# Patient Record
Sex: FEMALE | Race: WHITE
[De-identification: names, ages, dates, MRNs, and addresses within clinical notes are randomized per-mention and may not be internally consistent; named-entity substitution may affect disease eponyms.]

---

## 2022-12-10 ENCOUNTER — HOSPITAL ENCOUNTER (EMERGENCY)
Dept: HOSPITAL 97 - ER | Age: 62
Discharge: HOME | End: 2022-12-10
Payer: COMMERCIAL
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
## 2022-12-10 DIAGNOSIS — M25.561: ICD-10-CM

## 2022-12-10 DIAGNOSIS — M79.671: Primary | ICD-10-CM

## 2022-12-10 NOTE — ER
Nurse's Notes                                                                                     

 [facility]                                                                 

Name: Gita Haile                                                                                  

Age: 62 yrs                                                                                       

Sex: Female                                                                                       

: 1960                                                                                   

MRN: [id number]                                                                                   

Arrival Date: 12/10/2022                                                                          

Time: 13:22                                                                                       

Account#: [id number]                                                                            

Bed IW4                                                                                           

Private MD:                                                                                       

Diagnosis: Pain in right foot;Pain in right knee;Fall on same level, unspecified                  

                                                                                                  

ED Course:                                                                                        

12/10                                                                                             

13:22 Patient arrived in ED.                                                                  as  

13:55 Kishore Larkin PA is PHCP.                                                                cp  

13:55 Eloy Acuna MD is Attending Physician.                                            cp  

15:37 Dior Fishman RN is Primary Nurse.                                                   iw  

                                                                                                  

Administered Medications:                                                                         

No medications were administered                                                                  

                                                                                                  

                                                                                                  

Outcome:                                                                                          

15:34 Discharge ordered by MD.                                                                cp  

15:37 Patient left the ED.                                                                    iw  

                                                                                                  

Signatures:                                                                                       

Selina Hunter                             as                                                   

Dior Fishman, RN                     RN   iw                                                   

Kishore Larkin PA                         PA   cp                                                   

                                                                                                  

**************************************************************************************************

## 2022-12-10 NOTE — EDPHYS
Physician Documentation                                                                           

 [facility]                                                                 

Name: Gita Haile                                                                                  

Age: 62 yrs                                                                                       

Sex: Female                                                                                       

: 1960                                                                                   

MRN: [id number]                                                                                   

Arrival Date: 12/10/2022                                                                          

Time: 13:22                                                                                       

Account#: [id number]                                                                            

Bed IW4                                                                                           

Private MD:                                                                                       

ED Physician Eloy Acuna                                                                     

HPI:                                                                                              

12/10                                                                                             

14:50 This 62 yrs old Female presents to ER via Un[facility] with complaints of Toe Injury,     cp  

      Knee Injury.                                                                                

14:50 The patient presents with an injury, pain, that is acute. The complaints affect the     cp  

      right knee and right foot. Context: The problem was sustained at home, resulted from        

      the patient tripping, over a pet, the patient can fully bear weight, the patient is         

      able to ambulate, with mild difficulty. Onset: The symptoms/episode began/occurred          

      yesterday.                                                                                  

14:50 Associated signs and symptoms: The patient has no apparent associated signs or symptoms.cp  

                                                                                                  

ROS:                                                                                              

14:55 MS/extremity: Positive for pain, tenderness, of the right knee and right foot.          cp  

14:55 Eyes: Negative for injury, pain, redness, and discharge.                                cp  

14:55 Constitutional: Negative for body aches, chills, poor PO intake.                            

14:55 Neck: Negative for pain with movement, pain at rest, stiffness.                             

14:55 Cardiovascular: Negative for chest pain, palpitations.                                      

14:55 Respiratory: Negative for cough, shortness of breath, wheezing.                             

14:55 Abdomen/GI: Negative for abdominal pain, nausea, vomiting, and diarrhea.                    

14:55 Back: Negative for pain at rest, pain with movement.                                        

14:55 Skin: Negative for cellulitis, rash.                                                        

                                                                                                  

Exam:                                                                                             

15:00 Constitutional: The patient appears in no acute distress, alert, awake, non-toxic, well cp  

      developed, well nourished.                                                                  

15:00 Head/Face:  Normocephalic, atraumatic.                                                  cp  

15:00 Neck: ROM/movement: is normal, is supple, without pain, no range of motions limitations.    

15:00 Chest/axilla: Inspection: normal.                                                           

15:00 Cardiovascular: Rate: normal.                                                               

15:00 Respiratory: the patient does not display signs of respiratory distress,  Respirations:     

      normal, no use of accessory muscles, no retractions, labored breathing, is not present.     

15:00 Musculoskeletal/extremity: Extremities: grossly normal except: noted in the right knee:     

      pain, tenderness, There is no evidence of  decreased ROM, deformity, noted in the right     

      foot: pain, tenderness to great toe and dorsum mid foot, no evidence of  deformity,         

      Pulses: noted to be 2+ in the right dorsalis pedis artery.                                  

15:00 Neuro: Orientation: to person, place \T\ time. Mentation: is normal.                        

                                                                                                  

MDM:                                                                                              

14:44 Patient medically screened.                                                             cp  

15:00 Differential diagnosis: dislocation, closed fracture, contusion.                        cp  

15:33 Data reviewed: vital signs, nurses notes.                                               cp  

15:33 Counseling: I had a detailed discussion with the patient and/or guardian regarding: the cp  

      historical points, exam findings, and any diagnostic results supporting the                 

      discharge/admit diagnosis. ED course: Patient declined to wait for xrays of right knee      

      and right foot to be done. Patient may return at any time for reevaluation.                 

                                                                                                  

Administered Medications:                                                                         

No medications were administered                                                                  

                                                                                                  

                                                                                                  

Disposition:                                                                                      

17:30 Co-signature as Attending Physician, Eloy Acuna MD I agree with the assessment and rt  

      plan of care.                                                                               

                                                                                                  

Disposition Summary:                                                                              

12/10/22 15:34                                                                                    

Discharge Ordered                                                                                 

      Location: Home                                                                          cp  

      Problem: new                                                                            cp  

      Symptoms: are unchanged                                                                 cp  

      Condition: Stable                                                                       cp  

      Diagnosis                                                                                   

        - Pain in right foot                                                                  cp  

        - Pain in right knee                                                                  cp  

        - Fall on same level, unspecified                                                     cp  

      Followup:                                                                               cp  

        - With: Private Physician                                                                  

        - When: 2 - 3 days                                                                         

        - Reason: Recheck today's complaints                                                       

      Discharge Instructions:                                                                     

        - Discharge Summary Sheet                                                             cp  

        - Elastic Bandage and RICE Therapy                                                    cp  

        - Acute Knee Pain, Adult                                                              cp  

        - Foot Pain                                                                           cp  

      Forms:                                                                                      

        - Medication Reconciliation Form                                                      cp  

        - Thank You Letter                                                                    cp  

        - Antibiotic Education                                                                cp  

        - Prescription Opioid Use                                                             cp  

Signatures:                                                                                       

Dispatcher [employer]                           EDMS                                                 

Kishore Larkin PA PA   cp                                                   

Eloy Acuna MD MD   rt                                                   

                                                                                                  

Corrections: (The following items were deleted from the chart)                                    

                                                                                             

04:01 12/10 14:00 MS/extremity: Positive for pain, tenderness, of the right knee and right    cp  

      foot, cp                                                                                    

                                                                                                  

**************************************************************************************************

## 2022-12-10 NOTE — XMS REPORT
Continuity of Care Document

                          Created on:December 10, 2022



Patient:GILDARDO COON

Sex:Female

:1960

External Reference #:[id number]





Demographics







                          Address                   [address]



                                                    [place] [address]

 

                          Home Phone                [phone]

 

                          Work Phone                [phone]

 

                          Mobile Phone              [phone]

 

                          Email Address             [email or web address]

 

                          Preferred Language        English

 

                          Marital Status            Unknown

 

                          Jew Affiliation     Unknown

 

                          Race                      Unknown

 

                          Additional Race(s)        Unavailable



                                                    White

 

                          Ethnic Group              Unknown









Author







                          Organization              [facility]

t

 

                          Address                   [address]



                                                    [place] [address]

 

                          Phone                     [phone]









Support







                Name            Relationship    Address         Phone

 

                Brett Shepherd    Spouse          Unavailable     [phone]

 

                RONY MEYER   O               Unavailable     [phone]

 

                CHITO SANCHEZ     Friend          Unavailable     [phone]









Care Team Providers







                    Name                Role                Phone

 

                    Arnulfo London MD     Primary Care Physician [phone]

 

                    SYSTEM, PROVIDER NOT IN Attending Clinician Unavailable

 

                    Daron Coley MD  Attending Clinician [phone]

 

                    DARON COLEY     Attending Clinician Unavailable

 

                    Sangita Ortega Attending Clinician [phone]

 

                    SANGITA PAIGE Attending Clinician Unavailable

 

                    Kiya Chavez  Attending Clinician [phone]

 

                    JOAN MILLER        Attending Clinician Unavailable

 

                    Maty Whitt RN   Attending Clinician Unavailable

 

                    Monique Barrera NP Attending Clinician [phone]

 

                    MONIQUE BARRERA  Attending Clinician Unavailable

 

                    Dima Trotter  Attending Clinician [phone]

 

                    Rancho Mcfarlane, Sadie FALCON Attending Clinician [phone]

[phone]

 

                    DIMA BEAR      Attending Clinician Unavailable

 

                    Kvng Gallegos MD     Attending Clinician [phone]

 

                    KVNG GALLEGOS        Attending Clinician Unavailable

 

                    Akosua Medeiros RN   Attending Clinician Unavailable

 

                    JOSE ALFREDO ESPOSITO   Attending Clinician Unavailable

 

                    Jose Alfredo Corrigan Attending Clinician [phone]

 

                    Karli Garcia  Attending Clinician [phone]

 

                    Bebetobis [facility], Adelaida BURROUGHS. Attending Clinician [phone]

 

                    Adonay SOLANO, Milly   Attending Clinician [phone]

 

                    Kristina DAVID, Daryl  Attending Clinician [phone]

 

                    Kymberly Ramos RN    Attending Clinician [phone]

 

                    Migel SOLANO, Pamela Ashley Attending Clinician [phone]

[phone]

 

                    VENANCIO TAN   Attending Clinician Unavailable

 

                    Justen DAVID, Sameer  Attending Clinician [phone]

 

                    Vignesh NP, Kinza Attending Clinician [phone]

 

                    Drake DAVID, Jing      Attending Clinician [phone]

 

                    Rach SOLANO, Lilliana Attending Clinician [phone]

 

                    Roberto RT, Keara C  Attending Clinician Unavailable

 

                    Vitor DONALD, Alejandra     Attending Clinician [phone]

 

                    Sparkle SOLANO, Sandro  Attending Clinician [phone]

 

                    Luis DAVID, Santos Attending Clinician [phone]

 

                    Shady Painter MD        Attending Clinician [phone]

 

                    Collin Adler MD     Attending Clinician [phone]

 

                    Akosua Bauer  Attending Clinician [phone]

 

                    AKOSUA ROYAL      Attending Clinician Unavailable

 

                    Venancio Dominguez LVN Attending Clinician Unavailable

 

                    Douglas Shankar RN Attending Clinician Unavailable

 

                    Vel Abdul RN Attending Clinician Unavailable

 

                    Smiley Lopez Attending Clinician [phone]

 

                    Severino Hahn RN    Attending Clinician Unavailable

 

                    Avinash Heredia RN Attending Clinician Unavailable

 

                    Kymberly Choi RN Attending Clinician Unavailable

 

                    Dereje DONALD, Abiola CRISOSTOMO Attending Clinician [phone]

 

                    Haley Rogel RN Attending Clinician Unavailable

 

                    Shady Perez MD  Attending Clinician [phone]

 

                    Carmen Brush MA   Attending Clinician Unavailable

 

                    Reyes RN, Marialuisa L Attending Clinician Unavailable

 

                    Marium STEPHEN, Marlene   Attending Clinician Unavailable

 

                    Rubi RN, Lisbeth ORTEZ Attending Clinician Unavailable

 

                    Cricket RN, Tania LOPEZ Attending Clinician [phone]

 

                    Ramirez RN, Silvio CHEEMA  Attending Clinician [phone]

 

                    Tremayne RN, Santos MOSS  Attending Clinician [phone]

 

                    Max STEPHEN, Shree SHABAZZ Attending Clinician Unavailable

 

                    DIMITRIOS BRAUN  Attending Clinician Unavailable

 

                    Karen DAVID, Dimitrios JASON Attending Clinician [phone]

 

                    Tracy CONKLIN, Aubrey   Attending Clinician [phone]

 

                    Kevin HARPER MD, Serenity Attending Clinician [phone]

 

                    Doctor Unassigned, No Name Attending Clinician Unavailable

 

                    Only, Adc Test      Attending Clinician Unavailable

 

                    Pob, Adc Lab Main   Attending Clinician Unavailable

 

                    Stefano STEPHEN, Lianne AREVALO Attending Clinician Unavailable

 

                    Jaki DAVID, Enrike  Attending Clinician [phone]

 

                    Raul STEPHEN, Kiya SHEIKH  Attending Clinician [phone]

 

                    Irineo PA, Moses     Attending Clinician Unavailable

 

                    Giuliano STEPHEN, Cruz DOSS Attending Clinician [phone]

 

                    Elias STEPHEN, Ananya BURROUGHS Attending Clinician [phone]

 

                    Victor M STEPHEN, Nisha FALCON Attending Clinician Unavailable

 

                    Jordon DAVID, Kvng   Attending Clinician [phone]

 

                    Mena Felix Attending Clinician [phone]

 

                    Danya Madison Attending Clinician [phone]

 

                    Henri NP, Jose Alfredo JASON Attending Clinician [phone]

 

                    Merlin MD, Benjamin  Attending Clinician [phone]

 

                    Ruel DAVID, Jenaro Attending Clinician Unavailable

 

                    Cynthia DONALD, Carmen Attending Clinician [phone]

 

                    Sara DAVID, Melissa    Attending Clinician [phone]

 

                    Devin DAVID, Mai      Attending Clinician [phone]

 

                    Elie Perez CRNA Attending Clinician [phone]

 

                    CarrascoShayy Andrade Attending Clinician [phone]

 

                    SERENITY BAKER Attending Clinician Unavailable

 

                    JING ALTMAN         Attending Clinician Unavailable

 

                    PAMELA LAINEZ Attending Clinician Unavailable

 

                    SANDRO ORDAZ    Attending Clinician Unavailable

 

                    SHALONDA MORENO M.D. Attending Clinician Unavailable

 

                    DARON COLEY     Admitting Clinician Unavailable

 

                    DIMITRIOS BRAUN  Admitting Clinician Unavailable

 

                    Dimitrios Braun MD Admitting Clinician [phone]









Payers







           Payer Name Policy Type Policy Number Effective Date Expiration Date S

oursaurabh

 

           [employer] CHOICE POS II            [id number] 2001            



                                            00:00:00              

 

           [employer] HMO POS OPEN            [id number]            



           ACCESS                           00:00:00              

 

           [employer] PPO             [id number] 2015            



                                            00:00:00              

 

           [employer] COMMERCIAL            [id number] 2021            



           OUT OF NETWORK                       00:00:00              

 

           [employer] LIFESOURCE            [id number] 2019            



           SCT                              00:00:00              







Problems







       Condition Condition Condition Status Onset  Resolution Last   Treating Co

mments 

Source



       Name   Details Category        Date   Date   Treatment Clinician        



                                                 Date                 

 

       Class 2 Class 2 Disease Active                              [facility]



       severe severe                                              Health



       obesity obesity               00:00:                             



       due to due to               00                                 



       excess excess                                                  



       calories calories                                                  



       with   with                                                    



       serious serious                                                  



       comorbidit comorbidit                                                  



       y and body y and body                                                  



       mass index mass index                                                  



       (BMI) of (BMI) of                                                  



       37.0 to 37.0 to                                                  



       37.9 in 37.9 in                                                  



       adult  adult                                                   

 

       Right hip Right hip Disease Active                              [facility]



       pain   pain                                                Health



                                   00:00:                             



                                   00                                 

 

       Pain due Pain due Disease Active                              [facility]



       to right to right                                              Health



       hip joint hip joint               00:00:                             



       prosthesis prosthesis               00                                 

 

       Idiopathic Idiopathic Disease Active                              U

T



       aseptic aseptic                                              Health



       necrosis necrosis               00:00:                             



       of right of right               00                                 



       femur  femur                                                   

 

       Limp   Limp   Disease Active                              [facility]



                                                                  Health



                                   00:00:                             



                                   00                                 

 

       Chronic Chronic Disease Active                       Last   [facility]



       obstructiv obstructiv                                       Assessmen

 itdick of



       e      e                    00:00:                      t & Plan: [place]



       pulmonary pulmonary               00                          Formattin M

[place]



       disease disease                                           g of this [place]

so



                                                               note   n



                                                               might be Cancer



                                                               different Center



                                                               from the 



                                                               original. 



                                                               Patient 



                                                               reports 



                                                               unchanged 



                                                               dyspnea 



                                                               reports 



                                                               she is 



                                                               quite  



                                                               sedentary 



                                                               at home. 



                                                               Recommend 



                                                               that she 



                                                               initiate 



                                                               a home 



                                                               exercise 



                                                               regimen 



                                                               as she is 



                                                               unable to 



                                                               participa 



                                                               te in  



                                                               pulmonary 



                                                               rehab. 



                                                               Recommend 



                                                               she    



                                                               continue 



                                                               to use 



                                                               Advair 



                                                               and    



                                                               Spiriva 



                                                               inhaled 



                                                               therapies 



                                                               . Her  



                                                               FEV1 and 



                                                               total  



                                                               lung   



                                                               capacity 



                                                               are    



                                                               improved 



                                                               on PFTs. 



                                                               Recommend 



                                                               follow-up 



                                                               in 6   



                                                               months 



                                                               with   



                                                               PFTs.  

 

       Hoarseness Hoarseness Disease Active                              U

nivers



                                   9-07                               ity of



                                   00:00:                             [place]



                                   00                                 MD Tian lopez



                                                                      Cancer



                                                                      Center

 

       Gram-negat Gram-negat Disease Active                              U

nivers



       sena sepsis sena sepsis               4-30                               it

y of



                                   00::                             [place]



                                   00                                 MD Tian lopez



                                                                      Cancer



                                                                      Center

 

       Central Central Disease Active                              [facility]



       line   line                 4-30                               ity of



       associated associated               00:00:                             [place]

[place]



       bloodstrea bloodstrea               00                                 MD luis felipe [place]



       infection infection                                                  [facility]

 

       Supraventr Supraventr Disease Active 2021                      Last   U

nivers



       icular icular               0-26                        Assessmen ity of



       tachycardi tachycardi               00:00:                      t & Plan:

 [place]



       a      a                    00                          Formattin MD



                                                               g of this [place]



                                                               note   n



                                                               might be Cancer



                                                               different Center



                                                               from the 



                                                               original. 



                                                               Patient 



                                                               has a  



                                                               history 



                                                               of     



                                                               supravent 



                                                               ricular 



                                                               tachycard 



                                                               ia for 



                                                               which she 



                                                               wore a 



                                                               30-day 



                                                               event  



                                                               monitor 



                                                               in     



                                                               September 



                                                               that Dr. Daryl Acevedo 



                                                               reviewed 



                                                               showing 



                                                               no     



                                                               significa 



                                                               nt     



                                                               arrhythmi 



                                                               as during 



                                                               that time 



                                                               she had 



                                                               it on. 



                                                               Continue 



                                                               metoprolo 



                                                               l  



                                                               mg daily 

 

       Nausea Nausea Disease Active                       Overview: Univer

s



                                                           Formattin ity of



                                   00:00:                      g of this [place]



                                   00                          note   MD



                                                               might be [facility]



                                                               different n



                                                               from the Cancer



                                                               original. Center



                                                               Added  



                                                               automatic 



                                                               ally from 



                                                               request 



                                                               for    



                                                               surgery 



                                                               1872 

 

       Loose  Loose  Disease Active                       Overview: Univer

s



       stool  stool                                        Formattin ity of



                                   00:00:                      g of this [place]



                                   00                          note   MD



                                                               might be [place]



                                                               different n



                                                               from the Cancer



                                                               original. Center



                                                               Added  



                                                               automatic 



                                                               ally from 



                                                               request 



                                                               for    



                                                               surgery 



                                                               1872 

 

       Graft-vers Graft-vers Disease Active                       Overview

: Univers



       us-host us-host                                       Formattin ity o

f



       disease disease               00:00:                      g of this [place]



                                   00                          note   MD



                                                               might be [place]



                                                               different n



                                                               from the Cancer



                                                               original. Center



                                                               Added  



                                                               automatic 



                                                               ally from 



                                                               request 



                                                               for    



                                                               surgery 



                                                               [id number] 

 

       Hypokalemi Hypokalemi Disease Active 2020-0                             U

nivers



       a      a                    1-23                               ity of



                                   00:00:                             [place]



                                   00                                 MD Tian lopez



                                                                      [facility]



                                                                      Center

 

       Hypomagnes Hypomagnes Disease Active 2020-0                             U

pierce



       emia   emia                 -23                               ity of



                                   00:00:                             [place]



                                   00                                 MD Tian lopez



                                                                      [facility]

 

       Abdominal Abdominal Disease Active 2019                      Overview: 

Univers



       pain   pain                 2-24                        Formattin ity of



                                   00:00:                      g of this [place]



                                   00                          note   MD



                                                               might be [place]



                                                               from the Cancer



                                                               original. Center



                                                               Added  



                                                               automatic 



                                                               ally from 



                                                               request 



                                                               for    



                                                               surgery 



                                                               [id number] 

 

       Physical Physical Disease Active 2019                             Unive

rs



       deconditio deconditio               2-20                               it

y of



       fany   fany                 00:00:                             [place]



                                   00                                 MD Tian lopez



                                                                      [facility]

 

       Difficulty Difficulty Disease Active 2019                             U

nivers



       in walking in walking               2-20                               it

y of



                                   00:00:                             [place]



                                   00                                 MD Tian lopez



                                                                      [facility]

 

       Impairment Impairment Disease Active 2019                             U

nivpeter



       of balance of balance               2-20                               it

y of



                                   00:00:                             [place]



                                   00                                 MD Tian lopez



                                                                      [facility]

 

       Generalize Generalize Disease Active 2019                             U

pierce



       d muscle d muscle               1-27                               ity of



       weakness weakness               00:00:                             [place]



                                   00                                 MD Tian lopez



                                                                      [facility]

 

       Patient Patient Disease Active 2019                             Univers



       immunocomp immunocomp               1-13                               it

y of



       romised romised               00:00:                             [place]



                                   00                                 MD Tian lopez



                                                                      [facility]

 

       Cytomegalo Cytomegalo Disease Active 2019                             U

pierce



       virus  virus                1-05                               ity of



       serostatus serostatus               00:00:                             [place]

[place]



       positive positive               00                                 MD Tian lopez



                                                                      [facility]

 

       Pain due Pain due Disease Active 2019                             Unive

rs



       to     to                   0-28                               ity of



       neoplastic neoplastic               00:00:                             [place]

[place]



       disease disease               00                                 MD Tian lopez



                                                                      [facility]

 

       Status Status Disease Active 2019                             Univers



       post stem post stem               0-15                               ity 

of



       cell   cell                 00:00:                             [place]



       transplant transplant               00                                 MD Tian lopez



                                                                      [facility]

 

       Myelodyspl Myelodyspl Disease Active 2019                             U

pierce



       astic  astic                9-25                               ity of



       syndrome syndrome               00:00:                             [place]



                                   00                                 MD Tian lopez



                                                                      [facility]

 

       Encounter Encounter Disease Active                              Uni

vers



       for    for                  9-25                               ity of



       antineopla antineopla               00:00:                             [place]

[place]



       stic   stic                 00                                 MD



       chemothera chemothera                                                  An

derso



       py     py                                                      n



                                                                      Cancer



                                                                      Center

 

       Rash   Rash   Disease Active 2019                             Univers



                                   5-01                               ity of



                                   00:00:                             [place]



                                   00                                 MD Tian lopez



                                                                      [facility]

 

       Febrile Febrile Disease Active                              Univers



       neutrophil neutrophil               4-                               it

y of



       ic     ic                   00:00:                             [place]



       dermatosis dermatosis               00                                 MD



       (Evan) (Evan)                                                  Tian lopez



                                                                      [facility]

 

       Pain in Pain in Disease Active                              Univers



       right knee right knee                                              it

y of



                                   00:00:                             [place]



                                   00                                 MD Tian lopez



                                                                      [facility]

 

       Cellulitis Cellulitis Disease Active                              U

nivers



       of right of right                                              ity of



       lower limb lower limb               00:00:                             [place]

[place]



                                   00                                 MD Tian lopez



                                                                      [facility]

 

       Weakness Weakness Disease Active                              Unive

rs



                                                                  ity of



                                   00:00:                             [place]



                                   00                                 MD Tian lopez



                                                                      [facility]

 

       Other  Other  Disease Active                              Univers



       disorders disorders               4-                               ity 

of



       of     of                   00:00:                             [place]



       electrolyt electrolyt               00                                 



       fluid  fluid                                                   n



       balance, balance,                                                  Cancer



       not    not                                                     Center



       elsewhere elsewhere                                                  



       classified classified                                                  

 

       High grade High grade Disease Recurre                              

Univers



       myelodyspl myelodyspl        nce                                   it

y of



       astic  astic                00:00:                             [place]



       syndrome syndrome               00                                 MD



       lesions lesions                                                  [facility]

 

       Tumor  Tumor  Disease Active                              Univers



       lysis  lysis                3-                               ity of



       syndrome syndrome               00:00:                             [place]



                                   00                                 MD Tian lopez



                                                                      [facility]

 

       Anemia in Anemia in Disease Active                              Uni

vers



       neoplastic neoplastic               3-29                               it

y of



       disease disease               00:00:                             [place]



                                   00                                 MD Tian lopez



                                                                      [facility]

 

       Other  Other  Disease Active                              Univers



       secondary secondary               3-                               ity 

of



       thrombocyt thrombocyt               00:00:                             [place]

[place]



       openia openia               00                                 MD [place]



                                                                      [facility]

 

       Bipolar Bipolar Disease Active                              Univers



       disorder disorder               3-29                               ity of



                                   00:00:                             [place]



                                   00                                 MD Tian lopez



                                                                      [facility]

 

       Acute  Acute  Disease Active                              Univers



       myeloblast myeloblast               3-28                               it

y of



       ic     ic                   00:00:                             [place]



       leukemia leukemia               00                                 MD



       not having not having                                                  An

derso



       achieved achieved                                                  n



       remission remission                                                  [facility]



                                                                      Center

 

       Acute  Acute  Disease Active                              Methodi



       post-hemor post-hemor               7-24                               st



       rhagic rhagic               00:00:                             [facility]



       anemia anemia               00                                 l

 

       S/P lumbar S/P lumbar Disease Active                              M

ethodi



       spinal spinal               7-24                               st



       fusion fusion               00:00:                             [facility]



                                   00                                 l

 

       Electrolyt Electrolyt Disease Active                              M

ethodi



       e and  e and                7-24                               st



       fluid  fluid                00:00:                             [facility]



       disorder disorder               00                                 l

 

       Acute  Acute  Disease Active                              Methodi



       respirator respirator               7-16                               st



       y distress y distress               00:00:                             Ho

spita



                                   00                                 l

 

       Post-opera Post-opera Disease Active                              M

ethodi



       tive pain tive pain               7-14                               st



                                   00:00:                             [facility]



                                   00                                 l

 

       Post-opera Post-opera Disease Active                              M

ethodi



       tive   tive                 7-14                               st



       nausea and nausea and               00:00:                             Ho

spita



       vomiting vomiting               00                                 l

 

       Chronic Chronic Disease Active                              Methodi



       pain   pain                 7-14                               st



                                   00:00:                             [facility]



                                   00                                 l

 

       Obesity, Obesity, Disease Active                              Metho

di



       Class III, Class III,               7-14                               st



       BMI    BMI                  00:00:                             [facility]



       40-49.9 40-49.9               00                                 l



       (morbid (morbid                                                  



       obesity) obesity)                                                  

 

       Acute  Acute  Disease Active                              Methodi



       blood loss blood loss               7-14                               st



       anemia anemia               00:00:                             [facility]



                                   00                                 l

 

       BACK     BACK Diagnosis Active 2018-0        2018-10-19               Mem

oria



              Active                         14:44:00               l



              2018               08:00:                             [place]



              [facility]               00                                 



              [facility]                                                    

 

       Other  Other  Disease Active                              Methodi



       secondary secondary               7-10                               st



       scoliosis scoliosis               00:00:                             Hosp

abby



                                   00                                 l

 

       Scoliosis Scoliosis Disease Active                              Met

hodi



       due to due to               23                               st



       degenerati degenerati               00:00:                             Ho

spita



       ve disease ve disease               00                                 l



       of spine of spine                                                  



       in adult in adult                                                  



       patient patient                                                  

 

       BACK /  BACK / Diagnosis Active 2018               Me

moria



       AQUA   AQUA                           13:05:00               l



              Active               08:00:                             [place]



              2018               00                                 



               [facility]                                                    

 

       ARTHROPATH        Diagnosis Active 2017              

 [facility]



       Y OF   ARTHROPATH                         16:08:00               l



       LUMBAR Y OF                 00:00:                             Dami



       FACET  LUMBAR               00                                 



       JOINTS FACET                                                   



              JOINTS                                                  



              Active                                                  



              2017                                                  



               [facility]                                                  

 

       ARTHROPATH  ARTHROPAT Diagnosis Active 2017-0        2017-09-15          

     [facility]



       Y OF   HY OF                          09:23:00               l



       LUMBAR LUMBAR               00:00:                             Dami



       FACET  FACET                00                                 



       JOINT  JOINT                                                   



              Active                                                  



              2017                                                  



              [facility]                                                  



              [facility]                                                  

 

       S/P total S/P total Disease Active                              Uni

vers



       hip    hip                                                 ity of



       arthroplas arthroplas               00:00:                             [facility]

[place]



       ty     ty                   00                                 Medical



                                                                      Branch

 

       Low back  Low back Problem                      2019               

[facility]



       pain   pain                               11:36:29               l



              2019                                                  [place]



               [facility]                                                    

 

       Other    Other Problem                      2019               [facility]

max



       abnormalit abnormalit                             11:36:29               

l



       ies of ies of                                                  Dami



       gait and gait and                                                  



       mobility mobility                                                  



              2019                                                  



               [facility]                                                    

 

       Bipolar I  Bipolar I Problem Resolve               2019            

   [facility]



       disorder disorder        d                    11:36:29               l



       (disorder) (disorder)                                                  He

rmann



               Resolved                                                  



              Problem                                                  



              2019                                                  



               Ortho                                                  



              and                                                     



              Spine,[facility]                                                    

 

       Arthritis  Arthritis Problem Resolve               2019            

   [facility]



       (disorder) (disorder)        d                    11:36:29               

l



              Resolved                                                  Dami



              Problem                                                  



              2019                                                  



               Ortho                                                  



              and                                                     



              Spine,[facility]                                                    

 

       Depressive  Depressiv Problem Resolve               2019           

    [facility]



       disorder e disorder        d                    11:36:29               l



       (disorder) (disorder)                                                  He

rmann



              Resolved                                                  



              Problem                                                  



              2019                                                  



               Ortho                                                  



              and                                                     



              Spine,[facility]                                                    

 

       Sleep   Sleep Problem Resolve               2019               [facility]

max



       apnea  apnea         d                    11:36:29               l



       (finding) (finding)                                                  Herm

calin



              Resolved                                                  



              Problem                                                  



              2019                                                  



               Ortho                                                  



              and                                                     



              Spine,[facility]                                                    

 

       Diabetes  Diabetes Problem Active               2019               

[facility]



       mellitus mellitus                             11:36:29               l



       (disorder) (disorder)                                                  He

rmann



              Active                                                  



              Problem                                                  



              2019                                                  



               Ortho                                                  



              and                                                     



              Spine,[facility]                                                    

 

       Gastroesop  Gastroeso Problem Active               2019            

   [facility]



       hageal phageal                             11:36:29               l



       reflux reflux                                                  Dami



       disease disease                                                  



       (disorder) (disorder)                                                  



              Active                                                  



              Problem                                                  



              2019                                                  



               [facility]th[facility]                                                  



              and                                                     



              [facility]                                                    

 

       Hyperchole  Hyperchol Problem Active               2019            

   [facility]



       sterolemia esterolemi                             11:36:29               

l



       (disorder) ezekiel lopez



              (disorder)                                                  



              Active                                                  



              Problem                                                  



              2019                                                  



               [facility]th[facility]                                                  



              and                                                     



              Spine,[facility]                                                    

 

       Asthma  Asthma Problem Active               2019               Raphael

max



       (disorder) (disorder)                             11:36:29               

l



              Active                                                  [place]



              Problem                                                  



              2019                                                  



                Ortho                                                  



              and                                                     



              Spine,[facility]                                                    

 

       Hypertensi  Hypertens Problem Active               2019            

   [facility]



       ve     sena                                11:36:29               l



       disorder, disorder,                                                  Herm

calin



       systemic systemic                                                  



       arterial arterial                                                  



       (disorder) (disorder)                                                  



              Active                                                  



              Problem                                                  



              2019                                                  



               [facility]th[facility]                                                  



              and                                                     



              Sp[facility]                                                    

 

       Migraine  Migraine Problem Active               2019               

[facility]



       (disorder) (disorder)                             11:36:29               

l



              Active                                                  [place]



              Problem                                                  



              2019                                                  



                Ortho                                                  



              and                                                     



              Spine,[facility]                                                    

 

       Urinary   Urinary Problem Active               2019               M

[facility]



       incontinen incontinen                             11:36:29               

l



       ce     ce                                                      [place]



       (finding) (finding)                                                  



              Active                                                  



              Problem                                                  



              2019                                                  



                Ortho                                                  



              and                                                     



              Spine,[facility]                                                    

 

       Z96.641 -   Z96.641 Diagnosis Active               2022            

   [facility]



       PRESENCE - PRESENCE                             15:39:00               l



       OF RIGHT OF RIGHT                                                  [place]

n



       ARTIFICIAL ARTIFICIAL                                                  



       H      H Active                                                  



              [facility]                                                    

 

       Painful Painful Disease Active                                    [facility]



       [facility]                                                   [facility] of



                                                                      [place]



                                                                      [facility]



                                                                      [facility]



                                                                      Center

 

       Pain in Pain in Disease Active                                    [facility]



       right arm right arm                                                  ity 

of



                                                                      [place]



                                                                      [facility]



                                                                      [facility]



                                                                      Center

 

       Inguinal Inguinal Disease Active                                    [facility]

rs



       pain   pain                                                    ity of



                                                                      [place]



                                                                      [facility]



                                                                      [facility]



                                                                      Center

 

       Headache Headache Disease Active                                    [facility]

rs



                                                                      ity of



                                                                      [place]



                                                                      [facility]



                                                                      [facility]



                                                                      Center

 

       Nausea and Nausea and Disease Active                                    U

nivers



       vomiting vomiting                                                  ity of



                                                                      [place]



                                                                      MD Tina lopez



                                                                      Cancer



                                                                      Center







History of Past Illness







       Condition Condition Condition Status Onset  Resolution Last   Treating Co

mments 

Source



       Name   Details Category        Date   Date   Treatment Clinician        



                                                 Date                 

 

       Scoliosis,        Problem        -2019            

   [facility]



       unspecifie Scoliosis,                  11:36:29 11:36:29             

  l



       d      unspecifie               06:20:                             [place]

n



              d                    00                                 



              2018                                                  



               [facility]                                                    







Allergies, Adverse Reactions, Alerts







       Allergy Allergy Status Severity Reaction(s) Onset  Inactive Treating Comm

ents 

Source



       Name   Type                        Date   Date   Clinician        

 

       Penicill Propensi Active        Rash                         [facility]

s



       ins    ty to                                               ity of



              adverse                      00:00:                      [place]



              reaction                      00                          Medical



              s                                                       Branch

 

       Penicill Propensi Active        Rash   2022-0                      Univer

s



       ins    ty to                       1-26                        ity of



              adverse                      00:00:                      [place]



              reaction                      00                          Medical



              s                                                       Branch

 

       [facility]   Active        Rash   2022-0                      [facility]



       INS    Class                       1-26                        ity of



                                          00:00:                      [place]



                                          00                          Medical



                                                                      Branch

 

       Penicill Propensi Active        Rash   2019-0                      Univer

s



       ins    ty to                       3-28                        ity of



              adverse                      00:00:                      [place]



              reaction                      00                          MD gracy Overton



                                                                      n



                                                                      Cancer



                                                                      [place]

 

       PENICILL Drug   Active Low    Rash   2019-0                      MD



       INS    Class                       3-28                        [facility]



                                          00:00:                      n



                                          00                          

 

       PENICILL Drug   Active Low    Rash   2019-0                      MD



       INS    Class                       3-28                        [facility]



                                          00:00:                      n



                                          00                          

 

       PENICILL Drug   Active Low    Rash   2019-0                      MD



       INS    Class                       3-28                        [facility]



                                          00:00:                      n



                                          00                          

 

       PENICILL Drug   Active Low    Rash   2019-0                      MD



       INS    Class                       3-28                        [facility]



                                          00:00:                      n



                                          00                          

 

       PENICILL Drug   Active Low    Rash   2019-0                      MD



       INS    Class                       3-28                        [place]



                                          00:00:                      n



                                          00                          

 

       PENICILL Drug   Active Low    Rash   2019-0                      MD



       INS    Class                       3-28                        [facility]



                                          00:00:                      n



                                          00                          

 

       PENICILL Drug   Active Low    Rash   2019-0                      MD



       INS    Class                       3-28                        [place]



                                          00:00:                      n



                                          00                          

 

       PENICILL Drug   Active Low    Rash   2019-0                      MD



       INS    Class                       3-28                        [place]



                                          00:00:                      n



                                          00                          

 

       PENICILL Drug   Active Low    Rash   2019-0                      MD



       INS    Class                       3-28                        [place]



                                          00:00:                      n



                                          00                          

 

       PENICILL Drug   Active Low    Rash   2019-0                      MD



       INS    Class                       3-28                        [place]



                                          00:00:                      n



                                          00                          

 

       PENICILL Drug   Active Low    Rash   2019-0                      MD



       INS    Class                       3-28                        [place]



                                          00:00:                      n



                                          00                          

 

       PENICILL Drug   Active Low    Rash   2019-0                      MD



       INS    Class                       3-28                        [place]



                                          00:00:                      n



                                          00                          

 

       PENICILL Drug   Active Low    Rash   2019-0                      MD



       INS    Class                       3-28                        [place]



                                          00:00:                      n



                                          00                          

 

       PENICILL Drug   Active Low    Rash   2019-0                      MD



       INS    Class                       3-28                        [place]



                                          00:00:                      n



                                          00                          

 

       PENICILL Drug   Active Low    Rash   2019-0                      MD



       INS    Class                       3-28                        [place]



                                          00:00:                      n



                                          00                          

 

       PENICILL Drug   Active Low    Rash   2019-0                      MD



       INS    Class                       3-28                        [place]



                                          00:00:                      n



                                          00                          

 

       PENICILL Drug   Active Low    Rash   2019-0                      MD



       INS    Class                       3-28                        [place]



                                          00:00:                      n



                                          00                          

 

       PENICILL Drug   Active Low    Rash   2019-0                      MD



       INS    Class                       3-28                        [place]



                                          00:00:                      n



                                          00                          

 

       PENICILL Drug   Active Low    Rash   2019-0                      MD



       INS    Class                       3-28                        [place]



                                          00:00:                      n



                                          00                          

 

       PENICILL Drug   Active Low    Rash   2019-0                      MD



       INS    Class                       3-28                        [place]



                                          00:00:                      n



                                          00                          

 

       PENICILL Drug   Active Low    Rash   2019-0                      MD



       INS    Class                       3-28                        [place]



                                          00:00:                      n



                                          00                          

 

       PENICILL Drug   Active Low    Rash   2019-0                      MD



       INS    Class                       3-28                        [place]



                                          00:00:                      n



                                          00                          

 

       PENICILL Drug   Active Low    Rash   2019-0                      MD



       INS    Class                       3-28                        [place]



                                          00:00:                      n



                                          00                          

 

       PENICILL Drug   Active Low    Rash   2019-0                      MD



       INS    Class                       3-28                        [place]



                                          00:00:                      n



                                          00                          

 

       PENICILL Drug   Active Low    Rash   2019-0                      MD



       INS    Class                       3-28                        [place]



                                          00:00:                      n



                                          00                          

 

       PENICILL Drug   Active Low    Rash   2019-0                      MD



       INS    Class                       3-28                        [place]



                                          00:00:                      n



                                          00                          

 

       PENICILL Drug   Active Low    Rash   2019-0                      MD



       INS    Class                       3-28                        [place]



                                          00:00:                      n



                                          00                          

 

       PENICILL Drug   Active Low    Rash   2019-0                      MD



       INS    Class                       3-28                        [place]



                                          00:00:                      n



                                          00                          

 

       PENICILL Drug   Active Low    Rash   2019-0                      MD



       INS    Class                       3-28                        [place]



                                          00:00:                      n



                                          00                          

 

       PENICILL Drug   Active Low    Rash   2019-0                      MD



       INS    Class                       3-28                        [place]



                                          00:00:                      n



                                          00                          

 

       PENICILL Drug   Active Low    Rash   2019-0                      MD



       INS    Class                       3-28                        [place]



                                          00:00:                      n



                                          00                          

 

       PENICILL Drug   Active Low    Rash   2019-0                      MD



       INS    Class                       3-28                        [place]



                                          00:00:                      n



                                          00                          

 

       PENICILL Drug   Active Low    Rash   2019-0                      MD



       INS    Class                       3-28                        [place]



                                          00:00:                      n



                                          00                          

 

       PENICILL Drug   Active Low    Rash   2019-0                      MD



       INS    Class                       3-28                        [place]



                                          00:00:                      n



                                          00                          

 

       PENICILL Drug   Active Low    Rash   2019-0                      MD



       INS    Class                       3-28                        [place]



                                          00:00:                      n



                                          00                          

 

       PENICILL Drug   Active Low    Rash   2019-0                      MD



       INS    Class                       3-28                        [place]



                                          00:00:                      n



                                          00                          

 

       PENICILL Drug   Active Low    Rash   2019-0                      MD



       INS    Class                       3-28                        [place]



                                          00:00:                      n



                                          00                          

 

       PENICILL Drug   Active Low    Rash   2019-0                      MD



       INS    Class                       3-28                        [place]



                                          00:00:                      n



                                          00                          

 

       PENICILL Drug   Active Low    Rash   2019-0                      MD



       INS    Class                       3-28                        [place]



                                          00:00:                      n



                                          00                          

 

       PENICILL Drug   Active Low    Rash   2019-0                      MD



       INS    Class                       3-28                        [place]



                                          00:00:                      n



                                          00                          

 

       PENICILL Drug   Active Low    Rash   2019-0                      MD



       INS    Class                       3-28                        [place]



                                          00:00:                      n



                                          00                          

 

       PENICILL Drug   Active Low    Rash   2019-0                      MD



       INS    Class                       3-28                        [place]



                                          00:00:                      n



                                          00                          

 

       PENICILL Drug   Active Low    Rash   2019-0                      MD



       INS    Class                       3-28                        [place]



                                          00:00:                      n



                                          00                          

 

       PENICILL Drug   Active Low    Rash   2019-0                      MD



       INS    Class                       3-28                        [place]



                                          00:00:                      n



                                          00                          

 

       PENICILL Drug   Active Low    Rash   2019-0                      MD



       INS    Class                       3-28                        [place]



                                          00:00:                      n



                                          00                          

 

       PENICILL Drug   Active Low    Rash   2019-0                      MD



       INS    Class                       3-28                        [place]



                                          00:00:                      n



                                          00                          

 

       PENICILL Drug   Active Low    Rash   2019-0                      MD



       INS    Class                       3-28                        [facility]



                                          00:00:                      n



                                          00                          

 

       PEN[facility] Drug   Active Low    Rash   2019-0                      MD



       INS    Class                       3-28                        [facility]



                                          00:00:                      n



                                          00                          

 

       PENICILL Drug   Active Low    Rash   2019-0                      MD



       INS    Class                       3-28                        [facility]



                                          00:00:                      n



                                          00                          

 

       PENICILL Drug   Active Low    Rash   2019-0                      MD



       INS    Class                       3-28                        [facility]



                                          00:00:                      n



                                          00                          

 

       PENICILL Drug   Active Low    Rash   2019-0                      MD



       INS    Class                       3-28                        [facility]



                                          00:00:                      n



                                          00                          

 

       PENICILL Drug   Active Low    Rash   2019-0                      MD



       INS    Class                       3-28                        [facility]



                                          00:00:                      n



                                          00                          

 

       PENICILL Drug   Active Low    Rash   2019-0                      MD



       INS    Class                       3-28                        [facility]



                                          00:00:                      n



                                          00                          

 

       PENICILL Drug   Active Low    Rash   2019-0                      MD



       INS    Class                       3-28                        [facility]



                                          00:00:                      n



                                          00                          

 

       PENICILL Drug   Active Low    Rash   2019-0                      MD



       INS    Class                       3-28                        [facility]



                                          00:00:                      n



                                          00                          

 

       PENICILL Drug   Active Low    Rash   2019-0                      MD



       INS    Class                       3-28                        [facility]



                                          00:00:                      n



                                          00                          

 

       PENICILL Drug   Active Low    Rash   2019-0                      MD



       INS    Class                       3-28                        [facility]



                                          00:00:                      n



                                          00                          

 

       PENICILL Drug   Active Low    Rash   2019-0                      MD



       INS    Class                       3-28                        [facility]



                                          00:00:                      n



                                          00                          

 

       PENICILL Drug   Active Low    Rash   2019-0                      MD



       INS    Class                       3-28                        [facility]



                                          00:00:                      n



                                          00                          

 

       PENICILL Drug   Active Low    Rash   2019-0                      MD



       INS    Class                       3-28                        [facility]



                                          00:00:                      n



                                          00                          

 

       PENICILL Drug   Active Low    Rash   2019-0                      MD



       INS    Class                       3-28                        [facility]



                                          00:00:                      n



                                          00                          

 

       PENICILL Drug   Active Low    Rash   2019-0                      MD



       INS    Class                       3-28                        [facility]



                                          00:00:                      n



                                          00                          

 

       PENICILL Drug   Active Low    Rash   2019-0                      MD



       INS    Class                       3-28                        [facility]



                                          00:00:                      n



                                          00                          

 

       PENICILL Drug   Active Low    Rash   2019-0                      MD



       INS    Class                       3-28                        [facility]



                                          00:00:                      n



                                          00                          

 

       PENICILL Drug   Active Low    Rash   2019-0                      MD



       INS    Class                       3-28                        [facility]



                                          00:00:                      n



                                          00                          

 

       PENICILL Drug   Active Low    Rash   2019-0                      MD



       INS    Class                       3-28                        [facility]



                                          00:00:                      n



                                          00                          

 

       PENICILL Drug   Active Low    Rash   2019-0                      MD



       INS    Class                       3-28                        [facility]



                                          00:00:                      n



                                          00                          

 

       PENICILL Drug   Active Low    Rash   2019-0                      MD



       INS    Class                       3-28                        [facility]



                                          00:00:                      n



                                          00                          

 

       PENICILL Drug   Active Low    Rash   2019-0                      MD



       INS    Class                       3-28                        [facility]



                                          00:00:                      n



                                          00                          

 

       PENICILL Drug   Active Low    Rash   2019-0                      MD



       INS    Class                       3-28                        [facility]



                                          00:00:                      n



                                          00                          

 

       PENICILL Drug   Active Low    Rash   2019-0                      MD



       INS    Class                       3-28                        [facility]



                                          00:00:                      n



                                          00                          

 

       PENICILL Drug   Active Low    Rash   2019-0                      MD



       INS    Class                       3-28                        [facility]



                                          00:00:                      n



                                          00                          

 

       PENICILL Drug   Active Low    Rash   2019-0                      MD



       INS    Class                       3-28                        [facility]



                                          00:00:                      n



                                          00                          

 

       PENICILL Drug   Active Low    Rash   2019-0                      MD



       INS    Class                       3-28                        [facility]



                                          00:00:                      n



                                          00                          

 

       PENICILL Drug   Active Low    Rash   2019-0                      MD



       INS    Class                       3-28                        [facility]



                                          00:00:                      n



                                          00                          

 

       PENICILL Drug   Active Low    Rash   2019-0                      MD



       INS    Class                       3-28                        [facility]



                                          00:00:                      n



                                          00                          

 

       PENICILL Drug   Active Low    Rash   2019-0                      MD



       INS    Class                       3-28                        [facility]



                                          00:00:                      n



                                          00                          

 

       PENICILL Drug   Active Low    Rash   2019-0                      MD



       INS    Class                       3-28                        [facility]



                                          00:00:                      n



                                          00                          

 

       PENICILL Drug   Active Low    Rash   2019-0                      MD



       INS    Class                       3-28                        [facility]



                                          00:00:                      n



                                          00                          

 

       PENICILL Drug   Active Low    Rash   2019-0                      MD



       INS    Class                       3-28                        [facility]



                                          00:00:                      n



                                          00                          

 

       PENICILL Drug   Active Low    Rash   2019-0                      MD



       INS    Class                       3-28                        [facility]



                                          00:00:                      n



                                          00                          

 

       PENICILL Drug   Active Low    Rash   2019-0                      MD



       INS    Class                       3-28                        [facility]



                                          00:00:                      n



                                          00                          

 

       PENICILL Drug   Active Low    Rash   2019-0                      MD



       INS    Class                       3-28                        [facility]



                                          00:00:                      n



                                          00                          

 

       PENICILL Drug   Active Low    Rash   2019-0                      MD



       INS    Class                       3-28                        [facility]



                                          00:00:                      n



                                          00                          

 

       PENICILL Drug   Active Low    Rash   2019-0                      MD



       INS    Class                       3-28                        [facility]



                                          00:00:                      n



                                          00                          

 

       PENICILL Drug   Active Low    Rash   2019-0                      MD



       INS    Class                       3-28                        [facility]



                                          00:00:                      n



                                          00                          

 

       PENICILL Drug   Active Low    Rash   2019-0                      MD



       INS    Class                       3-28                        [facility]



                                          00:00:                      n



                                          00                          

 

       PENICILL Drug   Active Low    Rash   2019-0                      MD



       INS    Class                       3-28                        [facility]



                                          00:00:                      n



                                          00                          

 

       PENICILL Drug   Active Low    Rash   2019-0                      MD



       INS    Class                       3-28                        [facility]



                                          00:00:                      n



                                          00                          

 

       PENICILL Drug   Active Low    Rash   2019-0                      MD



       INS    Class                       3-28                        [facility]



                                          00:00:                      n



                                          00                          

 

       PENICILL Drug   Active Low    Rash   2019-0                      MD



       INS    Class                       3-28                        [facility]



                                          00:00:                      n



                                          00                          

 

       PENICILL Drug   Active Low    Rash   2019-0                      MD



       INS    Class                       3-28                        [facility]



                                          00:00:                      n



                                          00                          

 

       PENICILL Drug   Active Low    Rash   2019-0                      MD



       INS    Class                       3-28                        [facility]



                                          00:00:                      n



                                          00                          

 

       PENICILL Drug   Active Low    Rash   2019-0                      MD



       INS    Class                       3-28                        [facility]



                                          00:00:                      n



                                          00                          

 

       PENICILL Drug   Active Low    Rash   2019-0                      MD



       INS    Class                       3-28                        [facility]



                                          00:00:                      n



                                          00                          

 

       PENICILL Drug   Active Low    Rash   2019-0                      MD



       INS    Class                       3-28                        [facility]



                                          00:00:                      n



                                          00                          

 

       PENICILL Drug   Active Low    Rash   2019-0                      MD



       INS    Class                       3-28                        [facility]



                                          00:00:                      n



                                          00                          

 

       [facility] Propensi Active        Rash   -0                      [facility]

i



       ins    ty to                       [address]                        st



              adverse                      00:00:                      [facility]



              reaction                      00                          l



              s to                                                    



              drug                                                    

 

       [facility] Propensi Active        Rash                         [facility]

s



       ins    ty to                       6-18                        ity of



              adverse                      00:00:                      [place]



              reaction                      00                          Medical



              s to                                                    [facility]



              drug                                                    

 

       [facility]   Active Med    Hives                        [facility]



       INS    Class                       6-18                        ity of



                                          00:00:                      [place]



                                          00                          Medical



                                                                      [facility]

 

       [facility] All[facility] Active        [facility]   -0                      [facility]



       ins    to                          6-18                        Health



              substan                      00:00:                      



              e                           00                          

 

       penicill penicill Active                                           [facility]

a



       ins<sup> ins<sup>                                                  l



       1</sup> 1</sup>                                                  [facility]







Family History







           Family Member Diagnosis  Comments   Start Date Stop Date  Source

 

           Paternal   Cancer                                      [facility]

 

           Natural daughter Acute myelogenous                                  [facility]

[facility]



                      leukemia                                    [place] [facility]

[facility]

 

           Natural daughter Myelodysplastic                                  [facility]i

versity of



                      syndrome                                    [place] [facility]



                                                                  [facility]

[facility]



                                                                  [facility]

 

           Natural father -Other cancer                                  [facility]

ity of



                                                                  [place] [facility]



                                                                  [facility]

[facility]



                                                                  [facility]

 

           Natural father Bladder Cancer                                  [facility]

sity of



                                                                  [place] [facility]



                                                                  [facility]

r



                                                                  [place]

 

           Half-brother                                             [facility]

f



                                                                  [place] [facility]



                                                                  [facility]

[facility]



                                                                  [facility]

 

           Maternal aunt                                             [facility] 

[facility]



                                                                  [place] [facility]



                                                                  [facility]

[facility]



                                                                  [place]

 

           Maternal                                               [place]



           grandfather                                             [place] [facility]



                                                                  [facility]

[facility]



                                                                  [place]

 

           Maternal                                               [place]



           grandmother                                             [place] [facility]



                                                                  [facility]

[facility]



                                                                  [place]

 

           Natural mother                                             [facility]



                                                                  [place] [facility]



                                                                  [facility]

[facility]



                                                                  [place]

 

           Other                                                  [facility]



                                                                  [place] [facility]



                                                                  [facility]

[facility]



                                                                  [place]

 

           Paternal aunt Breast cancer                                  [facility]

[facility]



                                                                  [place] [facility]



                                                                  [facility]

[facility]



                                                                  [place]

 

           Paternal cousin Breast cancer                                  [facility]

sity of



                                                                  [place] [facility]



                                                                  [facility]

[facility]



                                                                  [place]

 

           Paternal                                               [facility]



           grandmother                                             [place] [facility]



                                                                  [facility]

[facility]



                                                                  [place]

 

           Paternal uncle                                             [facility]

 [facility]



                                                                  [place] [facility]



                                                                  [facility]

r



                                                                  [place]







Social History







           Social Habit Start Date Stop Date  Quantity   Comments   Source

 

           Exposure to 2022 Not sure              [facility]



           SARS-CoV-2 (event) 00:00:00   12:21:00                         [facility]

 

           Alcohol Comment 2022 [facility] quit drinking            [facility]

[facility]



                      00:00:00   00:00:00   20 years ago            

 

           Tobacco use and 2020 Smokeless             [facility]

y of



           exposure   00:00:00   00:00:00   tobacco non-user            [facility]

 

           Alcohol intake 2018 Current               [facility]



                      00:00:00   00:00:00   non-drinker of            Hospital



                                            alcohol               



                                            (finding)             

 

           Cigarettes smoked 2018                       [facility]

st



           current (pack per 00:00:00   00:00:00                         [facility]osp[facility]

l



           day) - Reported                                             

 

           Cigarette  2018                       [facility]



           pack-years 00:00:00   00:00:00                         Hospital

 

           Tobacco Comment 2018 QUITE 5 YEARS            [facility]

[facility]



                      00:00:00   00:00:00                         Hospital

 

           Social History 2017                       [facility]

cynthia



                      14:06:42   14:06:42                         

 

           History of tobacco 1976 Cigarette Smoker            

[facility]



           use        00:00:00   00:00:00                         

 

           Sex Assigned At 1960                       [facility]



           Birth      00:00:00   00:00:00                         Hospital









                Smoking Status  Start Date      Stop Date       Source

 

                Unknown if ever smoked                                 [facility]

 

                Ex-smoker       2022 00:00:00 2022 [facility]:00 [facility]

h







Medications







       Ordered Filled Start  Stop   Current Ordering Indication Dosage Frequency

 Signature

                    Comments            Components          Source



     Medication Medication Date Date Medication? Clinician                (SIG) 

          



     Name Name                                                   

 

     cholecalcif      2022      Yes            400U      Take 400           Un

[facility]abb[facility]



     juanito,      2-06                               Units by           ity of



     vitamin D3,      13:31:                               mouth           [place]



     (VITAMIN      03                                 daily.           MD



     D3) 5,000                                                        [facility]



     units tab                                                        n



     tablet                                                        Cancer



                                                                 [facility]

 

     polyethylen      2022      Yes            17g       Take 17 g           U

nivers



     e glycol      2-06                               by mouth           ity of



     (GLYCOLAX)      13:31:                               twice           [place]



     17        03                                 daily.           MD



     gram/dose                                                        [facility]



     powder                                                        [facility]

 

     esomeprazol      2022      Yes            20mg      Take 20 mg           

[facility]



     e (NexIUM)      2-06                               by mouth.           ity 

of



     20 MG      13:31:                                              [place]



     capsule      03                                                MD Tian lopez



                                                                 [facility]

 

     cyclobenzap      2022      Yes       Status post 10mg      Take 1        

   [facility]



     rine      2-06                stem cell           tablet (10           ity 

of



     (FLEXERIL)      00:00:                transplant           mg) by          

 [place]



     10 mg      00                                 mouth 3           MD



     tablet                                         (three)           [facility]



                                                  times a           n



                                                  day as           Cancer



                                                  needed for           Center



                                                  muscle           



                                                  spasms.           

 

     magnesium      2022- No             400mg      Take 1           Univ

ers



     oxide      1-17 11-17                          tablet           ity of



     (MAOX) 400      13:39: 00:00                          (400 mg)           [place]

[place]



     mg tablet      03   :00                           by mouth           MD



                                                  daily.           [facility]



                                                                 n



                                                                 [facility]

 

     meloxicam      2022      Yes            7.5mg      Take 1           Unive

rs



     (MOBIC) 7.5      1-12                               tablet           ity of



     mg tablet      00:00:                               (7.5 mg)           [place]

[place]



               00                                 by mouth           MD



                                                  daily as           [facility]



                                                  needed.           n



                                                                 Cancer



                                                                 [facility]

 

     calcium      2022- No             1{tbl}      Take 1           Unive

rs



     carbonate-v      0-25 10-25                          tablet by           it

y of



     itamin D3      13:12: 00:00                          mouth           [place]



     500 mg -      03   :00                           daily.           MD



     200 units                                                        [facility]



     (1,250 mg                                                        n



     calcium                                                        Cancer



     carbonate)                                                        Center



     tablet                                                        

 

     ezetimibe      2022      Yes                      TAKE ONE           Univ

ers



     (ZETIA) 10      0-18                               (1)            ity of



     mg tablet      00:00:                               TABLET(S)           [place]

as



               00                                 BY MOUTH           MD



                                                  ONCE A           [facility]



                                                  DAY.           n



                                                                 Cancer



                                                                 Center

 

     ursodiol      2022      Yes       Graft-versu 300mg      Take 1          

 [facility]



     (ACTIGALL)      0-14                s-host           capsule           ity 

of



     300 mg      00:00:                disease,           (300 mg)           [place]

as



     capsule      00                  not            by mouth 3           MD



                                   otherwise           (three)           [facility]



                                   specified           times a           n



                                                  day.           Cancer



                                                                 [facility]

 

     ruxolitinib      2022- No        graft-versu 10mg      Take 1       

    [facility]



     (Jakafi) 10      0-04 11-17           s-host           tablet (10          

 ity of



     mg tablet      00:00: 00:00           disease           mg) by           Te

[place]



               00   :00                           mouth           MD



                                                  twice           [facility]



                                                  daily.           n



                                                                 [facility]

 

     Metoprolol            Yes                      Take by           [facility]



     Succinate      9-28                               mouth.           Health



     100 MG      10:42:                                              



     capsule      27                                                



     extended-re                                                        



     lease 24                                                        



     hour                                                        



     sprinkle                                                        

 

     polyethylen            Yes            17g  QD   Take 17 g           U

T



     e glycol                                     by mouth 1           Healt

h



     (Glycolax)      10:42:                               (one) time           



     17 GM/SCOOP      27                                 each day.           



     powder                                                        

 

     simvastatin            Yes            10mg QD   Take 10 mg           

[facility]



     (Zocor) 20                                     by mouth 1           Hea

lth



     MG tablet      10:42:                               (one) time           



               27                                 each day.           

 

     cholecalcif            Yes            400U      Take 400           [facility]



     juanito (D3-5)                                     Units by           Heal

th



     5,000 Units      10:42:                               mouth.           



     tablet      26                                                

 

     esomeprazol            Yes            20mg      Take 20 mg           

[facility]



     e (NexIUM)      -                               by mouth.           Heal

th



     20 MG DR      10:42:                                              



     capsule      26                                                

 

     fluconazole            Yes                      fluconazol           

[facility]



     (Diflucan)      -28                               e 150 mg           Healt

h



     150 MG      10:42:                               tablet TK           



     tablet      26                                 1 T PO 1           



                                                  TIME Q 3           



                                                  DAYS           

 

     Letermovir            Yes            480mg      Take 480           [facility]



     480 MG      -                               mg by           Health



     tablet      10:42:                               mouth.           



               26                                                

 

     diclofenac            Yes                      APPLY FOUR           U

nivers



     sodium      9-28                               (4)            ity of



     (Voltaren)      00:00:                               GRAM(S) TO           T

[place]



     1 % gel      00                                 AFFECTED           MD



                                                  AREA THREE           [facility]



                                                  TIMES           n



                                                  DAILY AS           Cancer



                                                  NEEDED. DO           Center



                                                  NOT EXCEED           



                                                  16 GRAMS           



                                                  DAILY.           

 

     Diclofenac      2022- Yes       [id number]      [id number] Apply      

     [facility]



     Sodium      9-28 10-29                     [id number] topically           H

ealth



     (Voltaren)      00:00: 04:59                     3D   3 (three)           



     1 %       00   :00                           times a           



     external                                         day if           



     gel                                          needed           



                                                  (pain).           



                                                  APPLY 4           



                                                  GRAMS TO           



                                                  AFFECTED           



                                                  AREA DO           



                                                  NOT EXCEED           



                                                  16 GRAMS           



                                                  QD             

 

     methylPREDN      2022- No        [id number] 4mg       Take 1          

 [facility]



     ISolone                                tablet (4           Health



     (Medrol      00:00: 04:59                          mg total)           



     Dospak) 4      00   :00                           by mouth 1           



     MG tablets                                         (one) time           



                                                  for 1           



                                                  dose. Use           



                                                  as             



                                                  directed           



                                                  by package           



                                                  instructio           



                                                  ns             

 

     FLUoxetine            Yes            40mg QD   Take 40 mg           U

T



     (PROzac) 40                                     by mouth 1           He

alth



     MG capsule      00:00:                               (one) time           



               00                                 each day.           

 

     topiramate            Yes            200mg      Take 200           [facility]



     (Topamax)                                     mg by           Health



     200 MG      00:00:                               mouth           



     tablet      00                                 every           



                                                  night.           

 

     FLUoxetine            Yes            40mg      Take 40 mg           U

nivers



     (PROzac) 40                                     by mouth           ity 

of



     mg capsule      00:00:                               daily.           [place]



               00                                                MD Tian lopez



                                                                 Cancer



                                                                 Center

 

     calcium            Yes            1{tbl}      Take 1           Univer

s



     carbonate-v                                     tablet by           ity

 of



     itamin D3      15:26:                               mouth           [place]



     500 mg -      33                                 daily.           MD



     200 units                                                        [facility]



     (1,250 mg                                                        n



     calcium                                                        Cancer



     carbonate)                                                        Center



     tablet                                                        

 

     magnesium            Yes                      Take by           Unive

rs



     oxide                                     mouth.           ity of



     (MAOX) 400      15:26:                                              [place]



     mg tablet      33                                                MD Tian lopez



                                                                 Cancer



                                                                 Center

 

     cholecalcif            Yes            400U      Take 400           Un

[facility]



     juanito,                                     Units by           ity of



     vitamin D3,      16:08:                               mouth           [place]



     (VITAMIN      01                                 daily.           MD



     D3) 5,000                                                        [facility]



     units tab                                                        n



     tablet                                                        Cancer



                                                                 Center

 

     tacrolimus            Yes       Graft-versu           Take 2         

  [facility]



     (PROGRAF)                      s-host           capsules           ity 

of



     0.5 mg      00:00:                disease,           (1.0 mg)           [place]

as



     capsule      00                  not            by mouth           MD



                                   otherwise           twice           [facility]



                                   specified           daily           [facility]

 

     tacrolimus            Yes       Graft-versu           Take 2         

  [facility]



     (PROGRAF)                      s-host           capsules           ity 

of



     0.5 mg      00:00:                disease,           (1.0 mg)           [place]

as



     capsule      00                  not            by mouth           MD



                                   otherwise           twice           [facility]



                                   specified           daily           [facility]

 

     esomeprazol      2022- No             20mg      Take 20 mg          

 [facility]



     e (NexIUM)                                by mouth           ity 

of



     20 MG      21:16: 00:00                          daily.           [place]



     capsule      55   :00                                          MD [place]



                                                                 [facility]

 

     esomeprazol      2022- No             20mg      Take 20 mg          

 [facility]



     e (NexIUM)                                by mouth           ity 

of



     20 MG      21:16: 00:00                          daily.           [place]



     capsule      55   :00                                          MD [place]



                                                                 [facility]

 

     HYDROmorpho            Yes            2mg  Q.5D Take 2 mg           U

T



     ne        8-30                               by mouth           Health



     (Dilaudid)      00:00:                               in the           



     2 MG tablet      00                                 morning           



                                                  and 2 mg           



                                                  in the           



                                                  evening.           

 

     HYDROmorpho            Yes            2mg       Take 2 mg           U

nivers



     ne        8-30                               by mouth           ity of



     (DILAUDID)      00:00:                               every 4           [place]

[place]



     2 mg tablet      00                                 (four)           MD



                                                  hours as           [facility]



                                                  needed.           [facility]

 

     ezetimibe            Yes            10mg QD   Take 10 mg           [facility]



     (Zetia) 10      8-23                               by mouth 1           Hea

lth



     MG tablet      00:00:                               (one) time           



               00                                 each day.           

 

     Spiriva            Yes                                     [facility]



     HandiHaler      8-20                                              Health



     18 MCG      00:00:                                              



     inhalation      00                                                



     capsule                                                        

 

     sulfamethox            Yes                      TAKE ONE           [facility]



     azole-trime      7-15                               (1)            Health



     thoprim      00:00:                               TABLET(S)           



     (Bactrim      00                                 BY MOUTH           



     DS) 800-160                                         THREE           



     MG tablet                                         TIMES           



                                                  WEEKLY.           



                                                  (MONDAY,           



                                                  WEDNESDAY,           



                                                  AND            



                                                  FRIDAY).           

 

     SUMAtriptan            Yes            100mg      Take 100           U

T



     (Imitrex)      7-15                               mg by           Health



     50 MG      00:00:                               mouth.           



     tablet      00                                                

 

     voriconazol            Yes       Graft-versu 200mg      Take 1       

    [facility]



     e (VFEND)      7-15                s-host           tablet           ity of



     200 mg      00:00:                disease,           (200 mg)           [place]

as



     tablet      00                  not            by mouth           MD



                                   otherwise           twice           [facility]



                                   specified           daily.           n



                                                                 Cancer



                                                                 Center

 

     ketorolac            Yes       Headache 10mg      Take 1           Un

[facility]



     (TORADOL)      7-15                               tablet (10           ity 

of



     10 mg      00:00:                               mg) by           [place]



     tablet      00                                 mouth           MD



                                                  every 12           [facility]



                                                  (twelve)           n



                                                  hours as           Cancer



                                                  needed for           Center



                                                  moderate           



                                                  pain           



                                                  (headache)           



                                                  .              

 

     promethazin            Yes       Headache 12.5mg      Take 1         

  [facility]



     e         7-15                               tablet           ity of



     (PHENERGAN)      00:00:                               (12.5 mg)           T

[facility]



     12.5 mg      00                                 by mouth           MD



     tablet                                         every 6           [facility]



                                                  (six)           n



                                                  hours as           Cancer



                                                  needed for           Center



                                                  nausea.           

 

     levothyroxi            Yes       Status post 25ug      Take 1        

   [facility]



     ne        7-15                stem cell           tablet (25           ity 

of



     (SYNTHROID,      00:00:                transplant           mcg) by        

   [place]



     LEVOTHROID)      00                                 mouth           MD



     25 mcg                                         daily.           [facility]



     tablet                                                        n



                                                                 Cancer



                                                                 Center

 

     sulfamethox            Yes       Graft-versu 1{tbl}      Take 1      

     [facility]



     azole-trime      7-15                s-host           tablet by           i

ty of



     thoprim      00:00:                disease,           mouth 3           [place]

as



     (BACTRIM      00                  not            (three)           MD BIRD) 800                          otherwise           times a           [facility]

rso



     mg-160 mg                          specified           week           n



     per tablet                                         Monday,           Cancer



                                                  Wednesday           Center



                                                  and            



                                                  Friday.           

 

     topiramate            Yes       Status post 200mg      Take 1        

   [facility]



     (TOPAMAX)      7-15                stem cell           tablet           ity

 of



     200 mg      00:00:                transplant           (200 mg)           T

[facility]



     tablet      00                                 by mouth           MD



                                                  at             [facility]



                                                  bedtime.           n



                                                  To prevent           Cancer



                                                  migraine           Center

 

     fluticasone            Yes       Chronic 1{puff}      Inhale 1       

    [facility]



     propionate-      7-15                obstructive           puff by         

  [facility]



     salmeterol      00:00:                pulmonary           mouth           [facility]

[facility]



     (Advair      00                  disease           twice           MD



     Diskus) 500                          with acute           daily.           

[facility]



     mcg-50                          exacerbatio                          n



     mcg/inhalat                          n                             Cancer



     ion diskus                                                        Center



     inhaler                                                        

 

     gabapentin            Yes       Acute 600mg      Take 2           Un[facility]

[facility]



     (NEURONTIN)      7-15                myeloblasti           capsules        

   [facility]



     300 mg      00:00:                c leukemia           (600 mg)           [facility]

[facility]



     capsule      00                  not having           by mouth 3           

MD



                                   achieved           (three)           [facility]



                                   remission           times a           n



                                                  day.           Cancer



                                                                 Center

 

     valACYclovi            Yes       Cellulitis, 500mg      Take 1       

    [facility]



     r (VALTREX)      7-15                not            tablet           [facility]



     500 mg      00:00:                otherwise           (500 mg)           [facility]

[facility]



     tablet      00                  specified           by mouth           MD



                                                  daily.           [facility]



                                                                 n



                                                                 Cancer



                                                                 Center

 

     SUMAtriptan            Yes       Status post 100mg      Take 2       

    [facility]



     (IMITREX)      7-15                stem cell           tablets           it

y of



     50 mg      00:00:                transplant           (100 mg)           [facility]

[facility]



     tablet      00                                 by mouth           MD



                                                  once as           [facility]



                                                  needed for           n



                                                  migraine           Cancer



                                                  (Not to           Center



                                                  exceed 2           



                                                  doses per           



                                                  day).           

 

     metoprolol            Yes       Stem cells 100mg      Take 1         

  [facility]



     succinate      7-15                transplant           tablet           it

y of



     (TOPROL XL)      00:00:                status           (100 mg)           

[place]



     100 mg 24      00                                 by mouth           MD



     hr tablet                                         daily for           [place]

so



                                                  blood           n



                                                  pressure.           Cancer



                                                  Hold if           Center



                                                  top number           



                                                  is less           



                                                  than 100           



                                                  or heart           



                                                  rate is           



                                                  less than           



                                                  60             

 

     Ventolin            Yes       Other form 2{puff}      Inhale 2       

    [facility]



     HFA 90      7-15                of dyspnea           puffs by           ity

 of



     mcg/actuati      00:00:                               mouth           [place]



     on inhaler      00                                 every 6           MD



                                                  (six)           [facility]



                                                  hours as           n



                                                  needed for           Cancer



                                                  wheezing           Center



                                                  or             



                                                  shortness           



                                                  of breath.           

 

     voriconazol            Yes       Graft-versu 200mg      Take 1       

    [facility]



     e (VFEND)      7-15                s-host           tablet           ity of



     200 mg      00:00:                disease,           (200 mg)           [place]

as



     tablet      00                  not            by mouth           MD



                                   otherwise           twice           [facility]



                                   specified           daily.           n



                                                                 Cancer



                                                                 Center

 

     ketorolac            Yes       Headache 10mg      Take 1           Un

[facility]



     (TORADOL)      7-15                               tablet (10           ity 

of



     10 mg      00:00:                               mg) by           [place]



     tablet      00                                 mouth           MD



                                                  every 12           [facility]



                                                  (twelve)           n



                                                  hours as           Cancer



                                                  needed for           Center



                                                  moderate           



                                                  pain           



                                                  (headache)           



                                                  .              

 

     promethazin            Yes       Headache 12.5mg      Take 1         

  [facility]



     e         7-15                               tablet           ity of



     (PHENERGAN)      00:00:                               (12.5 mg)           [facility]

[facility]



     12.5 mg      00                                 by mouth           MD



     tablet                                         every 6           [facility]



                                                  (six)           n



                                                  hours as           Cancer



                                                  needed for           Center



                                                  nausea.           

 

     levothyroxi            Yes       Status post 25ug      Take 1        

   [facility]



     ne        7-15                stem cell           tablet (25           ity 

of



     (SYNTHROID,      00:00:                transplant           mcg) by        

   [place]



     LEVOTHROID)      00                                 mouth           MD



     25 mcg                                         daily.           [facility]



     tablet                                                        n



                                                                 Cancer



                                                                 [place]

 

     sulfamethox            Yes       Graft-versu 1{tbl}      Take 1      

     [facility]



     azole-trime      7-15                s-host           tablet by           i

ty of



     thoprim      00:00:                disease,           mouth 3           [place]

as



     (BACTRIM      00                  not            (three)           MD



     MARGE) 800                          otherwise           times a           [facility]

[facility]



     mg-160 mg                          specified           week           n



     per tablet                                         Monday,           Cancer



                                                  Wednesday           Center



                                                  and            



                                                  Friday.           

 

     topiramate            Yes       Status post 200mg      Take 1        

   [facility]



     (TOPAMAX)      7-15                stem cell           tablet           ity

 of



     200 mg      00:00:                transplant           (200 mg)           [facility]

[facility]



     tablet      00                                 by mouth           MD



                                                  at             [facility]



                                                  bedtime.           n



                                                  To prevent           Cancer



                                                  migraine           Center

 

     fluticasone            Yes       Chronic 1{puff}      Inhale 1       

    [facility]



     propionate-      7-15                obstructive           puff by         

  [facility]



     salmeterol      00:00:                pulmonary           mouth           [facility]

[facility]



     (Advair      00                  disease           twice           MD [place]) 500                          with acute           daily.           

[facility]



     mcg-50                          exacerbatio                          n



     mcg/inhalat                          n                             Cancer



     ion diskus                                                        [facility]



     inhaler                                                        

 

     gabapentin            Yes       Acute 600mg      Take 2           [facility]

[facility]



     (NEURONTIN)      7-15                myeloblasti           capsules        

   [facility]



     300 mg      00:00:                c leukemia           (600 mg)           [facility]

[facility]



     capsule      00                  not having           by mouth 3           

MD



                                   achieved           (three)           [facility]



                                   remission           times a           n



                                                  day.           Cancer



                                                                 Center

 

     valACYclovi            Yes       Cellulitis, 500mg      Take 1       

    [facility]



     r (VALTREX)      7-15                not            tablet           ity of



     500 mg      00:00:                otherwise           (500 mg)           [facility]

[facility]



     tablet      00                  specified           by mouth           MD



                                                  daily.           [facility]



                                                                 n



                                                                 Cancer



                                                                 Center

 

     SUMAtriptan            Yes       Status post 100mg      Take 2       

    [facility]



     (IMITREX)      7-15                stem cell           tablets           it

y of



     50 mg      00:00:                transplant           (100 mg)           [facility]

[facility]



     tablet      00                                 by mouth           MD



                                                  once as           [facility]



                                                  needed for           n



                                                  migraine           Cancer



                                                  (Not to           Center



                                                  exceed 2           



                                                  doses per           



                                                  day).           

 

     metoprolol            Yes       Stem cells 100mg      Take 1         

  [facility]



     succinate      7-15                transplant           tablet           it

y of



     (TOPROL XL)      00:00:                status           (100 mg)           

[place]



     100 mg 24      00                                 by mouth           MD



     hr tablet                                         daily for           [place]

so



                                                  blood           n



                                                  pressure.           Cancer



                                                  Hold if           Center



                                                  top number           



                                                  is less           



                                                  than 100           



                                                  or heart           



                                                  rate is           



                                                  less than           



                                                  60             

 

     Ventolin            Yes       Other form 2{puff}      Inhale 2       

    [facility]



     HFA 90      7-15                of dyspnea           puffs by           ity

 of



     mcg/actuati      00:00:                               mouth           [place]



     on inhaler      00                                 every 6           MD



                                                  (six)           [facility]



                                                  hours as           n



                                                  needed for           Cancer



                                                  wheezing           Center



                                                  or             



                                                  shortness           



                                                  of breath.           

 

     tacrolimus      2022- No        Graft-versu           Take 4        

   [facility]



     (PROGRAF)      7-15 09-06           s-host           capsules           ity

 of



     0.5 mg      00:00: 00:00           disease,           (2.0 mg)           [facility]

[facility]



     capsule      00   :00            not            by mouth           MD



                                   otherwise           twice           [facility]



                                   specified           daily           n



                                                                 Cancer



                                                                 Center

 

     tacrolimus      2022- No        Graft-versu           Take 4        

   [facility]



     (PROGRAF)      7-15 09-06           s-host           capsules           ity

 of



     0.5 mg      00:00: 00:00           disease,           (2.0 mg)           [facility]

[facility]



     capsule      00   :00            not            by mouth           MD



                                   otherwise           twice           [facility]



                                   specified           daily           n



                                                                 Cancer



                                                                 Center

 

     tacrolimus      2022- No        Graft-versu           Take 4        

   [facility]



     (PROGRAF)      7-14 07-15           s-host           capsules           ity

 of



     0.5 mg      00:00: 00:00           disease,           (2.0 mg)           [facility]

[facility]



     capsule      00   :00            not            by mouth           MD



                                   otherwise           twice           [facility]



                                   specified           daily           n



                                                                 Cancer



                                                                 Center

 

     voriconazol      2022- No        Graft-versu 200mg      Take 1      

     [facility]



     e (VFEND)      7-14 07-15           s-host           tablet           ity o

f



     200 mg      00:00: 00:00           disease,           (200 mg)           [facility]

[facility]



     tablet      00   :00            not            by mouth           MD



                                   otherwise           twice           [facility]



                                   specified           daily.           n



                                                                 Cancer



                                                                 Center

 

     tacrolimus      2022- No        Graft-versu           Take 4        

   [facility]



     (PROGRAF)      15           s-host           capsules           ity

 of



     0.5 mg      00:00: 00:00           disease,           (2.0 mg)           [facility]

[facility]



     capsule      00   :00            not            by mouth           MD



                                   otherwise           twice           [facility]



                                   specified           daily           n



                                                                 Cancer



                                                                 Center

 

     voriconazol      2022- No        Graft-versu 200mg      Take 1      

     [facility]



     e (VFEND)      714 07-15           s-host           tablet           ity o

f



     200 mg      00:00: 00:00           disease,           (200 mg)           [facility]

[facility]



     tablet      00   :00            not            by mouth           MD



                                   otherwise           twice           [facility]



                                   specified           daily.           n



                                                                 [facility]

 

     traZODone            Yes                      TAKE ONE           [facility]



     (Desyrel)      7-12                               (1) TO           Health



     50 MG      00:00:                               THREE (3)           



     tablet      00                                 TABLET(S)           



                                                  BY MOUTH           



                                                  AT BEDTIME           



                                                  AS NEEDED.           

 

     traZODone            Yes                      TAKE ONE           [facility]

ers



     (DESYREL)      7-12                               (1) TO           ity of



     50 mg      00:00:                               THREE (3)           [place]



     tablet      00                                 TABLET(S)           MD



                                                  BY MOUTH           [facility]



                                                  AT BEDTIME           n



                                                  AS NEEDED.           [facility]

 

     levoFLOXaci      2022- No        Sore 500mg      Take 1           Un

[facility]



     n                    throat, not           tablet           ity o

f



     (LEVAQUIN)      00:00: 00:00           otherwise           (500 mg)        

   [place]



     500 mg      00   :00            specified           by mouth           MD



     tablet                                         daily.           [facility]



                                                                 [facility]



                                                                 [facility]

 

     levoFLOXaci      2022- No        Sore 500mg      Take 1           Un

[facility]



     n                    throat, not           tablet           ity o

f



     (LEVAQUIN)      00:00: 00:00           otherwise           (500 mg)        

   [place]



     500 mg      00   :00            specified           by mouth           MD



     tablet                                         daily.           [facility]

 

     predniSONE      2022- No        Graft-versu 10mg      Take 1        

   [facility]



     (DELTASONE)                 s-host           tablet (10          

 it[facility] of



     10 mg      00:00: 00:00           disease,           mg) by           [place]



     tablet      00   :00            not            mouth           MD



                                   otherwise           daily.           [facility]



                                   specified           Take 10 mg           n



                                                  po daily x           Cancer



                                                  2 weeks           Center



                                                  then 5 mg           



                                                  po daily           

 

     predniSONE      2022- No        Graft-versu 10mg      Take 1        

   [facility]



     (DELTASONE)                 s-host           tablet (10          

 ity of



     10 mg      00:00: 00:00           disease,           mg) by           [place]



     tablet      00   :00            not            mouth           MD



                                   otherwise           daily.           [facility]



                                   specified           Take 10 mg           n



                                                  po daily x           Cancer



                                                  2 weeks           Center



                                                  then 5 mg           



                                                  po daily           

 

     ketorolac      2022- No        Headache 10mg      Take 1           U

[facility]



     (TORADOL)      6-21 07-15                          tablet (10           ity

 of



     10 mg      00:00: 00:00                          mg) by           [place]



     tablet      00   :00                           mouth           MD



                                                  every 12           [facility]



                                                  (twelve)           n



                                                  hours as           Cancer



                                                  needed for           Center



                                                  moderate           



                                                  pain           



                                                  (headache)           



                                                  .              

 

     promethazin      2022- No        Headache 12.5mg      Take 1        

   [facility]



     e         -15                          tablet           ity of



     (PHENERGAN)      00:00: 00:00                          (12.5 mg)           

[place]



     12.5 mg      00   :00                           by mouth           MD



     tablet                                         every 6           [facility]



                                                  (six)           n



                                                  hours as           Cancer



                                                  needed for           Center



                                                  nausea.           

 

     ketorolac      2022- No        Headache 10mg      Take 1           U

[facility]



     (TORADOL)      -15                          tablet (10           ity

 of



     10 mg      00:00: 00:00                          mg) by           [place]



     tablet      00   :00                           mouth           MD



                                                  every 12           [facility]



                                                  (twelve)           n



                                                  hours as           Cancer



                                                  needed for           Center



                                                  moderate           



                                                  pain           



                                                  (headache)           



                                                  .              

 

     promethazin      2022- No        Headache 12.5mg      Take 1        

   [facility]



     e         -15                          tablet           ity of



     (PHENERGAN)      00:00: 00:00                          (12.5 mg)           

[place]



     12.5 mg      00   :00                           by mouth           MD



     tablet                                         every 6           [facility]



                                                  (six)           n



                                                  hours as           [facility]



                                                  needed for           Center



                                                  nausea.           

 

     tiotropium            Yes       Simple 18ug      Inhale 1           U

nivers



     (Spiriva                      chronic           capsule           ity o

f



     with      00:00:                bronchitis           (18 mcg)           [place]

as



     HandiHaler)      00                                 by mouth           MD



     18 mcg                                         daily.           [facility]



     inhalation                                                        n



     capsule                                                        Cancer



                                                                 Center

 

     tiotropium            Yes       Simple 18ug      Inhale 1           U

nivers



     (Spiriva                      chronic           capsule           ity o

f



     with      00:00:                bronchitis           (18 mcg)           [place]

as



     HandiHaler)      00                                 by mouth           MD



     18 mcg                                         daily.           [facility]



     inhalation                                                        n



     capsule                                                        Cancer



                                                                 Center

 

     LORazepam            Yes       Anxiety, .5mg      Take 1           Un

[facility]



     (ATIVAN)      6-02                not            tablet           ity of



     0.5 mg      00:00:                otherwise           (0.5 mg)           [facility]

[place]



     tablet      00                  specified           by mouth           MD



                                                  every 6           [facility]



                                                  (six)           n



                                                  hours as           [facility]



                                                  needed for           Center



                                                  anxiety.           

 

     LORazepam            Yes       Anxiety, .5mg      Take 1           Un

[facility]



     (ATIVAN)      6-02                not            tablet           ity of



     0.5 mg      00:00:                otherwise           (0.5 mg)           [facility]

[place]



     tablet      00                  specified           by mouth           MD



                                                  every 6           [facility]



                                                  (six)           n



                                                  hours as           [facility]



                                                  needed for           Center



                                                  anxiety.           

 

     potassium      2022- No        Status post 20meq      Take 1        

   [facility]



     chloride      623           stem cell           tablet (20          

 ity of



     (Klor-Con      00:00: 00:00           transplant           mEq) by         

  [place]



     M20) 20 mEq      00   :00                           mouth           MD



     tablet                                         daily. For           [facility]



                                                  4 days           n



                                                                 Cancer



                                                                 Center

 

     potassium      2022- No        Status post 20meq      Take 1        

   [facility]



     chloride                 stem cell           tablet (20          

 ity of



     (Klor-Con      00:00: 00:00           transplant           mEq) by         

  [place]



     M20) 20 mEq      00   :00                           mouth           MD



     tablet                                         daily. For           [facility]



                                                  4 days           n



                                                                 Cancer



                                                                 Center

 

     unknown      2022- No                       by             [facility]



     patient-sup                                intratheca           i

ty of



     plied      13:25: 00:00                          l route           [place]



     medication      23   :00                           continuous           MD



     in sodium                                         . Morphine           [facility]

rso



     chloride                                                        n



     0.9% (PF)                                                        Cancer



     intrathecal                                                        Center



     pump                                                        

 

     unknown      2022- No                       by             [facility]



     patient-sup                                intratheca           i

ty of



     plied      13:25: 00:00                          l route           [place]



     medication      23   :00                           continuous           MD



     in sodium                                         . Morphine           [facility]

rso



     chloride                                                        n



     0.9% (PF)                                                        Cancer



     intrathecal                                                        Center



     pump                                                        

 

     ciprofloxac      2022- No        Sepsis due 750mg      Take 1       

    [facility]



     in HCl                 to             tablet           ity of



     (CIPRO) 750      00:00: 04:59           Pseudomonas           (750 mg)     

      [place]



     mg tablet      00   :00                           by mouth           MD



                                                  every 12           [facility]



                                                  (twelve)           n



                                                  hours for           Can[facility]



                                                  12 days.           Center

 

     ciprofloxac      2022- No        Sepsis due 750mg      Take 1       

    [facility]



     in HCl                 to             tablet           ity of



     (CIPRO) 750      00:00: 04:59           Pseudomonas           (750 mg)     

      [place]



     mg tablet      00   :00                           by mouth           MD



                                                  every 12           [facility]



                                                  (twelve)           n



                                                  hours for           Cancer



                                                  12 days.           Center

 

     SUMAtriptan            Yes            50mg      Take 50 mg           

[facility]



     (IMITREX)      3-30                               by mouth           ity of



     50 mg      16:48:                               once now.           [place]



     tablet      54                                                Medical



                                                                 Branch

 

     ursodioL            Yes            300mg      Take 300           Un[facility]

ers



     300 mg      3-30                               mg by           ity of



     capsule      16:48:                               mouth 2           [place]



               54                                 (two)           Medical



                                                  times           Branch



                                                  daily.           

 

     letermovir            Yes            480mg      Take 480           Un

[facility]



     480 mg      3-30                               mg by           ity of



     tablet      16:48:                               mouth           [place]



               54                                 every 24           Medical



                                                  (twenty-fo           Branch



                                                  ur) hours.           

 

     topiramate      0      Yes            100mg      Take 100           Un

[facility]



     100 mg      3-30                               mg by           ity of



     tablet      16:48:                               mouth.           [place]



               54                                                Medical



                                                                 Branch

 

     levothyroxi            Yes            25ug      Take 25           Uni

vers



     ne 25 mcg      3-30                               mcg by           ity of



     tablet      16:48:                               mouth           [place]



               54                                 every           Medical



                                                  morning.           Branch

 

     metoprolol            Yes                      Take by           Univ

ers



     succinate      3-30                               mouth.           ity of



     100 mg CSpX      16:48:                                              [place]



               54                                                Medical



                                                                 Branch

 

     gabapentin            Yes            300mg      Take 300           Un

[facility]



     300 mg      3-30                               mg by           ity of



     capsule      16:48:                               mouth 3           [place]



               54                                 (three)           Medical



                                                  times           [place]



                                                  daily.           

 

     valACYclovi            Yes            500mg      Take 500           U

nivers



     r 500 mg      3-30                               mg by           ity of



     tablet      16:48:                               mouth 2           [place]



               54                                 (two)           Medical



                                                  times           [place]



                                                  daily.           

 

     tacrolimus      2022- No        Graft-versu           Take 4        

   [facility]



     (PROGRAF)      3-14           s-host           capsules           ity

 of



     0.5 mg      00:00: 00:00           disease,           (2.0 mg)           [facility]

[facility]



     capsule      00   :00            not            by mouth           MD



                                   otherwise           twice           [facility]



                                   specified           daily           



                                                                 Cancer



                                                                 Center

 

     tacrolimus      2022- No        Graft-versu           Take 4        

   [facility]



     (PROGRAF)      3-14           s-host           capsules           ity

 of



     0.5 mg      00:00: 00:00           disease,           (2.0 mg)           [facility]

[place]



     capsule      00   :00            not            by mouth           MD



                                   otherwise           twice           [facility]



                                   specified           daily           n



                                                                 Cancer



                                                                 Center

 

     levothyroxi            Yes            25ug      Take 25           [facility]



     ne        3-25                               mcg by           Health



     (Synthroid,      00:00:                               mouth.           



     Levoxyl) 25      00                                                



     MCG tablet                                                        

 

     tacrolimus      2022- No        Graft-versu           Take 4        

   [facility]



     (PROGRAF)      3--28           s-host           capsules           ity

 of



     0.5 mg      00:00: 00:00           disease,           (2.0 mg)           [facility]

[place]



     capsule      00   :00            not            by mouth           MD



                                   otherwise           twice           [facility]



                                   specified           daily           n



                                                                 [facility]

 

     tacrolimus      2022- No        Graft-versu           Take 4        

   [facility]



     (PROGRAF)      3-24 04-           s-host           capsules           ity

 of



     0.5 mg      00:00: 00:00           disease,           (2.0 mg)           [facility]

[place]



     capsule      00   :00            not            by mouth           MD



                                   otherwise           twice           [facility]



                                   specified           daily           n



                                                                 [facility]

 

     levothyroxi      2022- No        Status post 25ug      Take 1       

    [facility]



     ne        3-23 07-15           stem cell           tablet (25           ity

 of



     (SYNTHROID,      00:00: 00:00           transplant           mcg) by       

    [place]



     LEVOTHROID)      00   :00                           mouth           MD



     25 mcg                                         daily.           [facility]



     tablet                                                        n



                                                                 [facility]

 

     levothyroxi      2022- No        Status post 25ug      Take 1       

    [facility]



     ne        3-23 07-15           stem cell           tablet (25           ity

 of



     (SYNTHROID,      00:00: 00:00           transplant           mcg) by       

    [place]



     LEVOTHROID)      00   :00                           mouth           MD



     25 mcg                                         daily.           [facility]



     tablet                                                        n



                                                                 [facility]

 

     predniSONE      2022- No        Graft-versu 5mg       Take 1        

   [facility]



     (DELTASONE)      -24           s-host           tablet (5           

ity of



     5 mg tablet      00:00: 00:00           disease,           mg) by          

 [place]



               00   :00            not            mouth           MD



                                   otherwise           every           [facility]



                                   specified           other day.           n



                                                  For 2           Cancer



                                                  weeks,           Center



                                                  then STOP           

 

     tacrolimus      2022- No        Graft-versu           Take 4        

   [facility]



     (PROGRAF)      -           s-host           capsules           ity

 of



     0.5 mg      00:00: 00:00           disease,           (2.0 mg)           [facility]

[facility]



     capsule      00   :00            not            by mouth           MD



                                   otherwise           twice           [facility]



                                   specified           daily           n



                                                                 [facility]

 

     predniSONE      2022- No        Graft-versu 5mg       Take 1        

   [facility]



     (DELTASONE)      -24           s-host           tablet (5           

ity of



     5 mg tablet      00:00: 00:00           disease,           mg) by          

 [place]



               00   :00            not            mouth           MD



                                   otherwise           every           [facility]



                                   specified           other day.           n



                                                  For 2           Cancer



                                                  weeks,           Center



                                                  then STOP           

 

     tacrolimus      2022- No        Graft-versu           Take 4        

   [facility]



     (PROGRAF)       03-24           s-host           capsules           ity

 of



     0.5 mg      00:00: 00:00           disease,           (2.0 mg)           [facility]

[place]



     capsule      00   :00            not            by mouth           MD



                                   otherwise           twice           [facility]



                                   specified           daily           n



                                                                 [facility]      2022- No        Graft           Apply           Uni

vers



     ne                   versus host           topically           it

y of



     (KENALOG)      00:00: 00:00           disease           to             [place]

[facility]



     ointment      00   :00                           affected           MD



     0.1%                                         area(s)           [facility]



                                                  twice           n



                                                  daily.           [facility]      2022- No        Graft           Apply           Uni

vers



     ne                   versus host           topically           it

y of



     (KENALOG)      00:00: 00:00           disease           to             [place]

[facility]



     ointment      00   :00                           affected           MD



     0.1%                                         area(s)           [facility]



                                                  twice           n



                                                  daily.           [facility]

 

     clobetasol      2022- No        Graft           Apply           Univ

ers



     (Temovate)                 versus host           topically       

    ity of



     0.05% cream      00:00: 00:00           disease           to             [facility]

[place]



               00   :00                           affected           MD



                                                  area(s)           [facility]



                                                  twice           n



                                                  daily.           [facility]

 

     clobetasol      2022- No        Graft           Apply           Univ

ers



     (Temovate)                 versus host           topically       

    ity of



     0.05% cream      00:00: 00:00           disease           to             [facility]

[place]



               00   :00                           affected           MD



                                                  area(s)           [facility]



                                                  twice           n



                                                  daily.           [facility]

 

     sulfamethox      2022- No        Graft-versu 1{tbl}      Take 1     

      [facility]



     azole-trime      2-14 07-15           s-host           tablet by           

ity of



     thoprim      00:00: 00:00           disease,           mouth 3           [facility]

[place]



     (BACTRIM      00   :00            not            (three)           MD BIRD) 800                          otherwise           times a           [facility]

rso



     mg-160 mg                          specified           week           n



     per tablet                                         Monday,           Cancer



                                                  Wednesday           Center



                                                  and            



                                                  Friday.           

 

     sulfamethox      2022- No        Graft-versu 1{tbl}      Take 1     

      [facility]



     azole-trime      2-14 07-15           s-host           tablet by           

ity of



     thoprim      00:00: 00:00           disease,           mouth 3           [facility]

[place]



     (BACTRIM      00   :00            not            (three)           MD BIRD) 800                          otherwise           times a           [facility]

rso



     mg-160 mg                          specified           week           n



     per tablet                                         Monday,           Cancer



                                                  Wednesday           Center



                                                  and            



                                                  Friday.           

 

     voriconazol      2022- No        Graft-versu 200mg      Take 1      

     [facility]



     e (VFEND)                 s-host           tablet           ity o

f



     200 mg      00:00: 00:00           disease,           (200 mg)           [facility]

[place]



     tablet      00   :00            not            by mouth           MD



                                   otherwise           twice           [facility]



                                   specified           daily.           n



                                                                 [facility]



                                                                 [place]

 

     voriconazol      2022- No        Graft-versu 200mg      Take 1      

     [facility]



     e (VFEND)                 s-host           tablet           ity o

f



     200 mg      00:00: 00:00           disease,           (200 mg)           [facility]

[place]



     tablet      00   :00            not            by mouth           MD



                                   otherwise           twice           [facility]



                                   specified           daily.           n



                                                                 [facility]



                                                                 [place]

 

     topiramate      2022- No        Status post 200mg      Take 1       

    [facility]



     (TOPAMAX)      2-11 07-15           stem cell           tablet           it

y of



     200 mg      00:00: 00:00           transplant           (200 mg)           

[place]



     tablet      00   :00                           by mouth           [facility]



                                                  [facility]



                                                  [facility]           n



                                                  To prevent           Cancer



                                                  migraine           Center

 

     topiramate      2022- No        Status post 200mg      Take 1       

    [facility]



     (TOPAMAX)      2-11 07-15           stem cell           tablet           it

y of



     200 mg      00:00: 00:00           transplant           (200 mg)           

[place]



     tablet      00   :00                           by mouth           [facility]



                                                  [facility]



                                                  [facility]           n



                                                  To prevent           Cancer



                                                  migraine           Center

 

     tacrolimus      2022- No        Graft-versu           Take 3        

   [facility]



     (PROGRAF)                 s-host           capsules           ity

 of



     0.5 mg      00:00: 00:00           disease,           (1.5 mg)           [facility]

[place]



     capsule      00   :00            not            by mouth           MD



                                   otherwise           twice           [facility]



                                   specified           daily           n



                                                                 Cancer



                                                                 [facility]

 

     tacrolimus      2022- No        Graft-versu           Take 3        

   [facility]



     (PROGRAF)                 s-host           capsules           ity

 of



     0.5 mg      00:00: 00:00           disease,           (1.5 mg)           [facility]

[place]



     capsule      00   :00            not            by mouth           MD



                                   otherwise           twice           [facility]



                                   specified           daily           n



                                                                 Cancer



                                                                 [facility]

 

     ruxolitinib            Yes       Status post 10mg      Take 1        

   [facility]



     (Jakafi) 10                      stem cell           tablet (10        

   ity of



     mg tablet      00:00:                transplant           mg) by           

[place]



               00                                 mouth           MD



                                                  twice           [facility]



                                                  daily.           n



                                                                 [facility]

 

     ruxolitinib      2022- No        Status post 10mg      Take 1       

    [facility]



     (Jakafi) 10      2-07 10-04           stem cell           tablet (10       

    ity of



     mg tablet      00:00: 00:00           transplant           mg) by          

 [place]



               00   :00                           mouth           MD



                                                  twice           [place]



                                                  daily.           n



                                                                 [facility]

 

     fluticasone      2022- No        Chronic 1{puff}      Inhale 1      

     [facility]



     propionate-      15           obstructive           puff by        

   ity of



     salmeterol      00:00: 00:00           pulmonary           mouth           

[place]



     (Advair      00   :00            disease           twice           MD



     Diskus) 500                          with acute           daily.           

[facility]



     mcg-50                          exacerbatio                          n



     mcg/inhalat                          n                             [facility]



     inhaler                                                        

 

     fluticasone      2022- No        Chronic 1{puff}      Inhale 1      

     [facility]



     propionate-      2-03 07-15           obstructive           puff by        

   ity of



     salmeterol      00:00: 00:00           pulmonary           mouth           

[place]



     (Advair      00   :00            disease           twice           MD



     Diskus) 500                          with acute           daily.           

[facility]



     mcg-50                          exacerbatio                          n



     mcg/inhalat                          n                             [facility]



     inhaler                                                        

 

     ruxolitinib            Yes            10mg      Take 10 mg           

[facility]



     (Jakafi) 10      2-01                               by mouth.           [facility]

[facility]



     MG chemo      00:00:                                              



     tablet      00                                                

 

     predniSONE      2022- No        Graft-versu 5mg       Take 1        

   [facility]



     (DELTASONE)                 s-host           tablet (5           

ity of



     5 mg tablet      00:00: 00:00           disease,           mg) by          

 [place]



               00   :00            not            mouth           MD



                                   otherwise           daily.           [facility]



                                   specified                          n



                                                                 [facility]

 

     predniSONE      2022- No        Graft-versu 5mg       Take 1        

   [facility]



     (DELTASONE)                 s-host           tablet (5           

ity of



     5 mg tablet      00:00: 00:00           disease,           mg) by          

 [place]



               00   :00            not            mouth           MD



                                   otherwise           daily.           [facility]



                                   specified                          n



                                                                 [facility]

 

     ruxolitinib      2022- No        Status post 10mg      Take 1       

    [facility]



     (Jakafi) 10                 stem cell           tablet (10       

    ity of



     mg tablet      00:00: 00:00           transplant           mg) by          

 [place]



               00   :00                           mouth           MD vazquez           [place]argas



                                                  daily.           n



                                                                 [facility]

 

     ruxolitinib      2022- No        Status post 10mg      Take 1       

    [facility]



     (Jakafi) 10                 stem cell           tablet (10       

    ity of



     mg tablet      00:00: 00:00           transplant           mg) by          

 [place]



               00   :00                           mouth           MD vazquez           [place]



                                                  daily.           n



                                                                 [facility]

 

     ondansetron      2022- No                       Slow IV           Un

[facility]



     (ZOFRAN                                Push, ONCE           ity o

f



     (PF))      13:51: 14:10                          INTRA           [place]



     injection      00   :00                           PROCEDURE,           Medi

[place]



                                                  Starting           Branch



                                                  on 22 at           



                                                  0751,           



                                                  Until 22 at           



                                                  0810,           



                                                  Routine,           



                                                  Intra-op           

 

     dexamethaso      2022- No                       Intravenou          

 [facility]



     ne                                  s, ONCE           ity of



     (DECADRON      13:50: 14:10                          INTRA           [place]



     PHOSPHATE)      00   :00                           PROCEDURE,           Med

ical



     injection                                         Starting           Branch



                                                  on 22 at           



                                                  0750,           



                                                  Until 22 at           



                                                  0810,           



                                                  Routine,           



                                                  Intra-op           

 

     ciprofloxac      2022- No                       IV             Unive

rs



     in in 5 %                                Piggyback,           ity

 of



     dextrose      13:49: 14:10                          Administer           [facility]

[place]



     (CIPRO)      00   :00                           over 60           Medical



     piggyback                                         Minutes,           Branch



                                                  ONCE INTRA           



                                                  PROCEDURE,           



                                                  Starting           



                                                  on 22 at           



                                                  0749,           



                                                  Until 22 at           



                                                  0810,           



                                                  ASAP,           



                                                  Intra-op           

 

     sodium      2022- No                       PRN,           [facility]



     chloride                                Starting           ity of



     0.9 %      13:42: 16:54                          on Mon           [place]



     irrigation      00   :36                           22 at           Med

ical



     solution                                         0742,           Branch



                                                  Until 22 at           



                                                  1054,           



                                                  Intra-op           

 

     bupivacaine      2022- No                       PRN,           Unive

rs



     (preserv                                Starting           ity of



     free) 0.5%      13:42: 16:54                          on Mon           [place]

s



     (SENSORCAIN      00   :36                           22 at           Me

dical



     E MPF) 0.5                                         0742,           Branch



     % (5 mg/mL)                                         Until Mon           



     injection                                         22 at           



                                                  1054,           



                                                  Routine,           



                                                  Intra-op           

 

     propofoL IV      2022- No                       Intravenou          

 [facility]



     infusion                                s, ONCE           ity of



               13:37: 14:10                          INTRA           [place]



               00   :00                           PROCEDURE,           Medical



                                                  Starting           Branch



                                                  on 22 at           



                                                  0737,           



                                                  Until 22 at           



                                                  0810,           



                                                  Routine,           



                                                  Intra-op           

 

     FENTanyl PF       No                       Intravenou          

 [facility]



     (SUBLIMAZE                                s, ONCE           ity o

f



     (PF))      13:37: 14:10                          INTRA           [place]



     injection      00   :00                           PROCEDURE,           Medi

[place]



                                                  Starting           Branch



                                                  on 22 at           



                                                  0737,           



                                                  Until 22 at           



                                                  0810,           



                                                  Routine,           



                                                  Intra-op           

 

     lidocaine       No                       Intravenou           U

nivers



     1%                                  s, ONCE           ity of



     (XYLOCAINE)      13:35: 14:10                          INTRA           [place]

[place]



     100 mg/10      00   :16                           PROCEDURE,           Medi

[place]



     mL (1 %)                                         Starting           Branch



     injection                                         on 22 at           



                                                  0735,           



                                                  Until 22 at           



                                                  0810,           



                                                  Routine,           



                                                  Intra-op           

 

     lactated      2022- No                       IV             [facility]



     ringers IV                                Infusion,           ity

 of



     infusion      13:28: 14:10                          CONTINUOUS           [facility]

[place]



               00   :00                           PRN,           Medical



                                                  Starting           Branch



                                                  on 22 at           



                                                  0728,           



                                                  Until 22 at           



                                                  0810,           



                                                  Routine,           



                                                  Intra-op           

 

     midazolam      2022- No                       IV Push,           Uni

vers



     (VERSED)                                ONCE INTRA           ity 

of



     injection      13:28: 14:10                          PROCEDURE,           [place]

[place]



               00   :00                           Starting           Medical



                                                  on Mon           Branch



                                                  22 at           



                                                  0728,           



                                                  Until 22 at           



                                                  0810,           



                                                  Routine,           



                                                  Intra-op           

 

     lactated      2022- No             1000mL      at 42           Unive

rs



     ringers IV                                mL/hr,           ity of



     infusion      13:00: 12:51                          1,000 mL,           [place]

as



     1,000 mL      00   :00                           IV             Medical



                                                  Infusion,           Branch



                                                  ONCE, 1           



                                                  dose, On           



                                                  22 at           



                                                  0700,           



                                                  Routine,           



                                                  DS[facility] Pre-op           

 

     lactated       - No             1000mL      at 42           Unive

rs



     ringers IV      1-31 01-31                          mL/hr,           ity of



     infusion      13:00: 12:51                          1,000 mL,           [place]

as



     1,000 mL      00   :00                           IV             Medical



                                                  Infusion,           Branch



                                                  ONCE, 1           



                                                  dose, On           



                                                  22 at           



                                                  0700,           



                                                  Routine,           



                                                  DSU Pre-op           

 

     valACYclovi      202-0      Yes            500mg      Take 500           U

nivers



     r 500 mg      1-31                               mg by           ity of



     tablet      08:54:                               mouth 2           [place]



               35                                 (two)           Medical



                                                  times           Branch



                                                  daily.           

 

     valACYclovi      -0      Yes            500mg      Take 500           U

nivers



     r 500 mg      1-31                               mg by           ity of



     tablet      08:54:                               mouth 2           [place]



               35                                 (two)           Medical



                                                  times           Branch



                                                  daily.           

 

     valACYclovi      -0      Yes            500mg      Take 500           U

nivers



     r 500 mg      1-31                               mg by           ity of



     tablet      08:54:                               mouth 2           [place]



               35                                 (two)           Medical



                                                  times           Branch



                                                  daily.           

 

     valACYclovi      -0      Yes            500mg      Take 500           U

nivers



     r 500 mg      1-31                               mg by           ity of



     tablet      08:54:                               mouth 2           [place]



               35                                 (two)           Medical



                                                  times           Branch



                                                  daily.           

 

     SUMAtriptan      -0      Yes            50mg      Take 50 mg           

[facility]



     (IMITREX)      1-31                               by mouth           ity of



     50 mg      08:54:                               once now.           [address]



                                                                 Branch

 

     ursodioL      -0      Yes            300mg      Take 300           Univ

ers



     300 mg      1-31                               mg by           ity of



     capsule      08:54:                               mouth 2           [place]



               34                                 (two)           Medical



                                                  times           [place]



                                                  daily.           

 

     letermovir      -0      Yes            480mg      Take 480           Un

[facility]abbir



     480 mg      1-31                               mg by           ity of



     tablet      08:54:                               mouth           [place]



               34                                 every 24           Medical



                                                  (twenty-fo           Branch



                                                  ur) hours.           

 

     topiramate      -0      Yes            100mg      Take 100           Un

[facility]abbir



     100 mg      1-31                               mg by           ity of



     tablet      08:54:                               mouth.           [place]



               34                                                Medical



                                                                 Branch

 

     levothyroxi      -0      Yes            25ug      Take 25           Uni

vers



     ne 25 mcg      1-31                               mcg by           ity of



     tablet      08:54:                               mouth           [place]



               34                                 every           Medical



                                                  morning.           Branch

 

     metoprolol      -0      Yes                      Take by           Univ

ers



     succinate      1-31                               mouth.           ity of



     100 mg CSpX      08:54:                                              [place]



               34                                                Medical



                                                                 Branch

 

     gabapentin      -0      Yes            300mg      Take 300           Un

[facility]abbir



     300 mg      1-31                               mg by           ity of



     capsule      08:54:                               mouth 3           [place]



               34                                 (three)           Medical



                                                  times           Branch



                                                  daily.           

 

     SUMAtriptan      -0      Yes            50mg      Take 50 mg           

[facility]



     (IMITREX)      1-31                               by mouth           ity of



     50 mg      08:54:                               once now.           [place]



     tablet      34                                                Medical



                                                                 Branch

 

     ursodioL      -0      Yes            300mg      Take 300           Univ

ers



     300 mg      1-31                               mg by           ity of



     capsule      08:54:                               mouth 2           [place]



               34                                 (two)           Medical



                                                  times           Branch



                                                  daily.           

 

     letermovir      -0      Yes            480mg      Take 480           Un

[facility]abbir



     480 mg      1-31                               mg by           ity of



     tablet      08:54:                               mouth           [place]



               34                                 every 24           Medical



                                                  (twenty-fo           Branch



                                                  ur) hours.           

 

     topiramate      -0      Yes            100mg      Take 100           Un

shabbir



     100 mg      1-31                               mg by           ity of



     tablet      08:54:                               mouth.           [address]



                                                                 Branch

 

     levothyroxi      -0      Yes            25ug      Take 25           Uni

vers



     ne 25 mcg      1-31                               mcg by           ity of



     tablet      08:54:                               mouth           [place]



               34                                 every           Medical



                                                  morning.           Branch

 

     metoprolol      -0      Yes                      Take by           Univ

ers



     succinate      1-31                               mouth.           ity of



     100 mg CSpX      08:54:                                              [address]



                                                                 Branch

 

     gabapentin      -0      Yes            300mg      Take 300           Un

shabbir



     300 mg      1-31                               mg by           ity of



     capsule      08:54:                               mouth 3           [place]



               34                                 (three)           Medical



                                                  times           [place]



                                                  daily.           

 

     SUMAtriptan      -0      Yes            50mg      Take 50 mg           

[facility]



     (IMITREX)      1-31                               by mouth           ity of



     50 mg      08:54:                               once now.           [place]



     tablet      34                                                Medical



                                                                 Branch

 

     ursodioL      -0      Yes            300mg      Take 300           [facility]

[facility]



     300 mg      1-31                               mg by           [facility] of



     capsule      08:54:                               mouth 2           [place]



               34                                 (two)           Medical



                                                  times           [place]



                                                  daily.           

 

     letermovir      -0      Yes            480mg      Take 480           [facility]

[facility]



     480 mg      1-31                               mg by           [facility]



     tablet      08:54:                               mouth           [place]



               34                                 every 24           Medical



                                                  (twenty-fo           [facility]



                                                  ur) hours.           

 

     topiramate      -0      Yes            100mg      Take 100           [facility]

[facility]



     100 mg      1-31                               mg by           ity of



     tablet      08:54:                               mouth.           [address]



                                                                 Branch

 

     levothyroxi      -0      Yes            25ug      Take 25           [facility]

[facility]



     ne 25 mcg      1-31                               mcg by           ity of



     tablet      08:54:                               mouth           [place]



               34                                 every           Medical



                                                  morning.           [facility]

 

     metoprolol      0      Yes                      Take by           [facility]

ers



     succinate      1-31                               mouth.           ity of



     100 mg CSpX      08:54:                                              [address]



                                                                 Branch

 

     gabapentin      0      Yes            300mg      Take 300           [facility]

[facility]



     300 mg      1-31                               mg by           ity of



     capsule      08:54:                               mouth 3           [place]



               34                                 (three)           Medical



                                                  times           [facility]



                                                  daily.           

 

     SUMAtriptan      0      Yes            50mg      Take 50 mg           

[facility]



     (IMITREX)      31                               by mouth           [facility] of



     50 mg      08:54:                               once now.           [address]



                                                                 Branch

 

     ursodioL      0      Yes            300mg      Take 300           [facility]

ers



     300 mg      1-31                               mg by           ity of



     capsule      08:54:                               mouth 2           [place]



               34                                 (two)           Medical



                                                  times           [place]



                                                  daily.           

 

     letermovir      0      Yes            480mg      Take 480           Un

[facility]



     480 mg      1-31                               mg by           ity of



     tablet      08:54:                               mouth           [place]



               34                                 every 24           Medical



                                                  (twenty-fo           [facility]anch



                                                  ur) hours.           

 

     topiramate      0      Yes            100mg      Take 100           Un

[facility]



     100 mg      1-31                               mg by           ity of



     tablet      08:54:                               mouth.           [address]



                                                                 Branch

 

     levothyroxi      0      Yes            25ug      Take 25           Uni

vers



     ne 25 mcg      1-31                               mcg by           ity of



     tablet      08:54:                               mouth           [place]



               34                                 every           Medical



                                                  morning.           [facility]anch

 

     metoprolol      0      Yes                      Take by           [facility]

ers



     succinate      -31                               mouth.           ity of



     100 mg CSpX      08:54:                                              [address]

 

     gabapentin      0      Yes            300mg      Take 300           Un

[facility]



     300 mg      1-31                               mg by           ity of



     capsule      08:54:                               mouth 3           [place]



               34                                 (three)           Medical



                                                  times           [facility]



                                                  daily.           

 

     No known      0      No                                      [facility]



     medications      -31                                              [facility]



               06:36:                                              [address]



                                                                 Branch

 

     ketorolac      0      Yes            10mg      Take 10 mg           [facility]



     (Toradol)                                     by mouth.           [facility]

h



     10 MG      00:00:                                              



     tablet      00                                                

 

     promethazin      0      Yes            12.5mg      Take 12.5          

 [facility]



     e         1-31                               mg by           [facility]



     (Phenergan)      00:00:                               mouth.           



     12.5 MG      00                                                



     tablet                                                        

 

     valACYclovi      -0      Yes            500mg      Take 500           U

T



     r (Valtrex)      1-31                               mg by           Health



     500 MG      00:00:                               mouth.           



     tablet      00                                                

 

     gabapentin      2022- No        Acute 600mg      Take 2           Un

[facility]



     (NEURONTIN)      15           myeloblasti           capsules       

    ity of



     300 mg      00:00: 00:00           c leukemia           (600 mg)           

[place]



     capsule      00   :00            not having           by mouth 3           

MD



                                   achieved           (three)           [facility]



                                   remission           times a           n



                                                  day.           Can[facility]



                                                                 Center

 

     valACYclovi      2022- No        Cellulitis, 500mg      Take 1      

     [facility]



     r (VALTREX)      1-31 07-15           not            tablet           ity o

f



     500 mg      00:00: 00:00           otherwise           (500 mg)           [facility]

[facility]



     tablet      00   :00            specified           by mouth           MD



                                                  daily.           [facility]



                                                                 n



                                                                 [facility]



                                                                 Center

 

     gabapentin      2022- No        Acute 600mg      Take 2           Un

[facility]



     (NEURONTIN)      1-31 07-15           myeloblasti           capsules       

    ity of



     300 mg      00:00: 00:00           c leukemia           (600 mg)           

[place]



     capsule      00   :00            not having           by mouth 3           

MD



                                   achieved           (three)           [facility]



                                   remission           times a           n



                                                  day.           Cancer



                                                                 Center

 

     valACYclovi      2022- No        Cellulitis, 500mg      Take 1      

     [facility]



     r (VALTREX)      1-31 07-15           not            tablet           ity o

f



     500 mg      00:00: 00:00           otherwise           (500 mg)           [facility]

[facility]



     tablet      00   :00            specified           by mouth           MD



                                                  daily.           [facility]



                                                                 n



                                                                 [facility]



                                                                 [place]

 

     ketorolac      2022- No        Headache 10mg      Take 1           U

[facility]



     (TORADOL)      20                          tablet (10           ity

 of



     10 mg      00:00: 00:00                          mg) by           [place]



     tablet      00   :00                           mouth           MD



                                                  every 12           [facility]



                                                  (twelve)           n



                                                  hours as           Cancer



                                                  needed for           Center



                                                  moderate           



                                                  pain           



                                                  (headache)           



                                                  .              

 

     promethazin      2022- No        Headache 12.5mg      Take 1        

   [facility]



     e         20                          tablet           ity of



     (PHENERGAN)      00:00: 00:00                          (12.5 mg)           

[place]



     12.5 mg      00   :00                           by mouth           MD



     tablet                                         every 6           [facility]



                                                  (six)           n



                                                  hours as           Cancer



                                                  needed for           Center



                                                  nausea.           

 

     ketorolac      2022- No        Headache 10mg      Take 1           U

[facility]



     (TORADOL)      20                          tablet (10           ity

 of



     10 mg      00:00: 00:00                          mg) by           [place]



     tablet      00   :00                           mouth           MD



                                                  every 12           [facility]



                                                  (twelve)           n



                                                  hours as           Cancer



                                                  needed for           Center



                                                  moderate           



                                                  pain           



                                                  (headache)           



                                                  .              

 

     promethazin      -0 2022- No        Headache 12.5mg      Take 1        

   [facility]



     e          06-20                          tablet           ity of



     (PHENERGAN)      00:00: 00:00                          (12.5 mg)           

[place]



     12.5 mg      00   :00                           by mouth           MD



     tablet                                         every 6           [facility]



                                                  (six)           n



                                                  hours as           Cancer



                                                  needed for           Center



                                                  nausea.           

 

     SUMAtriptan      -0      Yes            50mg      Take 50 mg           

[facility]



     (IMITREX)      1-26                               by mouth           [facility]



     50 mg      13:59:                               once now.           [place]



     tablet      41                                                Medical



                                                                 [facility]

 

     ursodioL      -0      Yes            300mg      Take 300           [facility]

ers



     300 mg      1-26                               mg by           [facility]



     capsule      13:59:                               mouth 2           [place]



               41                                 (two)           Medical



                                                  times           [facility]



                                                  daily.           

 

     letermovir      -0      Yes            480mg      Take 480           [facility]

[facility]



     480 mg      1-26                               mg by           [facility] of



     tablet      13:59:                               mouth           [place]



               41                                 every 24           Medical



                                                  (twenty-fo           [facility]



                                                  ur) hours.           

 

     topiramate      -0      Yes            100mg      Take 100           [facility]

[facility]



     100 mg      1-26                               mg by           ity [facility]



     tablet      13:59:                               mouth.           [place]



               41                                                Medical



                                                                 [facility]

 

     levothyroxi      -0      Yes            25ug      Take 25           [facility]

vers



     ne 25 mcg      1-26                               mcg by           ity of



     tablet      13:59:                               mouth           [place]



               41                                 every           Medical



                                                  morning.           [facility]

 

     metoprolol      -0      Yes                      Take by           [facility]

ers



     succinate      1-26                               mouth.           ity of



     100 mg CSpX      13:59:                                              [place]



               41                                                Medical



                                                                 [facility]

 

     gabapentin      -0      Yes            300mg      Take 300           [facility]

[facility]



     300 mg      1-26                               mg by           ity of



     capsule      13:59:                               mouth 3           [place]



               41                                 (three)           Medical



                                                  times           [facility]



                                                  daily.           

 

     valACYclovi      -0      Yes            500mg      Take 500           U

n[facility]



     r 500 mg      1-26                               mg by           ity of



     tablet      13:59:                               mouth 2           [place]



               41                                 (two)           Medical



                                                  times           [facility]



                                                  daily.           

 

     SUMAtriptan      -0      Yes            50mg      Take 50 mg           

[facility]



     (IMITREX)      1-26                               by mouth           [facility]



     50 mg      13:59:                               once now.           [place]



     tablet      41                                                Medical



                                                                 [facility]

 

     ursodioL      -0      Yes            300mg      Take 300           [facility]

ers



     300 mg      1-26                               mg by           ity of



     capsule      13:59:                               mouth 2           [place]



               41                                 (two)           Medical



                                                  times           [facility]



                                                  daily.           

 

     letermovir      -0      Yes            480mg      Take 480           [facility]

[facility]



     480 mg      1-26                               mg by           [facility]



     tablet      13:59:                               mouth           [place]



               41                                 every 24           Medical



                                                  (twenty-fo           [facility]



                                                  ur) hours.           

 

     topiramate      0      Yes            100mg      Take 100           Un

[facility]



     100 mg      1-26                               mg by           ity of



     tablet      13:59:                               mouth.           [place]



               41                                                Medical



                                                                 [facility]

 

     levothyroxi      0      Yes            25ug      Take 25           Uni

vers



     ne 25 mcg      1-26                               mcg by           ity of



     tablet      13:59:                               mouth           [place]



               41                                 every           Medical



                                                  morning.           [facility]

 

     metoprolol      0      Yes                      Take by           [facility]

ers



     succinate      1-26                               mouth.           ity of



     100 mg CSpX      13:59:                                              [place]



               41                                                Medical



                                                                 [facility]

 

     gabapentin      0      Yes            300mg      Take 300           [facility]

[facility]



     300 mg      1-26                               mg by           ity of



     capsule      13:59:                               mouth 3           [place]



               41                                 (three)           Medical



                                                  times           [facility]



                                                  daily.           

 

     valACYclovi      0      Yes            500mg      Take 500           U

n[facility]



     r 500 mg      1-26                               mg by           ity of



     tablet      13:59:                               mouth 2           [place]



               41                                 (two)           Medical



                                                  times           [facility]



                                                  daily.           

 

     SUMAtriptan      0      Yes            50mg      Take 50 mg           

[facility]



     (IMITREX)      1-26                               by mouth           [facility]



     50 mg      13:59:                               once now.           [place]



     tablet      41                                                Medical



                                                                 Br[facility]

 

     ursodioL      0      Yes            300mg      Take 300           [facility]

ers



     300 mg      1-26                               mg by           ity of



     capsule      13:59:                               mouth 2           [place]



               41                                 (two)           Medical



                                                  times           [facility]



                                                  daily.           

 

     letermovir      0      Yes            480mg      Take 480           Un

[facility]



     480 mg      1-26                               mg by           ity of



     tablet      13:59:                               mouth           [place]



               41                                 every 24           Medical



                                                  (twenty-fo           [facility]



                                                  ur) hours.           

 

     topiramate      -0      Yes            100mg      Take 100           Un

[facility]



     100 mg      1-26                               mg by           ity of



     tablet      13:59:                               mouth.           [place]



               41                                                Medical



                                                                 [facility]

 

     levothyroxi      0      Yes            25ug      Take 25           [facility]i

vers



     ne 25 mcg      1-26                               mcg by           ity of



     tablet      13:59:                               mouth           [place]



               41                                 every           Medical



                                                  morning.           [facility]

 

     metoprolol      0      Yes                      Take by           [facility]

ers



     succinate      1-26                               mouth.           ity of



     100 mg CSpX      13:59:                                              [place]



               41                                                Medical



                                                                 [facility]

 

     gabapentin      -0      Yes            300mg      Take 300           [facility]

[facility]



     300 mg      1-26                               mg by           ity of



     capsule      13:59:                               mouth 3           [place]



               41                                 (three)           Medical



                                                  times           [facility]



                                                  daily.           

 

     valACYclovi            Yes            500mg      Take 500           U

[facility]



     r 500 mg      1-26                               mg by           ity of



     tablet      13:59:                               mouth 2           [place]



               41                                 (two)           Medical



                                                  times           [facility]



                                                  daily.           

 

     albuterol            Yes                      Ventolin           [facility]



     (Ventolin      1-26                               HFA 90           Health



     HFA) 108      00:00:                               mcg/actuat           



     (90 Base)      00                                 ion            



     MCG/ACT                                         aerosol           



     inhaler                                         inhaler           

 

     ruxolitinib      2022- No        Status post 10mg      Take 2       

    [facility]



     (Jakafi) 5                 stem cell           tablets           

ity of



     mg tablet      00:00: 00:00           transplant           (10 mg) by      

     [place]



               00   :00                           mouth           MD



                                                  twice           [facility]



                                                  daily.           n



                                                                 [facility]

 

     ruxolitinib      2022- No        Status post 10mg      Take 2       

    [facility]



     (Jakafi) 5                 stem cell           tablets           

ity of



     mg tablet      00:00: 00:00           transplant           (10 mg) by      

     [place]



               00   :00                           mouth           MD



                                                  twice           [facility]



                                                  daily.           n



                                                                 [facility]

 

     tacrolimus            Yes                      Take 2           [facility]



     (Prograf)      1-10                               capsules           Health



     0.5 MG      00:00:                               (1.0 mg)           



     capsule      00                                 by mouth           



                                                  twice           



                                                  daily           

 

     voriconazol            Yes            200mg      Take 200           U

T



     e (Vfend)      1-10                               mg by           Health



     200 MG      00:00:                               mouth.           



     tablet      00                                                

 

     sulfamethox      2022- No        Graft-versu 1{tbl}      Take 1     

      [facility]



     azole-trime      1-10 02-14           s-host           tablet by           

ity of



     thoprim      00:00: 00:00           disease,           mouth 3           [facility]

[place]



     (BACTRIM      00   :00            not            (three)           MD BIRD) 800                          otherwise           times a           [facility]

rso



     mg-160 mg                          specified           week           n



     per tablet                                         Monday,           Cancer



                                                  Wednesday           Center



                                                  and            



                                                  Friday.           

 

     voriconazol      2022- No        Graft-versu 200mg      Take 1      

     [facility]



     e (VFEND)      -10 02-14           s-host           tablet           ity o

f



     200 mg      00:00: 00:00           disease,           (200 mg)           [facility]

[place]



     tablet      00   :00            not            by mouth           MD



                                   otherwise           twice           [facility]



                                   specified           daily.           n



                                                                 [facility]

 

     sulfamethox      2022- No        Graft-versu 1{tbl}      Take 1     

      [facility]



     azole-trime      1-10 02-14           s-host           tablet by           

ity of



     thoprim      00:00: 00:00           disease,           mouth 3           [facility]

[place]



     (BACTRIM      00   :00            not            (three)           MD BRID) 800                          otherwise           times a           [facility]

rso



     mg-160 mg                          specified           week           n



     per tablet                                         Monday,           Cancer



                                                  Wednesday           Center



                                                  and            



                                                  Friday.           

 

     voriconazol      2022- No        Graft-versu 200mg      Take 1      

     [facility]



     e (VFEND)      1-10 02-14           s-host           tablet           ity o

f



     200 mg      00:00: 00:00           disease,           (200 mg)           [facility]

[place]



     tablet      00   :00            not            by mouth           MD



                                   otherwise           twice           [facility]



                                   specified           daily.           [facility]

 

     tacrolimus      2022- No        Graft-versu           Take 3        

   [facility]



     (PROGRAF)      1-10 02-11           s-host           capsules           ity

 of



     0.5 mg      00:00: 00:00           disease,           (1.5 mg)           Te

[place]



     capsule      00   :00            not            by mouth           MD



                                   otherwise           twice           [facility]



                                   specified           daily           [facility]

 

     tacrolimus      2022- No        Graft-versu           Take 3        

   [facility]



     (PROGRAF)      1-10 02-11           s-host           capsules           ity

 of



     0.5 mg      00:00: 00:00           disease,           (1.5 mg)           Te

[place]



     capsule      00   :00            not            by mouth           MD



                                   otherwise           twice           [facility]



                                   specified           daily           [facility]

 

     predniSONE      2022- No        Graft-versu 10mg      Take a        

   [facility]



     (DELTASONE)                 s-host           HALF           ity o

f



     20 mg      00:00: 00:00           disease,           tablet (10           T

[place]



     tablet      00   :00            not            mg) by           MD



                                   otherwise           mouth           [facility]



                                   specified           daily.           [facility]

 

     predniSONE      2022- No        Graft-versu 10mg      Take a        

   [facility]



     (DELTASONE)                 s-host           HALF           ity o

f



     20 mg      00:00: 00:00           disease,           tablet (10           T

[place]



     tablet      00   :00            not            mg) by           MD



                                   otherwise           mouth           [facility]



                                   specified           daily.           [facility]

 

     Fluticasone            Yes            1{puff} Q.5D Inhale 1          

 [facility]



     -Salmeterol      1-04                               puff in           Healt

h



     (Advair      00:00:                               the            



     diskus)      00                                 morning           



     500-50                                         and 1 puff           



     MCG/ACT                                         before           



     diskus                                         bedtime.           



     inhaler                                                        

 

     fluticasone      2022- No        Chronic 1{puff}      Inhale 1      

     [facility]



     propionate-       02-03           obstructive           puff by        

   ity of



     salmeterol      00:00: 00:00           pulmonary           mouth           

[place]



     (Advair      00   :00            disease           twice           MD



     Diskus) 500                          with acute           daily.           

[facility]



     mcg-50                          exacerbatio                          n



     mcg/inhalat                          n                             Cancer



     ion diskus                                                        Center



     inhaler                                                        

 

     fluticasone      2022- No        Chronic 1{puff}      Inhale 1      

     [facility]



     propionate-       02-03           obstructive           puff by        

   ity of



     salmeterol      00:00: 00:00           pulmonary           mouth           

[place]



     (Advair      00   :00            disease           twice           MD



     Diskus) 500                          with acute           daily.           

[facility]



     mcg-50                          exacerbatio                          n



     mcg/inhalat                          n                             Cancer



     ion diskus                                                        Center



     inhaler                                                        

 

     budesonide-      2022- No        Dyspnea, 2{puff}      Inhale 2     

      [facility]



     formoterol      -06           not            puffs by           ity 

of



     (Symbicort)      00:00: 00:00           otherwise           mouth          

 [place]



     160-4.5      00   :00            specified           twice           MD



     mcg/actuati                                         daily.           All

o



     on inhaler                                                        n



                                                                 Cancer



                                                                 Center

 

     budesonide-      2022- No        Dyspnea, 2{puff}      Inhale 2     

      [facility]



     formoterol      -06           not            puffs by           ity 

of



     (Symbicort)      00:00: 00:00           otherwise           mouth          

 [place]



     160-4.5      00   :00            specified           twice           MD



     mcg/actuati                                         daily.           All

o



     on inhaler                                                        n



                                                                 Cancer



                                                                 [facility]

 

     topiramate      2021- No        Status post 200mg      Take 1       

    [facility]



     (TOPAMAX)                 stem cell           tablet           it

y of



     200 mg      00:00: 00:00           transplant           (200 mg)           

[place]



     tablet      00   :00                           by mouth           MD



                                                  at             [facility]



                                                  bedtime.           n



                                                  To prevent           Cancer



                                                  migraine           Center

 

     topiramate      2021- No        Status post 200mg      Take 1       

    [facility]



     (TOPAMAX)                 stem cell           tablet           it

y of



     200 mg      00:00: 00:00           transplant           (200 mg)           

[place]



     tablet      00   :00                           by mouth           MD



                                                  at             [facility]



                                                  bedtime.           n



                                                  To prevent           Cancer



                                                  migraine           Center

 

     predniSONE      2021-1 2022- No        Graft-versu 20mg      Take 1        

   [facility]



     (DELTASONE)                 s-host           tablet (20          

 ity of



     20 mg      00:00: 00:00           disease,           mg) by           [place]



     tablet      00   :00            not            mouth           MD



                                   otherwise           daily.           [facility]



                                   specified                          n



                                                                 Cancer



                                                                 Center

 

     predniSONE      2021- No        Graft-versu 20mg      Take 1        

   [facility]



     (DELTASONE)                 s-host           tablet (20          

 ity of



     20 mg      00:00: 00:00           disease,           mg) by           [place]



     tablet      00   :00            not            mouth           MD



                                   otherwise           daily.           [facility]



                                   specified                          n



                                                                 Cancer



                                                                 Center

 

     dexamethaso      2021- No        Graft-versu 5mL       Swish and    

       [facility]



     ne                   s-host           spit 5 mL           ity of



     (DECADRON)      00:00: 00:00           disease,           3 (three)        

   [place]



     0.5 mg/5 mL      00   :00            not            times a           MD



     mouthwash                          otherwise           day for 1           

[facility]



                                   specified           week. Use           n



                                                  as needed           Cancer



                                                  up to 3           Center



                                                  times           



                                                  daily           



                                                  thereafter           



                                                  . Do not           



                                                  eat food           



                                                  or drink           



                                                  30 minutes           



                                                  after use.           

 

     dexamethaso      2021- No        Graft-versu 5mL       Swish and    

       [facility]



     ne                   s-host           spit 5 mL           ity of



     (DECADRON)      00:00: 00:00           disease,           3 (three)        

   [place]



     0.5 mg/5 mL      00   :00            not            times a           MD



     mouthwash                          otherwise           day for 1           

[facility]



                                   specified           week. Use           n



                                                  as needed           Cancer



                                                  up to 3           Center



                                                  times           



                                                  daily           



                                                  thereafter           



                                                  . Do not           



                                                  eat food           



                                                  or drink           



                                                  30 minutes           



                                                  after use.           

 

     dexamethaso      2021- No        Graft-versu 5mL       Swish and    

       [facility]



     ne                   s-host           spit 5 mL           ity of



     (DECADRON)      00:00: 00:00           disease,           3 (three)        

   [place]



     0.5 mg/5 mL      00   :00            not            times a           MD



     mouthwash                          otherwise           day. Swish          

 [facility]



                                   specified           and spit 5           n



                                                  mL 3 times           Cancer



                                                  Daily for           Center



                                                  1 week.           



                                                  Use as           



                                                  needed up           



                                                  to 3 times           



                                                  daily           



                                                  thereafter           



                                                  . Do not           



                                                  eat food           



                                                  or drink           



                                                  30 minutes           



                                                  after use.           

 

     dexamethaso      2021- No        Graft-versu 5mL       Swish and    

       [facility]



     ne        1-30 12-01           s-host           spit 5 mL           ity of



     (DECADRON)      00:00: 00:00           disease,           3 (three)        

   [place]



     0.5 mg/5 mL      00   :00            not            times a           MD



     mouthwash                          otherwise           day. Swish          

 [facility]



                                   specified           and spit 5           n



                                                  mL 3 times           Cancer



                                                  Daily for           Center



                                                  1 week.           



                                                  Use as           



                                                  needed up           



                                                  to 3 times           



                                                  daily           



                                                  thereafter           



                                                  . Do not           



                                                  eat food           



                                                  or drink           



                                                  30 minutes           



                                                  after use.           

 

     ruxolitinib      2021- No        Graft-versu 5mg       Take 1       

    [facility]



     (Jakafi) 5      -20           s-host           tablet (5           i

ty of



     mg tablet      00:00: 00:00           disease,           mg) by           T

[place]



               00   :00            not            mouth           MD



                                   otherwise           twice           [facility]



                                   specified           daily.           [facility]

 

     ruxolitinib      2021- No        Graft-versu 5mg       Take 1       

    [facility]



     (Jakafi) 5      20           s-host           tablet (5           i

ty of



     mg tablet      00:00: 00:00           disease,           mg) by           T

[place]



               00   :00            not            mouth           MD



                                   otherwise           twice           [facility]



                                   specified           daily.           [facility]

 

     letermovir      2021- No        Cytomegalov 480mg      Take 1       

    [facility]



     (PREVYMIS)       12-16           irus           tablet           ity of



     480 mg      00:00: 00:00           infection           (480 mg)           T

[place]



     tablet      00   :00                           by mouth           MD



                                                  [facility].           [facility]



                                                                 [facility]

 

     letermovir      2021- No        Cytomegalov 480mg      Take 1       

    [facility]



     (PREVYMIS)       12-16           irus           tablet           ity of



     480 mg      00:00: 00:00           infection           (480 mg)           T

[place]



     tablet      00   :00                           by mouth           MD



                                                  daily.           [facility]



                                                                 [facility]

 

     voriconazol      2021- No        Graft-versu 200mg      Take 1      

     [facility]



     e (VFEND)       01-10           s-host           tablet           ity o

f



     200 mg      00:00: 00:00           disease,           (200 mg)           Te

[place]



     tablet      00   :00            not            by mouth           MD



                                   otherwise           twice           [facility]



                                   specified           daily.           [facility]

 

     voriconazol      2021- No        Graft-versu 200mg      Take 1      

     [facility]



     e (VFEND)       01-10           s-host           tablet           ity o

f



     200 mg      00:00: 00:00           disease,           (200 mg)           Te

[place]



     tablet      00   :00            not            by mouth           MD



                                   otherwise           twice           [facility]



                                   specified           daily.           n



                                                                 Cancer



                                                                 Center

 

     predniSONE      2021- No        Graft-versu 60mg      Take 3        

   [facility]



     (DELTASONE)      1-16 12-16           s-host           tablets           it

y of



     20 mg      00:00: 00:00           disease,           (60 mg) by           T

[place]



     tablet      00   :00            not            mouth           MD



                                   otherwise           daily.           [facility]



                                   specified                          n



                                                                 Cancer



                                                                 Center

 

     letermovir      2021- No        Graft-versu 480mg      Take 1       

    [facility]



     (Prevymis)      1-16 12-16           s-host           tablet           ity 

of



     480 mg      00:00: 00:00           disease,           (480 mg)           Te

[place]



     tablet      00   :00            not            by mouth           MD



                                   otherwise           daily.           [facility]



                                   specified                          n



                                                                 Cancer



                                                                 Center

 

     predniSONE      2021- No        Graft-versu 60mg      Take 3        

   [facility]



     (DELTASONE)      1-16 12-16           s-host           tablets           it

y of



     20 mg      00:00: 00:00           disease,           (60 mg) by           T

[place]



     tablet      00   :00            not            mouth           MD



                                   otherwise           daily.           [facility]



                                   specified                          n



                                                                 Cancer



                                                                 Center

 

     letermovir      2021- No        Graft-versu 480mg      Take 1       

    [facility]



     (Prevymis)      1-16 12-16           s-host           tablet           ity 

of



     480 mg      00:00: 00:00           disease,           (480 mg)           Te

[place]



     tablet      00   :00            not            by mouth           MD



                                   otherwise           daily.           [facility]



                                   specified                          n



                                                                 Cancer



                                                                 Center

 

     Ventolin      2021- No        Other form 2{puff}      Inhale 2      

     [facility]



     HFA 90       07-15           of dyspnea           puffs by           it

y of



     mcg/actuati      00:00: 00:00                          mouth           [place]

[place]



     on inhaler      00   :00                           every 6           MD



                                                  (six)           [facility]



                                                  hours as           n



                                                  needed for           Cancer



                                                  wheezing           Center



                                                  or             



                                                  shortness           



                                                  of breath.           

 

     Ventolin      2021- No        Other form 2{puff}      Inhale 2      

     [facility]



     HFA 90      12 07-15           of dyspnea           puffs by           it

y of



     mcg/actuati      00:00: 00:00                          mouth           [place]

s



     on inhaler      00   :00                           every 6           MD



                                                  (six)           [facility]



                                                  hours as           n



                                                  needed for           Cancer



                                                  wheezing           Center



                                                  or             



                                                  shortness           



                                                  of breath.           

 

     heparin,      2021- No        Complicatio           Inject 2        

   [facility]



     PF, 100      1- 06-23           n of bone           ml (200           ity

 of



     units/mL      00:00: 00:00           marrow           units)           [place]

s



     injection      00   :00            transplant,           into each         

  MD



                                   not            lumen of           [facility]



                                   otherwise           central           n



                                   specified           venous           Cancer



                                                  catheter           Center



                                                  daily as           



                                                  directed.           



                                                  Discard           



                                                  excess           



                                                  volume to           



                                                  administer           



                                                  2 mL.           

 

     heparin,      2021- No        Complicatio           Inject 2        

   [facility]



     PF, 100      23           n of bone           ml (200           ity

 of



     units/mL      00:00: 00:00           marrow           units)           [place]

s



     injection      00   :00            transplant,           into each         

  MD



                                   not            lumen of           [facility]



                                   otherwise           central           n



                                   specified           venous           Cancer



                                                  catheter           Center



                                                  daily as           



                                                  directed.           



                                                  Discard           



                                                  excess           



                                                  volume to           



                                                  administer           



                                                  2 mL.           

 

     ruxolitinib      2021- No        Graft-versu 5mg       Take 1       

    [facility]



     (Jakafi) 5       11-18           s-host           tablet (5           i

ty of



     mg tablet      00:00: 00:00           disease,           mg) by           [facility]

[facility]



               00   :00            not            mouth           MD



                                   otherwise           twice           [facility]



                                   specified           daily.           n



                                                                 Cancer



                                                                 Center

 

     predniSONE      2021- No        Graft-versu 100mg      Take 5       

    [facility]



     (DELTASONE)       11-16           s-host           tablets           it

y of



     20 mg      00:00: 00:00           disease,           (100 mg)           [place]

as



     tablet      00   :00            not            by mouth           MD



                                   otherwise           daily.           [facility]



                                   specified                          n



                                                                 Cancer



                                                                 Center

 

     promethazin      2021- No        Headache 12.5mg      Take 1        

   [facility]



     e         0--31                          tablet           ity of



     (PHENERGAN)      00:00: 00:00                          (12.5 mg)           

[place]



     12.5 mg      00   :00                           by mouth           MD



     tablet                                         every 6           [facility]



                                                  (six)           n



                                                  hours as           Cancer



                                                  needed for           Center



                                                  nausea.           

 

     ketorolac      2021- No        Headache 10mg      Take 1           U

nivers



     (TORADOL)      0-31                          tablet (10           ity

 of



     10 mg      00:00: 00:00                          mg) by           [place]



     tablet      00   :00                           mouth           MD



                                                  every 12           [facility]



                                                  (twelve)           n



                                                  hours as           Cancer



                                                  needed for           Center



                                                  moderate           



                                                  pain           



                                                  (headache)           



                                                  .              

 

     promethazin      2021- No        Headache 12.5mg      Take 1        

   [facility]



     e         0-08 -31                          tablet           ity of



     (PHENERGAN)      00:00: 00:00                          (12.5 mg)           

[place]



     12.5 mg      00   :00                           by mouth           MD



     tablet                                         every 6           [facility]



                                                  (six)           n



                                                  hours as           Cancer



                                                  needed for           Center



                                                  nausea.           

 

     ketorolac      2021- No        Headache 10mg      Take 1           U

nivers



     (TORADOL)                                tablet (10           ity

 of



     10 mg      00:00: 00:00                          mg) by           [place]



     tablet      00   :00                           mouth           MD



                                                  every 12           [facility]



                                                  (twelve)           n



                                                  hours as           Cancer



                                                  needed for           Center



                                                  moderate           



                                                  pain           



                                                  (headache)           



                                                  .              

 

     ursodiol            Yes            300mg      Take 300           [facility]



     (Actigall)                                     mg by           [facility]



     300 MG      00:00:                               mouth.           



     capsule      00                                                

 

     ursodiol            Yes       Graft-versu 300mg      Take 1          

 [facility]



     (ACTIGALL)                      s-host           capsule           ity 

of



     300 mg      00:00:                disease,           (300 mg)           [place]

as



     capsule      00                  not            by mouth 3           MD



                                   otherwise           (three)           [facility]



                                   specified           times a           n



                                                  day.           Cancer



                                                                 Center

 

     ursodiol      2022- No        Graft-versu 300mg      Take 1         

  [facility]



     (ACTIGALL)      9-16 10-14           s-host           capsule           ity

 of



     300 mg      00:00: 00:00           disease,           (300 mg)           [facility]

[place]



     capsule      00   :00            not            by mouth 3           MD



                                   otherwise           (three)           [facility]



                                   specified           times a           n



                                                  day.           Cancer



                                                                 Center

 

     SUMAtriptan      2022- No        Status post 100mg      Take 2      

     [facility]



     (IMITREX)      9-16 07-15           stem cell           tablets           i

ty of



     50 mg      00:00: 00:00           transplant           (100 mg)           [facility]

[facility]



     tablet      00   :00                           by mouth           MD



                                                  once as           [facility]



                                                  needed for           n



                                                  migraine           Cancer



                                                  (Not to           Center



                                                  exceed 2           



                                                  doses per           



                                                  day).           

 

     SUMAtriptan      2022- No        Status post 100mg      Take 2      

     [facility]



     (IMITREX)      15           stem cell           tablets           i

ty of



     50 mg      00:00: 00:00           transplant           (100 mg)           [facility]

[facility]



     tablet      00   :00                           by mouth           MD



                                                  once as           [facility]



                                                  needed for           n



                                                  migraine           Cancer



                                                  (Not to           Center



                                                  exceed 2           



                                                  doses per           



                                                  day).           

 

     gabapentin      2022- No        Acute 600mg      Take 2           Un

[facility]



     (NEURONTIN)                 myeloblasti           capsules       

    ity of



     300 mg      00:00: 00:00           c leukemia           (600 mg)           

[place]



     capsule      00   :00            not having           by mouth 3           

MD



                                   achieved           (three)           [facility]



                                   remission           times a           n



                                                  day.           Cancer



                                                                 Center

 

     gabapentin      2022- No        Acute 600mg      Take 2           Un

[facility]



     (NEURONTIN)                 myeloblasti           capsules       

    ity of



     300 mg      00:00: 00:00           c leukemia           (600 mg)           

[place]



     capsule      00   :00            not having           by mouth 3           

MD



                                   achieved           (three)           [facility]



                                   remission           times a           n



                                                  day.           Cancer



                                                                 Center

 

     isavuconazo      2021- No        Graft-versu 372mg      Take 2      

     [facility]



     nium                 s-host           capsules           ity of



     (Cresemba)      00:00: 00:00           disease,           (372 mg)         

  [place]



     186 mg      00   :00            not            by mouth           MD



     capsule                          otherwise           daily.           [place]

so



                                   specified                          n



                                                                 Cancer



                                                                 [place]

 

     isavuconazo      2021- No        Graft-versu 372mg      Take 2      

     [facility]



     nium                 s-host           capsules           ity of



     (Cresemba)      00:00: 00:00           disease,           (372 mg)         

  [place]



     186 mg      00   :00            not            by mouth           MD



     capsule                          otherwise           daily.           [place]

so



                                   specified                          n



                                                                 Cancer



                                                                 [place]

 

     letermovir      2021- No        Cytomegalov 480mg      Take 1       

    [facility]



     (PREVYMIS)                 irus           tablet           ity of



     480 mg      00:00: 00:00           infection           (480 mg)           [facility]

[facility]



     tablet      00   :00                           by mouth           MD



                                                  daily.           [facility]



                                                                 [facility]



                                                                 [facility]



                                                                 [place]

 

     predniSONE      2021- No        Cellulitis, 5mg       Take 0.5      

     [facility]



     (DELTASONE)                 not            tablets (5           i

ty of



     10 mg      00:00: 00:00           otherwise           mg) by           [place]

s



     tablet      00   :00            specified           mouth           MD



                                                  daily.           [facility]



                                                                 n



                                                                 Cancer



                                                                 [place]

 

     topiramate      2021- No        Status post 200mg      Take 1       

    [facility]



     (TOPAMAX)                 stem cell           tablet           it

y of



     200 mg      00:00: 00:00           transplant           (200 mg)           

[place]



     tablet      00   :00                           by mouth           [facility]



                                                  [facility]



                                                  [facility]           n



                                                  To prevent           Cancer



                                                  [facility]           Center

 

     topiramate      2021- No        Status post 200mg      Take 1       

    [facility]



     (TOPAMAX)                 stem cell           tablet           it

y of



     200 mg      00:00: 00:00           transplant           (200 mg)           

[place]



     tablet      00   :00                           by mouth           MD



                                                  at             [facility]



                                                  bedtime.           n



                                                  To prevent           Cancer



                                                  [facility]           Center

 

     clobetasol      2021- No        Graft           Apply           Un[facility]

ers



     (Temovate)      8-19 10-09           versus host           topically       

    ity of



     0.05%      00:00: 00:00           disease           to             [place]



     ointment      00   :00                           affected           MD



                                                  area(s)           [facility]



                                                  twice           n



                                                  daily.           Cancer



                                                                 Center

 

     tacrolimus      2022- No        Graft-versu           Take 3        

   [facility]



     (PROGRAF)      8-04 01-10           s-host           capsules           ity

 of



     0.5 mg      00:00: 00:00           disease,           (1.5 mg)           [facility]

[facility]



     capsule      00   :00            not            by mouth           MD



                                   otherwise           every           [facility]



                                   specified           morning           n



                                                  AND 2           Cancer



                                                  capsules           Center



                                                  (1 mg)           



                                                  every           



                                                  evening.           

 

     tacrolimus      2022- No        Graft-versu           Take 3        

   [facility]



     (PROGRAF)      8-04 01-10           s-host           capsules           ity

 of



     0.5 mg      00:00: 00:00           disease,           (1.5 mg)           [facility]

[place]



     capsule      00   :00            not            by mouth           MD



                                   otherwise           every           [facility]



                                   specified           morning           n



                                                  AND 2           Cancer



                                                  capsules           Center



                                                  (1 mg)           



                                                  every           



                                                  evening.           

 

     sulfamethox      2022- No        Graft-versu 1{tbl}      Take 1     

      [facility]



     azole-trime      7-28 01-10           s-host           tablet by           

ity of



     thoprim      00:00: 00:00           disease,           mouth 3           [facility]

[place]



     (BACTRIM      00   :00            not            (three)           MD BIRD) 800                          otherwise           times a           [facility]

[facility]so



     mg-160 mg                          specified           week           n



     per tablet                                         Monday,           Cancer



                                                  Wednesday           Center



                                                  and            



                                                  Friday.           

 

     sulfamethox      2022- No        Graft-versu 1{tbl}      Take 1     

      [facility]



     azole-trime      7-28 01-10           s-host           tablet by           

ity of



     thoprim      00:00: 00:00           disease,           mouth 3           [facility]

[place]



     (BACTRIM      00   :00            not            (three)           MD BIRD) 800                          otherwise           times a           [facility]

rso



     mg-160 mg                          specified           week           n



     per tablet                                         Monday,           Cancer



                                                  Wednesday           Center



                                                  and            



                                                  Friday.           

 

     dicyclomine      2022- No        Cramp 10mg      Take 1           Un

[facility]



     (BENTYL) 10       05-03                          capsule           ity 

of



     mg capsule      00:00: 00:00                          (10 mg) by           

[place]



               00   :00                           mouth           MD



                                                  every 6           [facility]



                                                  (six)           n



                                                  hours as           Cancer



                                                  needed           Center



                                                  (Cramps).           

 

     dicyclomine      2022- No        Cramp 10mg      Take 1           Un

[facility]



     (BENTYL) 10       05-03                          capsule           ity 

of



     mg capsule      00:00: 00:00                          (10 mg) by           

[place]



               00   :00                           mouth           MD



                                                  every 6           [facility]



                                                  (six)           n



                                                  hours as           Cancer



                                                  needed           Center



                                                  (Cramps).           

 

     valACYclovi      2022- No        Cellulitis, 500mg      Take 1      

     [facility]



     r (VALTREX)                 not            tablet           ity o

f



     500 mg      00:00: 00:00           otherwise           (500 mg)           [facility]

[facility]



     tablet      00   :00            specified           by mouth           MD



                                                  daily.           [facility]

 

     valACYclovi      2022- No        Cellulitis, 500mg      Take 1      

     [facility]



     r (VALTREX)                 not            tablet           ity o

f



     500 mg      00:00: 00:00           otherwise           (500 mg)           [facility]

[facility]



     tablet      00   :00            specified           by mouth           MD



                                                  daily.           [facility]

 

     oxyCODONE      2022- No        Abdominal 10mg      Take 1           

[facility]



     (ROXICODONE                 pain           tablet (10           i

ty of



     ) 10 mg      00:00: 00:00                          mg) by           [place]



     immediate      00   :00                           mouth           MD



     release                                         every 6           [facility]



     tablet                                         (six)           n



                                                  hours as           Cancer



                                                  needed for           Center



                                                  severe           



                                                  pain.           

 

     oxyCODONE      2022- No        Abdominal 10mg      Take 1           

[facility]



     (ROXICODONE                 pain           tablet (10           i

ty of



     ) 10 mg      00:00: 00:00                          mg) by           [place]



     immediate      00   :00                           mouth           MD



     release                                         every 6           [facility]



     tablet                                         (six)           n



                                                  hours as           Cancer



                                                  needed for           Center



                                                  severe           



                                                  pain.           

 

     senna-docus      2022- No        Acute 2{tbl}      Take 2           

[facility]



     ate                  myeloblasti           tablets by           i

ty of



     (SENOKOT-S)      00:00: 00:00           c leukemia           mouth         

  [place]



     8.6 mg-50      00   :00            not having           twice           MD



     mg tablet                          achieved           daily.           Omer

vargas



                                   [facility]

 

     senna-docus      2022- No        Acute 2{tbl}      Take 2           

[facility]



     ate                  myeloblasti           tablets by           i

ty of



     (SENOKOT-S)      00:00: 00:00           c leukemia           mouth         

  [place]



     8.6 mg-50      00   :00            not having           twice           MD



     mg tablet                          achieved           daily.           [facility]

rso



                                   remission                          n



                                                                 Cancer



                                                                 Center

 

     metoprolol      2022- No        Stem cells 100mg      Take 1        

   [facility]



     succinate      6-17 07-15           transplant           tablet           i

ty of



     (TOPROL XL)      00:00: 00:00           status           (100 mg)          

 [place]



     100 mg 24      00   :00                           by mouth           MD



     hr tablet                                         daily for           [place]

so



                                                  blood           n



                                                  pressure.           Cancer



                                                  Hold if           Center



                                                  top number           



                                                  is less           



                                                  than 100           



                                                  or heart           



                                                  rate is           



                                                  less than           



                                                  60             

 

     metoprolol      2022- No        Stem cells 100mg      Take 1        

   [facility]



     succinate      6-17 07-15           transplant           tablet           i

ty of



     (TOPROL XL)      00:00: 00:00           status           (100 mg)          

 [place]



     100 mg 24      00   :00                           by mouth           MD



     hr tablet                                         daily for           [place]

so



                                                  blood           n



                                                  pressure.           Cancer



                                                  Hold if           Center



                                                  top number           



                                                  is less           



                                                  than 100           



                                                  or heart           



                                                  rate is           



                                                  less than           



                                                  60             

 

     aluminum-ma      2022- No        Abdominal 15mL      Take 15 mL     

      [facility]



     gnesium       05-03           pain           by mouth           ity of



     hydroxide-s      00:00: 00:00                          every 4           [facility]

[place]



     imethicone      00   :00                           (four)           MD



     (MAALOX                                         hours as           [facility]



     PLUS) 200                                         needed for           n



     mg-200                                         heartburn.           Cancer



     mg-20 mg/5                                                        Center



     mL                                                          



     suspension                                                        

 

     aluminum-ma      2022- No        Abdominal 15mL      Take 15 mL     

      [facility]



     gnesium       05-03           pain           by mouth           ity of



     hydroxide-s      00:00: 00:00                          every 4           [facility]

[place]



     imethicone      00   :00                           (four)           MD



     (MAALOX                                         hours as           [facility]



     PLUS) 200                                         needed for           n



     mg-200                                         heartburn.           Cancer



     mg-20 mg/5                                                        Center



     mL                                                          



     suspension                                                        

 

     levothyroxi      2022- No        Status post 25ug      Take 1       

    [facility]



     ne         0323           stem cell           tablet (25           ity

 of



     (SYNTHROID,      00:00: 00:00           transplant           mcg) by       

    [place]



     LEVOTHROID)      00   :00                           mouth           MD



     25 mcg                                         daily.           [facility]



     tablet                                                        n



                                                                 Cancer



                                                                 [place]

 

     levothyroxi      2021-0 2022- No        Status post 25ug      Take 1       

    [facility]



     ne        6-17 03-23           stem cell           tablet (25           ity

 of



     (SYNTHROID,      00:00: 00:00           transplant           mcg) by       

    [place]



     LEVOTHROID)      00   :00                           mouth           MD



     25 mcg                                         daily.           [facility]



     tablet                                                        n



                                                                 Cancer



                                                                 Center

 

     gabapentin            Yes            600mg      Take 600           [facility]



     (Neurontin)      4-08                               mg by           [facility]



     300 MG      00:00:                               mouth.           



     capsule      00                                                

 

     fluticasone            Yes            1{puff}      Inhale 1          

 [facility]



     -vilanterol      7-15                               Puff           ity of



     (BREO      15:52:                               daily.           [place]



     ELLIPTA)      35                                                Medical



     100-25                                                        Branch



     mcg/dose                                                        



     DsDv                                                        

 

     azelastine-            Yes            1{spray      Use 1           Un

[facility]



     fluticasone      7-15                     }         Spray in           ity 

of



     (DYMISTA)      15:52:                               each           [place]



     137-50      35                                 nostril 2           Medical



     mcg/spray                                         (two)           Branch



     [place]                                         times           



                                                  daily as           



                                                  needed.           

 

     metoprolol            Yes            100mg      Take 100           Un

[facility]



     tartrate      7-15                               mg by           ity of



     (LOPRESSOR)      15:52:                               mouth 2           [place]

as



     100 mg      35                                 (two)           Medical



     tablet                                         times           Branch



                                                  daily.           

 

     hydrochloro            Yes            25mg      Take 25 mg           

[facility]



     thiazide      7-15                               by mouth           ity of



     (ESIDRIX)      15:52:                               daily.           [place]



     25 mg      35                                                Medical



     tablet                                                        Branch

 

     POLYETHYLEN            Yes            1{scoop      Take 1           U

nivers



     E GLYCOL      7-15                     }         scoop by           ity of



     3350      15:52:                               mouth           [place]



     (MIRALAX      35                                 daily.           Medical



     ORAL)                                                        Branch

 

     BACLOFEN            Yes                      by             [facility]



     INTRATHECAL      7-15                               Intratheca           it

y of



               15:52:                               l route.           [place]



               35                                 Indication           Medical



                                                  s: Pain           Branch



                                                  Pump in           



                                                  place           

 

     amitriptyli            Yes            75mg      Take 75 mg           

[facility]



     ne (ELAVIL)      7-15                               by mouth           ity 

of



     75 mg      15:52:                               at             [place]



     tablet      35                                 bedtime.           Medical



                                                                 Branch

 

     SIMVASTATIN            Yes            100mg      Take 100           U

nivers



     ORAL      7-15                               mg by           ity of



               15:52:                               mouth           [place]



               35                                 daily.           Medical



                                                                 Branch

 

     lurasidone            Yes            60mg      Take 60 mg           U

nivers



     (LATUDA) 60      7-15                               by mouth           ity 

of



     mg Tab      15:52:                               daily.           [place]



               35                                                Medical



                                                                 Branch

 

     topiramate            Yes            50mg      Take 50 mg           U

nivers



     (TOPAMAX)      7-15                               by mouth 2           ity 

of



     50 mg      15:52:                               (two)           [place]



     tablet      35                                 times           Medical



                                                  daily.           Branch

 

     lisinopril-      -      Yes            1{tbl}      Take 1 Tab         

  [facility]



     hydrochloro      7-15                               by mouth           ity 

of



     thiazide      15:52:                               every           [place]



     (PRINZIDE,Z      35                                 evening.           Medi

[place]



     ESTORETIC)                                                        Branch



     10-12.5 mg                                                        



     per tablet                                                        

 

     desvenlafax            Yes            100mg      Take 100           U

nivers



     ine       7-15                               mg by           ity of



     succinate      15:52:                               mouth           [place]



     (PRISTIQ)      35                                 daily.           Medical



     100 mg 24                                                        Branch



     hr tablet                                                        

 

     Dexlansopra            Yes            60mg      Take 60 mg           

[facility]



     zole      7-15                               by mouth           ity of



     (DEXILANT)      15:52:                               daily.           [place]



     60 mg CpDM      35                                                Medical



                                                                 Branch

 

     valACYclovi            Yes                                     Univer

s



     r 500 mg      7-07                                              ity of



     tablet      00:00:                                              [place]



                                                               Medical



                                                                 Branch

 

     NOXAFIL 100      0      Yes                                     Univer

s



     mg tablet      6-24                                              ity of



               00:00:                                              [place]



               00                                                Medical



                                                                 Branch

 

     TRINTELLIX      0      Yes                      TAKE 1           Unive

rs



     20 mg Tab      5-29                               TABLET BY           ity o

f



               00:00:                               MOUTH           [place]



               00                                 EVERY DAY           Medical



                                                  AS             Branch



                                                  DIRECTED           

 

     LINZESS 290            Yes                      TAKE 1           Univ

ers



     mcg Cap      4-18                               CAPSULE BY           ity of



               00:00:                               MOUTH           [place]



               00                                 EVERY DAY           Medical



                                                                 Branch

 

     levoFLOXaci            Yes            500mg      Take 500           U

nivers



     n 500 mg      4-12                               mg by           ity of



     tablet      00:00:                               mouth.           [place]



               00                                                Medical



                                                                 Branch

 

     metFORMIN      2018      Yes            1mg  Q.5D Take 1 mg           Met

hodi



     (GLUCOPHAGE      2-04                               by mouth 2           st



     ) 1,000 mg      16:21:                               (two)           Hospit

a



     tablet      58                                 times a           l



                                                  day.           

 

     simvastatin      2018      Yes            10mg QD   Take 10 mg           

Methodi



     (ZOCOR) 20      2-04                               by mouth           st



     MG tablet      16:21:                               nightly.           Hosp

abby



               58                                                l

 

     lurasidone      2018      Yes            60mg QD   Take 60 mg           M

ethodi



     (LATUDA) 60      2-04                               by mouth           st



     mg tablet      16:21:                               daily.           Hospit

a



               58                                                l

 

     desvenlafax      2018      Yes            100mg QD   Take 100           M

ethodi



     ine       2-04                               mg by           st



     (PRISTIQ)      16:21:                               mouth           Hospita



     100 MG 24      58                                 daily.           l



     hr tablet                                                        

 

     topiramate      2018      Yes            50mg Q.5D Take 50 mg           M

ethodi



     (TOPAMAX)      2-04                               by mouth 2           st



     50 MG      16:21:                               (two)           Hospita



     tablet      58                                 times a           l



                                                  day.           

 

     metFORMIN      2018      Yes            1mg  Q.5D Take 1 mg           Met

hodi



     (GLUCOPHAGE      2-04                               by mouth 2           st



     ) 1,000 mg      16:21:                               (two)           Hospit

a



     tablet      58                                 times a           l



                                                  day.           

 

     simvastatin      2018      Yes            10mg QD   Take 10 mg           

Methodi



     (ZOCOR) 20      2-04                               by mouth           st



     MG tablet      16:21:                               nightly.           Hosp

abby



               58                                                l

 

     lurasidone      2018      Yes            60mg QD   Take 60 mg           M

ethodi



     (LATUDA) 60      2-04                               by mouth           st



     mg tablet      16:21:                               daily.           Hospit

a



               58                                                l

 

     desvenlafax      2018      Yes            100mg QD   Take 100           M

ethodi



     ine       2-04                               mg by           st



     (PRISTIQ)      16:21:                               mouth           Hospita



     100 MG 24      58                                 daily.           l



     hr tablet                                                        

 

     topiramate      2018      Yes            50mg Q.5D Take 50 mg           M

ethodi



     (TOPAMAX)      2-04                               by mouth 2           st



     50 MG      16:21:                               (two)           Hospita



     tablet      58                                 times a           l



                                                  day.           

 

     metoprolol      2018      Yes            100mg QD   Take 100           Me

thodi



     succinate      2-04                               mg by           st



     XL        16:21:                               mouth           Hospita



     (TOPROL-XL)      58                                 daily.           l



     100 mg 24                                                        



     hr tablet                                                        

 

     esomeprazol      2018      Yes            40mg QD   Take 40 mg           

Methodi



     e (NexIUM)      2-04                               by mouth           st



     40 MG      16:21:                               daily           [facility]ospita



     capsule      58                                 before           l



                                                  breakfast.           

 

     ranitidine      2018      Yes            150mg QD   Take 150           Me

thodi



     (ZANTAC)      2-04                               mg by           st



     150 MG      16:21:                               mouth           Hospita



     tablet      58                                 nightly.           l

 

     umeclidiniu      2018      Yes            1{puff} QD   Inhale 1          

 Methodi



     m         2-04                               puff           st



     brm/vilante      16:21:                               daily.           Hosp

abby



     rol tr      58                                                l



     (ANORO                                                        



     ELLIPTA                                                        



     INHL)                                                        

 

     polyethylen      2018      Yes            17g  QD   Take 17 g           M

ethodi



     e glycol      2-04                               by mouth           st



     (MIRALAX)      16:21:                               daily.           Hospit

a



     17 gram      58                                                l



     packet                                                        

 

     metoprolol      2018      Yes            100mg QD   Take 100           Me

thodi



     succinate      2-04                               mg by           st



     XL        16:21:                               mouth           Hospita



     (TOPROL-XL)      58                                 daily.           l



     100 mg 24                                                        



     hr tablet                                                        

 

     esomeprazol      2018      Yes            40mg QD   Take 40 mg           

Methodi



     e (NexIUM)      2-04                               by mouth           st



     40 MG      16:21:                               daily           Hospita



     capsule      58                                 before           l



                                                  breakfast.           

 

     ranitidine      2018      Yes            150mg QD   Take 150           Me

thodi



     (ZANTAC)      2-04                               mg by           st



     150 MG      16:21:                               mouth           Hospita



     tablet      58                                 nightly.           l

 

     umeclidiniu      2018      Yes            1{puff} QD   Inhale 1          

 Methodi



     m         2-04                               puff           st



     brm/vilante      16:21:                               daily.           Hosp

abby



     rol tr      58                                                l



     (ANORO                                                        



     ELLIPTA                                                        



     INHL)                                                        

 

     polyethylen      2018      Yes            17g  QD   Take 17 g           M

ethodi



     e glycol      2-04                               by mouth           st



     (MIRALAX)      16:21:                               daily.           Hospit

a



     17 gram      58                                                l



     packet                                                        

 

     lisinopril            Yes            20mg QD   Take 1           [facility]

di



     (PRINIVIL,Z      8-02                               tablet (20           st



     ESTRIL) 20      00:00:                               mg total)           Ho

spita



     mg tablet      00                                 by mouth           l



                                                  nightly           



                                                  for 30           



                                                  days.           

 

     lisinopril            Yes            20mg QD   Take 1           [facility]

di



     (PRINIVIL,Z      8-02                               tablet (20           st



     ESTRIL) 20      00:00:                               mg total)           Ho

spita



     mg tablet      00                                 by mouth           l



                                                  nightly           



                                                  for 30           



                                                  days.           

 

     metFORMIN            Yes                      metformin           Uni

vers



     1,000 mg      -16                               1,000 mg           ity of



     tablet      00:00:                               tablet TK           [place]



               00                                 1 T PO BID           Medical



                                                                 Branch

 

     acetaminoph      2017      Yes                      Notes:           [facility]

max



     en-10 mg/mL      0-27                               Infuse           l



     INTRAVENOUS      17:17:                               over 15           Her

[facility]



     solution      00                                 minutes Do           



                                                  not exceed           



                                                  4gm/day of           



                                                  acetaminop           



                                                  hen ***           



                                                  MEDICATION           



                                                  WASTE ***           



                                                  Product           



                                                  Size: 1000           



                                                  mg Product           



                                                  Wasted:           



                                                  ___ mg           

 

     Lactated      2017      No                       1,000 mL,           [facility]

max



     Ringers      0-27                               Rate: 125           l



     1,000 mL      17:17:                               ml/hr,           [facility]



               00                                 Infuse           



                                                  over: 8           



                                                  hr, Route:           



                                                  IV, Dosing           



                                                  Weight           



                                                  112.784           



                                                  kg, Total           



                                                  Volume:           



                                                  1,000,           



                                                  Start           



                                                  date:           



                                                  10/27/17           



                                                  12:17:00           



                                                  CDT,           



                                                  Duration:           



                                                  30 day,           



                                                  Stop date:           



                                                  17           



                                                  12:16:00           



                                                  CST            

 

     ketOROLAC      2017      Yes                       4 days           [facility]

ia



     15 mg/mL      0-                               ***            l



     injectable      17:17:                               MEDICATION           [facility]

[facility]



     solution                                       WASTE ***           



                                                  Product           



                                                  Size: 30           



                                                  mg Product           



                                                  Wasted:           



                                                  ___ mg           

 

     hydromorpho      2017      No                       Notes:           [facility]

max



     ne        0-27                               (Same as:           l



               17:17:                               Dilaudid)           [place]                                                

 

     promethazin      2017      No                       Notes: Do           [facility]

[facility]



     e         0                               not give           l



               17:17:                               IV push.           [place]



                                                (Same as:           



                                                  Phenergan)           

 

     ondansetron      2017      No                       Notes:           [facility]

max



               0-27                               (Same as:           l



               17:17:                               Zofran)           [facility]



               00                                 ***            



                                                  MEDICATION           



                                                  WASTE ***           



                                                  Product           



                                                  Size: 4 mg           



                                                  Product           



                                                  Wasted:           



                                                  ___ mg           

 

     acetaminoph      2017      No                       Notes: Do           [facility]

[facility]



     en-hydrocod      0                               not exceed           l



     one 325      17:17:                               4gm/day of           [facility]

calin



     mg-10 mg      00                                 acetaminop           



     oral tablet                                         hen. (Same           



                                                  as: [place]           



                                                  325/10)           

 

     Saline      2017      No                       Notes:           [facility]



     Flush 0.9%      0-27                               preservati           l



               15:42:                               ve free.           [facility]                                                

 

     Lactated      2017      No                       1,000 mL,           [facility]

max



     Ringers      0-27                               Rate: 125           l



     1,000 mL      15:42:                               ml/hr,           [facility]                                 Infuse           



                                                  over: 8           



                                                  hr, Route:           



                                                  IV, Dosing           



                                                  Weight           



                                                  112.727           



                                                  kg, Total           



                                                  Volume:           



                                                  1,000,           



                                                  Start           



                                                  date:           



                                                  10/27/17           



                                                  10:42:00           



                                                  CDT,           



                                                  Duration:           



                                                  30 day,           



                                                  Stop date:           



                                                  17           



                                                  10:41:00           



                                                  CST            

 

     Hydromorpho            No                       Notes:           [facility]

max



     ne        9-15                               Same as           l



               16:27:                               Dilaudid           [facility]                                                

 

     Ondansetron            No                       Notes:           [facility]

max



               9-15                               (Same as:           l



               16:27:                               Zofran)           [facility]



               00                                 ***            



                                                  MEDICATION           



                                                  WASTE ***           



                                                  Product           



                                                  Size: 4 mg           



                                                  Product           



                                                  Wasted:           



                                                  ___ mg           

 

     Promethazin            No                       Notes: Do           [facility]

[facility]



     e         9-15                               not give           l



               16:27:                               IV push.           [place]



                                                (Same as:           



                                                  Phenergan)           

 

     Lactated            No                       1,000 mL,           [facility]

max



     Ringers      15                               Rate: 125           l



     1,000 mL      16:27:                               ml/hr,           [place]



               00                                 Infuse           



                                                  over: 8           



                                                  hr, Route:           



                                                  IV, Dosing           



                                                  Weight           



                                                  113.636           



                                                  kg, Total           



                                                  Volume:           



                                                  1,000,           



                                                  Start           



                                                  date:           



                                                  09/15/17           



                                                  11:27:00           



                                                  CDT,           



                                                  Duration:           



                                                  30 day,           



                                                  Stop date:           



                                                  10/15/17           



                                                  11:26:00           



                                                  CDT            

 

     Acetaminoph            No                       Notes: Do           [facility]

[facility]



     en 325 MG /      9-15                               not exceed           l



     Hydrocodone      16:27:                               4gm/day of           

[place]



     Bitartrate      00                                 acetaminop           



     10 MG Oral                                         hen. (Same           



     Tablet                                         as: Norco           



                                                  325/10)           

 

     Lactated            No                       1,000 mL,           [facility]

max



     Ringers      9-15                               Rate: 125           l



     1,000 mL      15:04:                               ml/hr,           [facility]



                                                Infuse           



                                                  over: 8           



                                                  hr, Route:           



                                                  IV, Dosing           



                                                  Weight           



                                                  113.636           



                                                  kg, Total           



                                                  Volume:           



                                                  1,000,           



                                                  Start           



                                                  date:           



                                                  09/15/17           



                                                  10:04:00           



                                                  CDT,           



                                                  Duration:           



                                                  30 day,           



                                                  Stop date:           



                                                  10/15/17           



                                                  10:03:00           



                                                  CDT            

 

     Promethazin            No                       Notes: Do           [facility]

[facility]



     e                                        not give           l



               14:46:                               IV push.           [facility]                                 (Same as:           



                                                  Phenergan)           

 

     Ondansetron            No                       Notes:           [facility]

max



                                              (Same as:           l



               14:46:                               Zofran)                                            ***            



                                                  MEDICATION           



                                                  WASTE ***           



                                                  Product           



                                                  Size: 4 mg           



                                                  Product           



                                                  Wasted:           



                                                  ___ mg           

 

     Hydromorpho            No                       Notes:           [facility]

max



     ne                                       Same as           l



               14:46:                               Dilaudid           [facility]                                                

 

     Acetaminoph            No                       Notes: Do           [facility]

[facility]



     en 325 MG /                                     not exceed           l



     Hydrocodone      14:46:                               4gm/day of           

[facility]



     Bitartrate      00                                 acetaminop           



     10 MG Oral                                         hen. (Same           



     Tablet                                         as: [place]co           



                                                  325/10)           

 

     Lactated            No                       1,000 mL,           [facility]

max



     Ringers                                     Rate: 125           l



     1,000 mL      14:46:                               ml/hr,           [facility]



               00                                 Infuse           



                                                  over: 8           



                                                  hr, Route:           



                                                  IV, Dosing           



                                                  Weight           



                                                  118.182           



                                                  kg, Total           



                                                  Volume:           



                                                  1,000,           



                                                  Start           



                                                  date:           



                                                  17           



                                                  9:46:00           



                                                  CDT,           



                                                  Duration:           



                                                  30 day,           



                                                  Stop date:           



                                                  17           



                                                  9:45:00           



                                                  CDT            

 

     Lactated      -0      No                       1,000 mL,           [facility]

max



     Ringers                                     Rate: 125           l



     1,000 mL      13:53:                               ml/hr,           [place]



               00                                 Infuse           



                                                  over: 8           



                                                  hr, Route:           



                                                  IV, Dosing           



                                                  Weight           



                                                  118.182           



                                                  kg, Total           



                                                  Volume:           



                                                  1,000,           



                                                  Start           



                                                  date:           



                                                  17           



                                                  8:53:00           



                                                  CDT,           



                                                  Duration:           



                                                  30 day,           



                                                  Stop date:           



                                                  17           



                                                  8:52:00           



                                                  CDT            

 

     Lactated      -0      No                       1,000 mL,           [facility]

max



     Ringers                                     Rate: 125           l



     1,000 mL      15:52:                               ml/hr,           [facility]



               00                                 Infuse           



                                                  over: 8           



                                                  hr, Route:           



                                                  IV, Dosing           



                                                  Weight           



                                                  118.636           



                                                  kg, Total           



                                                  Volume:           



                                                  1,000,           



                                                  Start           



                                                  date:           



                                                  17           



                                                  10:52:00           



                                                  CDT,           



                                                  Duration:           



                                                  30 day,           



                                                  Stop date:           



                                                  17           



                                                  10:51:00           



                                                  CDT            

 

     Promethazin            No                       Notes: Do           [facility]

[facility]



     e                                        not give           l



               15:52:                               IV push.           [facility]



                                                (Same as:           



                                                  Phenergan)           

 

     Ondansetron            No                       Notes:           [facility]

max



                                              (Same as:           l



               15:52:                               Zofran)           [place]



               00                                 ***            



                                                  MEDICATION           



                                                  WASTE ***           



                                                  Product           



                                                  Size: 4 mg           



                                                  Product           



                                                  Wasted:           



                                                  ___ mg           

 

     Hydromorpho            No                       Notes:           [facility]

max



     ne                                       Same as           l



               15:52:                               Dilaudid           [place]



               00                                                

 

     Acetaminoph            No                       Notes: Do           [facility]

[facility]



     en 325 MG /                                     not exceed           l



     Hydrocodone      15:52:                               4gm/day of           

[facility]



     Bitartrate      00                                 acetaminop           



     10 MG Oral                                         hen. (Same           



     Tablet                                         as: [place]           



                                                  325/10)           

 

     Ondansetron            No                       Notes:           [facility]

max



                                              (Same as:           l



               15:38:                               Zofran)           [facility]



               00                                 ***            



                                                  MEDICATION           



                                                  WASTE ***           



                                                  Product           



                                                  Size: 4 mg           



                                                  Product           



                                                  Wasted:           



                                                  ___ mg           

 

     Naloxone            No                       Notes:           [facility]



                                              Same as           l



               15:38:                               Narcan                                                           

 

     Flumazenil            No                       Notes:           [facility]

[facility]



                                              (Same as:           l



               15:38:                               [place])                                                           

 

     Lactated            No                       1,000 mL,           [facility]

max



     Ringers                                     Rate: 125           l



     1,000 mL      14:42:                               ml/hr,                                            Infuse           



                                                  over: 8           



                                                  hr, Route:           



                                                  IV, Dosing           



                                                  Weight           



                                                  118.636           



                                                  kg, Total           



                                                  Volume:           



                                                  1,000,           



                                                  Start           



                                                  date:           



                                                  17           



                                                  9:42:00           



                                                  CDT,           



                                                  Duration:           



                                                  30 day,           



                                                  Stop date:           



                                                  17           



                                                  9:41:00           



                                                  CDT            

 

     metoprolol            Yes                      100 mg = 1           M

[facility]



     100 mg oral      7-06                               tab, PO,           l



     tablet,      16:19:                               Daily, #           [facility]

n



     extended      00                                 30 tab, 0           



     release                                         Refill(s)           

 

     lisinopril            Yes                      10 mg = 1           Me

moria



     10 mg oral      7-06                               tab, PO,           l



     tablet      16:19:                               Daily, #           [facility]



               00                                 30 tab, 0           



                                                  Refill(s)           

 

     topiramate            Yes                      50 mg = 1           Me

moria



     50 MG Oral      7-06                               tab, PO,           l



     Tablet      16:19:                               BID, # 60           [facility]

n



     [Topamax]      00                                 tab, 0           



                                                  Refill(s)           

 

     Lurasidone      2017      Yes                      60 mg = 1           Me

moria



     Hydrochlori      7-06                               tab, PO,           l



     de 60 MG      16:18:                               Daily, 0           [facility]

nn



     Oral Tablet      00                                 Refill(s)           



     [Latuda]                                                        

 

     Deplin 15      2017      Yes                      15 mg = 1           Mem

[facility]



     mg oral      7-06                               cap, PO,           l



     capsule      16:18:                               Daily, 0           [facility]

[facility]



               00                                 Refill(s)           

 

     Simvastatin            Yes                      10 mg = 1           M

[facility]



     10 MG Oral      7-06                               tab, PO,           l



     Tablet      16:18:                               Bedtime, #           [facility]

nn



     [Zocor]      00                                 30 tab, 0           



                                                  Refill(s)           

 

     Furosemide            Yes                      20 mg = 1           Me

moria



     20 MG Oral      7-06                               tab, PO,           l



     Tablet      16:18:                               Daily, #           [facility]



     [Lasix]      00                                 30 tab, 0           



                                                  Refill(s)           

 

     24 HR            Yes                      100 mg = 1           Joe

a



     Desvenlafax      -06                               tab, PO,           l



     ine 100 MG      16:18:                               Daily, #           Her

braden



     Extended      00                                 30 tab, 0           



     Release                                         Refill(s)           



     Tablet                                                        



     [Pristiq]                                                        

 

     Metformin            Yes                      500 mg = 1           Me

moria



     hydrochlori                                     tab, PO,           l



     de 500 MG      16:17:                               BID-Meals,           He

rmann



     Oral Tablet                                       # 30 tab,           



                                                  0              



                                                  Refill(s)           

 

     amitriptyli            Yes                      75 mg = 1           M

[facility]



     ne 75 mg                                     tab, PO,           l



     oral tablet      16:17:                               Bedtime, #           

[place]



               00                                 30 tab, 0           



                                                  Refill(s)           

 

     acetaminoph            Yes            1{tbl}      Take 1 Tab         

  [facility]



     en-codeine      6-25                               by mouth           ity o

f



     (TYLENOL      00:00:                               every 4           [place]



     #3) 300-30      00                                 (four)           Medical



     mg tablet                                         hours as           [facility]



                                                  needed for           



                                                  Pain           



                                                  (scale           



                                                  4-6) (1-2           



                                                  po q 4-6           



                                                  hours prn           



                                                  pain).           







Immunizations







           Ordered Immunization Filled Immunization Date       Status     Commen

ts   Source



           Name       Name                                        

 

           Influenza             2022-10-25 Completed             [facility]



           [facility]            00:00:00                         [facility]

 

           Influenza             2021 Completed             [facility]



           [facility]            00:00:00                         [facility]

 

           Influenza             2021 Completed             [facility]



           [facility]            00:00:00                         [facility]

 

           Pneumococcal            2021 Completed             [facility]

[facility]



           Polysaccharide            00:00:00                         [facility]

 

           Pneumococcal            2021 Completed             [facility]

f



           Polysaccharide            00:00:00                         [facility]

 

           David SARS-CoV-2            2021 Completed             [facility]

[facility] of



           Vaccination            00:00:00                         [facility]

 

           David SARS-CoV-2            2021 Completed             [facility]

[facility] of



           Vaccination            00:00:00                         [facility]

 

           DTaP / IPV            2021 Completed             [facility] 



                                 00:00:00                         [facility]

 

           Hib (PRP-OMP)            2021 Completed             [facility] 

[facility]



                                 00:00:00                         [facility]

 

           Hepatitis A            2021 Completed             [facility]



                                 00:00:00                         [facility]

 

           Hepatitis B            2021 Completed             [facility]



                                 00:00:00                         [facility]

 

           Pneumococcal            2021 Completed             [facility]

f



           Conjugate 13-Valent            00:00:00                         [facility]

 

           DTaP / IPV            2021 Completed             [facility]



                                 00:00:00                         [facility]

 

           Hib (PRP-OMP)            2021 Completed             [facility]



                                 00:00:00                         [facility]

 

           Hepatitis A            2021 Completed             [facility]



                                 00:00:00                         [facility]

 

           Hepatitis B            2021 Completed             [facility]



                                 00:00:00                         [facility]

 

           Pneumococcal            2021 Completed             [facility]

f



           Conjugate 13-Valent            00:00:00                         [facility]

 

           Influenza,            2020-10-08 Completed             [facility]



           Quadrivalent            00:00:00                         [facility]

 

           Influenza,            2020-10-08 Completed             [facility]



           Unspecified            00:00:00                         [facility]

 

           Influenza,            2020-10-08 Completed             [facility]



           Quadrivalent            00:00:00                         [facility]

 

           Influenza,            2020-10-08 Completed             [facility]



           Unspecified            00:00:00                         [facility]

 

           DTaP / IPV            2020-09-15 Completed             [facility]



                                 00:00:00                         [facility]

 

           Hib (PRP-OMP)            2020-09-15 Completed             [facility]



                                 00:00:00                         [facility]

 

           Hepatitis B            2020-09-15 Completed             [facility]



                                 00:00:00                         [facility]

 

           Pneumococcal            2020-09-15 Completed             [facility]

f



           Conjugate 13-Valent            00:00:00                         [facility]

 

           DTaP / IPV            2020-09-15 Completed             [facility]



                                 00:00:00                         [facility]

 

           Hib (PRP-OMP)            2020-09-15 Completed             [facility]



                                 00:00:00                         [facility]

 

           Hepatitis B            2020-09-15 Completed             [facility]



                                 00:00:00                         [facility]

 

           Pneumococcal            2020-09-15 Completed             [facility] o

f



           Conjugate 13-Valent            00:00:00                         [facility]

 

           DTaP / IPV            2020 Completed             [facility]



                                 00:00:00                         [facility]

 

           Hib (PRP-OMP)            2020 Completed             [facility]



                                 00:00:00                         [facility]

 

           Hepatitis A            2020 Completed             [facility]



                                 00:00:00                         [facility]

 

           Hepatitis B            2020 Completed             [facility]



                                 00:00:00                         [facility]

 

           Pneumococcal            2020 Completed             [facility] o

f



           Conjugate 13-Valent            00:00:00                         [facility]

 

           DTaP / IPV            2020 Completed             [facility]



                                 00:00:00                         [facility]

 

           Hib (PRP-OMP)            2020 Completed             [facility]



                                 00:00:00                         [facility]

 

           Hepatitis A            2020 Completed             [facility]



                                 00:00:00                         [facility]

 

           Hepatitis B            2020 Completed             [facility]



                                 00:00:00                         [facility]

 

           Pneumococcal            2020 Completed             [facility] o

f



           Conjugate 13-Valent            00:00:00                         [facility]

 

           Influenza,            2018 Completed             [facility]



           Quadrivalent            00:00:00                         [facility]

 

           Influenza,            2018 Completed             [facility]



           Quadrivalent            00:00:00                         [facility]







Vital Signs







             Vital Name   Observation Time Observation Value Comments     Source

 

             Body height  2022 15:26:00 167.6 cm                  [facility]

 

             Body weight  2022 15:26:00 104.327 kg                [facility]

 

             BMI          2022 15:26:00 37.12 kg/m2               [facility]

 

             Systolic blood 2022 14:37:00 120 mm[Hg]                [facility]

[facility]ity of



             pressure                                            [facility]

 

             Diastolic blood 2022 14:37:00 65 mm[Hg]                 Unive

rsity of



             pressure                                            [facility]

 

             Heart rate   2022 14:36:00 67 /min                   [facility]

[facility]

 

             Oxygen saturation in 2022 14:36:00 97 /min                   

[facility]



             Arterial blood by                                        [place] [facility]

[facility]



             Pulse oximetry                                        [facility]

 

             Respiratory rate 2022 14:34:00 27 /min                   [facility]

 

             Body temperature 2022 14:10:00 36.44 Melinda                 [facility]

ersity of



                                                                 [place] [facility]



                                                                 [place]

 

             Body height  2022 20:15:00 167.6 cm                  [facility]

[facility] of



                                                                 [place] [facility]



                                                                 [place]

 

             Body weight  2022 20:15:00 108.863 kg                [facility]

ty of



                                                                 [place] [facility]



                                                                 [facility]

 

             BMI          2022 20:15:00 38.74 kg/m2               [facility]

ty of



                                                                 [facility]

 

             Systolic blood 2022 14:37:00 120 mm[Hg]                Un[facility]

sity of



             pressure                                            [facility]

 

             Diastolic blood 2022 14:37:00 65 mm[Hg]                 Unive

rsity of



             pressure                                            [facility]

 

             Heart rate   2022 14:36:00 67 /min                   [facility]

ty of



                                                                 [facility]

 

             Oxygen saturation in 2022 14:36:00 97 /min                   

[facility]



             Arterial blood by                                        [place] [facility]

[facility]



             Pulse oximetry                                        [facility]

 

             Respiratory rate 2022 14:34:00 27 /min                   Univ

ersity of



                                                                 [facility]

 

             Body temperature 2022 14:10:00 36.44 Melinda                 [facility]iv

ersity of



                                                                 [place] [facility]



                                                                 [place]

 

             Body height  2022 20:15:00 167.6 cm                  [facility]

ty of



                                                                 [place] [facility]



                                                                 [place]

 

             Body weight  2022 20:15:00 108.863 kg                [facility]

ty of



                                                                 [facility]

 

             BMI          2022 20:15:00 38.74 kg/m2               [facility]

ty of



                                                                 [facility]

 

             Respiratory rate 2022 14:03:00 12 /min                   Univ

ersity of



                                                                 [facility]



                                                                 [facility]

 

             WEIGHT       2020 00:00:00 72.2 kg                   

 

             WEIGHT       2020 00:00:00 69.3 kg                   

 

             Systolic blood 2022 19:24:44 115 mm[Hg]                [facility]

sitd[facility]



             pressure                                            [facility]

[facility]



                                                                 [facility]

 

             Diastolic blood 2022 19:24:44 65 mm[Hg]                 [facility]ive

rsity of



             pressure                                            [facility]

[facility]



                                                                 [facility]

 

             Heart rate   2022 19:24:44 79 /min                   [facility]

[facility]



                                                                 [facility]

[facility]



                                                                 [facility]

 

             Body temperature 2022 19:24:44 36.72 Melinda                 [facility]iv

ersity of



                                                                 [facility]

[facility]



                                                                 [facility]

 

             Respiratory rate 2022 19:24:44 18 /min                   [facility]

ersity of



                                                                 [place] [facility]

[facility]



                                                                 [facility]

 

             Oxygen saturation in 2022 19:24:44 96 /min                   

[facility]



             Arterial blood by                                        [facility]



             Pulse oximetry                                        [facility]

 

             Body weight  2022 19:15:00 105 kg                    [facility]

[facility]



                                                                 [facility]

[facility]



                                                                 [facility]

 

             BMI          2022 19:15:00 39.73 kg/m2               [facility]

[facility] of



                                                                 [place] [facility]

[facility]



                                                                 [facility]

 

             Body height  2022 16:35:00 162.6 cm                  [facility]

[facility]



                                                                 [place] [facility]

[facility]



                                                                 [facility]

 

             Systolic blood 2022 20:15:00 128 mm[Hg]                [facility]

sity of



             pressure                                            [place] [facility]

[facility]



                                                                 [facility]

 

             Diastolic blood 2022 20:15:00 72 mm[Hg]                 [facility]ive

rsity of



             pressure                                            [facility]

[facility]



                                                                 [facility]

 

             Heart rate   2022 20:15:00 75 /min                   [facility]

[facility]



                                                                 [place] [facility]

[facility]



                                                                 [facility]

 

             Body temperature 2022 20:15:00 36.61 Melinda                 [facility]iv

ersity of



                                                                 [facility]

[facility]



                                                                 [facility]

 

             Respiratory rate 2022 20:15:00 18 /min                   [facility]iv

ersity of



                                                                 [place] [facility]

[facility]



                                                                 [facility]

 

             Body weight  2022 20:15:00 106.7 kg                  [facility]

[facility]



                                                                 [facility]

[facility]



                                                                 [facility]

 

             BMI          2022 20:15:00 37.80 kg/m2               [facility]

ty of



                                                                 [place] [facility]

[facility]



                                                                 [facility]

 

             Oxygen saturation in 2022 20:15:00 98 /min                   

[facility]



             Arterial blood by                                        [facility] re[facility]



             Pulse oximetry                                        [facility]

 

             Body height  2022 19:55:00 168 cm                    [facility]

ty [facility]



                                                                 [facility]

[facility]



                                                                 [facility]

 

             Systolic (mm Hg) 2017-10-27 17:40:00                           [facility]

 

             Diastolic (mm Hg) 2017-10-27 17:40:00                           [facility]

 

             Respitory Rate 2017-10-27 17:40:00                           [facility]

 

             BMI Calculated 2017-10-27 17:27:00                           [facility]

 

             [facility]       2017-10-27 17:27:00                           [facility]

 [place]

 

             Height       2017-10-27 17:27:00 167.6 cm                  [facility]

 

             Respitory Rate 2017-10-27 17:25:00                           [facility]

[facility]

 

             Systolic (mm Hg) 2017-10-27 17:25:00                           [facility]

rial [facility]

 

             Diastolic (mm Hg) 2017-10-27 17:25:00                           [facility]

[facility] [place]

 

             Temperature Oral (F) 2017-10-27 17:10:00 97.6 F                    

[facility] [place]

 

             Respitory Rate 2017-10-27 17:10:00                           [facility]

al [place]

 

             Systolic (mm Hg) 2017-10-27 17:10:00                           [facility]

rial [place]

 

             Diastolic (mm Hg) 2017-10-27 17:10:00                           [facility]

[facility] [place]

 

             Temperature Oral (F) 2017-10-27 15:54:00 98.2 F                    

[facility]

 

             Systolic (mm Hg) 2017-09-15 16:55:00                           [facility]hael

rial [facility]

 

             Diastolic (mm Hg) 2017-09-15 16:55:00                           [facility] [place]

 

             Respitory Rate 2017-09-15 16:55:00                           [facility] [place]

 

             Respitory Rate 2017-09-15 16:41:00                           [facility]

[facility] [place]

 

             Systolic (mm Hg) 2017-09-15 16:41:00                           [facility]

[facility] [place]

 

             Diastolic (mm Hg) 2017-09-15 16:41:00                           [facility]

[facility]

 

             Respitory Rate 2017-09-15 16:26:00                           [facility]

[facility]

 

             Systolic (mm Hg) 2017-09-15 16:26:00                           [facility]

[facility]

 

             Diastolic (mm Hg) 2017-09-15 16:26:00                           [facility]

[facility]

 

             Temperature Oral (F) 2017-09-15 15:23:00 97.8 F                    

[facility]

 

             BMI Calculated 2017 18:45:00                           [facility]

 

             Height       2017 18:45:00 167.64 cm                 [facility]

 [place]

 

             Weight       2017 18:45:00                           [facility]

 [place]

 

             Systolic (mm Hg) 2017 15:16:00                           [facility]

[facility] [place]

 

             Diastolic (mm Hg) 2017 15:16:00                           [facility]

[facility] [place]

 

             Respitory Rate 2017 15:16:00                           [facility]

[facility] [place]

 

             Systolic (mm Hg) 2017 15:01:00                           [facility]

[facility] [place]

 

             Diastolic (mm Hg) 2017 15:01:00                           [facility]

[facility] [place]

 

             Respitory Rate 2017 15:01:00                           [facility]

[facility]

 

             Systolic (mm Hg) 2017 14:46:00                           [facility]

[facility]

 

             Diastolic (mm Hg) 2017 14:46:00                           [facility]

[facility]

 

             Respitory Rate 2017 14:46:00                           [facility]

[facility]

 

             BMI Calculated 2017 14:07:00                           [facility]

[facility]

 

             Height       2017 14:07:00 167.64 cm                 [facility]

 [place]

 

             Weight       2017 14:07:00                           [facility]

 [facility]

 

             BMI Calculated 2017 16:31:00                           [facility]

[facility] [place]

 

             Weight       2017 16:31:00                           [facility]

 [facility]

 

             Height       2017 16:31:00 167.64 cm                 [facility]

 [place]

 

             Respitory Rate 2017 16:19:00                           [facility]

[facility] [place]

 

             Systolic (mm Hg) 2017 16:19:00                           [facility]

[facility] [place]

 

             Diastolic (mm Hg) 2017 16:19:00                           [facility]

[facility]

 

             Respitory Rate 2017 16:04:00                           [facility]

[facility]

 

             Systolic (mm Hg) 2017 16:04:00                           [facility]

[facility] [place]

 

             Diastolic (mm Hg) 2017 16:04:00                           [facility]

[facility] [place]

 

             Respitory Rate 2017 15:49:00                           [facility]

[facility] [place]

 

             Systolic (mm Hg) 2017 15:49:00                           [facility]

[facility]

 

             Diastolic (mm Hg) 2017 15:49:00                           [facility]

[facility] [place]

 

             Heart Rate   2017 14:51:00                           [facility]

 [facility]

 

             BMI Calculated 2017 14:37:00                           [facility]

[facility] [place]

 

             Weight       2017 14:37:00                           [facility]

 [place]

 

             Height       2017 16:04:00 167.64 cm                 [facility]

 [place]







Procedures







                Procedure       Date / Time     Performing Clinician Source



                                Performed                       

 

                XR HIP 3 OR 4 VW BILATERAL 2022 19:18:29 Sangita Paige

[facility]



                W PELVIS                        Miriam        MD Perdue [facility]

 

                COMPLETE BLOOD COUNT W/ 2022 17:53:00 Maricarmen, Daron F. [facility]



                DIFFERENTIAL                                    [facility]

 

                TYPE AND SCREEN 2022 17:53:00 Daron Coley [facility]

 

                COMPREHENSIVE METABOLIC 2022 17:53:00 Daron Coley [facility]



                PANEL                                           [facility]

 

                PHOSPHORUS LEVEL 2022 17:53:00 Daron Coley [facility]

 

                URIC ACID       2022 17:53:00 Daron Coley [facility]

 

                LACTATE DEHYDROGENASE 2022 17:53:00 Daron Coley [facility]

[facility]

 

                MAGNESIUM LEVEL 2022 17:53:00 Daron Coley [facility]

 

                TACROLIMUS LEVEL 2022 17:53:00 Daron Coley [facility]

 

                CMV QUANT PCR   2022 17:53:00 Daron Coley [facility]

 

                Results CBC     2022 17:53:00 Daron Coley [facility]

 

                MANUAL DIFFERENTIAL 2022 17:53:00 Daron Coley [facility]

 

                GLUCOSE LEVEL   2022 17:53:00 Daron Coley [facility]

 

                BLOOD UREA NITROGEN 2022 17:53:00 Daron Coley [facility]

 

                ELECTROLYTE PANEL 2022 17:53:00 Daron Coley [facility]

 

                SERUM CREATININE 2022 17:53:00 Daron Coley [facility]

 

                .GLOMERULAR FILTRATION 2022 17:53:00 Daron Coley [facility]



                RATE                                            [facility]

 

                CALCIUM LEVEL TOTAL 2022 17:53:00 Daron Coley [facility]

 

                ALBUMIN LEVEL   2022 17:53:00 Daron Coley [facility]

 

                ALKALINE PHOSPHATASE 2022 17:53:00 Daron Coley [facility]

 

                ALANINE AMINOTRANSFERASE 2022 17:53:00 Daron Coley Uni

[facility]

 

                ASPARTATE AMINOTRANSFERASE 2022 17:53:00 [facility]

 

                TOTAL PROTEIN   2022 17:53:00 Daron Coley [facility]

 

                FRACTIONATED BILIRUBIN 2022 17:53:00 [facility]

[facility]

 

                ABORH           2022 17:53:00 Daron Coley [facility]

 

                ANTIBODY SCREEN 2022 17:53:00 Daron Coley [facility]

 

                CLOT EXPIRATION DATE 2022 17:53:00 Daron Coley [facility]

 

                TMP INTERPRETATION 2022 17:53:00 [facility]y [facility] [place]



                ANTIBODY SCREEN NEGATIVE                                 MD Tello

[facility]



                                                                Center

 

                CMV QUANT PCR   2022 17:20:00 Trent Morelos [facility]



                                                Yohan         [facility]

 

                COMPLETE BLOOD COUNT W/ 2022 17:20:00 Trent Morelos [facility]



                DIFFERENTIAL                    Yohan         [facility]

 

                COMPREHENSIVE METABOLIC 2022 17:20:00 Trent Morelos [facility]



                PANEL                           saturnino         [facility]

 

                LACTATE DEHYDROGENASE 2022 17:20:00 Trent Morelos [facility]



                                                Yohan         [facility]

 

                TACROLIMUS LEVEL 2022 17:20:00 Trent Morelos [facility]



                                                Yohan         [facility]

 

                PHOSPHORUS LEVEL 2022 17:20:[facility]         [facility]

 

                MAGNESIUM LEVEL 2022 17:20:00 Tamy [facility]

 

                Results CBC     2022 17:20:00 Daron Coley [facility]

 

                MANUAL DIFFERENTIAL 2022 17:20:00 Daron Coley [facility]

 

                GLUCOSE LEVEL   2022 17:20:00 Daron Coley [facility]

 

                BLOOD UREA NITROGEN 2022 17:20:00 Daron Coley [facility]

 

                ELECTROLYTE PANEL 2022 17:20:00 Daron Coley [facility]

 

                SERUM CREATININE 2022 17:20:00 Daron Coley [facility]

 

                .GLOMERULAR FILTRATION 2022 17:20:00 [facility]

[facility]

 

                CALCIUM LEVEL TOTAL 2022 17:20:00 Daron Coley [facility]

 

                ALBUMIN LEVEL   2022 17:20:00 Daron Coley [facility]

 

                ALKALINE PHOSPHATASE 2022 17:20:00 Daron Coley [facility]

 

                ALANINE AMINOTRANSFERASE 2022 17:20:00 Daron Coley [facility]

 

                ASPARTATE AMINOTRANSFERASE 2022 17:20:00 Daron Coley [facility]

 

                TOTAL PROTEIN   2022 17:20:00 Daron Coley [facility]

 

                FRACTIONATED BILIRUBIN 2022 17:20:00 Daron [facility]

[facility]



                                                                MD [facility]

 

                NM BONE MINERAL DENSITY 2022 16:53:29 Trent Morelos

[facility]



                APPENDICULAR FOREARM ONLY                 Yohan DAVID And

[facility]

 

                COMPLETE BLOOD COUNT W/ 2022 15:48:00 Sangita Paige   [facility] [place]



                DIFFERENTIAL                    Miriamolivier DAVID [facility]

 

                TYPE AND SCREEN 2022 15:48:00 [facility]



                                                Miriam DAVID [facility]

 

                COMPREHENSIVE METABOLIC 2022 15:48:00 Royal Sangita   [facility]

[facility] [place]



                PANEL                           Miriam DAVID [facility]

 

                PHOSPHORUS LEVEL 2022 15:48:00 Royal [facility]



                                                Miriam DAVID [facility]

 

                URIC ACID       2022 15:48:00 [facility]



                                                Miriam DAVID [facility]

 

                LACTATE DEHYDROGENASE 2022 15:48:00 Sangita Paige   [facility]

[facility]



                                                Miriam DAVID [facility]

 

                MAGNESIUM LEVEL 2022 15:48:00 [facility]

 

                TACROLIMUS LEVEL 2022 15:48:00 [facility]

 

                CMV QUANT PCR   2022 15:48:00 [facility]



                                                Miriam DAVID [facility]



                                                                Center

 

                Results CBC     2022 15:48:00 [facility]



                                                Miriam DAVID [facility]



                                                                Center

 

                MANUAL DIFFERENTIAL 2022 15:48:00 [facility]



                                                Miriam DAVID [facility]



                                                                Center

 

                GLUCOSE LEVEL   2022 15:48:00 [facility]



                                                Miriam DAVID [facility]

 

                BLOOD UREA NITROGEN 2022 15:48:00 [facility]



                                                Miriam DAVDI [facility]



                                                                Center

 

                ELECTROLYTE PANEL 2022 15:48:00 [facility]

 

                SERUM CREATININE 2022 15:48:00 Sangita Paige   [facility]

 

                .GLOMERULAR FILTRATION 2022 15:48:00 Sangita Paige

[facility]

 

                CALCIUM LEVEL TOTAL 2022 15:48:00 Sangita Paige   [facility] [place]



                                                Miriammabel DAVID [facility]

 

                ALBUMIN LEVEL   2022 15:48:00 Sangita Paige [facility]

 

                ALKALINE PHOSPHATASE 2022 15:48:00 Sangita Paige   [facility]        MD [facility]

 

                ALANINE AMINOTRANSFERASE 2022 15:48:00 Sangita Paige   [facility]        MD [facility]

 

                ASPARTATE AMINOTRANSFERASE 2022 15:48:00 Sangita Paige

[facility]

 

                TOTAL PROTEIN   2022 15:48:00 [facility]ige [facility]

 

                FRACTIONATED BILIRUBIN 2022 15:48:00 Sangita Paige

[facility]        MD [facility]

 

                ABORH           2022 15:48:00 Sangita Paige   [facility]

 

                ANTIBODY SCREEN 2022 15:48:00 Sangita Paige   [facility]        MD [facility]

 

                CLOT EXPIRATION DATE 2022 15:48:00 Sangita Paige

[facility]

 

                TMP INTERPRETATION 2022 15:48:00 Sangita Paige   [facility]



                ANTIBODY SCREEN NEGATIVE                 Miriam Tello

[facility]



                                                                Center

 

                COMPLETE BLOOD COUNT W/ 2022 19:31:00 Trent Morelos

[facility]



                DIFFERENTIAL                    Yohan DAVID [facility]

 

                COMPREHENSIVE METABOLIC 2022 19:31:00 Trent Morelos

[facility]



                PANEL                           Yohan DAVID [facility]

 

                LACTATE DEHYDROGENASE 2022 19:31:00 Trent Morelos [facility]

 

                MAGNESIUM LEVEL 2022 19:31:00 Trent Morelos [facility]

 

                PHOSPHORUS LEVEL 2022 19:31:00 Trent Morelos [facility]

 

                Results CBC     2022 19:31:00 Daron Coley [facility]

 

                MANUAL DIFFERENTIAL 2022 19:31:00 Daron Coley [facility]

 

                GLUCOSE LEVEL   2022 19:31:00 Daron Coley [facility]

 

                BLOOD UREA NITROGEN 2022 19:31:00 Daron Coley [facility]

 

                ELECTROLYTE PANEL 2022 19:31:00 Daron Coley [facility]

 

                SERUM CREATININE 2022 19:31:00 Daron Coley [facility]

 

                .GLOMERULAR FILTRATION 2022 19:31:00 [facility]

[facility]



                RATE                                            [facility]

 

                CALCIUM LEVEL TOTAL 2022 19:31:00 Daron Coley [facility]

 

                ALBUMIN LEVEL   2022 19:31:00 Daron Coley [facility]

 

                ALKALINE PHOSPHATASE 2022 19:31:00 Daron Coley [facility]

 

                ALANINE AMINOTRANSFERASE 2022 19:31:00 Daron Coley Uni

[facility]

 

                ASPARTATE AMINOTRANSFERASE 2022 19:31:00 [facility]

 

                TOTAL PROTEIN   2022 19:31:00 Daron Coley [facility]

 

                FRACTIONATED BILIRUBIN 2022 19:31:00 [facility]

[facility]



                                                                [facility]

 

                CMV QUANT PCR   2022-10-25 15:56:00 Trent Morelos [facility]



                                                Yohan DAVID [facility]

 

                COMPLETE BLOOD COUNT W/ 2022-10-25 15:56:00 Trent Morelos [facility]



                DIFFERENTIAL                    Yohan DAVID [facility]

 

                COMPREHENSIVE METABOLIC 2022-10-25 15:56:00 Trent Morelos [facility]



                PANEL                           Yohan DAVID [facility]

 

                LACTATE DEHYDROGENASE 2022-10-25 15:56:00 Trent Morelos [facility]



                                                Yohan DAVID [facility]

 

                TACROLIMUS LEVEL 2022-10-25 15:56:00 Trent Morelos [facility]



                                                Yohan DAVID [facility]

 

                PROTHROMBIN TIME 2022-10-25 15:56:00 Trent Morelos [facility]



                                                Yohan DAVID [facility]

 

                PHOSPHORUS LEVEL 2022-10-25 15:56:00 Trent Morelos [facility]



                                                Yohan DAVID [facility]

 

                MAGNESIUM LEVEL 2022-10-25 15:56:00 Trent Morelos [facility]



                                                Yohan DAVID [facility]

 

                URIC ACID       2022-10-25 15:56:00 Trent Morelos [facility]



                                                Yohan DAVID [facility]

 

                HP MD LAURA-SHIPLEY VIRUS 2022-10-25 15:56:00 [facility] Morelos 

[facility]



                QUANTITATIVE PCR ANALYSIS                 Yohan DAVID [facility]

[facility]



                COLLECTION, BLOOD                                 Center

 

                VITAMIN D 25 HYDROXY LEVEL 2022-10-25 15:56:00 Trent Morelos 

[facility]



                                                Yohan DAVID [facility]

 

                THYROID STIMULATING 2022-10-25 15:56:00 Trent Morelos [facility]



                HORMONE                         Yohan DAVID [facility]

 

                FREE THYROXINE  2022-10-25 15:56:00 Trent Morelos [facility]



                                                Yohan DAVID [facility]

 

                LIPID PANEL     2022-10-25 15:56:00 Trent Morelos [facility]



                                                [facility]

 

                Results CBC     2022-10-25 15:56:00 Daron Coley [facility]

[facility]

 

                MANUAL DIFFERENTIAL 2022-10-25 15:56:00 Daron Coley [facility]

 

                GLUCOSE LEVEL   2022-10-25 15:56:00 Daron Coley [facility]

[facility]

 

                BLOOD UREA NITROGEN 2022-10-25 15:56:00 Daron Coley [facility]

 

                ELECTROLYTE PANEL 2022-10-25 15:56:00 Daron Coley [facility]

 

                SERUM CREATININE 2022-10-25 15:56:00 Daron Coley [facility]

 

                .GLOMERULAR FILTRATION 2022-10-25 15:56:00 Daron Coley

[facility]

 

                CALCIUM LEVEL TOTAL 2022-10-25 15:56:00 Daron Coley [facility]

 

                ALBUMIN LEVEL   2022-10-25 15:56:00 Daron Coley [facility]

[facility]

 

                ALKALINE PHOSPHATASE 2022-10-25 15:56:00 Daron Coley [facility]

 

                ALANINE AMINOTRANSFERASE 2022-10-25 15:56:00 Daron Coley [facility]

 

                ASPARTATE AMINOTRANSFERASE 2022-10-25 15:56:00 [facility]

 

                TOTAL PROTEIN   2022-10-25 15:56:00 Daron Coley [facility]

[facility]

 

                FRACTIONATED BILIRUBIN 2022-10-25 15:56:00 Daron Coley Unive

[facility]

 

                HP MD LAURA-SHIPLEY VIRUS 2022-10-25 15:56:00 Daron Coley [facility]



                QUANTITATIVE PCR ANALYSIS                                 MD And

[facility]



                INTERPRETATION AND REPORT                                 Center

 

                SLP VIDEOSTROBOSCOPY 2022-10-20 17:39:28 Dima Bear  [facility]

 

                ECHOCARDIOGRAM 2D COMPLETE 2022 19:16:06 Jose Alfredo Esposito

 [facility] CONTRAST                                      [facility]

 

                SPIROMETRY W/O DILATORS, 2022 20:05:23 Sameer Campuzano

[facility]



                DLCO AND BODY                                   [facility]



                PLETHSMOGRAPHIC LUNG                                 Center



                VOLUMES                                         

 

                COMPLETE BLOOD COUNT W/ 2022 17:58:00 Daron Coley [facility]



                DIFFERENTIAL                                    [facility]

 

                TYPE AND SCREEN 2022 17:58:00 Daron Coley [facility]

 

                COMPREHENSIVE METABOLIC 2022 17:58:00 Daron Coley [facility]



                PANEL                                           [facility]

 

                PHOSPHORUS LEVEL 2022 17:58:00 Daron Coley [facility]

 

                URIC ACID       2022 17:58:00 Daron Coley [facility]

 

                LACTATE DEHYDROGENASE 2022 17:58:00 Daron Coley [facility]

 

                MAGNESIUM LEVEL 2022 17:58:00 Daron Coley [facility]

 

                CMV QUANT PCR   2022 17:58:00 Daron Coley [facility]



                                                                [facility]

 

                TACROLIMUS LEVEL 2022 17:58:00 Daron Coley [facility]

 

                Results CBC     2022 17:58:00 Daron Coley [facility]



                                                                Center

 

                MANUAL DIFFERENTIAL 2022 17:58:00 [facility]



                                                                Center

 

                GLUCOSE LEVEL   2022 17:58:00 Daron Coley [facility]



                                                                Center

 

                BLOOD UREA NITROGEN 2022 17:58:00 Daron Coley [facility]

 

                ELECTROLYTE PANEL 2022 17:58:00 Daron Coley [facility]

 

                SERUM CREATININE 2022 17:58:00 Daron Coley [facility]

 

                .GLOMERULAR FILTRATION 2022 17:58:00 [facility]

[facility]

 

                CALCIUM LEVEL TOTAL 2022 17:58:00 Daron Coley [facility]

 

                ALBUMIN LEVEL   2022 17:58:00 Daron Coley [facility]

 

                ALKALINE PHOSPHATASE 2022 17:58:00 Daron Coley [facility]

 

                ALANINE AMINOTRANSFERASE 2022 17:58:00 [facility]

[facility]

 

                ASPARTATE AMINOTRANSFERASE 2022 17:58:00 Daron Coley [facility]

 

                TOTAL PROTEIN   2022 17:58:00 Daron Coley [facility]

 

                FRACTIONATED BILIRUBIN 2022 17:58:00 Daron Coley [facility]

 

                ABORH           2022 17:58:00 Daron Coley [facility]

 

                ANTIBODY SCREEN 2022 17:58:00 Daron Coley [facility]

 

                TMP INTERPRETATION 2022 17:58:00 [facility] [place]



                ANTIBODY SCREEN NEGATIVE                                 MD Tello

[facility]



                                                                Center

 

                CLOT EXPIRATION DATE 2022 17:58:00 Daron Coley [facility]

 

                EKG, 12-LEAD (SCHEDULED) 2022 00:00:00 Jose Alfredo Esposito

[facility]

 

                HHV6 QUANT, PLASMA 2022 17:18:00 Adonay [facility]

 

                ADENOVIRUS QUANTITATIVE, 2022 17:18:00 Milly Urias  [facility]

[facility] [place]



                PLASMA                                          [facility]

 

                COMPLETE BLOOD COUNT W/ 2022 17:18:00 Adonay Milly  Univ

[facility] [place]



                DIFFERENTIAL                                    [facility]

 

                TYPE AND SCREEN 2022 17:18:00 Adonay [facility]

 

                COMPREHENSIVE METABOLIC 2022 17:18:00 Adonay Milly  Univ

[facility] [place]



                PANEL                                           [facility]

 

                PHOSPHORUS LEVEL 2022 17:18:00 Adonay [facility]

 

                URIC ACID       2022 17:18:00 Adonay [facility]

 

                LACTATE DEHYDROGENASE 2022 17:18:00 [facility]

[facility]

 

                MAGNESIUM LEVEL 2022 17:18:00 Adonay [facility]

 

                HP MD LAURA-SHIPLEY VIRUS 2022 17:18:00 Milly Urias  Uni

[facility] [place]



                QUANTITATIVE PCR ANALYSIS                                 MD And

[facility]



                COLLECTION, BLOOD                                 Center

 

                CMV QUANT PCR   2022 17:18:00 Adonay [facility]

 

                TACROLIMUS LEVEL 2022 17:18:00 Adonay [facility]

 

                Results CBC     2022 17:18:00 Adonay [facility]

 

                MANUAL DIFFERENTIAL 2022 17:18:00 Adonay [facility]

 

                GLUCOSE LEVEL   2022 17:18:00 Adonay [facility]

 

                BLOOD UREA NITROGEN 2022 17:18:00 Adonay [facility]

 

                ELECTROLYTE PANEL 2022 17:18:00 Adonay [facility]

 

                SERUM CREATININE 2022 17:18:00 Adonay [facility]

 

                .GLOMERULAR FILTRATION 2022 17:18:00 Milly Urias  [facility]

 

                CALCIUM LEVEL TOTAL 2022 17:18:00 Adonay [facility]

 

                ALBUMIN LEVEL   2022 17:18:00 Adonay [facility]

 

                ALKALINE PHOSPHATASE 2022 17:18:00 Adonay [facility]

 

                ALANINE AMINOTRANSFERASE 2022 17:18:00 Adonay [facility]

 

                ASPARTATE AMINOTRANSFERASE 2022 17:18:00 [facility]ly Urias  

[facility]

 

                TOTAL PROTEIN   2022 17:18:00 Adonay [facility]

 

                FRACTIONATED BILIRUBIN 2022 17:18:00 [facility]

 

                ABORH           2022 17:18:00 Adonay [facility]

 

                ANTIBODY SCREEN 2022 17:18:00 Adonay [facility]

 

                HP MD LAURA-SHIPLEY VIRUS 2022 17:18:00 Evens Uriasal  Uni

[facility] [place]



                QUANTITATIVE PCR ANALYSIS                                  Cancer



                INTERPRETATION AND REPORT                                 Center

 

                CLOT EXPIRATION DATE 2022 17:18:00 Milly Urias  [facility]

 

                TMP INTERPRETATION 2022 17:18:00 Adonay [facility]



                ANTIBODY SCREEN NEGATIVE                                 MD Tello

on Cancer



                                                                Center

 

                SPIROMETRY W/O DILATORS, 2022 18:22:58 Alejandra Adler    [facility]



                DLCO AND BODY                                   [facility]

er



                PLETHSMOGRAPHIC LUNG                                 Center



                VOLUMES                                         

 

                HHV6 QUANT, PLASMA 2022 17:25:00 Ad[facility]ay [facility]

 

                ADENOVIRUS QUANTITATIVE, 2022 17:25:00 Evens Uriasal  Uni

[facility] [place]



                PLASMA                                          [facility]

 

                COMPLETE BLOOD COUNT W/ 2022 17:25:00 Adonay Milly  Univ

[facility] [place]



                DIFFERENTIAL                                    [facility]

 

                TYPE AND SCREEN 2022 17:25:00 Adonay [facility]

 

                COMPREHENSIVE METABOLIC 2022 17:25:00 Adonay Milly  Univ

[facility] [place]



                PANEL                                           [facility]

 

                PHOSPHORUS LEVEL 2022 17:25:00 Adonay [facility]

 

                URIC ACID       2022 17:25:00 Adonay [facility]

 

                LACTATE DEHYDROGENASE 2022 17:25:00 Adonay [facility]

 

                MAGNESIUM LEVEL 2022 17:25:00 Adonay [facility]

 

                HP MD LAURA-BARR VIRUS 2022 17:25:00 Evens Uriasal  Uni

[facility] [place]



                QUANTITATIVE PCR ANALYSIS                                 MD And

[facility]



                COLLECTION, BLOOD                                 Center

 

                CMV QUANT PCR   2022 17:25:00 Adonay [facility]

 

                TACROLIMUS LEVEL 2022 [facility]:25:00 [facility]

 

                Results CBC     2022 17:25:00 Adonay [facility]

 

                MANUAL DIFFERENTIAL 2022 17:25:00 Adonay [facility]

 

                GLUCOSE LEVEL   2022 [facility]:25:00 [facility]

 

                BLOOD UREA NITROGEN 2022 [facility]25:00 [facility]

 

                ELECTROLYTE PANEL 2022 [facility]:25:00 [facility]

 

                SERUM CREATININE 2022 17:25:00 Adonay, [facility]

 

                .GLOMERULAR FILTRATION 2022 17:25:00 Milly Urias

[facility]



                RATE                                            [facility]

 

                CALCIUM LEVEL TOTAL 2022 17:25:00 Milly Urias  [facility]

 

                ALBUMIN LEVEL   2022 17:25:00 Milly Urias  [facility]

 

                ALKALINE PHOSPHATASE 2022 17:25:00 Milly Urias  [facility]

 

                ALANINE AMINOTRANSFERASE 2022 17:25:00 Milly Urias  [facility]

 

                ASPARTATE AMINOTRANSFERASE 2022 17:25:00 [facility]ly Urias

[facility]

 

                TOTAL PROTEIN   2022 17:25:00 Milly Urias  [facility]

 

                FRACTIONATED BILIRUBIN 2022 17:25:00 Milly Urias

[facility]

 

                ABORH           2022 [facility]:25:00 [facility]

 

                ANTIBODY SCREEN 2022 17:25:00 Adonay [facility]

 

                HP MD LAURA-BARR VIRUS 2022 17:25:00 Milly Urias  Uni

[facility] [place]



                QUANTITATIVE PCR ANALYSIS                                  Cancer



                INTERPRETATION AND REPORT                                 Center

 

                TMP INTERPRETATION 2022 17:25:00 Milly Urias  [facility]



                ANTIBODY SCREEN NEGATIVE                                 MD Tello

on Cancer



                                                                Center

 

                CLOT EXPIRATION DATE 2022 17:25:00 Milly Urias  [facility]

 

                GENERAL LABORATORY ADD ON 2022 19:59:00 [facility]  

[facility]



                TEST                                            [facility]

 

                COMPLETE BLOOD COUNT W/ 2022 18:02:00 Sandro Villagran

[facility]



                DIFFERENTIAL                                    [facility]

 

                TYPE AND SCREEN 2022 18:02:00 Sandro Villagran [facility]

 

                COMPREHENSIVE METABOLIC 2022 18:02:00 Sandro Villagran

[facility]



                PANEL                                           [facility]

 

                PHOSPHORUS LEVEL 2022 18:02:00 Sandro Villagran [facility]

 

                URIC ACID       2022 18:02:00 Sandro Villagran [facility]

 

                LACTATE DEHYDROGENASE 2022 18:02:00 Sandro Villagran

[facility]

 

                MAGNESIUM LEVEL 2022 18:02:00 Sandro Villagran [facility]

 

                Results CBC     2022 18:02:00 Sandro Villagran [facility]

 

                GLUCOSE LEVEL   2022 18:02:00 Sandro Villagran [facility]

 

                BLOOD UREA NITROGEN 2022 18:02:00 Sandro Villagran [facility]

 

                ELECTROLYTE PANEL 2022 18:02:00 Sandro Villagran [facility]

 

                SERUM CREATININE 2022 18:02:00 Sandro Villagran [facility]

 

                .GLOMERULAR FILTRATION 2022 18:02:00 Sandro Villagran

[facility]



                RATE                                            [facility]

 

                CALCIUM LEVEL TOTAL 2022 18:02:00 Sandro Villagran [facility]

 

                ALBUMIN LEVEL   2022 18:02:00 Sandro Villagran [facility]

 

                ALKALINE PHOSPHATASE 2022 18:02:00 Sandro Villagran [facility]

 

                ALANINE AMINOTRANSFERASE 2022 18:02:00 Sandro Villagran

[facility]

 

                ASPARTATE AMINOTRANSFERASE 2022 18:02:00 Sandro Villagran

[facility]

 

                TOTAL PROTEIN   2022 18:02:00 Sandro Villagran [facility]



                                                                Center

 

                FRACTIONATED BILIRUBIN 2022 18:02:00 Sandro Villagran

[facility]

 

                ABORH           2022 18:02:00 Sandro Villagran [facility]

 

                ANTIBODY SCREEN 2022 18:02:00 Sandro Villagran [facility]



                                                                Center

 

                MANUAL DIFFERENTIAL 2022 [facility]:02:00 [facility]

 

                CLOT EXPIRATION DATE 2022 18:02:00 Sandro Villagran

[facility]

 

                TMP INTERPRETATION 2022 18:02:00 [facility]



                ANTIBODY SCREEN NEGATIVE                                 MD Tello

[facility]

 

                TACROLIMUS LEVEL 2022 18:02:00 Sandro Villagran [facility]

 

                COMPLETE BLOOD COUNT W/ 2022 10:38:00 Akosua Royal

[facility] [place]



                DIFFERENTIAL                                    [facility]

 

                BASIC METABOLIC PANEL, 2022 10:38:00 Akosua Royal

[facility] [place]



                CALCIUM TOTAL                                   [facility]

 

                TYPE AND SCREEN 2022 10:38:00 Akosua Royal  [facility]

 

                ABORH           2022 10:38:00 Akosua Royal  [facility]

 

                ANTIBODY SCREEN 2022 10:38:00 Akosua Royal  [facility]

 

                GLUCOSE LEVEL   2022 10:38:00 Akosua Royal  [facility]

 

                BLOOD UREA NITROGEN 2022 10:38:00 Akosua Royal  [facility]

 

                ELECTROLYTE PANEL 2022 10:38:00 Akosua Royal  [facility]

 

                SERUM CREATININE 2022 10:38:00 Akosua Royal  [facility]

 

                .GLOMERULAR FILTRATION 2022 10:38:00 Akosua Royal

[facility] [place]



                RATE                                            [facility]

 

                CALCIUM LEVEL TOTAL 2022 10:38:00 Akosua Royal  [facility]

 

                Results CBC     2022 10:38:00 Akosua Royal  [facility]

 

                MANUAL DIFFERENTIAL 2022 10:38:00 Akosua Royal  [facility]

 

                TMP INTERPRETATION 2022 10:38:00 Akosua Royal  [facility]



                ANTIBODY SCREEN NEGATIVE                                 MD Omer jimenez [facility]



                                                                Center

 

                CLOT EXPIRATION DATE 2022 10:38:00 Akosua Royal

[facility]

 

                BLOODCULTURE    2022 11:19:00 Akosua Royal  [facility]

 

                COMPLETE BLOOD COUNT W/ 2022 11:19:00 Akosua Royal  Un[facility]

[facility] [place]



                DIFFERENTIAL                                    [facility]

 

                COMPREHENSIVE METABOLIC 2022 11:19:00 Akosua Royal  Univ

[facility] [place]



                PANEL                                           [facility]

 

                PHOSPHORUS LEVEL 2022 11:19:00 Akosua Royal  [facility]

 

                MAGNESIUM LEVEL 2022 11:19:00 Akosua Royal  [facility]

 

                URIC ACID       2022 11:19:00 Akosua Royal  [facility]

 

                LACTATE DEHYDROGENASE 2022 11:19:00 Akosua Royal

[facility]

 

                CMV QUANT PCR   2022 11:19:00 Akosua Royal  [facility]

 

                TACROLIMUS LEVEL 2022 11:19:00 Akosua Royal  [facility]

 

                Results CBC     2022 11:19:00 Akosua Royal  [facility]

 

                MANUAL DIFFERENTIAL 2022 11:19:00 Akosua Royal  [facility]

 

                GLUCOSE LEVEL   2022 11:19:00 Akosua Royal  [place] [place] [facility]

[facility]

 

                BLOOD UREA NITROGEN 2022 11:19:00 Akosua Royal  [facility]

 

                ELECTROLYTE PANEL 2022 11:19:00 Akosau Royal  [facility]

 

                SERUM CREATININE 2022 11:19:00 Akosua Royal  [facility]

 

                .GLOMERULAR FILTRATION 2022 11:19:00 Akosua Royal

[facility] [place]



                RATE                                            [facility]

 

                CALCIUM LEVEL TOTAL 2022 11:19:00 Akosua Royal  [facility]

 

                ALBUMIN LEVEL   2022 11:19:00 Akosua Royal  [facility]

 

                ALKALINE PHOSPHATASE 2022 11:19:00 Akosua Royal  [facility]

[facility]

 

                ALANINE AMINOTRANSFERASE 2022 11:19:00 Akosua Royal  [facility]

[facility]

 

                ASPARTATE AMINOTRANSFERASE 2022 11:19:00 Akosua Royal  [facility]

[facility]

 

                TOTAL PROTEIN   2022 11:19:00 Akosua Royal  [facility]

 

                FRACTIONATED BILIRUBIN 2022 11:19:00 Akosua Royale

[facility]

 

                EKG, 12-LEAD (PORTABLE) 2022 00:00:00 Jenaro Romano

 [facility]

 

                BLOODCULTURE    2022 10:23:00 Akosua Royal  [facility]

 

                COMPLETE BLOOD COUNT W/ 2022 10:23:00 Akosua Royal

[facility] [place]



                DIFFERENTIAL                                    [facility]

 

                BASIC METABOLIC PANEL, 2022 10:23:00 Akosua Royal

[facility] [place]



                CALCIUM TOTAL                                   [facility]

 

                VANCOMYCIN LEVEL TROUGH 2022 10:23:00 Aubrie Mohan

[facility]



                                                Tiffanie        [facility]

 

                GLUCOSE LEVEL   2022 10:23:00 Akosua Royal  [facility]

 

                BLOOD UREA NITROGEN 2022 10:23:00 Akosua Royal  [facility]

 

                ELECTROLYTE PANEL 2022 10:23:00 Akosua Royal  [facility]

 

                SERUM CREATININE 2022 10:23:00 Akosua Royal  [facility]

 

                .GLOMERULAR FILTRATION 2022 10:23:00 Akosua Royal  [facility]

[facility] [place]



                RATE                                            [facility]

 

                CALCIUM LEVEL TOTAL 2022 10:23:00 Akosua Royal  [facility]

 

                Results CBC     2022 10:23:00 Akosua Royal  [facility]

 

                MANUAL DIFFERENTIAL 2022 10:23:00 Akosua Royal  [facility]

 

                VRE CULTURE     2022 23:04:00 Akosua Royal  [facility]

 

                BLOODCULTURE    2022 06:14:00 Akosua Royal  [facility]

 

                TOBRAMYCIN LEVEL RANDOM 2022 06:14:00 Shady Painter       [facility]

 

                COMPLETE BLOOD COUNT W/ 2022 06:14:00 Akosua Royal

[facility] [place]



                DIFFERENTIAL                                    [facility]

 

                BASIC METABOLIC PANEL, 2022 06:14:00 Akosua Royal

[facility] [place]



                CALCIUM TOTAL                                   [facility]

 

                TYPE AND SCREEN 2022 06:14:00 Akosua Royal  [facility]

 

                ABORH           2022 06:14:00 Akosua oRyal  [facility]

 

                ANTIBODY SCREEN 2022 06:14:00 Akosua Royal  [facility]

 

                GLUCOSE LEVEL   2022 06:14:00 Akosua Royal  [facility]

[facility]

 

                BLOOD UREA NITROGEN 2022 06:14:00 Akosua Royal  [facility]

 

                ELECTROLYTE PANEL 2022 06:14:00 Akosua Royal  [facility]

 

                SERUM CREATININE 2022 06:14:00 Akosua Royal  [facility]

 

                .GLOMERULAR FILTRATION 2022 06:14:00 Akosua Royal

[facility] [place]



                RATE                                            [facility]

 

                CALCIUM LEVEL TOTAL 2022 06:14:00 Akosua Royal  [facility]

 

                Results CBC     2022 06:14:00 Akosua Royal  [facility]

 

                MANUAL DIFFERENTIAL 2022 06:14:00 Akosua Royal  [facility]

 

                CLOT EXPIRATION DATE 2022 06:14:00 Akosua Royal  [facility]

 

                TMP INTERPRETATION 2022 06:14:00 Akosua Royal  [facility]



                ANTIBODY SCREEN NEGATIVE                                 MD Tello

[facility]



                                                                Center

 

                MS REMOVAL TUNNELED CV 2022 15:26:17 Akosua Royal

[facility] [place]



                CATH W/O SUBQ PORT OR PUMP                                 MD Y[facility]y [facility]



                                                                Center

 

                BLOODCULTURE    2022 08:30:00 Sapphire Hyman [facility]

 

                COMPLETE BLOOD COUNT W/ 2022 08:30:00 Sapphire Hyman

[facility]



                DIFFERENTIAL                                    [facility] [facility]

 

                BASIC METABOLIC PANEL, 2022 08:30:00 Sapphire Hyman

[facility]



                CALCIUM TOTAL                                   [facility]

 

                MAGNESIUM LEVEL 2022 08:30:00 Sapphire Hyman [facility]

 

                PHOSPHORUS LEVEL 2022 08:30:00 Sapphire Hyman [facility]

 

                Results CBC     2022 08:30:00 [facility]

er



                                                                Center

 

                MANUAL DIFFERENTIAL 2022 08:30:00 Sapphire Hyman [facility]

er



                                                                Center

 

                GLUCOSE LEVEL   2022 08:30:00 Boogie [facility]



                                                                Center

 

                BLOOD UREA NITROGEN 2022 08:30:00 Sapphire Hyman [facility]



                                                                Center

 

                ELECTROLYTE PANEL 2022 08:30:00 Sapphire Hyman [facility]

er



                                                                Center

 

                SERUM CREATININE 2022 08:30:00 [facility]

 

                .GLOMERULAR FILTRATION 2022 08:30:00 Sapphire Hyman [facility]

[facility] [place]



                RATE                                            [facility]

 

                CALCIUM LEVEL TOTAL 2022 08:30:00 Sapphire Hyman [facility]



                                                                Center

 

                BLOODCULTURE    2022 08:00:00 Boogie [facility]



                                                                Center

 

                COMPLETE BLOOD COUNT W/ 2022 08:00:00 Monique Barrera [facility]



                DIFFERENTIAL                                    [facility]

 

                COMPREHENSIVE METABOLIC 2022 08:00:00 Monique Barrera [facility]

[facility]



                PANEL                                           [facility]

er



                                                                Center

 

                MAGNESIUM LEVEL 2022 08:00:00 Monique Barrera [facility]

er



                                                                Center

 

                TYPE AND SCREEN 2022 08:00:00 Monique Barrera [facility]

y [facility]

er



                                                                Center

 

                Results CBC     2022 08:00:00 Monique Barrera [facility]

er



                                                                Center

 

                MANUAL DIFFERENTIAL 2022 08:00:00 Monique Barrerae

rskayode [facility]

er



                                                                Center

 

                GLUCOSE LEVEL   2022 08:00:00 Monique Barrera [facility]

er



                                                                Center

 

                BLOOD UREA NITROGEN 2022 08:00:00 Monique Barrera [facility]

[facility]

 

                ELECTROLYTE PANEL 2022 08:00:00 Monique Barrera [facility]

 

                SERUM CREATININE 2022 08:00:00 Monique Barrera [facility]

 

                .GLOMERULAR FILTRATION 2022 08:00:00 Monique Barrera [facility]

[facility]

 

                CALCIUM LEVEL TOTAL 2022 08:00:00 Monique Barrera [facility]

 

                ALBUMIN LEVEL   2022 08:00:00 Monique Barrera [facility]

 

                ALKALINE PHOSPHATASE 2022 08:00:00 Monique Barrera [facility]

 

                ALANINE AMINOTRANSFERASE 2022 08:00:00 Monique Barrera 

[facility]

 

                ASPARTATE AMINOTRANSFERASE 2022 08:00:00 Monique Barrera [facility]

 

                TOTAL PROTEIN   2022 08:00:00 Monique Barrera [facility]

 

                FRACTIONATED BILIRUBIN 2022 08:00:00 Monique Barrera [facility]

[facility]

 

                ABORH           2022 08:00:00 Monique Barrera [facility]

 

                ANTIBODY SCREEN 2022 08:00:00 Monique Barrera [facility]

 

                TMP INTERPRETATION 2022 08:00:00 Monique Barrera [facility]



                ANTIBODY SCREEN NEGATIVE                                 MD Tello

on Cancer



                                                                Center

 

                CLOT EXPIRATION DATE 2022 08:00:00 Monique Barrera [facility]

 

                URINE CULTURE   2022 04:07:00 Santos Smith [facility]

 

                URINALYSIS WITH 2022 04:07:00 Santos Smith [facility]



                MICROSCOPIC IF INDICATED                                 MD Tello

[facility]



                                                                [place]

 

                URINALYSIS MICROSCOPIC 2022 04:07:00 Abdifatah Jacinto    [facility]

[facility]

 

                POC VENOUS BLOOD GAS + 2022 22:10:00 Santos Smith

[facility] [place]



                LACTATE                                         [facility]

 

                XR CHEST 1 VW   2022 22:00:18 Ophelia [facility]

 

                BLOODCULTURE    2022 21:47:00 Ophelia [facility]



                                                                Center

 

                RESPIRATORY VIRAL PANEL + 2022 21:46:00 Abdifatah Jacinto    

[facility]



                COVID-19, NASOPHARYNGEAL                                 MD Tello

[facility]



                SWAB                                            Center

 

                COMPLETE BLOOD COUNT W/ 2022 21:46:00 Ophelia Abdifatah    Univ

[facility] [place]



                DIFFERENTIAL                                    [facility]

 

                COMPREHENSIVE METABOLIC 2022 21:46:00 Ophelia Abdifatah    Univ

[facility] [place]



                PANEL                                           [facility]

 

                MAGNESIUM LEVEL 2022 21:46:00 Ophelia [facility]

 

                PHOSPHORUS LEVEL 2022 21:46:00 Ophelia [facility]

 

                LACTATE DEHYDROGENASE 2022 21:46:00 Abdifatah Jacinto    [facility]

 

                C REACTIVE PROTEIN 2022 21:46:00 Ophelia [facility]

 

                PROCALCITONIN   2022 21:46:00 Ophelia [facility]



                                                                Center

 

                Results CBC     2022 21:46:00 Ophelia [facility]



                                                                Center

 

                MANUAL DIFFERENTIAL 2022 21:46:00 Ophelia [facility]

 

                GLUCOSE LEVEL   2022 21:46:00 Ophelia [facility]

 

                BLOOD UREA NITROGEN 2022 21:46:00 Ophelia [facility]

 

                ELECTROLYTE PANEL 2022 21:46:00 Ophelia [facility]

 

                SERUM CREATININE 2022 21:46:00 Ophelia [facility]

 

                .GLOMERULAR FILTRATION 2022 21:46:00 Abdifatah Jacinto    [facility]

[facility]



                RATE                                            [facility]

 

                CALCIUM LEVEL TOTAL 2022 21:46:00 Abdifatah Jacinto    [facility]

 

                ALBUMIN LEVEL   2022 21:46:00 Abdifatah Jacinto    [facility]

 

                ALKALINE PHOSPHATASE 2022 21:46:00 Abdifatah Jacinto    [facility]

 

                ALANINE AMINOTRANSFERASE 2022 21:46:00 Abdifatah Jacinto

[facility]

 

                ASPARTATE AMINOTRANSFERASE 2022 21:46:00 Abdifatah Jacinto

[facility]

 

                TOTAL PROTEIN   2022 21:46:00 Ophelia [facility]

 

                FRACTIONATED BILIRUBIN 2022 21:46:00 Abdifatah Jacinto    [facility]

 

                CT HEAD WO CONTRAST 2022 21:28:51 Ophelia [facility]

 

                BLOODCULTURE    2022 16:47:00 Monique Barrera [facility]

 

                HHV6 QUANT, PLASMA 2022 15:23:00 Daron Coley [facility]

 

                ADENOVIRUS QUANTITATIVE, 2022 15:23:00 Daron Coley [facility]



                PLASMA                                          [facility]

 

                COMPLETE BLOOD COUNT W/ 2022 15:23:00 Daron Coley [facility]



                DIFFERENTIAL                                    [facility]

 

                TYPE AND SCREEN 2022 15:23:00 Daron Coley [facility]

[facility]

 

                COMPREHENSIVE METABOLIC 2022 15:23:00 Daron Coley [facility]



                PANEL                                           [facility]

 

                PHOSPHORUS LEVEL 2022 15:23:00 Daron Coley [facility]

 

                URIC ACID       2022 15:23:00 Daron Coley [facility]

 

                LACTATE DEHYDROGENASE 2022 15:23:00 [facility]

[facility]

 

                MAGNESIUM LEVEL 2022 15:23:00 Daron Coley [facility]

 

                CMV QUANT PCR   2022 15:23:00 Daron Coley [facility]

 

                TACROLIMUS LEVEL 2022 15:23:00 Darno Coley [facility]

 

                LIPID PANEL     2022 15:23:00 Daron Coley [facility]

 

                Results CBC     2022 15:23:00 Daron Coley [facility]

 

                MANUAL DIFFERENTIAL 2022 15:23:00 Daron Coley [facility]

 

                GLUCOSE LEVEL   2022 15:23:00 Daron Coley [facility]

 

                BLOOD UREA NITROGEN 2022 15:23:00 Daron Coley [facility]

 

                ELECTROLYTE PANEL 2022 15:23:00 Daron Coley [facility]

 

                SERUM CREATININE 2022 15:23:00 Daron Coley [facility]

 

                .GLOMERULAR FILTRATION 2022 15:23:00 Daron Coley [facility]

 

                CALCIUM LEVEL TOTAL 2022 15:23:00 Daron Coley [facility]

 

                ALBUMIN LEVEL   2022 15:23:00 Daron Coley [facility]

 

                ALKALINE PHOSPHATASE 2022 15:23:00 Daron Coley [facility]

 

                ALANINE AMINOTRANSFERASE 2022 15:23:00 Daron Cole[facility]

[facility]

 

                ASPARTATE AMINOTRANSFERASE 2022 15:23:00 Daron Coley [facility]

[facility]

 

                TOTAL PROTEIN   2022 15:23:00 Daron Coley [facility]

 

                FRACTIONATED BILIRUBIN 2022 15:23:00 Daron Coley [facility]

 

                ABORH           2022 15:23:00 Daron Coley [facility]

 

                ANTIBODY SCREEN 2022 15:23:00 Daron Coley [facility]

 

                TMP INTERPRETATION 2022 15:23:00 Daron Cole[facility]

[facility] [place]



                ANTIBODY SCREEN NEGATIVE                                 MD Tello

[facility]



                                                                Center

 

                CLOT EXPIRATION DATE 2022 15:23:00 Daron Coley [facility]

 

                COMPLETE BLOOD COUNT W/ 2022-04-15 15:16:00 Smiley Bal [facility]



                DIFFERENTIAL                                    [facility]

 

                TYPE AND SCREEN 2022-04-15 15:16:00 Smiley Bal [facility]

 

                COMPREHENSIVE METABOLIC 2022-04-15 15:16:00 Smiley Bal

[facility]



                PANEL                                           [facility]

 

                LACTATE DEHYDROGENASE 2022-04-15 15:16:00 Smiley Bal

[facility]

 

                MAGNESIUM LEVEL 2022-04-15 15:16:00 Smiley Bal [facility]

 

                Results CBC     2022-04-15 15:16:00 Smiley Bal [facility]

 

                MANUAL DIFFERENTIAL 2022-04-15 15:16:00 Smiley Bal

[facility]

 

                GLUCOSE LEVEL   2022-04-15 15:16:00 [facility]

 

                BLOOD UREA NITROGEN 2022-04-15 15:16:00 Smiley [facility]

[facility]

 

                ELECTROLYTE PANEL 2022-04-15 15:16:00 Smiley Bal [facility]

 

                SERUM CREATININE 2022-04-15 15:16:00 Smiley Bal [facility]

 

                .GLOMERULAR FILTRATION 2022-04-15 15:16:00 Smiley Bal

[facility]

 

                CALCIUM LEVEL TOTAL 2022-04-15 15:16:00 Smiley Bal [facility]

 

                ALBUMIN LEVEL   2022-04-15 15:16:00 Valeriano [facility]

 

                ALKALINE PHOSPHATASE 2022-04-15 15:16:00 Smiley Bal [facility]

 

                ALANINE AMINOTRANSFERASE 2022-04-15 15:16:00 [facility]

 

                ASPARTATE AMINOTRANSFERASE 2022-04-15 15:16:00 Valeriano [facility]

 

                TOTAL PROTEIN   2022-04-15 15:16:00 Valeriano [facility]

 

                FRACTIONATED BILIRUBIN 2022-04-15 15:16:00 Smiley Bal [facility]

 

                ABORH           2022-04-15 15:16:00 Valeriano [facility]

 

                ANTIBODY SCREEN 2022-04-15 15:16:00 Valeriano [facility]

 

                TMP INTERPRETATION 2022-04-15 15:16:00 Smiley Bal [facility]



                ANTIBODY SCREEN NEGATIVE                                 MD Tello

[facility]



                                                                Center

 

                CLOT EXPIRATION DATE 2022-04-15 15:16:00 Smiley Bal [facility]

[facility]

 

                COMPLETE BLOOD COUNT W/ 2022 15:27:00 Monique Barrera

[facility]



                DIFFERENTIAL                                    [facility]

 

                COMPREHENSIVE METABOLIC 2022 15:27:00 Monique Barrera

[facility] 

[place]



                PANEL                                           [facility]

 

                MAGNESIUM LEVEL 2022 15:27:00 Monique Barrera [facility]

 

                TYPE AND SCREEN 2022 15:27:00 Monique Barrera [facility]

 

                Results CBC     2022 15:27:00 Monique Barrera [facility]

 

                MANUAL DIFFERENTIAL 2022 15:27:00 Monique Barrera [facility]

[facility]

 

                GLUCOSE LEVEL   2022 15:27:00 Monique Barrera [facility]

 

                BLOOD UREA NITROGEN 2022 15:27:00 Monique Barrera

[facility]

 

                ELECTROLYTE PANEL 2022 15:27:00 Monique Barrera [facility]

 

                ABORH           2022 15:27:00 Monique Barrera [facility]

 

                ANTIBODY SCREEN 2022 15:27:00 Monique Barrera [facility]

 

                SERUM CREATININE 2022 15:27:00 Monique Barrera [facility]

 

                .GLOMERULAR FILTRATION 2022 15:27:00 Monique Barrera Un

[facility] 

[place]



                RATE                                            [facility]

 

                CALCIUM LEVEL TOTAL 2022 15:27:00 Monique Barrera

[facility]

 

                ALBUMIN LEVEL   2022 15:27:00 Monique Barrera [facility]

 

                ALKALINE PHOSPHATASE 2022 15:27:00 Monique Barrera

[facility]

 

                ALANINE AMINOTRANSFERASE 2022 15:27:00 Monique Barrera 

[facility]

 

                ASPARTATE AMINOTRANSFERASE 2022 15:27:00 Monique Barrera [facility]

 

                TOTAL PROTEIN   2022 15:27:00 Monique Barrera [facility]

 

                FRACTIONATED BILIRUBIN 2022 15:27:00 Monique Barrera 

[facility]

 

                TMP INTERPRETATION 2022 15:27:00 Monique Barrera [facility]



                ANTIBODY SCREEN NEGATIVE                                 MD Tello

on Cancer



                                                                Center

 

                CLOT EXPIRATION DATE 2022 15:27:00 Monique Barrera [facility]

 

                HHV6 QUANT, PLASMA 2022 15:33:00 Adonay [facility]

 

                ADENOVIRUS QUANTITATIVE, 2022 15:33:00 Adonay Milly  Uni

[facility] [place]



                PLASMA                                          [facility]

 

                PARVOVIRUS B19 QUANT, 2022 15:33:00 Adonay Milly  [facility]

[facility] [place]



                PLASMA                                          [facility]

 

                COMPLETE BLOOD COUNT W/ 2022 15:33:00 Adonay Milly  Univ

[facility] [place]



                DIFFERENTIAL                                    [facility]

 

                TYPE AND SCREEN 2022 15:33:00 Adonay [facility] o

f [facility]

 

                COMPREHENSIVE METABOLIC 2022 15:33:00 Adonay Milly  Univ

[facility] [place]



                PANEL                                           [facility]

 

                PHOSPHORUS LEVEL 2022 15:33:00 Adonay [facility]

 

                URIC ACID       2022 15:33:00 Adonay [facility] o

f [facility]

 

                LACTATE DEHYDROGENASE 2022 15:33:00 Adonay [facility]

 

                MAGNESIUM LEVEL 2022 15:33:00 [facility]

[facility]

 

                HP MD LAURA-BARR VIRUS 2022 15:33:00 Adonay Milly  Uni

[facility] [place]



                QUANTITATIVE PCR ANALYSIS                                 MD And

[facility]



                COLLECTION, BLOOD                                 Center

 

                CMV QUANT PCR   2022 15:33:00 Adonay [facility]

 

                TACROLIMUS LEVEL 2022 15:33:00 Adonay [facility]

 

                LIPID PANEL     2022 15:33:00 Adonay [facility]

 

                THYROID STIMULATING 2022 15:33:00 Adonay Milly  [facility]

[facility] [place]



                HORMONE                                         [facility]

 

                FREE THYROXINE  2022 15:33:00 Adonay [facility]

 

                HEMOGLOBIN A1C  2022 [facility]:33:00 [facility]

 

                VITAMIN D 25 HYDROXY LEVEL 2022 15:33:00 Milly Urias  [facility]

 

                Results CBC     2022 15:33:00 Adonay [facility]

 

                MANUAL DIFFERENTIAL 2022 15:33:00 Adonay [facility]

 

                GLUCOSE LEVEL   2022 15:33:00 Adonay [facility]

 

                BLOOD UREA NITROGEN 2022 15:33:00 [facility]

 

                ELECTROLYTE PANEL 2022 15:33:00 Adonay [facility]

 

                SERUM CREATININE 2022 15:33:00 Adonay [facility]

 

                .GLOMERULAR FILTRATION 2022 15:33:00 Milly Urias  [facility]



                RATE                                            [facility]

 

                CALCIUM LEVEL TOTAL 2022 15:33:00 [facility]

 

                ALBUMIN LEVEL   2022 15:33:00 [facility]

 

                ALKALINE PHOSPHATASE 2022 [facility]33:00 [facility]

 

                ALANINE AMINOTRANSFERASE 2022 15:33:00 Milly Urias

[facility]

 

                ASPARTATE AMINOTRANSFERASE 2022 15:33:00 Milly Urias

niverskayode [facility]

 

                TOTAL PROTEIN   2022 15:33:00 Adonay [facility]

[facility]

 

                FRACTIONATED BILIRUBIN 2022 15:33:00 Milly Urias

[facility]

 

                ABORH           2022 15:33:00 Adonay [facility]

[facility]

 

                ANTIBODY SCREEN 2022 15:33:00 Adonay [facility]

[facility]

 

                CLOT EXPIRATION DATE 2022 15:33:00 Milly Urias

[facility]

 

                TMP INTERPRETATION 2022 15:33:00 Milly Urias  [facility]



                ANTIBODY SCREEN NEGATIVE                                 MD Tello

[facility]



                                                                Center

 

                HP MD LAURA-BARR VIRUS 2022 15:33:00 Milly Urias  Uni

[facility] [place]



                QUANTITATIVE PCR ANALYSIS                                 MD And

[facility]



                INTERPRETATION AND REPORT                                 Center

 

                COMPLETE BLOOD COUNT W/ 2022 14:59:00 [facility]



                DIFFERENTIAL                                    [facility]

 

                COMPREHENSIVE METABOLIC 2022 14:59:00 [facility]



                PANEL                                           [facility]

 

                MAGNESIUM LEVEL 2022 14:59:00 Monique Barrera [facility]



                                                                Center

 

                TYPE AND SCREEN 2022 14:59:00 Monique Barrera [facility]



                                                                Center

 

                Results CBC     2022 14:59:00 Monique Barrera [facility]



                                                                Center

 

                MANUAL DIFFERENTIAL 2022 14:59:00 Monique Barrera

[facility]

 

                GLUCOSE LEVEL   2022 14:59:00 Monique Barrera [facility]



                                                                Center

 

                BLOOD UREA NITROGEN 2022 14:59:00 Hiral, Monique M [facility]

 

                ELECTROLYTE PANEL 2022 14:59:00 Monique Barrera [facility]

 

                SERUM CREATININE 2022 14:59:00 Monique Barrera [facility]

 

                .GLOMERULAR FILTRATION 2022 14:59:00 Monique Barrera [facility]

[facility]

 

                CALCIUM LEVEL TOTAL 2022 14:59:00 Monique Barrera Univyan

[facility]

 

                ALBUMIN LEVEL   2022 14:59:00 Monique Barrera [facility]

 

                ALKALINE PHOSPHATASE 2022 14:59:00 Monique Barrera [facility]

 

                ALANINE AMINOTRANSFERASE 2022 14:59:00 Monique Barrera 

[facility]

 

                ASPARTATE AMINOTRANSFERASE 2022 14:59:00 Monique Barrera [facility]

 

                TOTAL PROTEIN   2022 14:59:00 Monique Barrera [facility]

 

                FRACTIONATED BILIRUBIN 2022 14:59:00 Monique Barrera [facility]

[facility]

 

                ABORH           2022 14:59:00 Monique Barrera [facility]

 

                ANTIBODY SCREEN 2022 14:59:00 Monique Barrera [facility]

 

                TMP INTERPRETATION 2022 14:59:00 Monique Barrera [facility]



                ANTIBODY SCREEN NEGATIVE                                 MD Tello

on [facility]



                                                                Center

 

                CLOT EXPIRATION DATE 2022 14:59:00 Monique Barrera [facility]

 

                XR SPINE SCOLIOSIS 2-3 2022 19:35:16 [facility]

[facility]



                VIEWS                                           

 

                BLOODCULTURE    2022-03-10 15:50:00 Abiola Reyez [facility]

 

                COMPLETE BLOOD COUNT W/ 2022-03-10 15:50:00 Abiola Reyez [facility]

[facility] 

[place]



                DIFFERENTIAL                                    [facility]

 

                COMPREHENSIVE METABOLIC 2022-03-10 15:50:00 Abiola Reyez Un

[facility] 

[place]



                PANEL                                           [facility]

 

                MAGNESIUM LEVEL 2022-03-10 15:50:00 Abiola Reyez [facility]

 

                Results CBC     2022-03-10 15:50:00 Abiola Reyez [facility]

 

                MANUAL DIFFERENTIAL 2022-03-10 15:50:00 [facility]

[facility]

 

                GLUCOSE LEVEL   2022-03-10 15:50:00 Abiola Reyez [facility]

 

                BLOOD UREA NITROGEN 2022-03-10 15:50:00 Abiola Reyez

[facility]

 

                ELECTROLYTE PANEL 2022-03-10 15:50:00 [facility]

 

                SERUM CREATININE 2022-03-10 15:50:00 Abiola Reyez [facility]

 

                .GLOMERULAR FILTRATION 2022-03-10 15:50:00 Abiola Reyez Uni

[facility]



                RATE                                            [facility]

 

                CALCIUM LEVEL TOTAL 2022-03-10 15:50:00 Abiola Reyez

[facility]

 

                ALBUMIN LEVEL   2022-03-10 15:50:00 Abiola Reyez [facility]

 

                ALKALINE PHOSPHATASE 2022-03-10 15:50:00 [facility]ola Reyeze

[facility]

 

                ALANINE AMINOTRANSFERASE 2022-03-10 15:50:00 Abiola Reyez U

[facility]

 

                ASPARTATE AMINOTRANSFERASE 2022-03-10 15:50:00 Abiola Reyez

 [facility]

 

                TOTAL PROTEIN   2022-03-10 15:50:00 Abiola Reyez [facility]

 

                FRACTIONATED BILIRUBIN 2022-03-10 15:50:00 Abiola Reyez Uni

[facility]

 

                COMPLETE BLOOD COUNT W/ 2022 13:22:00 Monique Barrera [facility]



                DIFFERENTIAL                                    [facility]

 

                COMPREHENSIVE METABOLIC 2022 13:22:00 Monique Barrera

[facility]



                PANEL                                           [facility]

 

                MAGNESIUM LEVEL 2022 13:22:00 Monique Barrera [facility]

 

                TYPE AND SCREEN 2022 13:22:00 Monique Barrera [facility]

 

                Results CBC     2022 13:22:00 Monique Barrera [facility]

 

                MANUAL DIFFERENTIAL 2022 13:22:00 Monique Barrera

[facility]

 

                GLUCOSE LEVEL   2022 13:22:00 Monique Barrera [facility]

y [facility]



                                                                Center

 

                BLOOD UREA NITROGEN 2022 13:22:00 Monique Barrera [facility]

[facility]

 

                ELECTROLYTE PANEL 2022 13:22:00 Monique Barrera

[facility]

 

                SERUM CREATININE 2022 13:22:00 Monique Barrerai

[facility]

 

                .GLOMERULAR FILTRATION 2022 13:22:00 Monique Barrera Un

[facility] 

[place]



                RATE                                            [facility]



                                                                Center

 

                CALCIUM LEVEL TOTAL 2022 13:22:00 Monique Barrera

[facility]

 

                ALBUMIN LEVEL   2022 13:22:00 Monique Barrera Univers

[facility]



                                                                Center

 

                ALKALINE PHOSPHATASE 2022 13:22:00 Monique Barrera

[facility]



                                                                Center

 

                ALANINE AMINOTRANSFERASE 2022 13:22:00 Monique Barrera 

[facility]

 

                ASPARTATE AMINOTRANSFERASE 2022 13:22:00 Monique Barrera [facility]

 

                TOTAL PROTEIN   2022 13:22:00 Monique Barrera [facility]

 

                FRACTIONATED BILIRUBIN 2022 13:22:00 Monique Barrera Un

[facility]

 

                ABORH           2022 13:22:00 Monique Barrera [facility]

 

                ANTIBODY SCREEN 2022 13:22:00 Monique Barrera [facility]

 

                TMP INTERPRETATION 2022 13:22:00 Monique Barrera [facility]



                ANTIBODY SCREEN NEGATIVE                                 MD Tello

on [facility]



                                                                Center

 

                CLOT EXPIRATION DATE 2022 13:22:00 Monique Barrera [facility]

 

                COMPLETE BLOOD COUNT W/ 2022 15:53:00 Adonay Milly  Univ

[facility] [place]



                DIFFERENTIAL                                    [facility]



                                                                Center

 

                TYPE AND SCREEN 2022 15:53:00 [facility]

 

                COMPREHENSIVE METABOLIC 2022 15:53:00 Adonay Milly  Univ

[facility] [place]



                PANEL                                           [facility]

 

                PHOSPHORUS LEVEL 2022 [facility]:53:00 [facility]

 

                URIC ACID       2022 15:53:00 [facility]

 

                LACTATE DEHYDROGENASE 2022 15:53:00 [facility]

 

                MAGNESIUM LEVEL 2022 15:53:00 [facility]

 

                CMV QUANT PCR   2022 15:53:00 [facility]

 

                TACROLIMUS LEVEL 2022 15:53:00 [facility]

 

                Results CBC     2022 15:53:00 [facility]

 

                MANUAL DIFFERENTIAL 2022 15:53:00 Adonay [facility]

 

                GLUCOSE LEVEL   2022 15:53:00 Adonay [facility]

 

                BLOOD UREA NITROGEN 2022 15:53:00 Adonay [facility]

 

                ELECTROLYTE PANEL 2022 15:53:00 Adonay [facility]

 

                SERUM CREATININE 2022 15:53:00 Adonay [facility]

 

                .GLOMERULAR FILTRATION 2022 15:53:00 Milly Urias  [facility]

 

                CALCIUM LEVEL TOTAL 2022 15:53:00 [facility]

 

                ALBUMIN LEVEL   2022 15:53:00 [facility]

 

                ALKALINE PHOSPHATASE 2022 15:53:00 Milly Urias  [facility]

 

                ALANINE AMINOTRANSFERASE 2022 15:53:00 Milly Urias  [facility]

 

                ASPARTATE AMINOTRANSFERASE 2022 15:53:00 Milly Urias  U

[facility]

 

                TOTAL PROTEIN   2022 15:53:00 [facility]

 

                FRACTIONATED BILIRUBIN 2022 15:53:00 Milly Urias  [facility]

 

                ABORH           2022 15:53:00 [facility]

 

                ANTIBODY SCREEN 2022 15:53:00 Adonay [facility]

 

                TMP INTERPRETATION 2022 15:53:00 Milly Urias  [facility]



                ANTIBODY SCREEN NEGATIVE                                 MD Tello

[facility]



                                                                Center

 

                CLOT EXPIRATION DATE 2022 15:53:00 Milly Urias  [facility]

 

                HHV6 QUANT, PLASMA 2022 16:21:00 [facility]

 

                ADENOVIRUS QUANTITATIVE, 2022 16:21:00 Evens Uriasal  Uni

[facility] [place]



                PLASMA                                          [facility]

 

                COMPLETE BLOOD COUNT W/ 2022 16:21:00 Adonay Milly  Univ

[facility] [place]



                DIFFERENTIAL                                    [facility]

 

                TYPE AND SCREEN 2022 16:21:00 Adonay [facility]

 

                COMPREHENSIVE METABOLIC 2022 16:21:00 Adonay Milly  Univ

[facility] [place]



                PANEL                                           [facility]

 

                PHOSPHORUS LEVEL 2022 16:21:00 Adonay [facility]

 

                URIC ACID       2022 16:21:00 Adonay [facility]

 

                LACTATE DEHYDROGENASE 2022 16:21:00 [facility]

 

                MAGNESIUM LEVEL 2022 16:21:00 Adonay [facility]

 

                HP MD LAURA-SHIPLEY VIRUS 2022 16:21:00 Milly Urias  Uni

[facility] [place]



                QUANTITATIVE PCR ANALYSIS                                 MD And

[facility]



                COLLECTION, BLOOD                                 Center

 

                CMV QUANT PCR   2022 16:21:00 Adonay [facility]

 

                TACROLIMUS LEVEL 2022 16:21:00 Adonay [facility]

 

                ABORH           2022 16:21:00 [facility]

 

                ANTIBODY SCREEN 2022 16:21:00 [facility]

 

                Results CBC     2022 16:21:00 Adonay [facility]

 

                MANUAL DIFFERENTIAL 2022 16:21:00 Milly Urias  [facility]

 

                GLUCOSE LEVEL   2022 16:21:00 [facility]

[facility]

 

                BLOOD UREA NITROGEN 2022 16:21:00 Adonay [facility]

 

                ELECTROLYTE PANEL 2022 16:21:00 Adonay [facility]

 

                SERUM CREATININE 2022 16:21:00 Adonay [facility]

 

                .GLOMERULAR FILTRATION 2022 16:21:00 Milly Urias  [facility]



                RATE                                            [facility]

 

                CALCIUM LEVEL TOTAL 2022 16:21:00 Adonay [facility]

 

                ALBUMIN LEVEL   2022 16:21:00 Adonay [facility]

 

                ALKALINE PHOSPHATASE 2022 16:21:00 Milly Urias  [facility]

 

                ALANINE AMINOTRANSFERASE 2022 16:21:00 Milly Urias  [facility]

 

                ASPARTATE AMINOTRANSFERASE 2022 16:21:00 Milly Urias

[facility]

 

                TOTAL PROTEIN   2022 16:21:00 [facility]

 

                FRACTIONATED BILIRUBIN 2022 16:21:00 Milly Urias  [facility]

 

                TMP INTERPRETATION 2022 16:21:00 Milly Urias  [facility]



                ANTIBODY SCREEN NEGATIVE                                 MD Tello

[facility]



                                                                Center

 

                HP MD LAURA-SHIPLEY VIRUS 2022 16:21:00 Milly Urias  Uni

[facility] [place]



                QUANTITATIVE PCR ANALYSIS                                 MD And

[facility]



                INTERPRETATION AND REPORT                                 Center

 

                CLOT EXPIRATION DATE 2022 16:21:00 Milly Urias  [facility]

 

                COMPLETE BLOOD COUNT W/ 2022-02-15 16:54:00 [facility]rera[facility]



                DIFFERENTIAL                                    [facility]

 

                COMPREHENSIVE METABOLIC 2022-02-15 16:54:00 Hiral, Monique M U

[facility]

 

                MAGNESIUM LEVEL 2022-02-15 16:54:00 Monique Barrera [facility]



                                                                Center

 

                TYPE AND SCREEN 2022-02-15 16:54:00 Monique Barrera [facility]

 

                Results CBC     2022-02-15 16:54:00 Monique Barrera [facility]



                                                                Center

 

                MANUAL DIFFERENTIAL 2022-02-15 16:54:00 Monique Barrera [facility]

 

                GLUCOSE LEVEL   2022-02-15 16:54:00 Monique Barrera [facility]

 

                BLOOD UREA NITROGEN 2022-02-15 16:54:00 Monique Barrera Unive

[facility]

 

                ELECTROLYTE PANEL 2022-02-15 16:54:00 Monique Barrera [facility]

 

                SERUM CREATININE 2022-02-15 16:54:00 Monique Barrera [facility]

 

                .GLOMERULAR FILTRATION 2022-02-15 16:54:00 Monique Barrera

[facility] 

[facility]

 

                CALCIUM LEVEL TOTAL 2022-02-15 16:54:00 Monique Barrera [facility]

 

                ALBUMIN LEVEL   2022-02-15 16:54:00 Monique Barrera [facility]

 

                ALKALINE PHOSPHATASE 2022-02-15 16:54:00 Monique Barrera [facility]

 

                ALANINE AMINOTRANSFERASE 2022-02-15 16:54:00 Monique Barrera 

[facility]



                                                                Center

 

                ASPARTATE AMINOTRANSFERASE 2022-02-15 16:54:00 Monique Barrera [facility]

 

                TOTAL PROTEIN   2022-02-15 16:54:00 Monique Barrera [facility]



                                                                Center

 

                FRACTIONATED BILIRUBIN 2022-02-15 16:54:00 Monique Barrera Un

[facility] 

[facility]

 

                ABORH           2022-02-15 16:54:00 Monique Barrera [facility]

er



                                                                [place]

 

                ANTIBODY SCREEN 2022-02-15 16:54:00 Monique Barrera [facility]

 

                TMP INTERPRETATION 2022-02-15 16:54:00 Monique Barrera

[facility]



                ANTIBODY SCREEN NEGATIVE                                 MD [facility]



                                                                Center

 

                CLOT EXPIRATION DATE 2022-02-15 16:54:00 Monique Barrera

[facility]

 

                COMPLETE BLOOD COUNT W/ 2022-02-10 15:58:00 [facility]



                DIFFERENTIAL                                    [facility]

 

                COMPREHENSIVE METABOLIC 2022-02-10 15:58:00 Monique [facility]reraH[facility]



                PANEL                                           [facility]

 

                MAGNESIUM LEVEL 2022-02-10 15:58:00 Monique Barrera [facility]

 

                Results CBC     2022-02-10 15:58:00 Monique Barrera [facility]



                                                                Center

 

                MANUAL DIFFERENTIAL 2022-02-10 15:58:00 Monique Barrera Univyan

[facility]

 

                GLUCOSE LEVEL   2022-02-10 15:58:00 Monique Barrera [facility]



                                                                Center

 

                BLOOD UREA NITROGEN 2022-02-10 15:58:00 Monique Barrera Univyan

[facility]

 

                ELECTROLYTE PANEL 2022-02-10 15:58:00 Monique Barrera

[facility]



                                                                Center

 

                SERUM CREATININE 2022-02-10 15:58:00 Monique Barrera [facility]

 

                .GLOMERULAR FILTRATION 2022-02-10 15:58:00 Monique Barrera

[facility]



                RATE                                            [facility]

 

                CALCIUM LEVEL TOTAL 2022-02-10 15:58:00 Monique Barrera Univyan

[facility]



                                                                Center

 

                ALBUMIN LEVEL   2022-02-10 15:58:00 Monique Barrera [facility]



                                                                Center

 

                ALKALINE PHOSPHATASE 2022-02-10 15:58:00 Monique Barrera [facility]

 

                ALANINE AMINOTRANSFERASE 2022-02-10 15:58:00 Monique Barrera 

[facility]

 

                ASPARTATE AMINOTRANSFERASE 2022-02-10 15:58:00 Monique Barrera [facility]

 

                TOTAL PROTEIN   2022-02-10 15:58:00 Monique Barrera [facility]

 

                FRACTIONATED BILIRUBIN 2022-02-10 15:58:00 Monique Barrera 

[facility]

 

                CT CHEST/LUNG SURVEILLANCE 2022 13:54:41 Alejandra Adler

[facility]



                LOW DOSE                                        [facility]

 

                COMPLETE BLOOD COUNT W/ 2022 15:51:00 Monique Barrera

[facility]



                DIFFERENTIAL                                    [facility]

 

                COMPREHENSIVE METABOLIC 2022 15:51:00 Monique Barrera [facility]

 

                MAGNESIUM LEVEL 2022 15:51:00 Monique Barrera [facility]

 

                TYPE AND SCREEN 2022 15:51:00 Monique Barrera [facility]

 

                Results CBC     2022 15:51:00 Monique Barrera [facility]

 

                MANUAL DIFFERENTIAL 2022 15:51:00 Monique Barrera Univyan

[facility]

 

                GLUCOSE LEVEL   2022 15:51:00 Monique Barrera [facility]



                                                                Center

 

                BLOOD UREA NITROGEN 2022 15:51:00 Monique Barrera Univyan

[facility]

 

                ELECTROLYTE PANEL 2022 15:51:00 Monique Barrera [facility]

 

                SERUM CREATININE 2022 15:51:00 Monique Barrera [facility]

 

                .GLOMERULAR FILTRATION 2022 15:51:00 Monique Barrera Un

[facility]



                RATE                                            [facility]

 

                CALCIUM LEVEL TOTAL 2022 15:51:00 Monique Barrera Univyan

[facility]

 

                ALBUMIN LEVEL   2022 15:51:00 Monique Barrera [facility]

 

                ALKALINE PHOSPHATASE 2022 15:51:00 Monique Barrera [facility]

 

                ALANINE AMINOTRANSFERASE 2022 15:51:00 Monique Barrera 

[facility]

 

                ASPARTATE AMINOTRANSFERASE 2022 15:51:00 Monique Barrera [facility] [place]

 

                TOTAL PROTEIN   2022 15:51:00 Monique Barrera [facility]

 

                FRACTIONATED BILIRUBIN 2022 15:51:00 Monique Barrera [facility]

[facility]

 

                ABORH           2022 15:51:00 Monique Barrera [facility]

 

                ANTIBODY SCREEN 2022 15:51:00 Monique Barrera [facility]

 

                CLOT EXPIRATION DATE 2022 15:51:00 Monique Barrera [facility]

 

                TMP INTERPRETATION 2022 15:51:00 Monique Barrera [facility]



                ANTIBODY SCREEN NEGATIVE                                 MD Tello

[facility]



                                                                Center

 

                INTUBATION      2022 13:47:11 Dakota Tong [facility]



                                                                Medical Branch

 

                INTRATHECAL INFUSION PUMP 2022 13:13:00 Dimitrios Braun

 [facility]                                        Medical Branch

 

                PATIENT QUESTIONNAIRE 2022 06:01:00 Doctor Unassigned, Uni

[facility] [place]



                                                [place]         Medical Branch

 

                COMPLETE BLOOD COUNT W/ 2022 15:45:00 Monique Barrera

[facility]



                DIFFERENTIAL                                    [facility]

 

                COMPREHENSIVE METABOLIC 2022 15:45:00 Monique Barrera [facility]



                PANEL                                           [facility]

 

                MAGNESIUM LEVEL 2022 15:45:00 Monique Barrera [facility]

 

                TYPE AND SCREEN 2022 15:45:00 Monique Barrera [facility]

 

                Results CBC     2022 15:45:00 Monique Barrera [facility]

 

                MANUAL DIFFERENTIAL 2022 15:45:00 Monique Barrera [facility]

[facility]

 

                GLUCOSE LEVEL   2022 15:45:00 Monique Barrera [facility]

 

                BLOOD UREA NITROGEN 2022 15:45:00 Monique Barrera [facility]

[facility]

 

                ELECTROLYTE PANEL 2022 15:45:00 Monique Barrera [facility]

 

                SERUM CREATININE 2022 15:45:00 Monique Barrera [facility]

 

                .GLOMERULAR FILTRATION 2022 15:45:00 Monique Barrera Un

[facility] 

[place]



                RATE                                            [facility]

 

                CALCIUM LEVEL TOTAL 2022 15:45:00 Monique Barrera [facility]

[facility]

 

                ALBUMIN LEVEL   2022 15:45:00 Monique Barrera [facility]

 

                ALKALINE PHOSPHATASE 2022 15:45:00 Monique Barrera [facility]

 

                ALANINE AMINOTRANSFERASE 2022 15:45:00 Monique Barrera 

[facility]

 

                ASPARTATE AMINOTRANSFERASE 2022 15:45:00 Monique Barrera [facility]

 

                TOTAL PROTEIN   2022 15:45:00 Monique Barrera [facility]

 

                FRACTIONATED BILIRUBIN 2022 15:45:00 Monique Barrera Un

iversity of 

[facility]

 

                ABORH           2022 15:45:00 Monique Barrera [facility]

 

                ANTIBODY SCREEN 2022 15:45:00 Monique Barrera [facility]

 

                CLOT EXPIRATION DATE 2022 15:45:00 Monique Barrera [facility]

 

                TMP INTERPRETATION 2022 15:45:00 Monique Barrera [facility]



                ANTIBODY SCREEN NEGATIVE                                 MD Omer jimenez [facility]



                                                                Center

 

                ASSIGNMENT OF BENEFITS 2022 20:24:03 Doctor Unassigned, Un

[facility]



                                                [place]         Medical Branch

 

                ADENOVIRUS QUANTITATIVE, 2022 16:43:00 Daron Coley [facility]

[facility]



                PLASMA                                          [facility]

 

                HHV6 QUANT, PLASMA 2022 16:43:00 Daron Coley [facility]

 

                COMPLETE BLOOD COUNT W/ 2022 16:43:00 Daron Coley [facility]



                DIFFERENTIAL                                    [facility]

 

                TYPE AND SCREEN 2022 16:43:00 Daron Coley [facility]

 

                COMPREHENSIVE METABOLIC 2022 16:43:00 Daron Coley [facility]



                PANEL                                           [facility]

 

                PHOSPHORUS LEVEL 2022 16:43:00 Daron Coley [facility]

 

                URIC ACID       2022 16:43:00 Daron Coley [facility]

 

                LACTATE DEHYDROGENASE 2022 16:43:00 Daron Coley [facility]

 

                MAGNESIUM LEVEL 2022 16:43:00 Daron Cole[facility]

[facility]

 

                CMV QUANT PCR   2022 16:43:00 Daron Coley [facility]

[facility]

 

                TACROLIMUS LEVEL 2022 16:43:00 Daron Coley [facility]

 

                Results CBC     2022 16:43:00 Daron Coley [facility]

[facility]

 

                MANUAL DIFFERENTIAL 2022 16:43:00 Daron Coley [facility]

 

                GLUCOSE LEVEL   2022 16:43:00 Daron Coley [facility]

[facility]

 

                BLOOD UREA NITROGEN 2022 16:43:00 Daron Coley [facility]

 

                ELECTROLYTE PANEL 2022 16:43:00 Daron Coley [facility]

 

                SERUM CREATININE 2022 16:43:00 Daron Coley [facility] [place]

 

                .GLOMERULAR FILTRATION 2022 16:43:00 Daron Coley

[facility]

 

                CALCIUM LEVEL TOTAL 2022 16:43:00 Daron Coley [facility]

 

                ALBUMIN LEVEL   2022 16:43:00 Daron Coley [facility]

[facility]

 

                ALKALINE PHOSPHATASE 2022 16:43:00 Daron Coley [facility]

 

                ALANINE AMINOTRANSFERASE 2022 16:43:00 Daron Coley Uni

[facility]

 

                ASPARTATE AMINOTRANSFERASE 2022 16:43:00 Daron Coley U

[facility]

 

                TOTAL PROTEIN   2022 16:43:00 Daron Coley [facility]

[facility]

 

                FRACTIONATED BILIRUBIN 2022 16:43:00 Daron Coley Unive

[facility]

 

                ABORH           2022 16:43:00 Daron Coley [facility]

[facility]

 

                ANTIBODY SCREEN 2022 16:43:00 Daron Coley [facility]

[facility]



                                                                Center

 

                CLOT EXPIRATION DATE 2022 16:43:00 Daron Coley [facility]

 

                TMP INTERPRETATION 2022 16:43:00 Daron Coley [facility]

[facility] [place]



                ANTIBODY SCREEN NEGATIVE                                 MD Omer jimenez [facility]



                                                                Center

 

                CLOT EXPIRATION DATE 2022 20:21:00 Monique Barrera

[facility]

 

                COMPLETE [facility]LOOD COUNT W/ 2022 [facility]



                DIFFERENTIAL                                    [facility]

 

                COMPREHENSIVE METABOLIC 2022 16:17:00 [facility]



                PANEL                                           [facility]

 

                MAGNESIUM LEVEL 2022 16:17:00 Monique Barrera [facility]

 

                TYPE AND SCREEN 2022 16:17:00 Monique Barrera [facility]

 

                Results CBC     2022 16:17:00 Monique Barrera [facility]

 

                MANUAL DIFFERENTIAL 2022 16:17:00 Monique Barrera [facility]

[facility]

 

                GLUCOSE LEVEL   2022 16:17:00 Monique Barrera [facility]

 

                BLOOD UREA NITROGEN 2022 16:17:00 Monique Barrera

[facility]

 

                ELECTROLYTE PANEL 2022 16:17:00 Monique Barrera

[facility]



                                                                Center

 

                SERUM CREATININE 2022 [facility]:17[facility]00 [facility]

 

                .GLOMERULAR FILTRATION 2022 16:17:00 Monique Barrera

[facility]



                RATE                                            [facility]

 

                CALCIUM LEVEL TOTAL 2022 16:17:00 Monique Barrera [facility]

[facility]

 

                ALBUMIN LEVEL   2022 16:17:00 Monique Barrera [facility]

 

                ALKALINE PHOSPHATASE 2022 16:17:00 Monique Barrera [facility]

 

                ALANINE AMINOTRANSFERASE 2022 16:17:00 Monique Barrera 

[facility]

 

                ASPARTATE AMINOTRANSFERASE 2022 16:17:00 Monique Barrera [facility] [place]

 

                TOTAL PROTEIN   2022 16:17:00 Monique Barrera [facility]

 

                FRACTIONATED BILIRUBIN 2022 16:17:00 Monique Barrera Un

[facility]

 

                ANTIBODY SCREEN 2022 16:17:00 Monique Barrera [facility]

 

                TMP INTERPRETATION 2022 16:17:00 Monique Barrera [facility]



                ANTIBODY SCREEN NEGATIVE                                 MD Tello

[facility]



                                                                Center

 

                BONE MARROW TRANSPLANT 2022 16:17:00 Monique Barrera Un

iversity [facility] FORWARD                                   [facility]

 

                BONE MARROW TRANSPLANT 2022 16:17:00 Monique Barrera Un

iversity [facility] REVERSE                                   [facility]

 

                TMP INTERPRETATION BMT 2022 16:17:00 Monique Barrera Un

iversity [facility]

 

                EXTERNAL PROVIDER RECORDS 2022 06:01:00 Doctor Unassigned,

 [facility]



                                                [place]         Medical Branch

 

                EXTERNAL PROVIDER RECORDS 2022 06:01:00 Doctor Unassigned,

 [facility]



                                                [place]         Medical Branch

 

                COMPLETE BLOOD COUNT W/ 2022 15:47:00 Monique Barrera [facility]



                DIFFERENTIAL                                    [facility]

 

                COMPREHENSIVE METABOLIC 2022 15:47:00 Monique Barrera [facility]



                PANEL                                           [facility]

 

                MAGNESIUM LEVEL 2022 15:47:00 Monique Barrera [facility]



                                                                Center

 

                TYPE AND SCREEN 2022 15:47:00 Monique Barrera [facility]

 

                Results CBC     2022 15:47:00 oMnique Barrera [facility]

 

                MANUAL DIFFERENTIAL 2022 15:47:00 Monique Barrera [facility]

 

                GLUCOSE LEVEL   2022 15:47:00 Monique Barrera [facility]

 

                BLOOD UREA NITROGEN 2022 15:47:00 Monique Barrera [facility]

 

                ELECTROLYTE PANEL 2022 15:47:00 Monique Barrera [facility]

 

                SERUM CREATININE 2022 15:47:00 Monique Barrera [facility]

 

                .GLOMERULAR FILTRATION 2022 15:47:00 Monique Brarera

[facility]

 

                CALCIUM LEVEL TOTAL 2022 15:47:00 Monique Barrera [facility]

[facility]

 

                ALBUMIN LEVEL   2022 15:47:00 Monique Barrera [facility]

 

                ALKALINE PHOSPHATASE 2022 15:47:00 Monique Barrera [facility]

 

                ALANINE AMINOTRANSFERASE 2022 15:47:00 Monique Barrera 

[facility] [place]

 

                ASPARTATE AMINOTRANSFERASE 2022 15:47:00 Monique Barrera [facility] [place]

 

                TOTAL PROTEIN   2022 15:47:00 Monique Barrera [facility]

 

                FRACTIONATED BILIRUBIN 2022 15:47:00 Monique Barrera Un

[facility]

 

                ABORH           2022 15:47:00 Monique Barrera [facility]

 

                ANTIBODY SCREEN 2022 15:47:00 Monique Barrera [facility]

 

                TMP INTERPRETATION 2022 15:47:00 Monique Barrera [facility]



                ANTIBODY SCREEN NEGATIVE                                 MD Omer jimenez [facility]



                                                                Center

 

                CLOT EXPIRATION DATE 2022 15:47:00 Monique Barrera Univ

ersity of [facility]

 

                COMPLETE BLOOD COUNT W/ 2022 16:38:00 Pamela Lainez of 

[place]



                DIFFERENTIAL                    Gabi DAVID [facility]



                                                                Center

 

                TYPE AND SCREEN 2022 16:38:00 Pamela Lainez Univers[facility]

y of Jon Ashley MD [facility]

er



                                                                Center

 

                COMPREHENSIVE METABOLIC 2022 16:38:00 Pamela Lainez of 

[place]



                PANEL                           Gabi DAVID [facility]



                                                                Center

 

                PHOSPHORUS LEVEL 2022 16:38:00 Pamela Lainez

ty of Jon Ashley MD [facility]

er



                                                                [place]

 

                URIC ACID       2022 16:38:00 Pamela Lainezit

y of Jon Ashley MD [facility]



                                                                Center

 

                LACTATE DEHYDROGENASE 2022 16:38:00 Pamela Lainez Uni

versity of Jon Ashley MD [facility]

er



                                                                Center

 

                MAGNESIUM LEVEL 2022 16:38:00 Pamela Lainez

y of Jon sAhley MD [facility]

er



                                                                [place]

 

                CMV QUANT PCR   2022 16:38:00 Pamela Lainez Universit

y of Jon Perdue [facility]

er



                                                                Center

 

                TACROLIMUS LEVEL 2022 16:38:00 Pamela Lainez

ty of Jon Perdue [facility]



                                                                Center

 

                Results CBC     2022 16:38:00 Pamela Lainez Universit

y of Jon Ashley MD [facility]

er



                                                                Center

 

                MANUAL DIFFERENTIAL 2022 16:38:00 Pamela Lainez

rskayode of Jon Ashley MD [facility]

er



                                                                Center

 

                GLUCOSE LEVEL   2022 16:38:00 Pamela Lainez Univers[facility]a

y of Jon Ashley MD [facility]



                                                                Center

 

                BLOOD UREA NITROGEN 2022 16:38:00 Pamela Lainez

[facility]



                                                Gabi DAVID [facility]

 

                ELECTROLYTE PANEL 2022 16:38:00 Pamela Lainez [facility]



                                                Gabi DAVID [facility]

 

                SERUM CREATININE 2022 16:38:00 Pamela Lainez

[facility] [place]



                                                Gabi DAVID [facility]

 

                .GLOMERULAR FILTRATION 2022 16:38:00 Pamela Lainez Un

[facility]

 

                CALCIUM LEVEL TOTAL 2022 16:38:00 [facility]

[facility]



                                                Gabi DAVID [facility]

 

                ALBUMIN LEVEL   2022 16:38:00 Pamela Lainez [facility]



                                                Gabi DAVID [facility]

 

                ALKALINE PHOSPHATASE 2022 16:38:00 [facility]

 

                ALANINE AMINOTRANSFERASE 2022 16:38:00 Pamela Lainez 

[facility]



                                                Gabi DAVID [facility]

 

                ASPARTATE AMINOTRANSFERASE 2022 16:38:00 Crispin Lainez [facility]



                                                Gabi DAVID [facility]

 

                TOTAL PROTEIN   2022 16:38:00 Pamela Lainez [facility]



                                                Gabi DAVID [facility]

 

                FRACTIONATED BILIRUBIN 2022 16:38:00 Pamela Lainez 

[facility]



                                                Gabi DAVID [facility]

 

                ABORH           2022 16:38:00 Pamela Lainez [facility]



                                                Gabi DAVID [facility]

 

                ANTIBODY SCREEN 2022 16:38:00 Pamela Lainez [facility]



                                                Gabi DAVID [facility]

er



                                                                [place]

 

                CLOT EXPIRATION DATE 2022 16:38:00 Pamela Lainez [facility]



                                                Gabi DAVID [facility]

 

                TMP INTERPRETATION 2022 16:38:00 Pamela Lainez

[facility] [place]



                ANTIBODY SCREEN NEGATIVE                 Gabi Tello

[facility]



                                                                Center

 

                SPIROMETRY W/O DILATORS, 2022 20:05:08 Kinza Medellin

[facility]



                DLCO AND BODY                                   [facility]



                PLETHSMOGRAPHIC LUNG                                 Center



                VOLUMES                                         

 

                COMPLETE BLOOD COUNT W/ 2022 15:59:00 Daron Coley [facility]



                DIFFERENTIAL                                    [facility]

 

                COMPREHENSIVE METABOLIC 2022 15:59:00 Daron Coley [facility]



                PANEL                                           [facility]

 

                MAGNESIUM LEVEL 2022 15:59:00 Daron Coley [facility]

[facility]

 

                Results CBC     2022 15:59:00 Daron Coley [facility]

[facility]



                                                                Center

 

                MANUAL DIFFERENTIAL 2022 15:59:00 Daron Coley [facility]

 

                GLUCOSE LEVEL   2022 15:59:00 Daron Coley [facility]

[facility]

 

                BLOOD UREA NITROGEN 2022 15:59:00 Daron Coley [facility]

 

                ELECTROLYTE PANEL 2022 15:59:00 Daron Coley [facility] [place]

 

                SERUM CREATININE 2022 15:59:00 Daron Coley [facility] [place]



                                                                Center

 

                .GLOMERULAR FILTRATION 2022 15:59:00 [facility]

[facility]



                RATE                                            [facility]

 

                CALCIUM LEVEL TOTAL 2022 15:59:00 Daron Coley [facility]



                                                                Center

 

                ALBUMIN LEVEL   2022 15:59:00 Daron Coley [facility]

[facility]



                                                                Center

 

                ALKALINE PHOSPHATASE 2022 15:59:00 Daron Coley [facility]



                                                                Center

 

                ALANINE AMINOTRANSFERASE 2022 15:59:00 Daron Coley Uni

[facility]

 

                ASPARTATE AMINOTRANSFERASE 2022 15:59:00 Daron Coley U

[facility]

 

                TOTAL PROTEIN   2022 15:59:00 Daron Coley [facility]

[facility]

 

                FRACTIONATED BILIRUBIN 2022 15:59:00 Daron Cole[facility]

[facility]

 

                BLOODCULTURE    2021 16:17:00 Abiola Reyez [facility] [place]

 

                COMPLETE BLOOD COUNT W/ 2021 16:14:00 Abiola Reyez

iversity of 

[place]



                DIFFERENTIAL                                    [facility]

 

                COMPREHENSIVE METABOLIC 2021 16:14:00 Abiola Reyez Un

[facility]



                PANEL                                           [facility]

 

                MAGNESIUM LEVEL 2021 16:14:00 Abiola Reyez [facility]

 

                LACTATE DEHYDROGENASE 2021 16:14:00 Abiola Reyez [facility]

 

                URIC ACID       2021 16:14:00 Abiola Reyez [facility]

 

                PHOSPHORUS LEVEL 2021 16:14:00 Abiola Reyez [facility]

 

                Results CBC     2021 16:14:00 Abiola Reyez [facility]

 

                MANUAL DIFFERENTIAL 2021 16:14:00 [facility]

[facility]

 

                GLUCOSE LEVEL   2021 16:14:00 Abiola Reyez [facility]

 

                BLOOD UREA NITROGEN 2021 16:14:00 Abiola [facility]z [facility]

[facility]

 

                ELECTROLYTE PANEL 2021 16:14:00 Abiola Reyez [facility]

 

                SERUM CREATININE 2021 16:14:00 Abiola Reyez [facility]

 

                .GLOMERULAR FILTRATION 2021 16:14:00 Abiola Reyez Uni

[facility]



                RATE                                            [facility]

 

                CALCIUM LEVEL TOTAL 2021 16:14:00 Abiola Reyez Univer

[facility]

 

                ALBUMIN LEVEL   2021 16:14:00 Abiola Reyez [facility]

 

                ALKALINE PHOSPHATASE 2021 16:14:00 Abiola Reyeze

[facility]

 

                ALANINE AMINOTRANSFERASE 2021 16:14:00 Abiola Reyez U

[facility]

 

                ASPARTATE AMINOTRANSFERASE 2021 16:14:00 Abiola Reyez

 [facility]

 

                TOTAL PROTEIN   2021 16:14:00 Abiola Reyez [facility]

 

                FRACTIONATED BILIRUBIN 2021 16:14:00 Abiola Reyez Uni

[facility]

 

                COMPLETE BLOOD COUNT W/ 2021 16:16:00 Monique Barrera [facility]



                DIFFERENTIAL                                    [facility]

 

                COMPREHENSIVE METABOLIC 2021 16:16:00 [facility]

 

                MAGNESIUM LEVEL 2021 16:16:00 Monique Barrera [facility]

 

                TYPE AND SCREEN 2021 16:16:00 Monique Barrera [facility]

 

                Results CBC     2021 16:16:00 Monique Barrera [facility]

 

                MANUAL DIFFERENTIAL 2021 16:16:00 Monique Barrera Univyan

[facility]

 

                GLUCOSE LEVEL   2021 16:16:00 Monique Barrera [facility]

 

                BLOOD UREA NITROGEN 2021 16:16:00 Monique Barrera

[facility]

 

                ELECTROLYTE PANEL 2021 16:16:00 Monique Barrera [facility]

 

                SERUM CREATININE 2021 16:16:00 Monique Barrera [facility]

 

                .GLOMERULAR FILTRATION 2021 16:16:00 Monique Barrera Un

[facility]



                RATE                                            [facility]

 

                CALCIUM LEVEL TOTAL 2021 16:16:00 Monique Barrera [facility]

 

                ALBUMIN LEVEL   2021 16:16:00 Monique Barrera [facility]

 

                ALKALINE PHOSPHATASE 2021 16:16:00 Monique Barrera [facility]

 

                ALANINE AMINOTRANSFERASE 2021 16:16:00 oMnique Barrera 

[facility]

 

                ASPARTATE AMINOTRANSFERASE 2021 16:16:00 Monique Barrera [facility]

 

                TOTAL PROTEIN   2021 16:16:00 Monique Barrera [facility]

 

                FRACTIONATED BILIRUBIN 2021 16:16:00 Monique Barrera Un

[facility]

 

                ABORH           2021 16:16:00 Monique Barrera [facility]

 

                ANTIBODY SCREEN 2021 16:16:00 Monique Barrera [facility]

 

                CLOT EXPIRATION DATE 2021 16:16:00 Monique Barrera [facility]

 

                TMP INTERPRETATION 2021 16:16:00 Monique Barrera [facility]



                ANTIBODY SCREEN NEGATIVE                                 MD Tello

[facility]

 

                HHV6 QUANT, PLASMA 2021 19:28:00 Moses Cabello    [facility]



                                                                Center

 

                ADENOVIRUS QUANTITATIVE, 2021 19:28:00 [facility]



                PLASMA                                          [facility]

 

                COMPLETE BLOOD COUNT W/ 2021 19:28:00 [facility]



                DIFFERENTIAL                                    [facility]

 

                TYPE AND SCREEN 2021 19:28:00 Irineo [facility]

 

                COMPREHENSIVE METABOLIC 2021 19:28:00 Irineo [facility]



                PANEL                                           [facility]

 

                PHOSPHORUS LEVEL 2021 19:28:00 Irineo [facility]

 

                URIC ACID       2021 19:28:00 Irineo [facility]

 

                LACTATE DEHYDROGENASE 2021 19:28:00 Irineo [facility]

sity [facility]

 

                MAGNESIUM LEVEL 2021 19:28:00 Irineo [facility]

 

                HP MD LAURA-BARR VIRUS 2021 19:28:00 [facility]ineo [facility]



                QUANTITATIVE PCR ANALYSIS                                 MD And

[facility]



                COLLECTION, BLOOD                                 Center

 

                CMV QUANT PCR   2021 19:28:00 Irineo [facility]

 

                TACROLIMUS LEVEL 2021 19:28:00 [facility]

 

                Results CBC     2021 19:28:00 Irineo [facility]

 

                MANUAL DIFFERENTIAL 2021 19:28:00 Irineo [facility]

 

                GLUCOSE LEVEL   2021 19:28:00 Irineo [facility]

 

                BLOOD UREA NITROGEN 2021 19:28:00 Irineo [facility]

 

                ELECTROLYTE PANEL 2021 19:28:00 Irineo [facility]

 

                SERUM CREATININE 2021 19:28:00 Irineo [facility]

 

                .GLOMERULAR FILTRATION 2021 19:28:00 Moses Cabello    [facility]

[facility]



                RATE                                            [facility]

 

                CALCIUM LEVEL TOTAL 2021 19:28:00 Moses Cabello    [facility]

 

                ALBUMIN LEVEL   2021 19:28:00 [facility]o [facility]

[facility]

 

                ALKALINE PHOSPHATASE 2021 19:28:00 Moses Cabello    [facility]

 

                ALANINE AMINOTRANSFERASE 2021 19:28:00 Moses Cabello    [facility]

 

                ASPARTATE AMINOTRANSFERASE 2021 19:28:00 Moses Cabello

[facility]

 

                TOTAL PROTEIN   2021 19:28:00 Irineo [facility]

 

                FRACTIONATED BILIRUBIN 2021 19:28:00 Moses Cabello    [facility]

[facility]

 

                ABORH           2021 19:28:00 Irineo [facility]

 

                ANTIBODY SCREEN 2021 19:28:00 Irineo [facility]

 

                HP MD LAURA-BARR VIRUS 2021 19:28:00 Moses Cabello    [facility]



                QUANTITATIVE PCR ANALYSIS                                  Cancer



                INTERPRETATION AND REPORT                                 Center

 

                TMP INTERPRETATION 2021 19:28:00 [facility]o [facility]    [facility]



                ANTIBODY SCREEN NEGATIVE                                 MD Omer jimenez [facility]



                                                                Center

 

                CLOT EXPIRATION DATE 2021 19:28:00 Moses Cabello    [facility]



                                                                Center

 

                COMPLETE BLOOD COUNT W/ 2021 16:46:00 [facility]



                DIFFERENTIAL                                    [facility]

 

                COMPREHENSIVE METABOLIC 2021 16:46:00 [facility]



                PANEL                                           [facility]

er



                                                                [place]

 

                MAGNESIUM LEVEL 2021 16:46:00 Monique Barrera [facility]

er



                                                                Center

 

                TYPE AND SCREEN 2021 16:46:00 Monique Barrera [facility]

 

                Results CBC     2021 16:46:00 Monique Barrera [facility]

 

                MANUAL DIFFERENTIAL 2021 16:46:00 Monique Barrera [facility]

 

                GLUCOSE LEVEL   2021 16:46:00 Monique Barrera [facility]

 

                BLOOD UREA NITROGEN 2021 16:46:00 Monique Barrera [facility]

 

                ELECTROLYTE PANEL 2021 16:46:00 Monique Barrera [facility]

 

                SERUM CREATININE 2021 16:46:00 Monique Barrera [facility]

 

                .GLOMERULAR FILTRATION 2021 16:46:00 Monique Barrera Un

[facility]

 

                CALCIUM LEVEL TOTAL 2021 16:46:00 Monique Barrera Univyan

[facility]

 

                ALBUMIN LEVEL   2021 16:46:00 Monique Barrera [facility]

 

                ALKALINE PHOSPHATASE 2021 16:46:00 Monique Barrera [facility]

 

                ALANINE AMINOTRANSFERASE 2021 16:46:00 Monique Barrera 

[facility]

 

                ASPARTATE AMINOTRANSFERASE 2021 16:46:00 Monique Barrera [facility]

 

                TOTAL PROTEIN   2021 16:46:00 Monique Barrera [facility]

 

                FRACTIONATED BILIRUBIN 2021 16:46:00 Monique Barrera Un

[facility]

 

                ABORH           2021 16:46:00 Monique Barrera [facility]

 

                ANTIBODY SCREEN 2021 16:46:00 Monique Barrera [facility]

 

                CLOT EXPIRATION DATE 2021 16:46:00 Monique Barrera

[facility]

 

                TMP INTERPRETATION 2021 16:46:00 Monique Barrera

sitdick [facility]



                ANTIBODY SCREEN NEGATIVE                                 MD Tello

[facility]



                                                                Center

 

                COMPLETE BLOOD COUNT W/ 2021 15:46:00 [facility] 

[place]



                DIFFERENTIAL                                    [facility]

 

                COMPREHENSIVE METABOLIC 2021 15:46:00 Monique Barrera

[facility]



                PANEL                                           [facility]

 

                MAGNESIUM LEVEL 2021 15:46:00 Monique Barrera [facility]

 

                TYPE AND SCREEN 2021 15:46:00 Monique Barrera [facility]

 

                ABORH           2021 15:46:00 Monique Barrera [facility]

 

                ANTIBODY SCREEN 2021 15:46:00 Monique Barrera [facility]

 

                Results CBC     2021 15:46:00 Monique Barrera [facility]



                                                                Center

 

                MANUAL DIFFERENTIAL 2021 15:46:00 Monique Barrera Univyan

[facility]

 

                GLUCOSE LEVEL   2021 15:46:00 Monique Barrera [facility]

 

                BLOOD UREA NITROGEN 2021 15:46:00 Monique Barrera

[facility]

 

                ELECTROLYTE PANEL 2021 15:46:00 Monique Barrera

[facility]

 

                SERUM CREATININE 2021 15:46:00 [facility]

 

                .GLOMERULAR FILTRATION 2021 15:46:00 Monique Barrera

iverskayode of 

[place]



                RATE                                            [facility]

 

                CALCIUM LEVEL TOTAL 2021 15:46:00 Monique Barrera Univyan

[facility]

 

                ALBUMIN LEVEL   2021 15:46:00 Monique Barrera [facility]

 

                ALKALINE PHOSPHATASE 2021 15:46:00 Monique Barrera [facility]

 

                ALANINE AMINOTRANSFERASE 2021 15:46:00 Monique Barrera 

[facility]

 

                ASPARTATE AMINOTRANSFERASE 2021 15:46:00 Monique Barrera [facility]

 

                TOTAL PROTEIN   2021 15:46:00 Monique Barrera [facility]

 

                FRACTIONATED BILIRUBIN 2021 15:46:00 Monique Barrera 

[facility]

 

                CLOT EXPIRATION DATE 2021 15:46:00 Monique Barrera [facility]

 

                TMP INTERPRETATION 2021 15:46:00 Monique Barrera [facility]



                ANTIBODY SCREEN NEGATIVE                                 MD Tello

[facility]



                                                                Center

 

                CLOT EXPIRATION DATE 2021 21:12:00 Monique Barrera [facility]

 

                HHV6 QUANT, PLASMA 2021 16:33:00 Pamela Lainez [facility]

 

                ADENOVIRUS QUANTITATIVE, 2021 16:33:00 Pamela Lainez 

[facility]



                PLASMA                          Gabi           [facility]

 

                COMPLETE BLOOD COUNT W/ 2021 16:33:00 Monique Barrera [facility]



                DIFFERENTIAL                                    [facility]

 

                TYPE AND SCREEN 2021 16:33:00 Monique Barrera [facility]

 

                COMPREHENSIVE METABOLIC 2021 16:33:00 Pamela Lainez

[facility]



                PANEL                           Gabi           [facility]

 

                PHOSPHORUS LEVEL 2021 16:33:00 Pamela Lainez [facility]

 

                URIC ACID       2021 16:33:00 Pamela Lainez [facility]

y [facility]



                                                Gabi DAVID [facility]

 

                LACTATE DEHYDROGENASE 2021 16:33:00 Pamela Lainez Uni

[facility] [place]



                                                Gabi DAVID [facility]

 

                MAGNESIUM LEVEL 2021 16:33:00 Pamela Lainez [facility]

y [facility]



                                                Gabi DAVID [facility]

 

                HP MD LAURA-BARR VIRUS 2021 16:33:00 Pamela Lainez 

[facility]



                QUANTITATIVE PCR ANALYSIS                 Gabi DAVID And

[facility]



                COLLECTION, BLOOD                                 Center

 

                CMV QUANT PCR   2021 16:33:00 Pamela Lainez [facility]

y [facility]



                                                Gabi DAVID [facility]

 

                TACROLIMUS LEVEL 2021 16:33:00 Pmaela Lainez

[facility] [place]



                                                Gabi DAVID [facility]

 

                Results CBC     2021 16:33:00 Monique Barrera [facility]

[facility] [place]



                                                                MD [facility]

 

                MANUAL DIFFERENTIAL 2021 16:33:00 Monique Barrera Univyan

[facility]



                                                                MD [facility]

 

                GLUCOSE LEVEL   2021 16:33:00 Pamela Lainez [facility]

y [facility]



                                                Gabi DAVID [facility]

 

                BLOOD UREA NITROGEN 2021 16:33:00 Pamela Lainez

[facility]



                                                Gabi DAVID [facility]

 

                ANTIBODY SCREEN 2021 16:33:00 Monique Barrera [facility]

[facility] [place]



                                                                MD [facility]

 

                ELECTROLYTE PANEL 2021 16:33:00 Pamela Lainez

[facility] [place]



                                                Gabi DAVID [facility]

 

                SERUM CREATININE 2021 16:33:00 Pamela Lainez

[facility] [place]



                                                Gabi DAVID [facility]

 

                .GLOMERULAR FILTRATION 2021 16:33:00 Pamela Lainez Un

iverskayode [facility]



                RATE                            Gabi DAVID [facility]

 

                CALCIUM LEVEL TOTAL 2021 16:33:00 Pamela Lainez

[facility] [place]



                                                Gabi DAVID [facility]

 

                ALBUMIN LEVEL   2021 16:33:00 Pamela Lainez [facility]



                                                Gabi DAVID [facility]

 

                ALKALINE PHOSPHATASE 2021 16:33:00 Pamela Lainez [facility]



                                                Gabi DAVID [facility]

 

                ALANINE AMINOTRANSFERASE 2021 16:33:00 Pamela Lainez 

[facility]



                                                Gabi DAVID [facility]

 

                ASPARTATE AMINOTRANSFERASE 2021 16:33:00 Crispin Lainez [facility]



                                                Gabi DAVID [facility]

 

                TOTAL PROTEIN   2021 16:33:00 Pamela Lainez [facility]



                                                Gabi DAVID [facility]

 

                FRACTIONATED BILIRUBIN 2021 16:33:00 Pamela Lainez Un

[facility] 

[place]



                                                Gabijohn DAVID [facility]

 

                HP MD LAURA-BARR VIRUS 2021 16:33:00 Pamela Lainez 

[facility]



                QUANTITATIVE PCR ANALYSIS                 Gabi DAVID And

[facility]



                INTERPRETATION AND REPORT                                 Center

 

                TMP INTERPRETATION 2021 16:33:00 Monique Barrera [facility]



                ANTIBODY SCREEN NEGATIVE                                 MD Tello

[facility]



                                                                [place]

 

                BONE MARROW TRANSPLANT 2021 16:33:00 Monique Barrera Un

[facility]                                   MD [facility]

 

                BONE MARROW TRANSPLANT 2021 16:33:00 Monique Barrera Un

iversity [facility] REVERSE                                   [facility]

 

                TMP INTERPRETATION BMT 2021 16:33:00 Monique Barrera Un

iversity [facility]

 

                SPIROMETRY W/O DILATORS, 2021 15:44:49 Pamela Lainez 

[facility]



                DLCO AND BODY                   Gabi DAVID [facility]



                PLETHSMOGRAPHIC LUNG                                 Center



                VOLUMES                                         

 

                COMPLETE BLOOD COUNT W/ 2021 16:34:00 Monique Barrera

nivmiles [facility]



                DIFFERENTIAL                                    MD [facility]

 

                COMPREHENSIVE METABOLIC 2021 16:34:00 Monique Barrera

nivpeterH[facility]



                PANEL                                           MD [facility]

 

                MAGNESIUM LEVEL 2021 16:34:00 Monique Barrera [facility]

 

                TYPE AND SCREEN 2021 16:34:00 Monique Barrera [facility]

 

                Results CBC     2021 16:34:00 Monique Barrera [facility]

 

                MANUAL DIFFERENTIAL 2021 16:34:00 Monique Barrera Univyan

[facility]

 

                GLUCOSE LEVEL   2021 16:34:00 Monique Barrera [facility]

 

                BLOOD UREA NITROGEN 2021 16:34:00 Monique Barrera [facility]

 

                ELECTROLYTE PANEL 2021 16:34:00 Monique Barrera [facility]

 

                SERUM CREATININE 2021 16:34:00 Monique Barrera [facility]

 

                .GLOMERULAR FILTRATION 2021 16:34:00 Monique Barrera Un

[facility]

 

                CALCIUM LEVEL TOTAL 2021 16:34:00 Monique Barrera [facility]

 

                ALBUMIN LEVEL   2021 16:34:00 Monique Barrera [facility]

 

                ALKALINE PHOSPHATASE 2021 16:34:00 Monique Barrera [facility]

 

                ALANINE AMINOTRANSFERASE 2021 16:34:00 Monique Barrera 

[facility]

 

                ABORH           2021 16:34:00 Monique Barrera [facility]

 

                ANTIBODY SCREEN 2021 16:34:00 Monique Barrera [facility]

 

                ASPARTATE AMINOTRANSFERASE 2021 16:34:00 Monique Barrera [facility]

 

                TOTAL PROTEIN   2021 16:34:00 Monique Barrera [facility]

 

                FRACTIONATED BILIRUBIN 2021 16:34:00 Monique Barrera Un

[facility]



                                                                MD [facility]

 

                CLOT EXPIRATION DATE 2021 16:34:00 Monique Barrera [facility]

[facility] [place]



                                                                MD [facility]

 

                TMP INTERPRETATION 2021 16:34:00 Monique Barrera [facility]



                ANTIBODY SCREEN NEGATIVE                                 MD Tello

on Cancer



                                                                Center

 

                XR CHEST 2 VW   2021 19:18:16 Pamela Lainez

y [facility]



                                                Gabi DAVID [facility]

 

                EKG, 12-LEAD (SCHEDULED) 2021 00:00:00 Daryl Acevedo

[facility]

 

                HHV6 QUANT, PLASMA 2021 17:10:00 [facility]

[facility] [place]



                                                Gabi DAVID [facility]

 

                ADENOVIRUS QUANTITATIVE, 2021 17:10:00 Pamela Lainez 

[facility]



                PLASMA                          Gabi DAVID [facility]

 

                HSV 1 & 2 QUANTITATIVE, 2021 17:10:00 [facility]ainez

[facility]



                PLASMA                          Gabi           [facility]

 

                COMPLETE BLOOD COUNT W/ 2021 17:10:00 Pamela Lainez [facility]



                DIFFERENTIAL                    Gabi DAVID [facility]

 

                TYPE AND SCREEN 2021 17:10:00 Pamela Lainez

[facility]



                                                Gabi DAVID [facility]

 

                COMPREHENSIVE METABOLIC 2021 17:10:00 Pamela Lainez

nivmiles [facility]



                PANEL                           Gabi DAVID [facility]

 

                PHOSPHORUS LEVEL 2021 17:10:00 Pamela Lainez

[facility] [place]



                                                Gabi DAVID [facility]



                                                                Center

 

                URIC ACID       2021 17:10:00 Pamela Lainez

y [facility]



                                                Gabi DAVID [facility]

 

                LACTATE DEHYDROGENASE 2021 17:10:00 Pamela Lainez

[facility] [place]



                                                Gabi DAVID [facility]

 

                MAGNESIUM LEVEL 2021 17:10:00 Pamela Lainez [facility]

y [facility]



                                                Gabi DAVID [facility]

 

                CMV QUANT PCR   2021 17:10:00 Pamela Lainez [facility]

y  Jon Ashley MD [facility]

 

                HP MD LAURA-SHIPLEY VIRUS 2021 17:10:00 Pamela Lainez 

[facility]



                QUANTITATIVE PCR ANALYSIS                 Gabi DAVID And

[facility]



                COLLECTION, BLOOD                                 Center

 

                TACROLIMUS LEVEL 2021 17:10:00 Pamela Lainez [facility]

[facility] [place]



                                                Gabi DAVID [facility]

 

                Results CBC     2021 17:10:00 Pamela Lainez [facility]

y [facility]



                                                Gabi DAVID [facility]



                                                                Center

 

                MANUAL DIFFERENTIAL 2021 17:10:00 Pamela Lainez

[facility]



                                                Gabi DAVID [facility]

 

                GLUCOSE LEVEL   2021 17:10:00 Pamela Lainez

[facility]



                                                Gabi DAVID [facility]

 

                BLOOD UREA NITROGEN 2021 17:10:00 Pamela Lainez

[facility]



                                                Gabi DAVID [facility]

 

                ELECTROLYTE PANEL 2021 17:10:00 Pamela Lainez

[facility]



                                                Gabi DAVID [facility]

 

                SERUM CREATININE 2021 17:10:00 Pamela Lainez

[facility] [place]



                                                Gabi DAVID [facility]

 

                .GLOMERULAR FILTRATION 2021 17:10:00 Pamela Lainez Un

iversity [facility]



                EDISON Ashley MD [facility]

 

                CALCIUM LEVEL TOTAL 2021 17:10:00 Pamela Lainez

[facility]



                                                Gabi DAVID [facility]

 

                ALBUMIN LEVEL   2021 17:10:00 Pamela Lainez [facility] Jon Ashley MD [facility]

 

                ALKALINE PHOSPHATASE 2021 17:10:00 Pamela Lainez

[facility] Jon Ashley MD [facility]

 

                ALANINE AMINOTRANSFERASE 2021 17:10:00 Pamela Lainez 

[facility]



                                                Gabi DAVID [facility]

 

                ASPARTATE AMINOTRANSFERASE 2021 17:10:00 Crispin Lainez [facility]



                                                Gabi DAVID [facility]

 

                TOTAL PROTEIN   2021 17:10:00 Pamela Lainez [facility]



                                                Gabi DAVID [facility]

 

                FRACTIONATED BILIRUBIN 2021 17:10:00 Pamela Lainez Un

[facility]



                                                Gabi DAVID [facility]



                                                                Center

 

                ABORH           2021 17:10:00 Pamela Lainez [facility]



                                                Gabi DAVID [facility]

 

                ANTIBODY SCREEN 2021 17:10:00 Pamela Lainez [facility]



                                                Gabi DAVID [facility]

 

                HP MD LAURA-SHIPLEY VIRUS 2021 17:10:00 Pamela Lainez 

[facility]



                QUANTITATIVE PCR ANALYSIS                 Gabi DAVID And

[facility]



                INTERPRETATION AND REPORT                                 Center

 

                CLOT EXPIRATION DATE 2021 17:10:00 Pamela Lainez

[facility]



                                                Gabi DAVID [facility]

 

                TMP INTERPRETATION 2021 17:10:00 Pamela Lainez [facility]



                ANTIBODY SCREEN NEGATIVE                 Gabi DAVID Omer

[facility]



                                                                Center

 

                CATHETER SITE CULTURE 2021 22:23:00 Pamela Lainez Uni

[facility] [place]



                                                Gabi DAVID [facility]

 

                PHYSICIAN ORDERS 2021-11-10 06:01:00 [facility]



                                                [facility]

 

                IR TUNNELED CENTRAL VENOUS 2021 19:07:35 Abiola Reyez

 [facility]



                CATHETER PLACEMENT                                 [facility]

 

                COMPLETE BLOOD COUNT W/ 2021 16:48:00 Monique Barrera [facility]



                DIFFERENTIAL                                    MD [facility]

 

                COMPREHENSIVE METABOLIC 2021 16:48:00 [facility]



                PANEL                                           MD [facility]

 

                MAGNESIUM LEVEL 2021 16:48:00 Monique Barrera [facility]

[facility] [place]



                                                                MD [facility]



                                                                Center

 

                TYPE AND SCREEN 2021 16:48:00 Monique Barrera [facility]

[facility] [place]



                                                                MD [facility]

 

                Results CBC     2021 16:48:00 Monique Barrera [facility]

 

                MANUAL DIFFERENTIAL 2021 16:48:00 Monique Barrera Univyan

[facility]

 

                GLUCOSE LEVEL   2021 16:48:00 Monique Barrera [facility]

 

                BLOOD UREA NITROGEN 2021 16:48:00 Monique Barrera [facility]

 

                ELECTROLYTE PANEL 2021 16:48:00 Monique Barrera [facility]

 

                SERUM CREATININE 2021 16:48:00 Monique Barrera [facility]

 

                .GLOMERULAR FILTRATION 2021 16:48:00 Monique Barrera

[facility]

 

                CALCIUM LEVEL TOTAL 2021 16:48:00 Monique Barrera [facility]

 

                ALBUMIN LEVEL   2021 16:48:00 Monique Barrera [facility]

 

                ALKALINE PHOSPHATASE 2021 16:48:00 Monique Barrera [facility]

 

                ALANINE AMINOTRANSFERASE 2021 16:48:00 Monique Barrera 

[facility]

 

                ASPARTATE AMINOTRANSFERASE 2021 16:48:00 Monique Barrera [facility]

 

                TOTAL PROTEIN   2021 16:48:00 Monique Barrera [facility]

 

                FRACTIONATED BILIRUBIN 2021 16:48:00 Monique Barrera Un

[facility]

 

                ABORH           2021 16:48:00 Monique Barrera [facility]

 

                ANTIBODY SCREEN 2021 16:48:00 Monique Barrera [facility]

 

                TMP INTERPRETATION 2021 16:48:00 Monique Barrera [facility]



                ANTIBODY SCREEN NEGATIVE                                 MD Omer [facility]



                                                                Center

 

                CLOT EXPIRATION DATE 2021 16:48:00 Monique Barrera

[facility] [place]



                                                                MD [facility]

er



                                                                Center

 

                PROTHROMBIN TIME 2021 16:50:00 Mena Burch

[facility] [place]



                                                                MD [facility]



                                                                Center

 

                COMPLETE BLOOD COUNT W/ 2021 14:15:00 Pamela Lainez [facility] 

[place]



                DIFFERENTIAL                    Gabi DAVID [facility]



                                                                Center

 

                TYPE AND SCREEN 2021 14:15:00 Pamela Lainez

[facility] [place]



                                                Gabi DAVID [facility]

er



                                                                [facility]

 

                COMPREHENSIVE METABOLIC 2021 14:15:00 Pamela Lainez [facility]



                PANEL                           Gabi DAVID [facility]

 

                PHOSPHORUS LEVEL 2021 14:15:00 Pamela Lainez

[facility] [place]



                                                Gabi DAVID [facility]

 

                URIC ACID       2021 14:15:00 Pamela Lainez

[facility] [place]



                                                Gabi DAVID [facility]

 

                LACTATE DEHYDROGENASE 2021 14:15:00 Pamela Lainez Uni

[facility] [place]



                                                Gabi DAVID [facility]



                                                                Center

 

                MAGNESIUM LEVEL 2021 14:15:00 Pamela Lainez

[facility] [place]



                                                Gabi DAVID [facility]

 

                Results CBC     2021 14:15:00 Pamela Lainez

[facility] [place]



                                                Gabi DAVID [facility]



                                                                Center

 

                MANUAL DIFFERENTIAL 2021 14:15:00 Pamela Lainez

[facility] [place]



                                                Gabi DAVID [facility]



                                                                Center

 

                GLUCOSE LEVEL   2021 14:15:00 [facility]

[facility] [place]



                                                Gabi DAVID [facility]



                                                                Center

 

                BLOOD UREA NITROGEN 2021 14:15:00 Pamela Lainez

[facility] [place]



                                                Gabi DAVID [facility]

er



                                                                Center

 

                ELECTROLYTE PANEL 2021 14:15:00 Pamela [facility]ainez [facility] [place]



                                                Gabi DAVID [facility]



                                                                Center

 

                SERUM CREATININE 2021 14:15:00 Marcotulli, Pamela Universi

[facility] [place]



                                                Gabi           [facility]

 

                .GLOMERULAR FILTRATION 2021 14:15:00 Pamela Lainez [facility]

[facility]           [facility]

 

                CALCIUM LEVEL TOTAL 2021 14:15:00 Pamela Lainez [facility]

 

                ALBUMIN LEVEL   2021 14:15:00 [facility]           [facility]

 

                ALKALINE PHOSPHATASE 2021 14:15:00 Pamela Lainez [facility]

 

                ALANINE AMINOTRANSFERASE 2021 14:15:00 Pamela Lainez 

[facility]

 

                ASPARTATE AMINOTRANSFERASE 2021 14:15:00 Crispin Lainez [facility]

 

                TOTAL PROTEIN   2021 14:15:00 [facility]           [facility]

 

                FRACTIONATED BILIRUBIN 2021 14:15:00 Pamela Lainez [facility]

[facility]

 

                ABORH           2021 14:15:00 [facility]           [facility]

 

                ANTIBODY SCREEN 2021 14:15:00 Pamela Lainez [facility]

 

                CLOT EXPIRATION DATE 2021 14:15:00 Provider, Unknown [facility]

 

                TMP INTERPRETATION 2021 14:15:00 Pamela Lainez [facility]



                ANTIBODY SCREEN NEGATIVE                 Gabi Tello

[facility]



                                                                Center

 

                HOLTER MONITOR - 48 HOUR 2021-10-25 00:00:00 Jose Alfredo Álvarez

 [facility]

 

                COMPLETE BLOOD COUNT W/ 2021-10-09 05:45:00 Alicia Mendoza 

[facility]



                DIFFERENTIAL                    E               [facility]

 

                BASIC METABOLIC PANEL, 2021-10-09 05:45:00 Alicia Mendoza

[facility]



                CALCIUM TOTAL                   E               [facility]

 

                TYPE AND SCREEN 2021-10-09 05:45:00 [facility]



                                                E               [facility]

 

                ABORH           2021-10-09 05:45:00 Alicia [facility]



                                                E               [facility]

 

                ANTIBODY SCREEN 2021-10-09 05:45:00 Alicia [facility]za [facility]



                                                E               [facility]

 

                GLUCOSE LEVEL   2021-10-09 05:45:00 Alicia [facility]za [facility]



                                                E               [facility]

 

                BLOOD UREA NITROGEN 2021-10-09 05:45:00 Alicia Mendoza [facility]



                                                E               [facility]

 

                ELECTROLYTE PANEL 2021-10-09 05:45:00 Alicia Mendoza [facility]



                                                E               [facility]

 

                SERUM CREATININE 2021-10-09 05:45:00 Alicia Mendoza [facility]



                                                E               [facility]

 

                .GLOMERULAR FILTRATION 2021-10-09 05:45:00 Alicia Mendoza

[facility]



                RATE                            E               [facility]

 

                CALCIUM LEVEL TOTAL 2021-10-09 05:45:00 Alicia Mendoza [facility]



                                                E               [facility]

 

                Results CBC     2021-10-09 05:45:00 [facility]



                                                E               [facility]

 

                MANUAL DIFFERENTIAL 2021-10-09 05:45:00 Alicia Mendoza [facility]



                                                E               [facility]

 

                CLOT EXPIRATION DATE 2021-10-09 05:45:00 Collin Adler    [facility]

 

                TMP INTERPRETATION 2021-10-09 05:45:00 Alicia Mendoza [facility]



                ANTIBODY SCREEN NEGATIVE                 E               MD Tello

[facility]

 

                COMPLETE BLOOD COUNT W/ 2021-10-08 10:40:00 Alicia Mendoza 

[facility]



                DIFFERENTIAL                    E               [facility]

 

                COMPREHENSIVE METABOLIC 2021-10-08 10:40:00 Alicia Mendoza 

[facility]



                PANEL                           E               [facility]

 

                PHOSPHORUS LEVEL 2021-10-08 10:40:00 Alicia Mendoza [facility]



                                                E               [facility]

 

                MAGNESIUM LEVEL 2021-10-08 10:40:00 [facility]



                                                E               [facility]

 

                URIC ACID       2021-10-08 10:40:00 Alicia [facility]za [facility]



                                                E               [facility]

 

                LACTATE DEHYDROGENASE 2021-10-08 10:40:00 Alicia Mendoza [facility]



                                                E               [facility]

 

                Results CBC     2021-10-08 10:40:00 Alicia [facility]



                                                E               [facility]

 

                MANUAL DIFFERENTIAL 2021-10-08 10:40:00 Alicia Mendoza [facility]

 

                GLUCOSE LEVEL   2021-10-08 10:40:00 Alicia [facility]za [facility]



                                                E               [facility]

 

                BLOOD UREA NITROGEN 2021-10-08 10:40:00 Alicia Mendoza [facility]

 

                ELECTROLYTE PANEL 2021-10-08 10:40:00 Alicia Mendoza [facility]



                                                E               [facility]

 

                SERUM CREATININE 2021-10-08 10:40:00 Alicia Mendoza [facility]



                                                E               [facility]

 

                .GLOMERULAR FILTRATION 2021-10-08 10:40:00 Alicia Mendoza [facility]



                RATE                            E               [facility]

 

                CALCIUM LEVEL TOTAL 2021-10-08 10:40:00 Alicia Mendoza [facility]



                                                E               [facility]

 

                ALBUMIN LEVEL   2021-10-08 10:40:00 Alic[facility]za [facility]



                                                E               [facility]

 

                ALKALINE PHOSPHATASE 2021-10-08 10:40:00 Alicia Mendoza [facility]



                                                E               [facility]

 

                ALANINE AMINOTRANSFERASE 2021-10-08 10:40:00 Alicia Mendoza

 [facility]

 

                ASPARTATE AMINOTRANSFERASE 2021-10-08 10:40:00 Kwadwo Mendoza

[facility]



                                                E               [facility]

 

                TOTAL PROTEIN   2021-10-08 10:40:00 Alicia [facility]za [facility]



                                                E               [facility]

 

                FRACTIONATED BILIRUBIN 2021-10-08 10:40:00 Alicia Mendoza

[facility]



                                                E               [facility]

 

                EKG, 12-LEAD (PORTABLE) 2021-10-08 00:00:00 [place]     [facility]

 

                MRI BRAIN W WO CONTRAST 2021-10-07 21:25:00 Alicia Mendoza 

[facility]



                                                E               [facility]

 

                PATHOLOGY BIOPSY 2021-10-07 13:49:00 Melissa Ruiz   [facility]



                INTERPRETATION                                  [facility]

 

                ENDOSCOPY NOTE RESULTS 2021-10-07 13:35:31 Melissa Ruiz   [facility]

 

                UPPER GASTROINTESTINAL 2021-10-07 13:26:00 Melissa Ruiz   [facility]



                ENDOSCOPY OF ESOPHAGUS,                                 MD Lavell

[facility]



                STOMACH, AND DUODENUM WITH                                 Cente

r



                BIOPSY                                          

 

                TACROLIMUS LEVEL 2021-10-07 12:32:00 Collin Adler    [facility]

 

                VRE CULTURE     2021-10-07 11:51:00 [facility]



                                                E               [facility]

 

                COMPLETE BLOOD COUNT W/ 2021-10-07 08:10:00 Alicia Mendoza 

[facility]



                DIFFERENTIAL                    [facility]

 

                BASIC METABOLIC PANEL, 2021-10-07 08:10:00 Alicia Mendoza

[facility]



                CALCIUM TOTAL                   [facility]

 

                CMV QUANT PCR   2021-10-07 08:10:00 [facility]



                                                E               [facility]

 

                GLUCOSE LEVEL   2021-10-07 08:10:00 [facility]



                                                E               [facility]

 

                BLOOD UREA NITROGEN 2021-10-07 08:10:00 Alicia Mendoza [facility]



                                                E               [facility]

 

                ELECTROLYTE PANEL 2021-10-07 08:10:00 Alicia Mendoza [facility]



                                                E               [facility]

 

                SERUM CREATININE 2021-10-07 08:10:00 Alicia Mendoza [facility]

 

                .GLOMERULAR FILTRATION 2021-10-07 08:10:00 [facility]

[facility]



                RATE                            [facility]

 

                CALCIUM LEVEL TOTAL 2021-10-07 08:10:00 Alicia Mendoza [facility]

 

                Results CBC     2021-10-07 08:10:00 Alicia Mendoza [facility]

 

                MANUAL DIFFERENTIAL 2021-10-07 08:10:00 Alicia Mendoza [facility]



                                                E               [facility]

 

                COMPLETE BLOOD COUNT W/ 2021-10-06 11:14:00 Alicia Mendoza 

[facility]



                DIFFERENTIAL                    [facility]

 

                COMPREHENSIVE METABOLIC 2021-10-06 11:14:00 Alicia Mendoza 

[facility]



                PANEL                           [facility]

 

                PHOSPHORUS LEVEL 2021-10-06 11:14:00 Alicia Mendoza [facility]

 

                MAGNESIUM LEVEL 2021-10-06 11:14:00 Alicia Mendoza [facility]

 

                URIC ACID       2021-10-06 11:14:00 Alicia Mendoza [facility]

 

                LACTATE DEHYDROGENASE 2021-10-06 11:14:00 Alicia Mendoza

[facility]

 

                PROTHROMBIN TIME 2021-10-06 11:14:00 Alicia Mendoza [facility]

 

                APTT            2021-10-06 11:14:00 Alicia Mendoza [facility]



                                                                Center

 

                TYPE AND SCREEN 2021-10-06 11:14:00 Alicia Mendoza [facility]

 

                ABORH           2021-10-06 11:14:00 Alicia Mendoza [facility]

 

                ANTIBODY SCREEN 2021-10-06 11:14:00 Alicia Mendoza [facility]

ty [facility]



                                                                Center

 

                Results CBC     2021-10-06 11:14:00 Alicia Mendoza [facility]

 

                MANUAL DIFFERENTIAL 2021-10-06 11:14:00 Alicia Mendoza [facility]

 

                GLUCOSE LEVEL   2021-10-06 11:14:00 Alicia Mendoza [facility]

 

                BLOOD UREA NITROGEN 2021-10-06 11:14:00 Alicia Mendoza [facility]

 

                ELECTROLYTE PANEL 2021-10-06 11:14:00 Alicia Mendoza [facility]

 

                SERUM CREATININE 2021-10-06 11:14:00 Alicia Mendoza [facility]

 

                .GLOMERULAR FILTRATION 2021-10-06 11:14:00 Alicia Mendoza

[facility]



                RATE                            [facility]

 

                CALCIUM LEVEL TOTAL 2021-10-06 11:14:00 Alicia Mendoza [facility]

 

                ALBUMIN LEVEL   2021-10-06 11:14:00 Alicia Mendoza [facility]

 

                ALKALINE PHOSPHATASE 2021-10-06 11:14:00 Alicia Mendoza [facility]

 

                ALANINE AMINOTRANSFERASE 2021-10-06 11:14:00 Alicia Mendoza

 [facility]

 

                ASPARTATE AMINOTRANSFERASE 2021-10-06 11:14:00 Kwadwo Mendoza

[facility]

 

                TOTAL PROTEIN   2021-10-06 11:14:00 Alicia Mendoza [facility]

 

                FRACTIONATED BILIRUBIN 2021-10-06 11:14:00 Alicia Mendoza [facility]

 

                CLOT EXPIRATION DATE 2021-10-06 11:14:00 Collin Adler

[facility]

 

                TMP INTERPRETATION 2021-10-06 11:14:00 Alicia Mendoza [facility]



                ANTIBODY SCREEN NEGATIVE                                MD Tello

[facility]



                                                                Center

 

                VRE CULTURE     2021-10-05 23:37:00 [facility]



                                                E               [facility]

 

                TACROLIMUS LEVEL 2021-10-05 11:32:00 Merlin, [facility]

 

                TACROLIMUS LEVEL 2021-10-04 11:52:00 Merlin, [facility]

 

                COMPLETE BLOOD COUNT W/ 2021-10-04 11:52:00 Merlin, [facility]

[facility] [place]



                DIFFERENTIAL                                    [facility]

 

                BASIC METABOLIC PANEL, 2021-10-04 11:52:00 Merlin, [facility]

[facility] [place]



                CALCIUM TOTAL                                   [facility]

 

                MAGNESIUM LEVEL 2021-10-04 11:52:00 [facility]

 

                PHOSPHORUS LEVEL 2021-10-04 11:52:00 Merlin, [facility]

 

                Results CBC     2021-10-04 11:52:00 Merlin, [facility]

 

                MANUAL DIFFERENTIAL 2021-10-04 11:52:00 Merlin, [facility]

 

                GLUCOSE LEVEL   2021-10-04 11:52:00 Merlin, [facility]

 

                BLOOD UREA NITROGEN 2021-10-04 11:52:00 Merlin, Benjamin [facility]

 

                ELECTROLYTE PANEL 2021-10-04 11:52:00 Merlin, [facility]

 

                SERUM CREATININE 2021-10-04 11:52:00 Merlin, [facility]

 

                .GLOMERULAR FILTRATION 2021-10-04 11:52:00 Merlin, [facility]

[facility]



                RATE                                            [facility]

 

                CALCIUM LEVEL TOTAL 2021-10-04 11:52:00 Merlin, [facility]

 

                CT VENOGRAM BRAIN 2021-10-04 10:41:07 [facility]

 

                BLOODCULTURE    2021-10-04 04:01:00 Merlin, [facility]

 

                TOPIRAMATE LEVEL, SERUM 2021-10-04 04:01:00 Merlin, Benjamin [facility]

 

                CT HEAD WO CONTRAST 2021-10-04 01:47:23 Merlin, Jeffrey [facility]

 

                INFLUENZA A/B + COVID-19 2021-10-04 01:34:00 Merlin, Jeffrey [facility]

[facility] [place]



                ASYMPTOMATIC-L                                  [facility]

 

                COMPLETE BLOOD COUNT W/ 2021-10-04 01:34:00 Merlin, [facility]

[facility] [place]



                DIFFERENTIAL                                    [facility]

 

                COMPREHENSIVE METABOLIC 2021-10-04 01:34:00 Merlin, [facility]

[facility] [place]



                PANEL                                           [facility]

 

                MAGNESIUM LEVEL 2021-10-04 01:34:00 Merlin, [facility]

[facility]

 

                PHOSPHORUS LEVEL 2021-10-04 01:34:00 Merlin, [facility]



                                                                Center

 

                LIPASE LEVEL    2021-10-04 01:34:00 Merlin, [facility]

[facility]



                                                                Center

 

                LACTATE DEHYDROGENASE 2021-10-04 01:34:00 Merlin, Jeffrey [facility]

[facility]

 

                Results CBC     2021-10-04 01:34:00 Merlin, [facility]

[facility]

 

                MANUAL DIFFERENTIAL 2021-10-04 01:34:00 Merlin, Benjamin [facility]



                                                                Center

 

                GLUCOSE LEVEL   2021-10-04 01:34:00 Merlin, [facility] o

[facility]



                                                                Center

 

                BLOOD UREA NITROGEN 2021-10-04 01:34:00 Merlin, Benjamin [facility]



                                                                Center

 

                ELECTROLYTE PANEL 2021-10-04 01:34:00 Merlin, [facility]



                                                                Center

 

                SERUM CREATININE 2021-10-04 01:34:00 Merlin, [facility]

 

                .GLOMERULAR FILTRATION 2021-10-04 01:34:00 Merlin, Jeffrey [facility]

[facility]



                RATE                                            [facility]

 

                CALCIUM LEVEL TOTAL 2021-10-04 01:34:00 Merlin, [facility]



                                                                Center

 

                ALBUMIN LEVEL   2021-10-04 01:34:00 Merlin, [facility]

[facility]

 

                ALKALINE PHOSPHATASE 2021-10-04 01:34:00 Merlin, Jeffrey [facility]

ity [facility]

 

                ALANINE AMINOTRANSFERASE 2021-10-04 01:34:00 Merlin, [facility]

[facility]

 

                ASPARTATE AMINOTRANSFERASE 2021-10-04 01:34:00 Merlin, [facility]

[facility]

 

                TOTAL PROTEIN   2021-10-04 01:34:00 Merlin, [facility]

[facility]

 

                FRACTIONATED BILIRUBIN 2021-10-04 01:34:00 Merlin, [facility]

[facility]

 

                Injection       2017-10-27 05:00:00                 [facility] 

braden

 

                Lumbar epidural 2017 05:00:00                 [facility] 

braden



                block<sup>1</sup>                                 

 

                Injection of facet joint 2017 05:00:00                 Rei

or[place]ana [facility]

 

                Sling procedure of bladder 2016 00:00:00                 LUIS FELIPE [facility]



                neck<sup>2</sup>                                 

 

                Hip replacement<sup>3, 2015 00:00:00                 Mercedes Lomax



                4</sup>                                         

 

                Hip             2014 00:00:00                 [facility] Her braden



                replacement<sup>5</sup>                                 

 

                Insertion of infusion 2008 00:00:00                 [facility]



                pump<sup>6</sup>                                 

 

                Carpal tunnel release 2006 00:00:00                 [facility]

 

                [facility]onsil[facility]   1966 00[facility]00:00                 [facility]

[facility]







Plan of Care







             Planned Activity Planned Date Details      Comments     Source

 

             Future Scheduled 2022   COVID-19 Vaccination              [facility]



             Test         08:07:09     (2 - David risk              MD [place]

n Cancer



                                       series) [code =              Center



                                       COVID-19 Vaccination              



                                       (2 - David risk              



                                       series)]                  

 

             Future Scheduled 2022   Pneumococcal Vaccine:              [facility]



             Test         13:08:06     Pediatrics (0 to 5              



                                       Years) and At-Risk              



                                       Patients (6 to 64              



                                       Years) (1 - PCV)              



                                       [code = Pneumococcal              



                                       Vaccine: Pediatrics              



                                       (0 to 5 Years) and              



                                       At-Risk Patients (6              



                                       to 64 Years) (1 -              



                                       PCV)]                     

 

             Future Scheduled 2022   Hepatitis C screening              [facility]



             Test         13:08:06     (procedure) [code =              



                                       610470528]                

 

             Future Scheduled 2022   SHINGLES VACCINES (1              Met

[facility]



             Test         13:08:06     of 2) [code =              



                                       SHINGLES VACCINES (1              



                                       of 2)]                    

 

             Future Scheduled 2022   Screening for              [facility]



             Test         13:08:06     malignant neoplasm of              



                                       cervix (procedure)              



                                       [code = 963695455]              

 

             Future Scheduled 2022   BREAST CANCER              [facility]



             Test         13:08:06     SCREENING [code =              



                                       BREAST CANCER              



                                       SCREENING]                

 

             Future Scheduled 2022   COLONOSCOPY SCREENING              [facility]



             Test         13:08:06     [code = COLONOSCOPY              



                                       SCREENING]                

 

             Future Scheduled 2022   COVID-19 VACCINE (3 -              [facility]



             Test         13:08:06     Booster for David              



                                       series) [code =              



                                       COVID-19 VACCINE (3 -              



                                       Booster for David              



                                       series)]                  

 

             Future Scheduled 2022   INFLUENZA VACCINE              Method

[facility]



             Test         13:08:06     [code = INFLUENZA              



                                       VACCINE]                  

 

             Future Scheduled 2022   Pneumococcal Vaccine:              [facility]



             Test         10:47:25     Pediatrics (0 to 5              



                                       Years) and At-Risk              



                                       Patients (6 to 64              



                                       Years) (1 - PCV)              



                                       [code = Pneumococcal              



                                       Vaccine: Pediatrics              



                                       (0 to 5 Years) and              



                                       At-Risk Patients (6              



                                       to 64 Years) (1 -              



                                       PCV)]                     

 

             Future Scheduled 2022   Hepatitis C screening              [facility]



             Test         10:47:25     (procedure) [code =              



                                       862500577]                

 

             Future Scheduled 2022   SHINGLES VACCINES (1              Met

[facility]



             Test         10:47:25     of 2) [code =              



                                       SHINGLES VACCINES (1              



                                       of 2)]                    

 

             Future Scheduled 2022   Screening for              [facility]



             Test         10:47:25     malignant neoplasm of              



                                       cervix (procedure)              



                                       [code = [id number]]              

 

             Future Scheduled 2022   BREAST CANCER              [facility]



             Test         10:47:25     SCREENING [code =              



                                       BREAST CANCER              



                                       SCREENING]                

 

             Future Scheduled 2022   COLONOSCOPY SCREENING              [facility]



             Test         10:47:25     [code = COLONOSCOPY              



                                       SCREENING]                

 

             Future Scheduled 2022   COVID-19 VACCINE (3 -              [facility]



             Test         10:47:25     Booster for David              



                                       series) [code =              



                                       COVID-19 VACCINE (3 -              



                                       Booster for David              



                                       series)]                  

 

             Future Scheduled 2022   INFLUENZA VACCINE              Method

[facility]



             Test         10:47:25     [code = INFLUENZA              



                                       VACCINE]                  

 

             Future Scheduled 2022   COVID-19 Vaccination              Uni

[facility]



             Test         13:14:52     (2 - David risk              MD [facility] l[facility]



                                       series) [code =              Center



                                       COVID-19 Vaccination              



                                       (2 - David risk              



                                       series)]                  







Encounters







        Start   End     Encounter Admission Attending Care    Care    Encounter 

Source



        Date/Time Date/Time Type    Type    Clinicians Facility Department ID   

   

 

        2022         Outpatient                 [facility]     [id number]

 [place]



        06[place]28:46                                                 [id number]  [facility]

 

        2022         Outpatient                 [facility]     [id number]

 [place]



        10:47:01                                                 [id number]  [facility]

 

        2022         Outpatient                 [facility]     [id number]

 [place]



        11:46:19                                                 [id number]  [facility]

 

        2022         Outpatient                 [facility]     [id number]

 [place]



        00:05:13                                                 [id number]  [facility]

 

        2022         Outpatient                 [facility]     [id number]

 [place]



        14:05:32                                                 [id number]  [facility]

 

        2022         Outpatient         [facility]     MOR     [id number]

 MD



        12:22:20                         PROVIDER                         All

o



                                                                        john

 

        2021         Outpatient         [facility], [facility] J[facility]AR[facility]     [id number]

 MD



        15:10:47                         PROVIDER                         All

o



                                                                        john

 

        2020         Outpatient                 [facility] JU[facility]     [id number]

 MD



        14:50:07                                                         Anderso



                                                                        n

 

        2020         Outpatient                 [facility] JUARES     [id number]

 MD



        15:31:15                                                         Anderso



                                                                        john

 

        2020         Outpatient                 [facility] JUARES     [id number]

 MD



        12:12:05                                                         Anderso



                                                                        john

 

        2022 [facility], .[id number]

[id number] John [facility]



        11:30:00 23:59:00 Encounter         Daron DE JESUS 55849.1.1                 it

y of



                                                3.412.2.7                 [place]



                                                .3.416131                 MD



                                                .8                      Tian lopez



                                                                        Cancer



                                                                        Center

 

        2022 Outpatient [facility]     MOR     [id number]

248 MD



        11:30:00 23:59:00                 DARON                            All lopez

 

        2022 Follow-Up         Maricarmen, 1.2.840.1 [id number]

[id number] [facility]



        13:30:00 13:59:12                 Daron DE JESUS 05570.1.1                 ity 

of



                                                3.412.2.7                 [place]



                                                .3.315127                 MD Beckford                      [facility]

 

        2022 Outpatient GINA COLEY [facility]     [id number]

249 MD



        13:06:45 13:59:12                 DARON lopez

 

        2022 [facility]         Paige, 1.2.840.1 [id number]

[id number] Univers



        13:00:00 13:15:00 Procedure         Sangita   00885.1.1                 it

y of



                                        Miriam 3.412.2.7                 [place]



                                                .3.639363                 MD



                                                .8                      [facility]

 

        2022 Outpatient GINA PAIGE [facility]     [id number]

237 MD



        12:22:35 12:22:35                 SANGITA lopez

 

        2022 Documentat         Facciolli, 1.2.840.1 [id number] 

[id number] 

Univers



        00:00:00 00:00:00 ion             Kiya FALCON 90221.1.1                 ity 

of



                                                3.412.2.7                 [place]



                                                .3.996922                 MD Beckford                      [facility]

 

        2022 Travel                  1.2.840.1 1.2.608.490 [id number]

[id number] Univers



        00:00:00 00:00:00                         32391.1.1 350.1.13.41         

ity of



                                                3.412.2.7 2.2.7.3.698         [place]

[place]



                                                .3.663171 084.8           MD Beckford                      [facility]

 

        [facility] 1.2.840.1 [id number]

[id number] Univers



        11:14:07 23:59:00 Encounter         Daron DE JESUS 93891.1.1                 it

y of



                                                3.412.2.7                 [place]



                                                .3.267744                 MD Beckford                      [facility]

 

        2022 Outpatient GINA COLEY [facility]     MDA     [id number]

559 MD



        11:14:07 23:59:00                 DARONEZEKIEL kaye



                                                                        john

 

        2022 Follow-Up         Maricarmen 1.2.840.1 [id number]

[id number] [facility]



        13:00:00 13:46:23                 Daron DE JESUS 49490.1.1                 ity 

of



                                                3.412.2.7                 [place]



                                                .3.947857                 MD



                                                .8                      [facility]

 

        2022 Outpatient GINA COLEY [facility]     MDA     [id number]

561 MD



        11:31:01 13:46:23                 DARON                            All

o



                                                                        n

 

        2022 Ancillary         Maricarmen, 1.2.840.1 [id number]

[id number] [facility]



        10:20:00 10:40:00 Procedure         Daron DE JESUS 74965.1.1                 it

y of



                                                3.412.2.7                 [place]



                                                .3.332302                 MD



                                                .Vickey                      [facility]

 

        2022 Outpatient GINA COLEY [facility]     [id number]

560 MD



        10:11:18 10:11:18                 DARON                            All

o



                                                                        n

 

        2022 Travel                  1.2.840.1 1.2.143.875 2270

[id number] [facility]



        00:00:00 00:00:00                         18787.1.1 350.1.13.41         

ity of



                                                3.412.2.7 2.2.7.3.698         [place]

[place]



                                                .3.376580 084.8           MD



                                                .8                      [facility]



                                                                        n



                                                                        Cancer



                                                                        Neihart

 

        2022-11-10 2022-11-10 Outpatient         [facility]     [id number]

36 [place]



        13:45:00 14:30:12                 [facility]

 

        2022 [facility], 1.2.840.1 [id number]

[id number] [facility]



        09:39:01 23:59:00 Encounter         Sangita   90127.1.1                 it

y of



                                        Miriam 3.412.2.7                 [place]



                                                .3.418583                 MD



                                                .8                      Adipetero



                                                                        n



                                                                        Cancer



                                                                        Neihart

 

        2022 Outpatient MOR CARNEY     MDA     [id number]

935 MD



        09:39:01 23:59:00                 SANGITA lopez

 

        2022 [facility]         Maricarmen, 1.2.840.1 [id number]

[id number] [facility]



        16:00:00 16:30:00 ne              Daron DE JESUS 84250.1.1                 ity 

of



                                                3.412.2.7                 [place]



                                                .3.736189                 MD



                                                .8                      [facility]

 

        2022 Outpatient MOR RIOS     MDA     [id number]

934 MD



        08:05:51 08:05:51                 DARON lopez

 

        2022 [facility], 1.2.840.1 [id number]

[id number] [facility]



        11:30:00 23:59:00 Encounter         Daron DE JESUS 23552.1.1                 it

y of



                                                3.412.2.7                 [place]



                                                .3.634205                 MD



                                                .8                      [facility]

 

        2022 Outpatient GINA COLEY [facility]     MDA     [id number]

340 MD



        11:30:00 23:59:00                 DARON lopez

 

        2022 [facility], 1.2.840.1 [id number]

[id number] [facility]



        16:30:00 17:00:00 julee DE JESUS 13494.1.1                 ity 

of



                                                3.412.2.7                 [place]



                                                .3.408529                 MD



                                                .8                      [facility]

 

        2022 Outpatient         [facility]     [id number]

15 [place]



        10:45:00 10:45:00                 [facility]

 

        2022 Outpatient GINA COLEY MOR     MDA     [id number]

341 MD



        08:09:48 08:09:48                 DARON lopez

 

        2022-10-25 2022-10-25 \[facility]\"" Daron DE JESUS 1.2.840.1 [id number]

[id number] 

[facility]



        14:03:47 23:59:00 Encounter         Maty Whitt 80255.1.1            

     ity of



                                                3.412.2.7                 [place]



                                                .3.352402                 MD



                                                .8                      [facility]



                                                                        Cancer



                                                                        Neihart

 

        2022-10-25 2022-10-25 Outpatient       MARICARMEN [facility]     [id number]

955 MD



        14:03:47 23:59:00                 DARON lopez

 

        2022-10-25 2022-10-25 [facility], 1.2.840.1 [id number]

[id number] [facility]



        10:36:52 14:02:00 Encounter         Daron DE JESUS 52465.1.1                 it

y of



                                                3.412.2.7                 [place]



                                                .3.364845                 MD



                                                .8                      [facility]

 

        2022-10-25 2022-10-25 [facility]     [id number]

266 MD



        10:36:52 14:02:00                 DARON lopez

 

        2022-10-25 2022-10-25 [facility] 1.2.840.1 [id number]

[id number] [facility]



        13:30:00 13:52:13 Visit           Daron DE JESUS 26959.1.1                 ity 

of



                                                3.412.2.7                 [place]



                                                .3.444885                 MD



                                                .8                      [facility]

 

        2022-10-25 2022-10-25 Outpatient       MARICARMEN, MDA     MOR     [id number]

268 MD



        11:03:28 13:52:13                 DARON lopez

 

        2022-10-25 2022-10-25 [facility], 1.2.840.1 [id number]

[id number] [facility]



        09:00:00 10:35:00 Encounter         Monique BURROUGHS 62168.1.1                 i

ty of



                                                3.412.2.7                 [place]



                                                .3.167379                 MD



                                                .8                      Tian lopez



                                                                        [facility]

 

        2022-10-25 2022-10-25 Outpatient [facility]     [id number]

10414 MD



        09:00:00 10:35:00                 MONIQUE lopez

 

        2022-10-25 2022-10-25 [facility]          Hiral 1.2.840.1 [id number]

[id number] [facility]



        00:00:00 00:00:00 Only            Monique M 21810.1.1                 ity

 of



                                                3.412.2.7                 [place]



                                                .3.108564                 MD



                                                .8                      [facility]

 

        2022-10-25 2022-10-25 Travel                  1.2.840.1 1.2.358.543 [id number]

[id number] Univers



        00:00:00 00:00:00                         03928.1.1 350.1.13.41         

ity of



                                                3.412.2.7 2.2.7.3.698         [place]

[place]



                                                .3.743165 084.8           MD Beckford                      [facility]

 

        2022-10-20 2022-10-20 [facility]         Jordi Shivam 1.2.840.1 [id number]

[id number] 

[facility]



        11:56:55 23:59:00 Encounter         Rancho Sadie EZEKIEL 85895.1.1    

             ity of



                                                3.412.2.7                 [place]



                                                .3.162650                 MD



                                                .8                      [facility]

 

        2022-10-20 2022-10-20 Outpatient MOR VIDAL     [facility]     [id number]

740 MD



        11:56:55 23:59:00                 DIMA kaye



                                                                        

 

        2022-10-20 2022-10-20 [facility]         El,    1.2.840.1 [id number]

[id number] [facility]



        10:48:20 11:55:00 Encounter         Kvng 10253.1.1                 it

y of



                                                3.412.2.7                 [place]



                                                .3.220237                 MD



                                                .8                      [facility]

 

        2022-10-20 2022-10-20 Outpatient MOR WHALEN     [facility]     [id number]

667 MD



        10:48:20 11:55:00                 KVNG lopez

 

        2022-10-20 2022-10-20 Travel                  1.2.840.1 1.2.656.387 [id number]

[id number] [facility]



        00:00:00 00:00:00                         72816.1.1 350.1.13.41         

ity of



                                                3.412.2.7 2.2.7.3.698         [place]

[place]



                                                .3.747636 084.8           MD



                                                .8                      [facility]

 

        2022-10-14 2022-10-14 Kade Medeiros 1.2.840.1 [id number]

3695 Univers



        00:00:00 00:00:00                 Akosua  44072.1.1                 ity 

of



                                                3.412.2.7                 [place]



                                                .3.115044                 MD



                                                .8                      [facility]

 

        2022-10-06 2022-10-06 Documentat         Scott, 1.2.840.1 [id number] 

[id number] 

Univers



        00:00:00 00:00:00 ion             Kiya FALCON 15525.1.1                 ity 

of



                                                3.412.2.7                 [place]



                                                .3.387173                 MD



                                                .8                      [facility]

 

        2022-10-05 2022-10-05 Outpatient MOR HAYWARD     [id number]

760 MD



        09:36:43 09:37:03                 JOSE ALFREDO Tello

[facility]

 

        2022-10-05 2022-10-05 TelemedicJose Alfredo Ortiz 1.2.840.1 [id number]

[id number] 

[id number]                              Univers



        09:30:00 09:37:03 Karli Carrizales 77560.1.1             

    ity of



                                                3.412.2.7                 [place]



                                                .3.284668                 MD



                                                .8                      [facility]

 

        2022-10-04 2022-10-04 Crystal Bryan, 1.2.840.1 [id number]

3872 Univers



        00:00:00 00:00:00 Only            Adelaida ANNE 17150.1.1                 it

y of



                                                3.412.2.7                 [place]



                                                .3.569069                 MD



                                                .8                      [facility]

 

        2022-10-03 2022-10-03 Kade Urias,  1.2.840.1 [id number]

7172 Univers



        00:00:00 00:00:00                 Milly 97795.1.1                 ity 

of



                                                3.412.2.7                 [place]



                                                .3.719759                 MD



                                                .8                      [facility]

 

        2022 Documentat         Kristina, 1.2.840.1 [id number]

[id number] 

Univers



        00:00:00 00:00:00 ion             Daryl   10464.1.1                 ity 

of



                                                3.412.2.7                 [place]



                                                .3.050970                 MD Beckford                      [facility]



                                                                        Cancer



                                                                        Neihart

 

        2022 [facility]         Cate, 1.2.840.1 [id number]

[id number] [facility]



        12:55:07 23:59:00 Encounter         Jose Alfredo 08229.1.1                 

ity of



                                                3.412.2.7                 [place]



                                                .3.692238                 MD



                                                .8                      [facility]

 

        2022 Outpatient       CATE [facility]     [id number]

615 MD



        12:55:07 23:59:00                 JOSE ALFREDO                         Omer

[facility]

 

        2022 Telephone         Cate 1.2.840.1 [id number]

[id number] [facility]



        00:00:00 00:00:00                 Jose Alfredo 47353.1.1                 it

y of



                                                3.412.2.7                 [place]



                                                .3.582043                 MD



                                                .8                      [facility]

 

        2022 Travel                  1.2.840.1 1.2.431.354 6812

[id number] [facility]



        00:00:00 00:00:00                         55766.1.1 350.1.13.41         

ity of



                                                3.412.2.7 2.2.7.3.698         [place]

[place]



                                                .3.258984 084.8           MD



                                                .8                      [facility]

 

        2022 Office          [facility]  [facility] 1.2.840.114 [id number]

794 [place]



        10:00:00 11:25:20 Visit           Joan BILLINGS 350.1.13.58         H

[facility] 9.2.7.2.686         



                                                PLAZA 7 [phone]         



                                                        5               

 

        2022 Outpatient                 [facility]     [id number]

72 [place]



        00:00:00 11:25:12                                                 Health

 

        2022 Telephone         Kymberly Ramos 1.2.840.1 [id number] 

[id number] 

[facility]



        00:00:00 00:00:00                 T       56636.1.1                 ity 

of



                                                3.412.2.7                 [place]



                                                .3.127577                 MD Beckford                      [facility]

 

        2022 Telephone         Kymberly Ramos 1.2.840.1 [id number] 

[id number] 

Univers



        00:00:00 00:00:00                 T       55577.1.1                 ity 

of



                                                3.412.2.7                 [place]



                                                .3.656343                 MD Beckford                      [facility]

 

        2022 Kade Lainez, 1.2.840.1 [id number]

[id number] Univers



        00:00:00 00:00:00                 Pamela  71604.1.1                 ity 

of



                                        Gabi   3.412.2.7                 [place]



                                                .3Miesha212290                 MD Beckford                      [facility]

 

        2022 Kade Lainez 1.2.840.1 [id number]

[id number] Univers



        00:00:00 00:00:00                 Pamela  61753.1.1                 ity 

of



                                        Gabi   3.412.2.7                 [place]



                                                .[id number]                 MD Beckford                      [facility]

 

        [facility]    [id number]

6P-20 Univers



        09:30:00 09:30:00                 VENANCIO                   [id number]  ity [facility]

 

        2022 [facility]    [id number]

[id number] Univers



        09:30:00 09:30:00                 VENANCIO                           ity [facility]

 

        2022 [facility], 1.2.840.1 [id number]

[id number] 

Univers



        13:00:00 23:59:00 Encounter         Sameer    27944.1.1                 it

y of



                                                3.412.2.7                 [place]



                                                .3Ryan375457Kaia Beckford                      [facility]

 

        2022 [facility], 1.2.840.1 [id number]

[id number] 

Univers



        13:00:00 23:59:00 Encounter         Sameer    89901.1.1                 it

y of



                                                3.412.2.7                 [place]



                                                .3Ryan372357Kaia Beckford                      [facility]

 

        2022 Office          AlvinariSameer 1.2.840.1 [id number]

[id number] 

Univers



        14:30:00 16:00:46 Visit           Kinza Medellin 38766.1.1            

     ity of



                                                3.412.2.7                 [place]



                                                .3.189539                 MD Beckford                      [facility]

 

        2022 Office  Sameer Norton 1.2.840.1 [id number]

[id number] 

Univers



        14:30:00 16:00:46 Visit           Kinza Medellin 69041.1.1            

     ity of



                                                3.412.2.7                 [place]



                                                .3.479517                 MD Beckford                      [facility]

 

        2022 Office          Jing Altman 1.2.840.1 [id number]

[id number] Univers



        14:15:00 14:42:18 Visit                   75112.1.1                 ity 

of



                                                3.412.2.7                 [place]



                                                .3.542765                 MD Beckford                      [facility]

 

        2022 Office  GINA      KvngezekielJing 1.2.840.1 [id number]

[id number] 

Univers



        14:15:00 14:42:18 Visit                   60894.1.1                 ity 

of



                                                3.412.2.7                 [place]



                                                .3.579449                 MD Beckford                      [facility]

 

        2022 Travel                  1.2.840.1 1.2.031.615 1916

[id number] Univers



        00:00:00 00:00:00                         68011.1.1 350.1.13.41         

ity of



                                                3.412.2.7 2.2.7.3.698         [place]

[place]



                                                .3.265022 084.8           MD Beckford                      [facility]

 

        2022 Travel                  1.2.840.1 1.2.151.309 7414

[id number] Univers



        00:00:00 00:00:00                         80737.1.1 350.1.13.41         

ity of



                                                3.412.2.7 2.2.7.3.698         [place]

[place]



                                                .3.354804 084.8           MD



                                                .8                      [facility]

 

        2022 [place], 1.2.840.1 [id number]

8875 Univers



        00:00:00 00:00:00 Only            Shivam  48453.1.1                 ity 

of



                                                3.412.2.7                 [place]



                                                .3.841074                 MD



                                                .8                      [facility]

 

        2022 [place], 1.2.840.1 [id number]

8875 Univers



        00:00:00 00:00:00 Only            Shivam  09363.1.1                 ity 

of



                                                3.412.2.7                 [place]



                                                .3.322691                 MD Beckford                      [facility]

 

        2022 [facility], 1.2.840.1 [id number]

96214 Univers



        12:00:00 23:59:00 Encounter         Daron DE JESUS 98492.1.1                 it

y of



                                                3.412.2.7                 [place]



                                                .3.440004                 MD Beckford                      [facility]

 

        2022 [facility], 1.2.840.1 [id number]

57315 Univers



        12:00:00 23:59:00 Encounter         Daron DE JESUS 22595.1.1                 it

y of



                                                3.412.2.7                 [place]



                                                .3.502738                 MD Beckford                      [facility]

 

        2022 [facility], 1.2.840.1 [id number]

6185 Univers



        14:00:00 14:00:00 Visit           Daron DE JESUS 48398.1.1                 ity 

of



                                                3.412.2.7                 [place]



                                                .3.634656                 MD Beckford                      [facility]

 

        2022 [facility], 1.2.840.1 [id number]

6185 Univers



        14:00:00 14:00:00 Visit           Daron DE JESUS 07636.1.1                 ity 

of



                                                3.412.2.7                 [place]



                                                .3.664769                 MD



                                                .8                      [facility]

 

        2022 Travel                  1.2.840.1 1.2.252.008 5528

[id number] Univers



        00:00:00 00:00:00                         32686.1.1 350.1.13.41         

ity of



                                                3.412.2.7 2.2.7.3.698         [place]

[place]



                                                .3.745849 084.8           MD



                                                .8                      [facility]

 

        2022 Travel                  1.2.840.1 1.2.121.010 1336

[id number] [facility]



        00:00:00 00:00:00                         82571.1.1 350.1.13.41         

ity of



                                                3.412.2.7 2.2.7.3.698         [place]

[place]



                                                .3.664464 084.8           MD Beckford                      [facility]

 

        2022 [facility], 1.2.840.1 [id number]

52002 Univers



        16:07:17 23:59:00 Encounter         Jose Alfredo 70849.1.1                 

ity of



                                                3.412.2.7                 [place]



                                                .3.506975                 MD Beckford                      [facility]

 

        2022 [facility], 1.2.840.1 [id number]

[id number] Univers



        16:07:17 23:59:00 Encounter         Jose Alfredo 58892.1.1                 

ity of



                                                3.412.2.7                 [place]



                                                .3.880217                 MD Beckford                      [facility]

 

        2022 Follow-Up         Lilliana Loyd 1.2.840.1 1010

[id number] 

[id number]                              Univers



        15:30:00 16:29:52                 Jose Alfredo Esposito 78250.1.1           

      ity of



                                                3.412.2.7                 [place]



                                                .3.086113                 MD Beckford                      [facility]

 

        2022 Follow-Up Lilliana Silverman 1.2.840.1 [id number]

[id number] 

[id number]                              Univers



        15:30:00 16:29:52                 Jose Alfredo Esposito 85681.1.1           

      ity of



                                                3.412.2.7                 [place]



                                                .3.373735                 MD Beckford                      [facility]

 

        2022 Travel                  1.2.840.1 1.2.358.797 5790

[id number] Univers



        00:00:00 00:00:00                         27362.1.1 350.1.13.41         

ity of



                                                3.412.2.7 2.2.7.3.698         [place]

[place]



                                                .3.088924 084.8           MD



                                                .8                      [facility]

 

        2022 Travel                  1.2.840.1 1.2.038.425 9549

[id number] Univers



        00:00:00 00:00:00                         93383.1.1 350.1.13.41         

ity of



                                                3.412.2.7 2.2.7.3.698         Te

[place]



                                                .3.032524 084.Vickey Beckford                      [facility]

 

        2022 Documentat         Facángel, 1.2.840.1 [id number] 

[id number] 

Univers



        00:00:00 00:00:00 hector FALCON 09863.1.1                 ity 

of



                                                3.412.2.7                 [place]



                                                .3.650397                 MD Beckford                      [facility]

 

        2022 Documentat         Facciolli, 1.2.840.1 [id number] 

[id number] 

Univers



        00:00:00 00:00:00 hector FALCON 59609.1.1                 ity 

of



                                                3.412.2.7                 [place]



                                                .3.709160                 MD Beckford                      [facility]

 

        2022-07-15 2022-07-15 Kade Medeiros, 1.2.840.1 [id number]

7164 Univers



        00:00:00 00:00:00                 Akosua  66819.1.1                 ity 

of



                                                3.412.2.7                 [place]



                                                .3.942496                 MD



                                                .8                      [facility]

 

        2022-07-15 2022-07-15 Crystal Lainez, 1.2.840.1 [id number]

[id number] Univers



        00:00:00 00:00:00 Only            Pamela  64149.1.1                 ity 

of



                                        Gabi   3.412.2.7                 [place]



                                                .3.841738                 MD



                                                .8                      [facility]

 

        2022-07-15 2022-07-15 Kade Medeiros, 1.2.840.1 [id number]

7164 Univers



        00:00:00 00:00:00                 [place]osua  26692.1.1                 ity 

of



                                                3.412.2.7                 [place]



                                                .3.124501                 MD



                                                .8                      [facility]

 

        2022-07-15 2022-07-15 Crystal Lainez, 1.2.840.1 [id number]

[id number] Univers



        00:00:00 00:00:00 Only            Pamela  94377.1.1                 ity 

of



                                        Gabi   3.412.2.7                 [place]



                                                .3.310044                 MD



                                                .8                      [facility]

 

        2022 Kade Medeiros, 1.2.840.1 [id number]

9857 Univers



        00:00:00 00:00:00                 [place]os[place]  57045.1.1                 ity 

of



                                                3.412.2.7                 [place]



                                                .3.687578                 MD



                                                .8                      [facility]

 

        2022 Kade Medeiros, 1.2.840.1 [id number]

8979 Univers



        00:00:00 00:00:00                 [place]os[place]  28037.1.1                 ity 

of



                                                3.412.2.7                 [place]



                                                .3.968714                 MD



                                                .8                      [facility]

 

        2022 Kade Mdeeiros, 1.2.840.1 [id number]

9857 Univers



        00:00:00 00:00:00                 [place]  83853.1.1                 ity 

of



                                                3.412.2.7                 [place]



                                                .3.003676                 MD



                                                .8                      [facility]

 

        2022 Refill          Mala, 1.2.840.1 [id number]

8979 Univers



        00:00:00 00:00:00                 [place]  73359.1.1                 ity 

of



                                                3.412.2.7                 [place]



                                                .3.678667                 MD Beckford                      [facility]

 

        2022 [facility],  1.2.840.1 [id number]

76644 Univers



        12:07:43 23:59:00 Encounter         Milly 40366.1.1                 it

y of



                                                3.412.2.7                 [place]



                                                .3.692715                 MD Beckford                      [facility]

 

        2022 Hospital       Urias,  1.2.840.1 [id number]

31902 Univers



        12:07:43 23:59:00 Encounter         Milly 91003.1.1                 it

y of



                                                3.412.2.7                 [place]



                                                .3.973856                 MD Beckford                      [facility]

 

        2022 Office          Maricarmen, 1.2.840.1 [id number]

5200 Univers



        16:00:00 16:00:00 Visit           Daron DE JESUS 57536.1.1                 ity 

of



                                                3.412.2.7                 [place]



                                                .3.024524                 MD Beckford                      [facility]

 

        2022 Office  GINA Coley, 1.2.840.1 [id number]

5200 Univers



        16:00:00 16:00:00 Visit           Daron DE JESUS 94154.1.1                 ity 

of



                                                3.412.2.7                 [place]



                                                .3.158001                 MD Beckford                      [facility]

 

        2022 Travel                  1.2.840.1 1.2.309.235 0071

[id number] Univers



        00:00:00 00:00:00                         27290.1.1 350.1.13.41         

ity of



                                                3.412.2.7 2.2.7.3.698         Te

[place]



                                                .3.706750 084.8           MD Beckford                      [facility]

 

        2022 Telephone         Mejia, [facility]    1.2.781.398 5766

0537 Univers



        00:00:00 00:00:00                 Keara REDMAN 350.1.13.10         i

ty of



                                                [place] 4.2.7.2.686         Luis NAVARRETE [phone]         Me

dical



                                                [address]                 

 

        2022 Travel                  1.2.840.1 1.2.434.294 2401

[id number] Univers



        00:00:00 00:00:00                         68345.1.1 350.1.13.41         

ity of



                                                3.412.2.7 2.2.7.3.698         Te

[place]



                                                .3.382716 084.8           MD



                                                .8                      [facility]

 

        2022 Kade Urias,  1.2.840.1 [id number]

8078 Univers



        00:00:00 00:00:00                 Milly 68061.1.1                 ity 

of



                                                3.412.2.7                 [place]



                                                .3.524548                 MD



                                                .8                      [facility]

 

        2022 Kade Urias  1.2.840.1 [id number]

8078 Univers



        00:00:00 00:00:00                 Milly 80520.1.1                 ity 

of



                                                3.412.2.7                 [place]



                                                .3.753104                 MD



                                                .8                      [facility]

 

        2022 Documentat         Scott, 1.2.840.1 [id number] 

[id number] 

Univers



        00:00:00 00:00:00 ion             Nalini 58897.1.1                 ity 

of



                                                3.412.2.7                 [place]



                                                .3.713818                 MD



                                                .8                      [facility]

 

        2022 Documentat         Faccihenryi, 1.2.840.1 [id number] 

[id number] 

Univers



        00:00:00 00:00:00 ion             Nalini 96886.1.1                 ity 

of



                                                3.412.2.7                 [place]



                                                .3.339724                 MD



                                                .8                      [facility]

 

        2022 \[facility]\"",   1.2.840.1 [id number]

86391 Univers



        12:35:39 23:59:00 Encounter         Alejandra  28726.1.1                 it

y of



                                                3.412.2.7                 [place]



                                                .[id number]                 MD Beckford                      [facility]

 

        2022 \[facility]\"",   1.2.840.1 [id number]

74108 Univers



        12:35:39 23:59:00 Encounter         Alejandra  70461.1.1                 it

y of



                                                3.412.2.7                 [place]



                                                .3.526622                 MD



                                                .8                      [facility]

 

        2022 Office          Maricarmen, 1.2.840.1 [id number]

6522 Univers



        15:30:00 16:14:33 Visit           Daron DE JESUS 40109.1.1                 ity 

of



                                                3.412.2.7                 [place]



                                                .3Miesha112660Kaia Beckford                      [facility]

 

        2022 Office          Maricarmen, 1.2.840.1 [id number]

6522 Univers



        15:30:00 16:14:33 Visit           Daron DE JESUS 24877.1.1                 ity 

of



                                                3.412.2.7                 [place]



                                                .3.153528                 MD Beckford                      [facility]

 

        2022 Office          Justen, 1.2.840.1 [id number]

[id number] Univers



        14:30:00 15:55:27 Visit           Sameer    19494.1.1                 ity 

of



                                                3.412.2.7                 [place]



                                                .3Miesha896289                 MD



                                                .8                      [facility]

 

        2022 Office          Justen, 1.2.840.1 [id number]

[id number] Univers



        14:30:00 15:55:27 Visit           Sameer    23337.1.1                 ity 

of



                                                3.412.2.7                 [place]



                                                .3Miesha420690                 MD



                                                .8                      [facility]

 

        2022 [facility],  1.2.840.1 [id number]

62903 Univers



        12:17:44 12:34:00 Encounter         Milly 65514.1.1                 it

y of



                                                3.412.2.7                 [place]



                                                .3.427943                 MD Beckford                      [facility]

 

        2022 [facility]         Urias,  1.2.840.1 [id number]

15466 Univers



        12:17:44 12:34:00 Encounter         Milly 26341.1.1                 it

y of



                                                3.412.2.7                 [place]



                                                .3.801448                 MD Beckford                      [facility]

 

        2022 Orders          Urias,  1.2.840.1 [id number]

9725 Univers



        00:00:00 00:00:00 Only            Milly 09795.1.1                 ity 

of



                                                3.412.2.7                 [place]



                                                .3.659608                 MD Beckford                      [facility]

 

        2022 Travel                  1.2.840.1 1.2.186.894 6177

[id number] Univers



        00:00:00 00:00:00                         21555.1.1 350.1.13.41         

ity of



                                                3.412.2.7 2.2.7.3.698         Te

[place]



                                                .3.636892 084.8           MD Beckford                      [facility]

 

        2022 [facility]          Adonay,  1.2.840.1 [id number]

9725 Univers



        00:00:00 00:00:00 Only            Milly 37801.1.1                 ity 

of



                                                3.412.2.7                 [place]



                                                .3.983270                 MD Beckford                      [facility]

 

        2022 Travel                  1.2.840.1 1.2.394.614 9617

[id number] Univers



        00:00:00 00:00:00                         95237.1.1 350.1.13.41         

ity of



                                                3.412.2.7 2.2.7.3.698         Te

[place]



                                                .3.842964 084Miesha8           MD Beckford                      [facility]

 

        2022 [facility], 1.2.840.1 [id number]

[id number] Univers



        12:30:00 23:59:00 Encounter         Sandro   44514.1.1                 it

y of



                                                3.412.2.7                 [place]



                                                .3.865249                 MD Beckford                      [facility]

 

        2022 [facility], 1.2.840.1 [id number]

[id number] Univers



        12:30:00 23:59:00 Encounter         Sandro   63587.1.1                 it

y of



                                                3.412.2.7                 [place]



                                                .3.231247                 MD



                                                .8                      [facility]

 

        2022 Office          [facility], 1.2.840.1 [id number]

3224 Univers



        14:30:00 15:29:58 Visit           Daron DENIMiesha 82718.1.1                 ity 

of



                                                3.412.2.7                 [place]



                                                .3.888479                 MD Beckford                      [facility]

 

        2022 Office          Maricarmen, 1.2.840.1 [id number]

3224 Univers



        14:30:00 15:29:58 Visit           Daron DENIMiesha 36300.1.1                 ity 

of



                                                3.412.2.7                 [place]



                                                .3.579065                 MD Beckford                      [facility]

 

        2022 Travel                  1.2.840.1 1.2.288.555 5138

[id number] Univers



        00:00:00 00:00:00                         59964.1.1 350.1.13.41         

ity of



                                                3.412.2.7 2.2.7.3.698         [place]

[place]



                                                .3.119370 084.8           MD Beckford                      [facility]

 

        2022 Travel                  1.2.840.1 1.2.865.001 9142

[id number] Univers



        00:00:00 00:00:00                         85159.1.1 350.1.13.41         

ity of



                                                3.412.2.7 2.2.7.3.698         [place]

[place]



                                                .3.047660 084.8           MD



                                                .8                      [facility]

 

        [facility] [id number]

[id number]

                                        Univers



        16:05:00 13:15:00 Encounter         Daron Coley 29866.1.1           

      ity of



                                        Shady Painter 3.412.2.7                 Collin Orozco .3.528200                 M

D



                                                .8                      [facility]

 

        [facility] 70550

4025 

[id number]                              Univers



        16:05:00 13:15:00 Encounter         Daron Coley 44434.1.1           

      ity of



                                        Shady Painter 3.412.2.7                 Collin Orozco .3.969145                 M

D



                                                .8                      [facility]

 

        2022 Crystal Royal,   1.2.840.1 [id number]

 Univers



        00:00:00 00:00:00 Only            [place]os[place] DOSS 70220.1.1                 ity

 of



                                                3.412.2.7                 [place]



                                                .3.003845                 MD



                                                .8                      [facility]

 

        2022 Crystal Royal   1.2.840.1 [id number]

 Univers



        00:00:00 00:00:00 Only            [place]ua DOSS 04871.1.1                 ity

 of



                                                3.412.2.7                 [place]



                                                .3.367027                 MD



                                                .8                      [facility]

 

        2022 Inpatient GINA ROYAL   MOR     MDA     [id number]

76 MD



        10:08:14 11:34:23                 AKOSUA lopez

 

        2022 Inpatient GINA      MARICARMEN, MOR     MDA     [id number]

47 MD



        09:57:57 10:23:08                 DARON lopez

 

        2022 Inpatient GINA COLEY MOR     MDA     [id number]

49 MD



        02:32:13 02:47:32                 DARON lopez

 

        2022 [facility], 1.2.840.1 [id number]

[id number] Univers



        14:40:51 16:04:00 Encounter         Daron DENIMiesha 27611.1.1                 it

y of



                                                3.412.2.7                 [place]



                                                .[id number]                 MD Beckford                      [facility]

 

        2022 [facility], 1.2.840.1 [id number]

[id number] Univers



        14:40:51 16:04:00 Encounter         Daron DENIMiesha 87973.1.1                 it

y of



                                                3.412.2.7                 [place]



                                                .3Ryan546706                 MD Beckford                      [facility]

 

        2022 Office          [facility], 1.2.840.1 [id number]

[id number] Univers



        15:00:00 15:00:00 Visit           Daron DE JESUS 67025.1.1                 ity 

of



                                                3.412.2.7                 [place]



                                                .3Miesha512144                 MD Beckford                      [facility]

 

        2022 [facility], 1.2.840.1 [id number]

3326 Univers



        15:00:00 15:00:00 Visit           Daron DE JESUS 64085.1.1                 ity 

of



                                                3.412.2.7                 [place]



                                                .[id number]                 MD Beckford                      [facility]

 

        2022 [facility], Daron CHEEMA. 1.2.840.1 [id number]

[id number] 

Univers



        13:00:00 14:39:00 Encounter         Venancio Dominguez 43553.1.1      

           ity of



                                                3.412.2.7                 [place]



                                                .[id number]                 MD Beckford                      [facility]



                                                                        Cancer



                                                                        Neihart

 

        2022 [facility], Daron FMiesha 1.2.840.1 [id number]

[id number]

                                        Univers



        13:00:00 14:39:00 Encounter         Venancio Dominguez 24476.1.1      

           ity of



                                                3.412.2.7                 [place]



                                                .[id number]                 MD Beckford                      [facility]



                                                                        Cancer



                                                                        Neihart

 

        2022 [facility], 1.2.840.1 [id number]

[id number] Univers



        10:45:02 12:59:00 Encounter         Monique BURROUGHS 80509.1.1                 i

ty of



                                                3.412.2.7                 [place]



                                                .[id number]                 MD Beckford                      [facility]

 

        2022 [facility], 1.2.840.1 [id number] 109

[id number] 

Univers



        10:45:02 12:59:00 Encounter         Monique BURROUGHS 63964.1.1                 i

ty of



                                                3.412.2.7                 [place]



                                                .[id number]                 MD



                                                .8                      [facility]

 

        2022 \[facility]\"" 1.2.840.1 [id number]

[id number] Univers



        09:53:35 10:44:00 Encounter         Daron DE JESUS 97458.1.1                 it

y of



                                                3.412.2.7                 [place]



                                                .[id number]                 MD Beckford                      [facility]

 

        2022 [facility] 1.2.840.1 [id number]

[id number] Univers



        09:53:35 10:44:00 Encounter         Daron DE JESUS 77570.1.1                 it

y of



                                                3.412.2.7                 [place]



                                                .[id number]                 MD Beckford                      [facility]

 

        2022 [facility] 1.2.840.1 [id number] 109

[id number] Univers



        09:52:57 09:52:57 Encounter         Monique BURROUGHS 64731.1.1                 i

ty of



                                                3.412.2.7                 [place]



                                                .[id number]                 MD Beckford                      [facility]

 

        2022 [facility] 1.2.840.1 [id number] 109

[id number] 

Univers



        09:52:57 09:52:57 Encounter         Monique BURROUGHS 71435.1.1                 i

ty of



                                                3.412.2.7                 [place]



                                                .[id number]                 MD Beckford                      [facility]

 

        2022 [facility]          Vonnie, 1.2.840.1 [id number]

[id number] Univers



        00:00:00 00:00:00 Only            Douglas MOSS 40417.1.1                 ity

 of



                                                3.412.2.7                 [place]



                                                .3.969374                 MD Beckford                      [facility]

 

        2022 Travel                  1.2.840.1 1.2.700.610 4651

[id number] Univers



        00:00:00 00:00:00                         52552.1.1 350.1.13.41         

ity of



                                                3.412.2.7 2.2.7.3.698         [place]

[place]



                                                .3.304319 084.8           MD Beckford                      [facility]

 

        2022 Crystal Barrera, 1.2.840.1 [id number]

[id number] Univers



        00:00:00 00:00:00 Only            Monique BURROUGHS 20235.1.1                 ity

 of



                                                3.412.2.7                 [place]



                                                .3.579762                 MD Beckford                      [facility]

 

        2022 Crystal Shankar, 1.2.840.1 [id number]

[id number] Univers



        00:00:00 00:00:00 Only            Douglas MOSS 47397.1.1                 ity

 of



                                                3.412.2.7                 [place]



                                                .3.331478                 MD Beckford                      [facility]

 

        2022 Travel                  1.2.840.1 1.2.831.550 5637

[id number] Univers



        00:00:00 00:00:00                         32796.1.1 350.1.13.41         

ity of



                                                3.412.2.7 2.2.7.3.698         [place]

[place]



                                                .3.366206 084.8           MD Beckford                      [facility]

 

        2022 Crystal Barrera 1.2.840.1 [id number]

[id number] Univers



        00:00:00 00:00:00 Only            Monique BURROUGHS 99987.1.1                 ity

 of



                                                3.412.2.7                 [place]



                                                .3.174915                 MD Beckford                      [facility]

 

        2022 Crystal          Hiral, 1.2.840.1 [id number] 1091

[id number] Univers



        00:00:00 00:00:00 Only            Monique BURROUGHS 03368.1.1                 ity

 of



                                                3.412.2.7                 [place]



                                                .3.982030                 MD



                                                .8                      [facility]

 

        2022 Crystal          Hiral, 1.2.840.1 [id number] 1091

[id number] Univers



        00:00:00 00:00:00 Only            Monique BURROUGHS 25306.1.1                 ity

 of



                                                3.412.2.7                 [place]



                                                .3.390894                 MD



                                                .8                      [facility]

 

        2022-04-15 2022-04-15 \[facility] [place] [facility]\"" Daron CHEEMA. 1.2.840.1 [id number]

[id number] 

Univers



        12:47:00 23:59:00 Encounter         Vel Abdul 03856.1.1      

           ity of



                                                3.412.2.7                 [place]



                                                .3.584546                 MD



                                                .8                      [facility]

 

        2022-04-15 2022-04-15 [facility] Daron CHEEMA. 1.2.840.1 [id number]

[id number]

                                        Univers



        12:47:00 23:59:00 Encounter         Vel Abdul 13467.1.1      

           ity of



                                                3.412.2.7                 [place]



                                                .3.120919                 MD



                                                .8                      [facility]

 

        2022-04-15 2022-04-15 [facility], 1.2.840.1 [id number]

[id number] Univers



        10:26:02 12:46:00 Encounter         Daron FMiesha 40227.1.1                 it

y of



                                                3.412.2.7                 [place]



                                                .3.309541                 MD



                                                .8                      [facility]

 

        2022-04-15 2022-04-15 [facility], 1.2.840.1 [id number]

[id number] Univers



        10:26:02 12:46:00 Encounter         Daron FMiesha 02693.1.1                 it

y of



                                                3.412.2.7                 [place]



                                                .3.559175                 MD



                                                .8                      [facility]

 

        2022-04-15 2022-04-15 [facility] 1.2.840.1 [id number] 1

[id number] 

Univers



        08:45:00 10:25:00 Encounter         , Smiley  10087.1.1                 it

y of



                                                3.412.2.7                 [place]



                                                .3.816907                 MD



                                                .8                      [facility]

 

        2022-04-15 2022-04-15 [facility] 1.2.840.1 [id number]

[id number] 

Univers



        08:45:00 10:25:00 Encounter         , Smiley  69753.1.1                 it

y of



                                                3.412.2.7                 [place]



                                                .3.838280                 MD



                                                .8                      [facility]

 

        2022-04-15 2022-04-15 Travel                  1.2.840.1 1.2.769.628 6127

[id number] Univers



        00:00:00 00:00:00                         42042.1.1 350.1.13.41         

ity of



                                                3.412.2.7 2.2.7.3.698         [place]

[place]



                                                .3.786755 084.Vickey Beckford                      [facility]

 

        2022-04-15 2022-04-15 Travel                  1.2.840.1 1.2.387.737 9250

[id number] Univers



        00:00:00 00:00:00                         93575.1.1 350.1.13.41         

ity of



                                                3.412.2.7 2.2.7.3.698         [place]

[place]



                                                .3.843966 084.8           MD Beckford                      [facility]

 

        2022 Crystal Barrera 1.2.840.1 [id number] 1091

[id number] Univers



        00:00:00 00:00:00 Only            Monique BURROUGHS 70237.1.1                 ity

 of



                                                3.412.2.7                 [place]



                                                .3.300409                 MD Beckford                      [facility]

 

        2022 Crystal Barrera 1.2.840.1 [id number] 1091

[id number] Univers



        00:00:00 00:00:00 Only            Monique BURROUGHS 57955.1.1                 ity

 of



                                                3.412.2.7                 [place]



                                                .3.567896                 MD



                                                .8                      [facility]

 

        2022 Crystal Barrera, 1.2.840.1 [id number] 1091

[id number] Univers



        00:00:00 00:00:00 Only            Monique BURROUGHS 10674.1.1                 ity

 of



                                                3.412.2.7                 [place]



                                                .3.729868                 MD



                                                .8                      [facility]

 

        2022 Orders          Hiral 1.2.840.1 [id number] 1091

[id number] Univers



        00:00:00 00:00:00 Only            Monique BURROUGHS 50456.1.1                 ity

 of



                                                3.412.2.7                 [place]



                                                .3.028135                 MD



                                                .8                      [facility]

 

        2022 Orders          Valeriano 1.2.840.1 [id number]

[id number] Univers



        00:00:00 00:00:00 Only            , Smiley  36700.1.1                 ity 

of



                                                3.412.2.7                 [place]



                                                .3.857132                 MD



                                                .8                      [facility]

 

        2022 Crystal Bal 1.2.840.1 [id number] 10

[id number] Univers



        00:00:00 00:00:00 Only            , Smiley  40080.1.1                 ity 

of



                                                3.412.2.7                 [place]



                                                .3.912383                 MD



                                                .8                      [facility]

 

        2022 [facility]         Daron Coley 1.2.840.1 [id number]

[id number] 

[facility]



        12:53:52 23:59:00 Encounter         Severino Hahn 82360.1.1             

    ity of



                                                3.412.2.7                 [place]



                                                .3.237819                 MD



                                                .8                      [facility]

 

        2022 [facility]      Daron Coley 1.2.840.1 [id number]

[id number]

                                        [facility]



        12:53:52 23:59:00 Encounter         Severino Hahn 04400.1.1             

    ity of



                                                3.412.2.7                 [place]



                                                .3.306391                 MD Beckford                      [facility]

 

        2022 [facility] 1.2.840.1 [id number] 109

[id number] Univers



        10:31:39 12:52:00 Encounter         Monique BURROUGHS 57215.1.1                 i

ty of



                                                3.412.2.7                 [place]



                                                .3.042289                 MD Beckford                      [facility]

 

        2022 [facility] 1.2.840.1 [id number] 109

[id number] 

Univers



        10:31:39 12:52:00 Encounter         Monique BURROUGHS 82567.1.1                 i

ty of



                                                3.412.2.7                 [place]



                                                .3.327649                 MD Beckford                      [facility]

 

        2022 [facility] 1.2.840.1 [id number] 109

[id number] Univers



        10:16:20 10:30:00 Encounter         Monique BURROUGHS 38902.1.1                 i

ty of



                                                3.412.2.7                 [place]



                                                .3.458484                 MD Beckford                      [facility]

 

        2022 [facility], 1.2.840.1 [id number] 109

[id number] 

Univers



        10:16:20 10:30:00 Encounter         Monique BURROUGHS 62939.1.1                 i

ty of



                                                3.412.2.7                 [place]



                                                .3.948947                 MD Beckford                      [facility]

 

        2022 Crystal Heredia 1.2.840.1 [id number]

[id number] Univers



        00:00:00 00:00:00 Only            Lovette 15164.1.1                 ity 

of



                                        Persaud   3.412.2.7                 [place]



                                                .3.471144                 MD Beckford                      [facility]

 

        2022 Iain Heredia 1.2.840.1 [id number] 10

[id number] 

Univers



        00:00:00 00:00:00 ion             Lovette 73359.1.1                 ity 

of



                                        Persaud   3.412.2.7                 [place]



                                                .3.455991                 MD Beckford                      [facility]

 

        2022 Telephone         Yan, 1.2.840.1 [id number]

[id number] Univers



        00:00:00 00:00:00                 Lovette 74640.1.1                 ity 

of



                                        Persaud   3.412.2.7                 [place]



                                                .3.395578                 MD



                                                .8                      [facility]

 

        2022 Travel                  1.2.840.1 1.2.063.190 7656

[id number] Univers



        00:00:00 00:00:00                         74743.1.1 350.1.13.41         

ity of



                                                3.412.2.7 2.2.7.3.698         Te

[place]



                                                .3.161944 084.8           MD



                                                .8                      [facility]

 

        2022 Crystal Barrera, 1.2.840.1 [id number] 1091

[id number] Univers



        00:00:00 00:00:00 Only            Monique M 49427.1.1                 ity

 of



                                                3.412.2.7                 [place]



                                                .3.801069                 MD Beckford                      [facility]

 

        2022 Orders          Yan, 1.2.840.1 [id number]

[id number] Univers



        00:00:00 00:00:00 Only            Lovette 79145.1.1                 ity 

of



                                        Persaud   3.412.2.7                 [place]



                                                .3.124006                 MD Beckford                      [facility] [facility]

 

        2022 Documentat         Yan, 1.2.840.1 [id number]

[id number] 

Univers



        00:00:00 00:00:00 ion             Lovette 53352.1.1                 ity 

of



                                        Persaud   3.412.2.7                 [place]



                                                .3.128672                 MD



                                                .8                      [facility]

 

        2022 Telephone         Yan, 1.2.840.1 [id number]

[id number] Univers



        00:00:00 00:00:00                 Lovette 70658.1.1                 ity 

of



                                        Persaud   3.412.2.7                 [place]



                                                .3.283129                 MD Beckford                      [facility]

 

        2022 Travel                  1.2.840.1 1.2.592.059 9991

[id number] Univers



        00:00:00 00:00:00                         66125.1.1 350.1.13.41         

ity of



                                                3.412.2.7 2.2.7.3.698         [place]

[place]



                                                .3.074367 084.8           MD



                                                .8                      [facility]

 

        2022 Crystal Barrera, 1.2.840.1 [id number]

[id number] Univers



        00:00:00 00:00:00 Only            Monique BURROUGHS 17659.1.1                 ity

 of



                                                3.412.2.7                 [place]



                                                .3.126487                 MD Beckford                      [facility]

 

        2022 Documentat         Facciolli, 1.2.840.1 [id number] 

[id number] 

Univers



        00:00:00 00:00:00 hector FALCON 07087.1.1                 ity 

of



                                                3.412.2.7                 [place]



                                                .3.067621                 MD Beckford                      [facility]

 

        2022 Documentat         Facciolli, 1.2.840.1 [id number] 

[id number] 

Univers



        00:00:00 00:00:00 hector FALCON 77554.1.1                 ity 

of



                                                3.412.2.7                 [place]



                                                .3.477218                 MD Beckford                      [facility]

 

        2022 Crystal Barrera 1.2.840.1 [id number] 1091

[id number] Univers



        00:00:00 00:00:00 Only            Monique BURROUGHS 58107.1.1                 ity

 of



                                                3.412.2.7                 [place]



                                                .3.755888                 MD Beckford                      [facility]

 

        2022 Crystal Barrera 1.2.840.1 [id number] 1091

[id number] Univers



        00:00:00 00:00:00 Only            Monique BURROUGHS 42797.1.1                 ity

 of



                                                3.412.2.7                 [place]



                                                .3.376905                 MD Beckford                      [facility]

 

        2022 Kade Urias  1.2.840.1 [id number]

3446 Univers



        00:00:00 00:00:00                 Milly 07178.1.1                 ity 

of



                                                3.412.2.7                 [place]



                                                .3.662465                 MD



                                                .8                      [facility]

 

        2022 Kade Urias,  1.2.840.1 [id number]

3446 Univers



        00:00:00 00:00:00                 Milly 36466.1.1                 ity 

of



                                                3.412.2.7                 [place]



                                                .3.636704                 MD



                                                .8                      [facility]

 

        2022 [facility]         Daron Coley 1.2.840.1 [id number]

[id number] 

Univers



        12:40:00 23:59:00 Encounter         Kymberly Choi 56557.1.1          

       ity of



                                                3.412.2.7                 [place]



                                                .3.971758                 MD



                                                .8                      [facility]

 

        2022 [facility]      MaricarmenDaron 1.2.840.1 [id number]

[id number]

                                        Univers



        12:40:00 23:59:00 Encounter         Kymberly Choi 62670.1.1          

       ity of



                                                3.412.2.7                 [place]



                                                .3.429583                 MD Beckford                      [facility]

 

        2022 Office          Maricarmen 1.2.840.1 [id number]

8016 Univers



        14:00:00 14:28:02 Visit           Daron DE JESUS 58866.1.1                 ity 

of



                                                3.412.2.7                 [place]



                                                .3.293894                 MD Beckford                      [facility]

 

        2022 Office        Maricarmen 1.2.840.1 [id number]

8016 Univers



        14:00:00 14:28:02 Visit           Daron DE JESUS 87936.1.1                 ity 

of



                                                3.412.2.7                 [place]



                                                .3.972265                 MD Beckford                      [facility]

 

        2022 [facility], 1.2.840.1 [id number]

16126 Univers



        10:37:46 12:39:00 Encounter         Daron F. 08587.1.1                 it

y of



                                                3.412.2.7                 [place]



                                                .3.099691                 MD Beckford                      [facility]

 

        2022 [facility], 1.2.840.1 [id number]

[id number] Univers



        10:37:46 12:39:00 Encounter         Daron F. 91494.1.1                 it

y of



                                                3.412.2.7                 [place]



                                                .3.276196                 MD Beckford                      [facility]

 

        2022 [facility],  1.2.840.1 [id number]

[id number] Univers



        09:46:36 10:36:00 Encounter         Milly 94860.1.1                 it

y of



                                                3.412.2.7                 [place]



                                                .3.730807                 MD Beckford                      [facility]

 

        2022 [facility],  1.2.840.1 [id number]

[id number] Univers



        09:46:36 10:36:00 Encounter         Milly 82956.1.1                 it

y of



                                                3.412.2.7                 [place]



                                                .3.915135                 MD Beckford                      [facility]

 

        2022 Travel                  1.2.840.1 1.2.532.310 9527

[id number] Univers



        00:00:00 00:00:00                         52239.1.1 350.1.13.41         

ity of



                                                3.412.2.7 2.2.7.3.698         [place]

[place]



                                                .3.015382 084.8           MD Beckford                      [facility]

 

        2022 [facility]          Hiral, 1.2.840.1 [id number] 1090

[id number] Univers



        00:00:00 00:00:00 Only            Monique BURROUGHS 57957.1.1                 ity

 of



                                                3.412.2.7                 [place]



                                                .3.023247                 MD Beckford                      [facility]

 

        2022 Orders          Hiral, 1.2.840.1 [id number] 1090

[id number] Univers



        00:00:00 00:00:00 Only            Monique BURROUGHS 15798.1.1                 ity

 of



                                                3.412.2.7                 [place]



                                                .3.677522                 MD



                                                .8                      [facility]

 

        2022 Travel                  1.2.840.1 1.2.022.030 5995

[id number] Univers



        00:00:00 00:00:00                         93455.1.1 350.1.13.41         

ity of



                                                3.412.2.7 2.2.7.3.698         [place]

[place]



                                                .3.603089 084.8           MD



                                                .8                      [facility]

 

        2022 Kade Medeiros, 1.2.840.1 [id number]

7382 Univers



        00:00:00 00:00:00                 [place]  37079.1.1                 ity 

of



                                                3.412.2.7                 [place]



                                                .3.117994                 MD



                                                .8                      [facility]

 

        2022 Crystal Reyez, 1.2.840.1 [id number]

4642 Univers



        00:00:00 00:00:00 Only            Abiola CRISOSTOMO 86866.1.1                 it

y of



                                                3.412.2.7                 [place]



                                                .3.671387                 MD



                                                .8                      [facility]

 

        2022 Kade Medeiros, 1.2.840.1 [id number]

7382 Univers



        00:00:00 00:00:00                 [place]  85880.1.1                 ity 

of



                                                3.412.2.7                 [place]



                                                .3.037951                 MD



                                                .8                      [facility]

 

        2022 Crystal Reyez, 1.2.840.1 [id number]

4642 Univers



        00:00:00 00:00:00 Only            Abiola CRISOSTOMO 13419.1.1                 it

y of



                                                3.412.2.7                 [place]



                                                .3.992900                 MD Beckford                      [facility]

 

        2022 [facility], Daron CHEEMA. 1.2.840.1 [id number]

[id number] 

Univers



        12:34:36 23:59:00 Encounter         Haley Rogel 73545.1.1        

         ity of



                                                3.412.2.7                 [place]



                                                .3.431484                 MD Beckford                      [facility]

 

        2022 [facility] Daron F. 1.2.840.1 [id number]

[id number]

                                        Univers



        12:34:36 23:59:00 Encounter         Haley Rogel 32765.1.1        

         ity of



                                                3.412.2.7                 [place]



                                                .3.077084                 MD



                                                .8                      [facility]

 

        2022 [facility], 1.2.840.1 [id number]

[id number] Univers



        10:04:29 12:33:00 Encounter         Daron F. 00455.1.1                 it

y of



                                                3.412.2.7                 [place]



                                                .3.398351                 MD



                                                .8                      [facility]

 

        2022 [facility], 1.2.840.1 [id number]

14339 Univers



        10:04:29 12:33:00 Encounter         Daron F. 26393.1.1                 it

y of



                                                3.412.2.7                 [place]



                                                .3Miesha563222                 MD Beckford                      [facility]

 

        2022 [facility] 1.2.840.1 [id number]

[id number] Univers



        09:00:00 10:03:00 Encounter         Monique BURROUGHS 85945.1.1                 i

ty of



                                                3.412.2.7                 [place]



                                                .3Miesha788504                 MD Beckford                      [facility]

 

        2022 [facility] 1.2.840.1 [id number]

[id number] 

Univers



        09:00:00 10:03:00 Encounter         Monique BURROUGHS 35231.1.1                 i

ty of



                                                3.412.2.7                 [place]



                                                .[id number]                 MD Beckford                      [facility]

 

        2022 Travel                  1.2.840.1 1.2.878.418 0903

[id number] Univers



        00:00:00 00:00:00                         74390.1.1 350.1.13.41         

ity of



                                                3.412.2.7 2.2.7.3.698         [place]

[place]



                                                .3.824394 084.8           MD



                                                .8                      [facility]

 

        2022 Travel                  1.2.840.1 1.2.142.678 4675

[id number] Univers



        00:00:00 00:00:00                         27971.1.1 350.1.13.41         

ity of



                                                3.412.2.7 2.2.7.3.698         [place]

[place]



                                                .3.249395 084.8           MD



                                                .8                      [facility]

 

        2022 [facility] 1.2.840.1 [id number]

12158 Methodi



        14:10:41 23:59:00 Encounter         Shady Graf50.1.1         502     st



                                                3.430.2.7                 Hospit

a



                                                .3.833742                 l



                                                .8                      

 

        2022 [facility] 1.2.840.1 [id number]

38451 Methodi



        14:10:41 23:59:00 Encounter         Shady Graf50.1.1         502     st



                                                3.430.2.7                 Hospit

a



                                                .3.491128                 l



                                                .8                      

 

        2022 Office          [facility] 1.2.840.1 [id number]

8353 Methodi



        15:30:00 16:20:29 Visit           Shady Donis.1.1         762     st



                                                3.430.2.7                 Hospit

a



                                                .3.291285                 l



                                                .8                      

 

        2022 Office          Perez, 1.2.840.1 [id number]

8353 Methodi



        15:30:00 16:20:29 Visit           Shady Donis.1.1         762     st



                                                3.430.2.7                 Hospit

a



                                                .3.830491                 l



                                                .8                      

 

        2022 Orders          Reyez, 1.2.840.1 [id number]

6965 [facility]



        00:00:00 00:00:00 Only            Abiola ORTEZ. 01922.1.1                 it

y of



                                                3.412.2.7                 [place]



                                                .3.085476                 MD



                                                .8                      [facility]

 

        2022 Travel                  1.2.840.1 1.2.571.733 1968

[id number] Methodi



        00:00:00 00:00:00                         87829.1.1 350.1.13.43 493     

st



                                                3.430.2.7 0.2.7.3.698         Ho

spita



                                                .3.062351 084.8           l



                                                .8                      

 

        2022 Orders          Brush, 1.2.840.1 [id number]

1188 Methodi



        00:00:00 00:00:00 Only            Carmen  24526.1.1         519     st



                                                3.430.2.7                 Hospit

a



                                                .3.537845                 l



                                                .8                      

 

        2022 Orders          Reyez, 1.2.840.1 [id number]

6965 Univers



        00:00:00 00:00:00 Only            Abiola ORTEZ. 00933.1.1                 it

y of



                                                3.412.2.7                 [place]



                                                .3.826210                 MD



                                                .8                      [facility]

 

        2022 Travel                  1.2.840.1 1.2.801.106 3345

[id number] Methodi



        00:00:00 00:00:00                         52173.1.1 350.1.13.43 493     

st



                                                3.430.2.7 0.2.7.3.698         Ho

spita



                                                .3.655598 084.8           l



                                                .8                      

 

        2022 Orders          Brush, 1.2.840.1 [id number]

1188 Methodi



        00:00:00 00:00:00 Only            Carmen  22240.1.1         519     st



                                                3.430.2.7                 Hospit

a



                                                .3.506162                 kori



                                                .8                      

 

        2022-03-10 2022-03-10 [facility], Daron CHEEMA. 1.2.840.1 [id number]

[id number] 

Univers



        12:57:40 23:59:00 Encounter         Reyes, Marialuisa KORI 97153.1.1       

          ity of



                                        Marium, Marlene 3.412.2.7                

 [place]



                                                .3.233529                 MD



                                                .8                      [facility]

 

        2022-03-10 2022-03-10 [facility], Daron CHEEMA. 1.2.840.1 [id number]

[id number]

                                        Univers



        12:57:40 23:59:00 Encounter         ReyesKiyaAdriana L 68948.1.1       

          ity of



                                        [place], Marlene 3.412.2.7                

 [place]



                                                .3.963201                 MD



                                                .8                      [facility]

 

        2022-03-10 2022-03-10 [facility], 1.2.840.1 [id number]

03705 Univers



        09:55:44 12:56:00 Encounter         Daron F. 78027.1.1                 it

y of



                                                3.412.2.7                 [place]



                                                .3.847678                 MD



                                                .8                      [facility]

 

        2022-03-10 2022-03-10 [facility], 1.2.840.1 [id number]

[id number] Univers



        09:55:44 12:56:00 Encounter         Daron F. 52324.1.1                 it

y of



                                                3.412.2.7                 [place]



                                                .3Miesha146166                 MD



                                                .8                      [facility]



                                                                        Cancer



                                                                        [place]

 

        2022-03-10 2022-03-10 [facility], 1.2.840.1 [id number]

08243 Univers



        07:45:00 09:54:00 Encounter         Abiola ORTEZ. 45699.1.1                 

ity of



                                                3.412.2.7                 [place]



                                                .3Miesha365619                 MD



                                                .8                      [facility]

 

        2022-03-10 2022-03-10 [facility], 1.2.840.1 [id number]

[id number] Univers



        07:45:00 09:54:00 Encounter         Abiola R. 87940.1.1                 

ity of



                                                3.412.2.7                 [place]



                                                .3.470393                 MD



                                                .8                      [facility]

 

        2022-03-10 2022-03-10 Crystal Barrera, 1.2.840.1 [id number]

[id number] Univers



        00:00:00 00:00:00 Only            Monique BURROUGHS 24231.1.1                 ity

 of



                                                3.412.2.7                 [place]



                                                .3.307943                 MD



                                                .8                      [facility]

 

        2022-03-10 2022-03-10 Travel                  1.2.840.1 1.2.513.382 9697

[id number] Univers



        00:00:00 00:00:00                         51397.1.1 350.1.13.41         

ity of



                                                3.412.2.7 2.2.7.3.698         [place]

[place]



                                                .3.740815 084.8           MD Beckford                      [facility]

 

        2022-03-10 2022-03-10 Crystal Barrera, 1.2.840.1 [id number] 1090

[id number] Univers



        00:00:00 00:00:00 Only            Monique BURROUGHS 15394.1.1                 ity

 of



                                                3.412.2.7                 [place]



                                                .3.564354                 MD Beckford                      [facility]

 

        2022-03-10 2022-03-10 Travel                  1.2.840.1 1.2.034.819 7780

[id number] Univers



        00:00:00 00:00:00                         73732.1.1 350.1.13.41         

ity of



                                                3.412.2.7 2.2.7.3.698         [place]

[place]



                                                .3.107248 084.8           MD Beckford                      [facility]

 

        2022 Orders          Reyez, 1.2.840.1 [id number]

3931 [facility]



        00:00:00 00:00:00 Only            Abiola CRISOSTOMO 85816.1.1                 it

y of



                                                3.412.2.7                 [place]



                                                .3.153572                 MD Beckford                      [facility]

 

        2022 Orders          Reyez, 1.2.840.1 [id number]

3098 Univers



        00:00:00 00:00:00 Only            Abiola CRISOSTOMO 60527.1.1                 it

y of



                                                3.412.2.7                 [place]



                                                .3.513127                 MD



                                                .8                      [facility]

 

        2022 [facility], 1.2.840.1 [id number]

3931 Univers



        00:00:00 00:00:00 Only            Abiola CRISOSTOMO 46013.1.1                 it

y of



                                                3.412.2.7                 [place]



                                                .3.931698                 MD



                                                .8                      [facility]

 

        2022 [facility], 1.2.840.1 [id number]

3098 Univers



        00:00:00 00:00:00 Only            Abiola CRISOSTOMO 48387.1.1                 it

y of



                                                3.412.2.7                 [place]



                                                .3.729660                 MD Beckford                      [facility]

 

        [facility] [id number]

[id number] 

Univers



        07:38:53 23:59:00 Lisbeth Barrientos 85611.1.1     

            ity of



                                                3.412.2.7                 [place]



                                                .3.240954                 MD



                                                .8                      [facility]

 

        [id number]

39 [id number]

                                        Univers



        07:38:53 23:59:00 Lisbeth Barrientos 27772.1.1     

            ity of



                                                3.412.2.7                 [place]



                                                .[id number]                 MD



                                                .8                      [facility]

 

        2022 \[facility] [place] [facility]\"" 1.2.840.1 [id number]

[id number] Univers



        07:37:41 07:37:41 Ester DE JESUS 95098.1.1                 it

y of



                                                3.412.2.7                 [place]



                                                .3.174964                 MD



                                                .8                      [facility]

 

        2022 [facility], 1.2.840.1 [id number]

[id number] Univers



        07:37:41 07:37:41 Encounter         Daron DE JESUS 35760.1.1                 it

y of



                                                3.412.2.7                 [place]



                                                .3.119871                 MD Beckford                      [facility]

 

        2022 [facility], 1.2.840.1 [id number] 108

[id number] Univers



        06:45:00 07:36:00 Encounter         Monique BURROUGHS 42167.1.1                 i

ty of



                                                3.412.2.7                 [place]



                                                .3.405607                 MD Beckford                      [facility]

 

        2022 [facility], 1.2.840.1 [id number] 108

[id number] 

Univers



        06:45:00 07:36:00 Encounter         Monique BURROUGHS 21082.1.1                 i

ty of



                                                3.412.2.7                 [place]



                                                .3.215710                 MD Beckford                      [facility]

 

        2022 Travel                  1.2.840.1 1.2.234.517 2153

[id number] Univers



        00:00:00 00:00:00                         22960.1.1 350.1.13.41         

ity of



                                                3.412.2.7 2.2.7.3.698         [place]

[place]



                                                .3.511556 084.Vickey Beckford                      [facility]

 

        2022 Travel                  1.2.840.1 1.2.427.207 0455

[id number] Univers



        00:00:00 00:00:00                         11976.1.1 350.1.13.41         

ity of



                                                3.412.2.7 2.2.7.3.698         [place]

[place]



                                                .3.928030 084.8           MD Beckford                      [facility]

 

        2022 [facility] 1.2.840.1 [id number] 1089

[id number] Univers



        00:00:00 00:00:00 Only            Monique BURROUGHS 18932.1.1                 ity

 of



                                                3.412.2.7                 [place]



                                                .3.128174                 MD Beckford                      [facility]

 

        2022 [facility]          Hiral, 1.2.840.1 [id number] 1089

[id number] Univers



        00:00:00 00:00:00 Only            Monique BURROUGHS 81255.1.1                 ity

 of



                                                3.412.2.7                 [place]



                                                .3.420099                 MD



                                                .8                      [facility]

 

        2022 [facility], 1.2.840.1 [id number]

[id number] Univers



        09:55:44 23:59:00 Encounter         Daron DE JESUS 06260.1.1                 it

y of



                                                3.412.2.7                 [place]



                                                .3.512665                 MD



                                                .8                      [facility]

 

        2022 [facility], 1.2.840.1 [id number]

[id number] Univers



        09:55:44 23:59:00 Encounter         Daron DE JESUS 34631.1.1                 it

y of



                                                3.412.2.7                 [place]



                                                .3.243367                 MD



                                                .8                      [facility]

 

        2022 [facility], 1.2.840.1 [id number]

[id number] Univers



        16:00:00 16:30:00 ne              Daron F. 36180.1.1                 ity 

of



                                                3.412.2.7                 [place]



                                                .3.468868                 MD



                                                .8                      [facility]

 

        2022 [facility], 1.2.840.1 [id number] 108

[id number] 

Univers



        16:00:00 16:30:00 ne              Daron F. 30495.1.1                 ity 

of



                                                3.412.2.7                 [place]



                                                .3.280563                 MD



                                                .8                      [facility]

 

        2022 [facility],  1.2.840.1 [id number]

05310 Univers



        09:30:51 09:54:00 Encounter         Milly 05358.1.1                 it

y of



                                                3.412.2.7                 [place]



                                                .3.125354                 MD



                                                .8                      [facility]

 

        2022 [facility],  1.2.840.1 [id number]

[id number] Univers



        09:30:51 09:54:00 Encounter         Milly 23501.1.1                 it

y of



                                                3.412.2.7                 [place]



                                                .3.144290                 MD Beckford                      [facility]

 

        2022 [facility], 1.2.840.1 [id number]

[id number] Univers



        00:00:00 00:00:00 Only            Monique BURROUGHS 20903.1.1                 ity

 of



                                                3.412.2.7                 [place]



                                                .3.888379                 MD Beckford                      [facility]

 

        2022 Travel                  1.2.840.1 1.2.967.508 0007

[id number] Univers



        00:00:00 00:00:00                         33241.1.1 350.1.13.41         

ity of



                                                3.412.2.7 2.2.7.3.698         [place]

[place]



                                                .3.150520 084.8           MD Beckford                      [facility]

 

        2022 [facility], 1.2.840.1 [id number] 1089

[id number] Univers



        00:00:00 00:00:00 Only            Monique BURROUGHS 04088.1.1                 ity

 of



                                                3.412.2.7                 [place]



                                                .3.599275                 MD Beckford                      [facility]

 

        2022 Travel                  1.2.840.1 1.2.206.156 2363

[id number] Univers



        00:00:00 00:00:00                         26952.1.1 350.1.13.41         

ity of



                                                3.412.2.7 2.2.7.3.698         [place]

[place]



                                                .3.707147 084.8           MD Beckford                      [facility]

 

        2022 [facility]         Daron Coley 1.2.840.1 [id number]

[id number] 

Univers



        14:55:52 23:59:00 Tania Eller 90773.1.1         

        ity of



                                                3.412.2.7                 [place]



                                                .3.167600                 MD



                                                .8                      [facility]

 

        [facility] 1.2.840.1 [id number]

[id number]

                                        Univers



        14:55:52 23:59:00 Encounter         Tania Harley 41396.1.1         

        ity of



                                                3.412.2.7                 [place]



                                                .3.259165                 MD



                                                .8                      [facility]

 

        2022 [facility], 1.2.840.1 [id number]

[id number] Univers



        10:39:43 14:54:00 Encounter         Daron DE JESUS 89086.1.1                 it

y of



                                                3.412.2.7                 [place]



                                                .3Miesha707435                 MD



                                                .8                      [facility]

 

        2022 [facility], 1.2.840.1 [id number]

[id number] Univers



        10:39:43 14:54:00 Encounter         Daron DE JESUS 79359.1.1                 it

y of



                                                3.412.2.7                 [place]



                                                .3.720307                 MD



                                                .8                      [facility]

 

        2022 [facility], 1.2.840.1 [id number]

2723 Univers



        13:00:00 14:13:04 Visit           Daron DE JESUS 57434.1.1                 ity 

of



                                                3.412.2.7                 [place]



                                                .3.838608                 MD Beckford                      [facility]

 

        2022 [facility] 1.2.840.1 [id number]

2723 Univers



        13:00:00 14:13:04 Visit           Daron DE JESUS 08033.1.1                 ity 

of



                                                3.412.2.7                 [place]



                                                .3Miesha479262                 MD



                                                .8                      [facility]

 

        2022 [facility] 1.2.840.1 [id number]

[id number] Univers



        09:45:00 10:38:00 Encounter         Monique BURROUGHS 57338.1.1                 i

ty of



                                                3.412.2.7                 [place]



                                                .[id number]                 MD Beckford                      [facility]

 

        2022 [facility] GINA Rochaelle, 1.2.840.1 [id number]

[id number] 

Univers



        09:45:00 10:38:00 Encounter         Monique BURROUGHS 62604.1.1                 i

ty of



                                                3.412.2.7                 [place]



                                                .3.651696                 MD Beckford                      [facility]

 

        2022 [facility],  1.2.840.1 [id number]

[id number] Univers



        09:15:00 09:44:00 Encounter         Milly 19011.1.1                 it

y of



                                                3.412.2.7                 [place]



                                                .3.249664                 MD



                                                .8                      [facility]

 

        2022 [facility]      Urias,  1.2.840.1 [id number]

[id number] Univers



        09:15:00 09:44:00 Encounter         Milly 15662.1.1                 it

y of



                                                3.412.2.7                 [place]



                                                .3.984558                 MD Beckford                      [facility]

 

        2022 Travel                  1.2.840.1 1.2.207.815 9571

[id number] Univers



        00:00:00 00:00:00                         19232.1.1 350.1.13.41         

ity of



                                                3.412.2.7 2.2.7.3.698         [place]

[place]



                                                .3.976943 084.8           MD Beckford                      [facility]

 

        2022 Travel                  1.2.840.1 1.2.191.195 5665

[id number] Univers



        00:00:00 00:00:00                         52375.1.1 350.1.13.41         

ity of



                                                3.412.2.7 2.2.7.3.698         [place]

[place]



                                                .3.372239 084.8           MD Beckford                      [facility]

 

        2022 Orders          Dereje, 1.2.840.1 [id number]

5549 Univers



        00:00:00 00:00:00 Only            Abiola R. 76323.1.1                 it

y of



                                                3.412.2.7                 [place]



                                                .3.470876                 MD



                                                .8                      [facility]

 

        2022 Orders          Reyez, 1.2.840.1 [id number]

5549 Univers



        00:00:00 00:00:00 Only            Abiola CRISOSTOMO 35035.1.1                 it

y of



                                                3.412.2.7                 [place]



                                                .3.431770                 MD



                                                .8                      [facility]

 

        2022 Orders          Hiral, 1.2.840.1 [id number] 1089

[id number] Univers



        00:00:00 00:00:00 Only            Monique BURROUGHS 93803.1.1                 ity

 of



                                                3.412.2.7                 [place]



                                                .3.992146                 MD



                                                .8                      [facility]

 

        2022 Orders          Hiral, 1.2.840.1 [id number] 1089

[id number] Univers



        00:00:00 00:00:00 Only            Monique BURROUGHS 05592.1.1                 ity

 of



                                                3.412.2.7                 [place]



                                                .3.825408                 MD



                                                .8                      [facility]

 

        2022 \[facility]\"" 1.2.840.1 [id number]

[id number] Univers



        11:00:00 23:59:00 Encounter         Daron F. 72296.1.1                 it

y of



                                                3.412.2.7                 [place]



                                                .3.539359                 MD Beckford                      [facility]

 

        2022 [facility] 1.2.840.1 [id number]

[id number] Univers



        11:00:00 23:59:00 Encounter         Daron F. 62826.1.1                 it

y of



                                                3.412.2.7                 [place]



                                                .3.396714                 MD



                                                .8                      [facility]

 

        2022 Office          Jing Altman 1.2.840.1 [id number]

[id number] Univers



        10:15:00 11:22:37 Visit                   70911.1.1                 ity 

of



                                                3.412.2.7                 [place]



                                                .3.462622                 MD Beckford                      [facility]

 

        2022 Office  Jing Rausch 1.2.840.1 [id number]

[id number] 

Univers



        10:15:00 11:22:37 Visit                   06764.1.1                 ity 

of



                                                3.412.2.7                 [place]



                                                .3.115169                 MD Beckford                      [facility]

 

        2022 Travel                  1.2.840.1 1.2.141.765 5695

[id number] Univers



        00:00:00 00:00:00                         61533.1.1 350.1.13.41         

ity of



                                                3.412.2.7 2.2.7.3.698         [place]

[place]



                                                .3.372918 084.Vickey Beckford                      [facility]

 

        2022 Travel                  1.2.840.1 1.2.257.320 5700

[id number] Univers



        00:00:00 00:00:00                         87804.1.1 350.1.13.41         

ity of



                                                3.412.2.7 2.2.7.3.698         [place]

[place]



                                                .3.105454 084.Vickey Beckford                      [facility]

 

        2022 [facility], 1.2.840.1 [id number] 1089

[id number] Univers



        00:00:00 00:00:00 Only            Monique BURROUGHS 94189.1.1                 ity

 of



                                                3.412.2.7                 [place]



                                                .3.788208                 MD Beckford                      [facility]

 

        2022 [facility], 1.2.840.1 [id number] 1089

[id number] Univers



        00:00:00 00:00:00 Only            Monique BURROUGHS 31461.1.1                 ity

 of



                                                3.412.2.7                 [place]



                                                .3.547222                 MD Beckford                      [facility]

 

        2022-02-15 2022-02-15 [facility], 1.2.840.1 [id number]

[id number] [facility]



        13:11:03 23:59:00 Ester DE JESUS 36808.1.1                 it

y of



                                                3.412.2.7                 [place]



                                                .3Miesha230727                 MD



                                                .8                      [facility]

 

        2022-02-15 2022-02-15 [facility], 1.2.840.1 [id number]

[id number] Univers



        13:11:03 23:59:00 Encounter         Daron DENIMiesha 42351.1.1                 it

y of



                                                3.412.2.7                 [place]



                                                .3.623557                 MD



                                                .8                      [facility]

 

        2022-02-15 2022-02-15 \[facility]\"" Daron CHEEMA. 1.2.840.1 [id number]

[id number] 

Univers



        11:15:15 13:10:00 Encounter         Silvio Guzmán 96040.1.1           

      ity of



                                                3.412.2.7                 [place]



                                                .3.953545                 MD



                                                .8                      [facility]

 

        2022-02-15 2022-02-15 [facility] Daron LIZA 1.2.840.1 [id number]

[id number]

                                        Univers



        11:15:15 13:10:00 Encounter         Silvio Guzmán 94373.1.1           

      ity of



                                                3.412.2.7                 [place]



                                                .3.537022                 MD



                                                .8                      [facility]

 

        2022-02-15 2022-02-15 [facility] 1.2.840.1 [id number]

[id number] Univers



        10:42:18 11:14:00 Encounter         Monique BURROUGHS 60276.1.1                 i

ty of



                                                3.412.2.7                 [place]



                                                .[id number]                 MD



                                                .8                      [facility]

 

        2022-02-15 2022-02-15 [facility] 1.2.840.1 [id number]

[id number] 

Univers



        10:42:18 11:14:00 Encounter         Monique BURROUGHS 35681.1.1                 i

ty of



                                                3.412.2.7                 [place]



                                                .3.127915                 MD Beckford                      [facility]

 

        2022-02-15 2022-02-15 Travel                  1.2.840.1 1.2.782.520 [id number]

[id number] [facility]



        00:00:00 00:00:00                         37414.1.1 350.1.13.41         

ity of



                                                3.412.2.7 2.2.7.3.698         [place]

[place]



                                                .3.446560 084.8           MD



                                                .8                      [facility]

 

        2022-02-15 2022-02-15 Travel                  1.2.840.1 1.2.658.685 6914

[id number] [facility]



        00:00:00 00:00:00                         70056.1.1 350.1.13.41         

ity of



                                                3.412.2.7 2.2.7.3.698         [place]

[place]



                                                .3.135410 084.8           MD



                                                .8                      [facility]

 

        2022 Crystal Barrera, 1.2.840.1 [id number] 1089

[id number] [facility]



        00:00:00 00:00:00 Only            Monique BURROUGHS 48987.1.1                 ity

 of



                                                3.412.2.7                 [place]



                                                .3.435729                 MD



                                                .8                      [facility]

 

        2022 Kade Medeiros, 1.2.840.1 [id number]

1615 Univers



        00:00:00 00:00:00                 [place]  89230.1.1                 ity 

of



                                                3.412.2.7                 [place]



                                                .3.780679                 MD



                                                .8                      [facility]

 

        2022 Crystal Barrera, 1.2.840.1 [id number] 1089

[id number] [facility]



        00:00:00 00:00:00 Only            Monique BURROUGHS 51335.1.1                 ity

 of



                                                3.412.2.7                 [place]



                                                .3.856937                 MD



                                                .8                      [facility]

 

        2022 Kade Medeiros, 1.2.840.1 [id number]

1615 [facility]



        00:00:00 00:00:00                 [place]  89057.1.1                 ity 

of



                                                3.412.2.7                 [place]



                                                .3.433151                 MD



                                                .8                      [facility]

 

        2022 Crystal Barrera, 1.2.840.1 [id number] 1089

[id number] [facility]



        00:00:00 00:00:00 Only            Monqiue BURROUGHS 36605.1.1                 ity

 of



                                                3.412.2.7                 [place]



                                                .3.375394                 MD



                                                .8                      [facility]

 

        2022 Refpaulo Medeiros, 1.2.840.1 [id number]

9106 [facility]



        00:00:00 00:00:00                 [place]  45221.1.1                 ity 

of



                                                3.412.2.7                 [place]



                                                .3.073113                 MD



                                                .8                      [facility]

 

        2022 [facility], 1.2.840.1 [id number] 1089

[id number] [facility]



        00:00:00 00:00:00 Only            Monique BURROUGHS 43672.1.1                 ity

 of



                                                3.412.2.7                 [place]



                                                .3.912228                 MD



                                                .8                      [facility]

 

        2022 Refpaulo Medeiros, 1.2.840.1 [id number]

9106 [facility]



        00:00:00 00:00:00                 [place]  83544.1.1                 ity 

of



                                                3.412.2.7                 [place]



                                                .3.916136                 MD



                                                .8                      [facility]

 

        2022-02-10 2022-02-10 [facility], 1.2.840.1 [id number]

44010 Univers



        10:02:36 23:59:00 Encounter         Daron DE JESUS 38686.1.1                 it

y of



                                                3.412.2.7                 [place]



                                                .3.012830                 MD



                                                .8                      [facility]

 

        2022-02-10 2022-02-10 [facility], 1.2.840.1 [id number]

[id number] Univers



        10:02:36 23:59:00 Encounter         Daron DE JESUS 69130.1.1                 it

y of



                                                3.412.2.7                 [place]



                                                .3.934518                 MD



                                                .8                      [facility]

 

        2022-02-10 2022-02-10 [facility], 1.2.840.1 [id number]

[id number] Univers



        09:15:00 10:01:00 Encounter         Monique BURROUGHS 51264.1.1                 i

ty of



                                                3.412.2.7                 [place]



                                                .3.607135                 MD Beckford                      [facility]

 

        2022-02-10 2022-02-10 [facility]      Hiral, 1.2.840.1 [id number]

[id number] 

Univers



        09:15:00 10:01:00 Encounter         Monique BURROUGHS 61841.1.1                 i

ty of



                                                3.412.2.7                 [place]



                                                .3.923146                 MD



                                                .8                      [facility]

 

        2022-02-10 2022-02-10 [facility], 1.2.840.1 [id number] 1089

[id number] [facility]



        00:00:00 00:00:00 Only            Monique BURROUGHS 19885.1.1                 ity

 of



                                                3.412.2.7                 [place]



                                                .3.915432                 MD Beckford                      [facility]

 

        2022-02-10 2022-02-10 Travel                  1.2.840.1 1.2.772.429 8531

[id number] [facility]



        00:00:00 00:00:00                         06830.1.1 350.1.13.41         

ity of



                                                3.412.2.7 2.2.7.3.698         [place]

[place]



                                                .3.481545 084.8           MD Beckford                      [facility]

 

        2022-02-10 2022-02-10 [facility],    1.2.840.1 [id number]

7254 [facility]



        00:00:00 00:00:00 Only            Santos MOSS 14741.1.1                 i

ty of



                                                3.412.2.7                 [place]



                                                .3.810724                 MD Beckford                      [facility]

 

        2022-02-10 [facility]22[facility]02-10 [facility] 1.2.840.1 [id number] 1089

[id number] [facility]



        00:00:00 00:00:00 Only            Monique BURROUGHS 95617.1.1                 ity

 of



                                                3.412.2.7                 [place]



                                                .3.771973                 MD Beckford                      [facility]

 

        2022-02-10 2022-02-10 Travel                  1.2.840.1 1.2.688.389 5742

[id number] [facility]



        00:00:00 00:00:00                         45256.1.1 350.1.13.41         

ity of



                                                3.412.2.7 2.2.7.3.698         [place]

[place]



                                                .3.918103 084.8           MD



                                                .8                      [facility]

 

        2022-02-10 2022-02-10 Crystal Casper,    1.2.840.1 [id number]

7254 Univers



        00:00:00 00:00:00 Only            Santos MOSS 58965.1.1                 i

ty of



                                                3.412.2.7                 [place]



                                                .3.227476                 MD



                                                .8                      [facility]

 

        2022 Crystal Barrera, 1.2.840.1 [id number] 1089

[id number] [facility]



        00:00:00 00:00:00 Only            Monique BURROUGHS 27448.1.1                 ity

 of



                                                3.412.2.7                 [place]



                                                .3.134791                 MD Beckford                      [facility]

 

        2022 Crystal Urias  1.2.840.1 [id number]

1087 Univers



        00:00:00 00:00:00 Only            Milly 53881.1.1                 ity 

of



                                                3.412.2.7                 [place]



                                                .3.336154                 MD



                                                .8                      [facility]

 

        2022 Crystal Barrera 1.2.840.1 [id number] 1089

[id number] [facility]



        00:00:00 00:00:00 Only            Monique BURROUGHS 19953.1.1                 ity

 of



                                                3.412.2.7                 [place]



                                                .3.828169                 MD Beckford                      [facility]

 

        2022 Crystal Urias  1.2.840.1 [id number]

1087 [facility]



        00:00:00 00:00:00 Only            Milly 43755.1.1                 ity 

of



                                                3.412.2.7                 [place]



                                                .3.295062                 MD Beckford                      [facility]

 

        2022 Crystal Barrera 1.2.840.1 [id number] 1089

[id number] Univers



        00:00:00 00:00:00 Only            Monique BURROUGHS 10017.1.1                 ity

 of



                                                3.412.2.7                 [place]



                                                .3.333219                 MD



                                                .8                      [facility]

 

        2022 Kade Medeiros, 1.2.840.1 [id number]

0432 Univers



        00:00:00 00:00:00                 [place]  73698.1.1                 ity 

of



                                                3.412.2.7                 [place]



                                                .3.735897                 MD



                                                .8                      [facility]

 

        2022 Kade Medeiros, 1.2.840.1 [id number]

9547 [facility]



        00:00:00 00:00:00                 [place]  76083.1.1                 ity 

of



                                                3.412.2.7                 [place]



                                                .3.075372                 MD



                                                .8                      [facility]

 

        2022 Crystal Barrera, 1.2.840.1 [id number] 1089

[id number] Univers



        00:00:00 00:00:00 Only            Monique BURROUGHS 90283.1.1                 ity

 of



                                                3.412.2.7                 [place]



                                                .3.205624                 MD



                                                .8                      [facility]

 

        2022 Kade Medeiros, 1.2.840.1 [id number]

0432 Univers



        00:00:00 00:00:00                 [place]  25164.1.1                 ity 

of



                                                3.412.2.7                 [place]



                                                .3.434847                 MD



                                                .8                      [facility]

 

        2022 Kade Medeiros, 1.2.840.1 [id number]

9547 Univers



        00:00:00 00:00:00                 [place]  99956.1.1                 ity 

of



                                                3.412.2.7                 [place]



                                                .3.194960                 MD



                                                .8                      [facility]

 

        2022 [facility], 1.2.840.1 [id number]

[id number] Univers



        09:51:15 23:59:00 Ester DE JESUS 28027.1.1                 it

y of



                                                3.412.2.7                 [place]



                                                .3.939957                 MD



                                                .8                      [facility]

 

        2022 [facility], 1.2.840.1 [id number]

[id number] Univers



        09:51:15 23:59:00 Encounter         Daron DE JESUS 23451.1.1                 it

y of



                                                3.412.2.7                 [place]



                                                .3Miesha969100                 MD



                                                .8                      [facility]

 

        2022 \[facility]\"" Daron . 1.2.840.1 [id number]

[id number] 

Univers



        08:03:39 09:50:00 Encounter         Shree Santnaa 43349.1.1       

          ity of



                                                3.412.2.7                 [place]



                                                .3Miesha798011                 MD



                                                .8                      [facility]

 

        2022 [facility] Daron . 1.2.840.1 [id number]

[id number]

                                        Univers



        08:03:39 09:50:00 Encounter         Shree Santana 43768.1.1       

          ity of



                                                3.412.2.7                 [place]



                                                .3Miesha382124                 MD



                                                .8                      [facility]

 

        2022 \[facility]\"",   1.2.840.1 [id number]

[id number] Univers



        06:50:00 08:02:00 Encounter         Alejandra  87827.1.1                 it

y of



                                                3.412.2.7                 [place]



                                                .3Miesha907808                 MD



                                                .8                      [facility]

 

        2022 [facility],   1.2.840.1 [id number]

[id number] Univers



        06:50:00 08:02:00 Encounter         Alejandra  09898.1.1                 it

y of



                                                3.412.2.7                 [place]



                                                .3Miesha948997                 MD



                                                .8                      [facility]

 

        2022 Refill          Mala, 1.2.840.1 [id number]

7751 Univers



        00:00:00 00:00:00                 [place]  12079.1.1                 ity 

of



                                                3.412.2.7                 [place]



                                                .3Miesha802675                 MD



                                                .8                      [facility]

 

        2022 Travel                  1.2.840.1 1.2.042.911 4329

[id number] [facility]



        00:00:00 00:00:00                         77427.1.1 350.1.13.41         

ity of



                                                3.412.2.7 2.2.7.3.698         [place]

[place]



                                                .3.858949 084.8           MD



                                                .8                      [facility]

 

        2022 Travel                  1.2.840.1 1.2.405.716 4768

[id number] [employer]



        00:00:00 00:00:00                         13883.1.1 350.1.13.43 638     

st



                                                3.430.2.7 0.2.7.3.698         Ho

spita



                                                .3.434101 084.8           l



                                                .8                      

 

        2022 Kade Medeiros, 1.2.840.1 [id number]

7751 [facility]



        00:00:00 00:00:00                 [place]  01541.1.1                 ity 

of



                                                3.412.2.7                 [place]



                                                .3.440790                 MD



                                                .8                      [facility]

 

        2022 Travel                  1.2.840.1 1.2.135.312 7886

003675 [facility]



        00:00:00 00:00:00                         71500.1.1 350.1.13.41         

ity of



                                                3.412.2.7 2.2.7.3.698         [place]

[place]



                                                .3.199603 084.8           MD



                                                .8                      [facility]

 

        2022 Travel                  1.2.840.1 1.2.178.000 3603

[id number] Methodi



        00:00:00 00:00:00                         74606.1.1 350.1.13.43 638     

st



                                                3.430.2.7 0.2.7.3.698         Ho

spita



                                                .3.419267 084.8           l



                                                .8                      

 

        2022 Crystal Barrera, 1.2.840.1 [id number] 1088

[id number] Univers



        00:00:00 00:00:00 Only            Monique BURROUGHS 18819.1.1                 ity

 of



                                                3.412.2.7                 [place]



                                                .3.180778                 MD Beckford                      [facility]

 

        2022 [facility] 1.2.840.1 [id number] 1088

[id number] Univers



        00:00:00 00:00:00 Only            Monique BURROUGHS 85088.1.1                 ity

 of



                                                3.412.2.7                 [place]



                                                .3.580412                 MD Beckford                      [facility]

 

        2022 \[facility]\"" 1.2.840.1 [id number]

[id number] Univers



        10:00:48 23:59:00 Encounter         Daron FMiesha 16437.1.1                 it

y of



                                                3.412.2.7                 [place]



                                                .3.056073                 MD Beckford                      [facility]

 

        2022 [facility] 1.2.840.1 [id number]

64643 [facility]



        10:00:48 23:59:00 Encounter         Daron DE JESUS 57748.1.1                 it

y of



                                                3.412.2.7                 [place]



                                                .3.081500                 MD Beckford                      [facility]

 

        2022 [facility] 1.2.840.1 [id number]

[id number] Univers



        09:00:00 09:59:00 Encounter         Monique BURROUGHS 08799.1.1                 i

ty of



                                                3.412.2.7                 [place]



                                                .3.388154                 MD Beckford                      [facility]

 

        2022 [facility] 1.2.840.1 [id number]

[id number] 

Univers



        09:00:00 09:59:00 Encounter         Monique BURROUGHS 48994.1.1                 i

ty of



                                                3.412.2.7                 [place]



                                                .3.491998                 MD Beckford                      [facility]

 

        2022 [facility]  1.2.840.1 [id number]

5893 Univers



        00:00:00 00:00:00 Only            Milly 34194.1.1                 ity 

of



                                                3.412.2.7                 [place]



                                                .3.355855                 MD Beckford                      [facility]

 

        2022 Travel                  1.2.840.1 1.2.895.493 8286

[id number] [facility]



        00:00:00 00:00:00                         29244.1.1 350.1.13.41         

ity of



                                                3.412.2.7 2.2.7.3.698         [place]

[place]



                                                .3.809179 084.8           MD Beckford                      [facility]

 

        2022 [employer]         [place], 1.2.840.1 [id number] 

[id number] 

Univers



        00:00:00 00:00:00 hector FALCON 17296.1.1                 ity 

of



                                                3.412.2.7                 [place]



                                                .3.843255                 MD Beckford                      [facility] [facility]

 

        2022 Orders          Urias,  1.2.840.1 [id number]

5893 Univers



        00:00:00 00:00:00 Only            Milly 03439.1.1                 ity 

of



                                                3.412.2.7                 [place]



                                                .3.019297                 MD Beckford                      [facility]

 

        2022 Travel                  1.2.840.1 1.2.516.165 6668

[id number] Univers



        00:00:00 00:00:00                         15069.1.1 350.1.13.41         

ity of



                                                3.412.2.7 2.2.7.3.698         [place]

[place]



                                                .3.925883 084.8           MD Beckford                      [facility]

 

        2022 Documentat         Facciolli, 1.2.840.1 [id number] 

[id number] 

Univers



        00:00:00 00:00:00 hector FALCON 91554.1.1                 ity 

of



                                                3.412.2.7                 [place]



                                                .3.301254                 MD Beckford                      [facility]

 

        2022 Outpatient R       KAREN [facility]    ANS     10366

12147 Univers



        06:28:00 08:47:00                 DIMITRIOS                          ity of



                                                                        [facility]

 

        2022 Hospital         [facility]    1.2.840.114 905

[id number] Univers



        06:28:00 08:47:00 Encounter         Dimitrios REDMAN 350.1.13.10       

  ity of



                                                [place] 4.2.7.2.686         [place]

[place]



                                                SURGICAL [phone]         [facility]  [address]             Branch

 

        2022 Surgery         [facility]    1.2.584.658 8354

6621 Univers



        07:30:00 08:39:00                 Dimitrios REDMAN 350.1.13.10         

ity of



                                                [facility] 4.2.7.2.686         [place]

[place]



                                                SURGICAL [phone]         [facility]  [address]             Branch

 

        2022 Anesthesia         BrantyanAubrey [facility]    1.2.840.11

4 [id number] Univers



        07:28:00 08:07:00 Event           Serenity Brown 350.1.13.10 

        ity of



                                                [place] 4.2.7.2.686         [place]

[place]



                                                SURGICAL [phone]         [facility]  [address]             Branch

 

        2022 Orders          Doctor CAMPBELL    1.2.840.114 [id number]

50 Univers



        00:00:00 00:00:00 Only            Unassigned, JEN   350.1.13.10       

  ity of



                                        [facility]po [facility] 4.2.7.2.686         [place]

as



                                                        [phone]         Medi

kaylee



                                                        [address]             Branch

 

        2022 Orders          Hiral, 1.2.840.1 [id number]

[id number] Univers



        00:00:00 00:00:00 Only            Monique BURROUGHS 40740.1.1                 ity

 of



                                                3.412.2.7                 [place]



                                                .[id number]                 MD



                                                .8                      [facility]



                                                                        Cancer



                                                                        Center

 

        2022 Kade Medeiros, 1.2.840.1 [id number]

9454 Univers



        00:00:00 00:00:00                 [place]  12634.1.1                 ity 

of



                                                3.412.2.7                 [place]



                                                .3.259928                 MD



                                                .8                      [facility]

 

        2022 Crystal Barrera, 1.2.840.1 [id number] 1088

[id number] Univers



        00:00:00 00:00:00 Only            Monique BURROUGHS 05788.1.1                 ity

 of



                                                3.412.2.7                 [place]



                                                .3.120781                 MD



                                                .8                      [facility]

 

        2022 Kade Medeiros, 1.2.840.1 [id number]

9454 Univers



        00:00:00 00:00:00                 [place]  20628.1.1                 ity 

of



                                                3.412.2.7                 [place]



                                                .3.695013                 MD



                                                .8                      [facility]

 

        2022 Laboratory         Only, Adc Test [facility]    1.2.840.

114 [id number] 

Univers



        10:30:00 10:45:00 Only            Dimitrios Braun [facility] 350.1.13.1

0         ity of



                                                [place] 4.2.7.2.686         [facility]  [phone]         [address]

 

        2022 Outpatient R       KAREN [facility]    87841

32113 Univers



        10:30:00 10:30:00                 DIMITRIOS                          ity of



                                                                        [facility]

 

        2022 [facility]         Daron Coley 1.2.840.1 [id number]

[id number] 

Univers



        13:17:59 23:59:00 Encounter         Vel Abdul 13492.1.1      

           ity of



                                                3.412.2.7                 [place]



                                                .3.871015                 MD Beckford                      [facility]

 

        2022 [facility]      Daron Coley 1.2.840.1 [id number]

[id number]

                                        Univers



        13:17:59 23:59:00 Encounter         Vel Abdul 21949.1.1      

           ity of



                                                3.412.2.7                 [place]



                                                .3.239785                 MD Beckford                      [facility]

 

        2022 [facility], 1.2.840.1 [id number]

[id number] Univers



        09:54:26 13:16:00 Encounter         Daron CHEEMAMiesha 85851.1.1                 it

y of



                                                3.412.2.7                 [place]



                                                .3.637367                 MD Beckford                      [facility]

 

        2022 [facility], 1.2.840.1 [id number]

[id number] Univers



        09:54:26 13:16:00 Encounter         Daron DE JESUS 96555.1.1                 it

y of



                                                3.412.2.7                 [place]



                                                .3.429457                 MD Beckford                      [facility]

 

        2022 [facility] 1.2.840.1 [id number]

[id number] Univers



        09:00:00 09:53:00 Encounter         Monique BURROUGHS 65483.1.1                 i

ty of



                                                3.412.2.7                 [place]



                                                .3.426565                 MD Beckford                      [facility]

 

        2022 [facility] 1.2.840.1 [id number]

[id number] 

Univers



        09:00:00 09:53:00 Encounter         Monique BURROUGHS 81827.1.1                 i

ty of



                                                3.412.2.7                 [place]



                                                .3.347425                 MD Beckford                      [facility]

 

        2022 [facility] 1.2.840.1 [id number]

[id number] Univers



        00:00:00 00:00:00 Only            Monique BURROUGHS 64420.1.1                 ity

 of



                                                3.412.2.7                 [place]



                                                .3.363726                 MD Beckford                      [facility]

 

        2022 Travel                  1.2.840.1 1.2.043.124 2436

[id number] Univers



        00:00:00 00:00:00                         63099.1.1 350.1.13.41         

ity of



                                                3.412.2.7 2.2.7.3.698         [place]

[place]



                                                .3.295053 084.8           MD Beckford                      [facility]

 

        2022 Orders          Hiral, 1.2.840.1 [id number] 1088

[id number] Univers



        00:00:00 00:00:00 Only            Monique BURROUGHS 40158.1.1                 ity

 of



                                                3.412.2.7                 [place]



                                                .3.403940                 MD



                                                .8                      [facility]

 

        2022 Travel                  1.2.840.1 1.2.429.393 7026

[id number] Univers



        00:00:00 00:00:00                         04746.1.1 350.1.13.41         

ity of



                                                3.412.2.7 2.2.7.3.698         [place]

[place]



                                                .3.651597 084.8           MD



                                                .8                      [facility]

 

        2022 Technician         Abdi, Adc Lab Main [facility]    1.2.8

40.114 [id number] 

Univers



        14:30:00 14:45:00 Visit           Dimitrios Braun 350.1.13.1

0         ity of



                                                [place] 4.2.7.2.686         [place]

[place]



                                                PROFESSIO [phone]         Me

dical



                                                [address]                 

 

        2022 Outpatient R       KAREN [facility]    38346

[id number] Univers



        14:30:00 14:30:00                 DIMITRIOS                          ity of



                                                                        [facility]

 

        2022 Orders          Doctor CAMPBELL    1.2.840.114 [id number]

57 Univers



        00:00:00 00:00:00 Only            Unassigned, JEN   350.1.13.10       

  ity of



                                        [facility] 4.2.7.2.686         [place]

as



                                                        [phone]         [address]

 

        2022 Orders          Hiral, 1.2.840.1 [id number] 1088

[id number] Univers



        00:00:00 00:00:00 Only            Monique BURROUGHS 23602.1.1                 ity

 of



                                                3.412.2.7                 [place]



                                                .3.331662                 MD



                                                .8                      [facility]

 

        2022 [facility], 1.2.840.1 [id number] 1088

[id number] Univers



        00:00:00 00:00:00 Only            Monique BURRUOGHS 34243.1.1                 ity

 of



                                                3.412.2.7                 [place]



                                                .3.082929                 MD



                                                .8                      [facility]

 

        2022 \[facility] [place] [facility]\"" Daron DE JESUS 1.2.840.1 [id number]

[id number] 

Univers



        11:25:06 23:59:00 Encounter         Vel Abdul 73769.1.1      

           ity of



                                                3.412.2.7                 [place]



                                                .3.470065                 MD



                                                .8                      [facility]

 

        2022 [facility] Daron DE JESUS 1.2.840.1 [id number]

[id number]

                                        [facility]



        11:25:06 23:59:00 Encounter         Vel Abdul 19430.1.1      

           ity of



                                                3.412.2.7                 [place]



                                                .3.165466                 MD



                                                .8                      [facility]

 

        2022 Office          [facility], 1.2.840.1 [id number]

5398 Univers



        13:00:00 13:59:12 Visit           Daron DE JESUS 27724.1.1                 ity 

of



                                                3.412.2.7                 [place]



                                                .3.118740                 MD



                                                .8                      [facility]

 

        2022 [facility], 1.2.840.1 [id number]

5398 Univers



        13:00:00 13:59:12 Visit           Daron DE JESUS 07852.1.1                 ity 

of



                                                3.412.2.7                 [place]



                                                .3.180593                 MD



                                                .8                      [facility]

 

        2022 \[facility]\"" 1.2.840.1 [id number]

[id number] Univers



        10:47:42 11:24:00 Encounter         Daron DE JESUS 15835.1.1                 it

y of



                                                3.412.2.7                 [place]



                                                .3Miesha382523                 MD



                                                .8                      [facility]

 

        2022 [facility], 1.2.840.1 [id number]

[id number] Univers



        10:47:42 11:24:00 Encounter         Daron DE JESUS 84123.1.1                 it

y of



                                                3.412.2.7                 [place]



                                                .3.408790                 MD Beckford                      [facility]

 

        2022 [facility], 1.2.840.1 [id number]

[id number] Univers



        08:45:00 10:46:00 Encounter         Daron CHEEMA. 73051.1.1                 it

y of



                                                3.412.2.7                 [place]



                                                .3.763492                 MD Beckford                      [facility]

 

        2022 [facility], 1.2.840.1 [id number] 09849

[id number] Univers



        08:45:00 10:46:00 Encounter         Daron CHEEMAMiesha 44313.1.1                 it

y of



                                                3.412.2.7                 [place]



                                                .3.176529                 MD Beckford                      [facility]

 

        2022 Crystal Barrera, 1.2.840.1 [id number] 1088

[id number] Univers



        00:00:00 00:00:00 Only            Monique BURROUGHS 42863.1.1                 ity

 of



                                                3.412.2.7                 [place]



                                                .3.906495                 MD Beckford                      [facility]

 

        2022 Travel                  1.2.840.1 1.2.606.553 5859

[id number] Univers



        00:00:00 00:00:00                         58467.1.1 350.1.13.41         

ity of



                                                3.412.2.7 2.2.7.3.698         [place]

[place]



                                                .3.112752 084.8           MD Beckford                      [facility]

 

        2022 Crystal Barrera 1.2.840.1 [id number] 1088

[id number] Univers



        00:00:00 00:00:00 Only            Monique BURROUGHS 85328.1.1                 ity

 of



                                                3.412.2.7                 [place]



                                                .3.973934                 MD Beckford                      [facility]

 

        2022 Travel                  1.2.840.1 1.2.572.891 1142

[id number] Univers



        00:00:00 00:00:00                         06475.1.1 350.1.13.41         

ity of



                                                3.412.2.7 2.2.7.3.698         [place]

[place]



                                                .3.847825 084.8           MD Beckford                      [facility]

 

        2022 [facility], 1.2.840.1 [id number] 1088

[id number] Univers



        00:00:00 00:00:00 Only            Monique BURROUGHS 23342.1.1                 ity

 of



                                                3.412.2.7                 [place]



                                                .3.570842                 MD Beckford                      [facility]

 

        [facility] 1.2.840.1 [id number] 1088

[id number] Univers



        00:00:00 00:00:00 Only            Monique BURROUGHS 23942.1.1                 ity

 of



                                                3.412.2.7                 [place]



                                                .3.088712                 MD Beckford                      [facility]

 

        2022 [facility], 1.2.840.1 [id number]

[id number] Univers



        10:24:09 23:59:00 Encounter         Daron F. 09765.1.1                 it

y of



                                                3.412.2.7                 [place]



                                                .3.597562                 MD Beckford                      [facility]

 

        2022 [facility], 1.2.840.1 [id number]

76348 Univers



        10:24:09 23:59:00 Encounter         Daron F. 75225.1.1                 it

y of



                                                3.412.2.7                 [place]



                                                .3.496885                 MD Beckford                      [facility]

 

        2022 [facility], 1.2.840.1 [id number]

[id number] Univers



        09:30:00 10:23:00 Encounter         Monique BURROUGHS 93973.1.1                 i

ty of



                                                3.412.2.7                 [place]



                                                .3.643586                 MD Beckford                      [facility]

 

        2022 [facility] GINA Barrera, 1.2.840.1 [id number]

[id number] 

Univers



        09:30:00 10:23:00 Encounter         Monique BURROUGHS 13348.1.1                 i

ty of



                                                3.412.2.7                 [place]



                                                .3.403339                 MD Beckford                      [facility]

 

        2022 Travel                  1.2.840.1 1.2.750.537 7762

[id number] Univers



        00:00:00 00:00:00                         95576.1.1 350.1.13.41         

ity of



                                                3.412.2.7 2.2.7.3.698         [place]

[place]



                                                .3.195466 084.8           MD



                                                .8                      [facility]

 

        2022 Travel                  1.2.840.1 1.2.942.518 3873

[id number] Univers



        00:00:00 00:00:00                         51281.1.1 350.1.13.41         

ity of



                                                3.412.2.7 2.2.7.3.698         [place]

[place]



                                                .3.139673 084.8           MD Beckford                      [facility]

 

        2022 [facility]          Hiral, 1.2.840.1 [id number] 1088

[id number] Univers



        00:00:00 00:00:00 Only            Monique BURROUGHS 53613.1.1                 ity

 of



                                                3.412.2.7                 [place]



                                                .3.404178                 MD Beckford                      [facility]

 

        2022 [facility]          Hiral, 1.2.840.1 [id number] 1088

[id number] Univers



        00:00:00 00:00:00 Only            Monique BURROUGHS 79048.1.1                 ity

 of



                                                3.412.2.7                 [place]



                                                .3.634600                 MD Beckford                      [facility]

 

        2022 [facility]         Daron Coley 1.2.840.1 [id number]

[id number] 

Univers



        13:12:32 23:59:00 Lianne Carmen 35692.1.1          

       ity of



                                                3.412.2.7                 [place]



                                                .3.847272                 MD



                                                .8                      [facility]

 

        2022 [facility], Daron CHEEMA. 1.2.840.1 [id number]

[id number]

                                        Univers



        13:12:32 23:59:00 Encounter         Lianne Agarwal 16603.1.1          

       ity of



                                                3.412.2.7                 [place]



                                                .3.474483                 MD



                                                .8                      [facility]

 

        2022 [facility], 1.2.840.1 [id number]

[id number] Univers



        10:34:58 13:11:00 Encounter         Daron DE JESUS 64145.1.1                 it

y of



                                                3.412.2.7                 [place]



                                                .3.716832                 MD



                                                .8                      [facility]

 

        2022 [facility], 1.2.840.1 [id number]

[id number] Univers



        10:34:58 13:11:00 Encounter         Daron DE JESUS 08026.1.1                 it

y of



                                                3.412.2.7                 [place]



                                                .3.411596                 MD



                                                .8                      [facility]

 

        2022 Crystal Barrera, 1.2.840.1 [id number] 1088

[id number] Univers



        00:00:00 00:00:00 Only            Monique BURROUGHS 38406.1.1                 ity

 of



                                                3.412.2.7                 [place]



                                                .3.776061                 MD



                                                .8                      [facility]

 

        2022 Documentat         Rach, 1.2.840.1 [id number]

[id number] 

Univers



        00:00:00 00:00:00 ion             Lilliana 54622.1.1                 ity

 of



                                                3.412.2.7                 [place]



                                                .3.399267                 MD



                                                .8                      [facility]

 

        2022 Travel                  1.2.840.1 1.2.925.726 1538

[id number] Univers



        00:00:00 00:00:00                         51209.1.1 350.1.13.41         

ity of



                                                3.412.2.7 2.2.7.3.698         [place]

[place]



                                                .3.128368 084.8           MD



                                                .8                      [facility]

 

        2022 Crystal Barrera, 1.2.840.1 [id number] 1088

[id number] Univers



        00:00:00 00:00:00 Only            Monique BURROUGHS 02397.1.1                 ity

 of



                                                3.412.2.7                 [place]



                                                .3.759443                 MD



                                                .8                      [facility]

 

        2022 Documentat         Deluna,  1.2.840.1 [id number]

[id number] Univers



        00:00:00 00:00:00 ion             Lilliana 49728.1.1                 ity

 of



                                                3.412.2.7                 [place]



                                                .3.291339                 MD



                                                .8                      [facility]

 

        2022 Travel                  1.2.840.1 1.2.720.758 1042

[id number] Univers



        00:00:00 00:00:00                         16109.1.1 350.1.13.41         

ity of



                                                3.412.2.7 2.2.7.3.698         [place]

[place]



                                                .3.241674 084.8           MD Beckford                      [facility]

 

        2022 Crystal Barrera, 1.2.840.1 [id number] 1088

[id number] Univers



        00:00:00 00:00:00 Only            Monique BURROUGHS 00708.1.1                 ity

 of



                                                3.412.2.7                 [place]



                                                .3.678376                 MD Beckford                      [facility]

 

        2022 Crystal Barrera, 1.2.840.1 [id number] 1088

[id number] Univers



        00:00:00 00:00:00 Only            Monique BURROUGHS 07141.1.1                 ity

 of



                                                3.412.2.7                 [place]



                                                .3.070014                 MD Beckford                      [facility]

 

        2022 [facility], 1.2.840.1 [id number] 73666

[id number] Univers



        09:54:12 23:59:00 [facility]         Daron CHEEMA. 96790.1.1                 it

y of



                                                3.412.2.7                 [place]



                                                .3.719330                 MD



                                                .8                      [facility]

 

        2022 [facility] 1.2.840.1 [id number] 07033

[id number] Univers



        09:54:12 23:59:00 Encounter         Daron DE JESUS 79687.1.1                 it

y of



                                                3.412.2.7                 [place]



                                                .3.381835                 MD



                                                .8                      [facility]

 

        2022 [facility] 1.2.840.1 [id number] 108

[id number] Univers



        09:00:00 09:53:00 Encounter         Monique BURROUGHS 82995.1.1                 i

ty of



                                                3.412.2.7                 [place]



                                                .3.203621                 MD Beckford                      [facility]

 

        2022 [facility], 1.2.840.1 [id number] 108

[id number] 

Univers



        09:00:00 09:53:00 Encounter         Monique BURROUGHS 47792.1.1                 i

ty of



                                                3.412.2.7                 [place]



                                                .3.265383                 MD Beckford                      [facility]

 

        2022 Travel                  1.2.840.1 1.2.928.055 1531

[id number] Univers



        00:00:00 00:00:00                         31050.1.1 350.1.13.41         

ity of



                                                3.412.2.7 2.2.7.3.698         [place]

[place]



                                                .3.843526 084.Vickey Beckford                      [facility]

 

        2022 Travel                  1.2.840.1 1.2.893.655 4511

[id number] Univers



        00:00:00 00:00:00                         26556.1.1 350.1.13.41         

ity of



                                                3.412.2.7 2.2.7.3.698         [place]

[place]



                                                .3.454707 084Miesha8           MD Beckford                      [facility]

 

        2022-01-10 2022-01-10 [facility], 1.2.840.1 [id number] 1088

[id number] Univers



        00:00:00 00:00:00 Only            Monique BURROUGHS 50244.1.1                 ity

 of



                                                3.412.2.7                 [place]



                                                .3.029815                 MD



                                                .8                      [facility]

 

        2022-01-10 2022-01-10 Kade Medeiros, 1.2.840.1 [id number]

[id number] Univers



        00:00:00 00:00:00                 [place]  90308.1.1                 ity 

of



                                                3.412.2.7                 [place]



                                                .3.479192                 MD



                                                .8                      [facility]

 

        2022-01-10 2022-01-10 Crystal Barrera, 1.2.840.1 [id number] 1088

[id number] Univers



        00:00:00 00:00:00 Only            Monique BURROUGHS 84430.1.1                 ity

 of



                                                3.412.2.7                 [place]



                                                .3.054718                 MD



                                                .8                      [facility]

 

        2022-01-10 2022-01-10 Kade Medeiros, 1.2.840.1 [id number]

7862 Univers



        00:00:00 00:00:00                 [place]  33349.1.1                 ity 

of



                                                3.412.2.7                 [place]



                                                .3.280638                 MD



                                                .8                      [facility]

 

        2022 Crystal Barrera, 1.2.840.1 [id number] 1087

[id number] Univers



        00:00:00 00:00:00 Only            Monique BURROUGHS 15965.1.1                 ity

 of



                                                3.412.2.7                 [place]



                                                .3.453224                 MD



                                                .8                      [facility]

 

        2022 Crystal Barrera, 1.2.840.1 [id number] 1087

[id number] Univers



        00:00:00 00:00:00 Only            Monique BURROUGHS 10471.1.1                 ity

 of



                                                3.412.2.7                 [place]



                                                .3.142012                 MD



                                                .8                      [facility]

 

        2022 [facility]         Maricarmen, 1.2.840.1 [id number] 62331

[id number] Univers



        10:44:23 23:59:00 [facility]         Daron DENIMiesha 59285.1.1                 it

y of



                                                3.412.2.7                 [place]



                                                .3.412148                 MD



                                                .8                      [facility]

 

        2022 [facility], 1.2.840.1 [id number]

[id number] Univers



        10:44:23 23:59:00 Encounter         Daron DENIMiesha 83636.1.1                 it

y of



                                                3.412.2.7                 [place]



                                                .3.894995                 MD



                                                .8                      [facility]

 

        2022 [facility], 1.2.840.1 [id number]

6417 [facility]



        14:00:00 14:45:03 Visit           Daron DENIMiesha 10787.1.1                 ity 

of



                                                3.412.2.7                 [place]



                                                .3.146374                 MD



                                                .8                      [facility]

 

        2022 [facility], 1.2.840.1 [id number]

6417 [facility]



        14:00:00 14:45:03 Visit           Daron DENIMiesha 97166.1.1                 ity 

of



                                                3.412.2.7                 [place]



                                                .3.236959                 MD



                                                .8                      [facility]

 

        2022 [facility], 1.2.840.1 [id number] 1

[id number] 

Univers



        09:45:00 10:43:00 Encounter         Pamela  91831.1.1                 it

y of



                                        Gabi   3.412.2.7                 [place]



                                                .3.979258                 MD



                                                .8                      [facility]

 

        2022 [facility], 1.2.840.1 [id number] 1

[id number] 

Univers



        09:45:00 10:43:00 Encounter         Pamela  12044.1.1                 it

y of



                                        Gabi   3.412.2.7                 [place]



                                                .3.630008                 MD



                                                .8                      [facility]

 

        2022 Outpatient       MARICARMEN [facility]     [id number]

[id number] MD



        00:00:00 00:00:00                 DARON lopez

 

        2022 Travel                  1.2.840.1 1.2.827.862 8917

[id number] Univers



        00:00:00 00:00:00                         68549.1.1 350.1.13.41         

ity of



                                                3.412.2.7 2.2.7.3.698         [place]

[place]



                                                .3.668559 084.8           MD



                                                .8                      [facility]

 

        2022 Crystal Barrera 1.2.840.1 [id number] 1087

[id number] Univers



        00:00:00 00:00:00 Only            Monique LUIS FELIPE 44545.1.1                 ity

 of



                                                3.412.2.7                 [place]



                                                .3.302822                 MD



                                                .8                      [facility]

 

        2022 Travel                  1.2.840.1 1.2.958.390 4622

102002 Univers



        00:00:00 00:00:00                         48630.1.1 350.1.13.41         

ity of



                                                3.412.2.7 2.2.7.3.698         [place]

[place]



                                                .3.906332 084.8           MD



                                                .8                      [facility]

 

        2022 Crystal Barrera 1.2.840.1 [id number] 1087

[id number] Univers



        00:00:00 00:00:00 Only            Monique LUIS FELIPE 30211.1.1                 ity

 of



                                                3.412.2.7                 [place]



                                                .3.582539                 MD Beckford                      [facility]

 

        2022 Daryl Cornelius 1.2.840.1 [id number]

[id number]

                                        Univers



        14:00:00 14:18:04 Lilliana Monsalve 91317.1.1           

      ity of



                                                3.412.2.7                 [place]



                                                .3.471741                 MD Beckford                      [facility]

 

        2022 Daryl Tariq 1.2.840.1 01020

4219 

[id number]                              Univers



        14:00:00 14:18:04 Lilliana Gillespie 07910.1.1             

    ity of



                                                3.412.2.7                 [place]



                                                .3.060466                 MD



                                                .8                      [facility]

 

        2022 Orders          [place],   1.2.840.1 [id number]

5013 Univers



        00:00:00 00:00:00 Only            Alejandra  45247.1.1                 ity 

of



                                                3.412.2.7                 [place]



                                                .3.486194                 MD



                                                .8                      [facility]

 

        2022 [facility]          Adler,   1.2.840.1 [id number]

5013 Univers



        00:00:00 00:00:00 Only            Alejandra  65452.1.1                 ity 

of



                                                3.412.2.7                 [place]



                                                .3.997010                 MD



                                                .8                      [facility]

 

        2022 [facility], 1.2.840.1 [id number]

[id number] Univers



        13:00:00 23:59:00 Encounter         Daron DE JESUS 60496.1.1                 it

y of



                                                3.412.2.7                 [place]



                                                .3.201748                 MD



                                                .8                      [facility]

 

        2022 [facility], 1.2.840.1 [id number]

70015 Univers



        13:00:00 23:59:00 Encounter         Daron DE JESUS 84752.1.1                 it

y of



                                                3.412.2.7                 [place]



                                                .3.471782Kaia Whiteside8                      [facility]

 

        2022 Office          Sameer Campuzano 1.2.840.1 [id number]

[id number] 

Univers



        14:30:00 16:22:01 Visit           Enrike Pollack.1.1             

    ity of



                                                3.412.2.7                 [place]



                                                .3Miesha536234Kaia Whiteside8                      [facility]

 

        2022 Office  Sameer Norton 1.2.840.1 [id number]

0 [id number] 

Univers



        14:30:00 16:22:01 Visit           Adachi, Enrike 91316.1.1             

    ity of



                                                3.412.2.7                 [place]



                                                .3Ryan185554Kaia Beckford                      [facility]

 

        2022 [facility], 1.2.840.1 [id number] 98032

[id number] Univers



        10:03:59 12:59:00 Encounter         Daron CHEEMAMiesha 92850.1.1                 it

y of



                                                3.412.2.7                 [place]



                                                .[id number] Whiteside8                      [facility]

 

        2022 [facility], 1.2.840.1 [id number]

[id number] Univers



        10:03:59 12:59:00 Encounter         Daron DENIMiesha 82468.1.1                 it

y of



                                                3.412.2.7                 [place]



                                                .3Ryan401235Kaia Beckford                      [facility]

 

        2022 [facility], 1.2.840.1 [id number]

[id number] Univers



        09:30:00 10:02:00 Encounter         Daron DE JESUS 61407.1.1                 it

y of



                                                3.412.2.7                 [place]



                                                .[id number] Whiteside8                      [facility]

 

        2022 [facility], 1.2.840.1 [id number]

01794 Univers



        09:30:00 10:02:00 Encounter         Daron CHEEMAMeisha 64854.1.1                 it

y of



                                                3.412.2.7                 [place]



                                                .3Ryan352150Kaia Beckford                      [facility]

 

        2022 [facility]          [place],   1.2.840.1 [id number]

4808 Univers



        00:00:00 00:00:00 Only            Alejandra  07388.1.1                 ity 

of



                                                3.412.2.7                 [place]



                                                .3Ryan010840Kaia Beckford                      [facility]

 

        2022 Travel                  1.2.840.1 1.2.237.202 5535

[id number] Univers



        00:00:00 00:00:00                         93866.1.1 350.1.13.41         

ity of



                                                3.412.2.7 2.2.7.3.698         [place]

[place]



                                                .3.935636 084.8           MD Beckford                      [facility]

 

        2022 Orders          Hiral, 1.2.840.1 [id number] 1087

[id number] Univers



        00:00:00 00:00:00 Only            Monique BURROUGHS 70453.1.1                 ity

 of



                                                3.412.2.7                 [place]



                                                .3.004010                 MD Beckford                      [facility]

 

        2022 Orders          Vitor,   1.2.840.1 [id number]

4808 Univers



        00:00:00 00:00:00 Only            Alejandra  30373.1.1                 ity 

of



                                                3.412.2.7                 [place]



                                                .3.685947                 MD Beckford                      [facility]

 

        2022 Travel                  1.2.840.1 1.2.027.842 8948

[id number] Univers



        00:00:00 00:00:00                         68338.1.1 350.1.13.41         

ity of



                                                3.412.2.7 2.2.7.3.698         Te

[place]



                                                .3.414745 084.8           MD Beckford                      [facility]

 

        2022 Orders          Hiral, 1.2.840.1 [id number] 1087

[id number] Univers



        00:00:00 00:00:00 Only            Monique BURROUGHS 61425.1.1                 ity

 of



                                                3.412.2.7                 [place]



                                                .3.079356                 MD Beckford                      [facility]

 

        2021 Orders          [place],    1.2.840.1 [id number]

5902 Univers



        00:00:00 00:00:00 Only            Santos MOSS 02268.1.1                 i

ty of



                                                3.412.2.7                 [place]



                                                .3.142970                 MD Beckford                      [facility]

 

        2021 Orders          [place],    1.2.840.1 [id number]

5902 Univers



        00:00:00 00:00:00 Only            Santos MOSS 68383.1.1                 i

ty of



                                                3.412.2.7                 [place]



                                                .[id number]                 MD



                                                .8                      [facility]



                                                                        Cancer



                                                                        [place]

 

        2021 \[facility] [place] [facility]\"" Daron . 1.2.840.1 [id number]

[id number] 

Univers



        13:16:17 23:59:00 Encounter         Venancio Dominguez 54391.1.1      

           ity of



                                                3.412.2.7                 [place]



                                                .3.795166                 MD



                                                .8                      [facility]

 

        2021 [facility]. 1.2.840.1 [id number]

[id number]

                                        Univers



        13:16:17 23:59:00 Encounter         Venancio Dominguez 97198.1.1      

           ity of



                                                3.412.2.7                 [place]



                                                .3.223492                 MD Beckford                      [facility] [facility]

 

        2021 [facility], 1.2.840.1 [id number]

[id number] Univers



        10:24:19 13:15:00 Encounter         Abiola CRISOSTOMO 20131.1.1                 

ity of



                                                3.412.2.7                 [place]



                                                .3.153953                 MD



                                                .8                      [facility]



                                                                        Cancer



                                                                        Ne[place]art

 

        2021 [facility], 1.2.840.1 [id number]

[id number] Univers



        10:24:19 13:15:00 Encounter         Abiola CRISOSTOMO 85663.1.1                 

ity of



                                                3.412.2.7                 [place]



                                                .3.700737                 MD



                                                .8                      [facility]



                                                                        Cancer



                                                                        Center

 

        2021 [facility], 1.2.840.1 [id number]

[id number] Univers



        09:30:00 10:23:00 Encounter         Abiola CRISOSTOMO 80042.1.1                 

ity of



                                                3.412.2.7                 [place]



                                                .[id number]                 MD



                                                .8                      [facility]



                                                                        Cancer



                                                                        Center

 

        2021 [facility], 1.2.840.1 [id number]

[id number] Univers



        09:30:00 10:23:00 Ester ORTEZMiesha 67619.1.1                 

ity of



                                                3.412.2.7                 [place]



                                                .3.651948                 MD



                                                .8                      [facility]

 

        2021 Travel                  1.2.840.1 1.2.379.188 8755

[id number] Univers



        00:00:00 00:00:00                         58123.1.1 350.1.13.41         

ity of



                                                3.412.2.7 2.2.7.3.698         [place]

[place]



                                                .3.050275 084.8           MD Beckford                      [facility]

 

        2021 Travel                  1.2.840.1 1.2.840.276 0804

[id number] Univers



        00:00:00 00:00:00                         76883.1.1 350.1.13.41         

ity of



                                                3.412.2.7 2.2.7.3.698         [place]

[place]



                                                .3.885923 084.8           MD Beckford                      [facility]

 

        2021 Crystal Barrera, 1.2.840.1 [id number] 1087

[id number] [facility]



        00:00:00 00:00:00 Only            Monique BURROUGHS 98497.1.1                 ity

 of



                                                3.412.2.7                 [place]



                                                .3.100419                 MD Beckford                      [facility]

 

        2021 Kade Carter 1.2.840.1 [id number]

[id number] Univers



        00:00:00 00:00:00                 Kiya SHEIKH 16290.1.1                 ity 

of



                                                3.412.2.7                 [place]



                                                .3.798338                 MD Beckford                      [facility]

 

        2021 Crystal Barrera 1.2.840.1 [id number] 1087

[id number] Univers



        00:00:00 00:00:00 Only            Monique BURROUGHS 91638.1.1                 ity

 of



                                                3.412.2.7                 [place]



                                                .3.526527                 MD Beckford                      [facility]

 

        2021 Kade Carter, 1.2.840.1 [id number]

6277 Univers



        00:00:00 00:00:00                 Steffanie 07100.1.1                 ity 

of



                                                3.412.2.7                 [place]



                                                .3.648860                 MD Beckford                      [facility]

 

        2021 [facility], 1.2.840.1 [id number]

[id number] Univers



        11:00:00 23:59:00 Encounter         Monique BURROUGHS 51447.1.1                 i

ty of



                                                3.412.2.7                 [place]



                                                .3.909263                 MD Beckford                      [facility]

 

        2021 [facility], 1.2.840.1 [id number]

[id number] 

Univers



        11:00:00 23:59:00 Encounter         Monique BURROUGHS 19154.1.1                 i

ty of



                                                3.412.2.7                 [place]



                                                .3.611887                 MD Beckford                      [facility]

 

        2021 Travel                  1.2.840.1 1.2.685.193 9904

[id number] Univers



        00:00:00 00:00:00                         31847.1.1 350.1.13.41         

ity of



                                                3.412.2.7 2.2.7.3.698         [place]

[place]



                                                .3.070378 084.8           MD Beckford                      [facility]

 

        2021 Travel                  1.2.840.1 1.2.092.640 3292

[id number] Univers



        00:00:00 00:00:00                         64565.1.1 350.1.13.41         

ity of



                                                3.412.2.7 2.2.7.3.698         [place]

[place]



                                                .3.985732 084.8           MD Beckford                      [facility]

 

        2021 [facility], 1.2.840.1 [id number]

[id number] Univers



        10:12:44 23:59:00 Encounter         Daron DE JESUS 26345.1.1                 it

y of



                                                3.412.2.7                 [place]



                                                .3.524387                 MD Beckford                      [facility]

 

        2021 [facility], 1.2.840.1 [id number]

[id number] Univers



        10:12:44 23:59:00 Encounter         Daron CHEEMAMiesha 92369.1.1                 it

y of



                                                3.412.2.7                 [place]



                                                .3.303961                 MD



                                                .8                      [facility]

 

        2021 [facility], 1.2.840.1 [id number]

[id number] Univers



        08:30:00 10:11:00 Encounter         Monique BURROUGHS 71270.1.1                 i

ty of



                                                3.412.2.7                 [place]



                                                .3.622716                 MD



                                                .8                      [facility]

 

        2021 [facility], 1.2.840.1 [id number]

[id number] 

Univers



        08:30:00 10:11:00 Encounter         Monique BURROUGHS 58524.1.1                 i

ty of



                                                3.412.2.7                 [place]



                                                .3.693365                 MD



                                                .8                      [facility]

 

        2021 Travel                  1.2.840.1 1.2.793.250 6289

[id number] Univers



        00:00:00 00:00:00                         02746.1.1 350.1.13.41         

ity of



                                                3.412.2.7 2.2.7.3.698         [place]

[place]



                                                .3.432563 084.8           MD Beckford                      [facility]

 

        2021 Travel                  1.2.840.1 1.2.340.490 1502

[id number] Univers



        00:00:00 00:00:00                         23885.1.1 350.1.13.41         

ity of



                                                3.412.2.7 2.2.7.3.698         [place]

[place]



                                                .3.720740 084.8           MD Beckford                      [facility]

 

        2021 [facility], 1.2.840.1 [id number] 1087

[id number] Univers



        00:00:00 00:00:00 Only            Monique BURROUGHS 71219.1.1                 ity

 of



                                                3.412.2.7                 [place]



                                                .3.429875                 MD



                                                .8                      [facility]

 

        2021 [facility]          Hiral, 1.2.840.1 [id number] 1087

[id number] Univers



        00:00:00 00:00:00 Only            Monique BURROUGHS 75277.1.1                 ity

 of



                                                3.412.2.7                 [place]



                                                .3.246911                 MD



                                                .8                      [facility]

 

        2021 [facility],   1.2.840.1 [id number]

[id number] Univers



        13:21:48 23:59:00 Encounter         Bricesandra  63070.1.1                 it

y of



                                                3.412.2.7                 [place]



                                                .3.713348                 MD



                                                .8                      [facility]

 

        2021 [facility],   1.2.840.1 [id number]

[id number] Univers



        13:21:48 23:59:00 Encounter         Moses  20461.1.1                 it

y of



                                                3.412.2.7                 [place]



                                                .3.683178                 MD



                                                .8                      [facility]

 

        2021 Office          [facility], 1.2.840.1 [id number]

5834 [facility]



        16:00:00 16:00:00 Visit           Daron DE JESUS 19233.1.1                 ity 

of



                                                3.412.2.7                 [place]



                                                .3.086654                 MD



                                                .8                      [facility]

 

        2021 Office  [facility], 1.2.840.1 [id number]

5834 Univers



        16:00:00 16:00:00 Visit           Daron DE JESUS 75946.1.1                 ity 

of



                                                3.412.2.7                 [place]



                                                .3.400964                 MD



                                                .8                      [facility]

 

        2021 [facility], 1.2.840.1 [id number]

[id number] Univers



        10:22:16 13:20:00 Encounter         Daron DE JESUS 14602.1.1                 it

y of



                                                3.412.2.7                 [place]



                                                .3.784595                 MD Beckford                      [facility]

 

        2021 [facility], 1.2.840.1 [id number]

[id number] Univers



        10:22:16 13:20:00 Encounter         Daron DE JESUS 40017.1.1                 it

y of



                                                3.412.2.7                 [place]



                                                .3.096157                 MD Beckford                      [facility]

 

        2021 Crystal Lainez, 1.2.840.1 [id number]

[id number] Univers



        00:00:00 00:00:00 Only            Pamela  92088.1.1                 ity 

of



                                        Gabi   3.412.2.7                 [place]



                                                .3.398054                 MD



                                                .8                      [facility]

 

        2021 Travel                  1.2.840.1 1.2.930.770 9619

[id number] Univers



        00:00:00 00:00:00                         78341.1.1 350.1.13.41         

ity of



                                                3.412.2.7 2.2.7.3.698         [place]

[place]



                                                .3.225654 084.8           MD Beckford                      [facility]

 

        2021 Crystal Barrera 1.2.840.1 [id number] 1087

[id number] Univers



        00:00:00 00:00:00 Only            Monique BURROUGHS 13901.1.1                 ity

 of



                                                3.412.2.7                 [place]



                                                .3.413561                 MD Beckford                      [facility]

 

        2021 Crystal Lainez, 1.2.840.1 [id number]

[id number] Univers



        00:00:00 00:00:00 Only            Pamela  67172.1.1                 ity 

of



                                        Gabi   3.412.2.7                 [place]



                                                .3.423696                 MD Beckford                      [facility]

 

        2021 Travel                  1.2.840.1 1.2.471.297 3901

[id number] Univers



        00:00:00 00:00:00                         96766.1.1 350.1.13.41         

ity of



                                                3.412.2.7 2.2.7.3.698         [place]

[place]



                                                .3.434733 084.8           MD



                                                .8                      [facility]

 

        2021 [facility], 1.2.840.1 [id number] 1087

[id number] Univers



        00:00:00 00:00:00 Only            Monique BURROUGHS 37965.1.1                 ity

 of



                                                3.412.2.7                 [place]



                                                .3.854527                 MD



                                                .8                      [facility]

 

        2021 [facility], Daron DE JESUS 1.2.840.1 [id number]

[id number] 

Univers



        13:30:05 23:59:00 Encounter         Cruz Nobles 75310.1.1         

        ity of



                                                3.412.2.7                 [place]



                                                .3.455473                 MD



                                                .8                      [facility]

 

        [facility] [id number]

[id number]

                                        Univers



        13:30:05 23:59:00 Encounter         Cruz Nobles 65502.1.1         

        ity of



                                                3.412.2.7                 [place]



                                                .3.587568                 MD



                                                .8                      [facility]

 

        2021 [facility], 1.2.840.1 [id number]

[id number] Univers



        10:53:18 13:29:00 Encounter         Daron DE JESUS 66693.1.1                 it

y of



                                                3.412.2.7                 [place]



                                                .3.710763                 MD



                                                .8                      [facility]

 

        2021 [facility], 1.2.840.1 [id number]

[id number] Univers



        10:53:18 13:29:00 Encounter         Daron DE JESUS 16919.1.1                 it

y of



                                                3.412.2.7                 [place]



                                                .3.281896                 MD Beckford                      [facility]

 

        2021 [facility], 1.2.840.1 [id number]

[id number] Univers



        09:15:00 10:52:00 Encounter         Monique BURROUGHS 41488.1.1                 i

ty of



                                                3.412.2.7                 [place]



                                                .3.051249                 MD Beckford                      [facility]

 

        2021 [facility] GINA Barrera, 1.2.840.1 [id number]

[id number] 

Univers



        09:15:00 10:52:00 Encounter         Monique BURROUGHS 31912.1.1                 i

ty of



                                                3.412.2.7                 [place]



                                                .3.389731                 MD Beckford                      [facility]

 

        2021 Travel                  1.2.840.1 1.2.724.301 0008

[id number] Univers



        00:00:00 00:00:00                         37305.1.1 350.1.13.41         

ity of



                                                3.412.2.7 2.2.7.3.698         [place]

[place]



                                                .3.586408 084.8           MD Beckford                      [facility]

 

        2021 Travel                  1.2.840.1 1.2.322.791 4007

[id number] Univers



        00:00:00 00:00:00                         85307.1.1 350.1.13.41         

ity of



                                                3.412.2.7 2.2.7.3.698         [place]

[place]



                                                .3.779059 084.8           MD Beckford                      [facility]

 

        2021 Crystal [facility]rera, 1.2.840.1 [id number] 1087

[id number] Univers



        00:00:00 00:00:00 Only            Monique BURROUGHS 15598.1.1                 ity

 of



                                                3.412.2.7                 [place]



                                                .3.482633                 MD Beckford                      [facility]

 

        2021 Crystal Barrera 1.2.840.1 [id number] 1087

[id number] Univers



        00:00:00 00:00:00 Only            Monique M 22042.1.1                 ity

 of



                                                3.412.2.7                 [place]



                                                .3.663254                 MD Beckford                      [facility]

 

        2021-12-10 2021-12-10 Crystal Reyez 1.2.840.1 [id number]

[id number] Univers



        00:00:00 00:00:00 Only            Abiola R. 12052.1.1                 it

y of



                                                3.412.2.7                 [place]



                                                .3.923986                 MD Beckford                      [facility]

 

        2021-12-10 2021-12-10 [facility], 1.2.840.1 [id number]

[id number] Univers



        00:00:00 00:00:00 Only            Abiola R. 54670.1.1                 it

y of



                                                3.412.2.7                 [place]



                                                .3.456250                 MD



                                                .8                      [facility]

 

        2021 [facility], 1.2.840.1 [id number] 79639

[id number] Univers



        10:06:10 23:59:00 Encounter         Daron CHEEMAMiesha 76981.1.1                 it

y of



                                                3.412.2.7                 [place]



                                                .3.352676                 MD Beckford                      [facility]

 

        2021 [facility] GINA Rochaelle, 1.2.840.1 [id number] 108

[id number] 

Univers



        09:15:00 10:05:00 Encounter         Monique BURROUGHS 50909.1.1                 i

ty of



                                                3.412.2.7                 [place]



                                                .3.835757                 MD Beckford                      [facility]

 

        2021 Travel                  1.2.840.1 1.2.149.473 7933

[id number] Univers



        00:00:00 00:00:00                         87435.1.1 350.1.13.41         

ity of



                                                3.412.2.7 2.2.7.3.698         [place]

[place]



                                                .3.940752 084.8           MD Beckford                      [facility]

 

        2021 [facility] 1.2.840.1 [id number] 1087

[id number] Univers



        00:00:00 00:00:00 Only            Monique BURROUGHS 30316.1.1                 ity

 of



                                                3.412.2.7                 [place]



                                                .3.967636                 MD Beckford                      [facility]

 

        2021 [facility], 1.2.840.1 [id number] 1086

[id number] Univers



        00:00:00 00:00:00 Only            Monique LUIS FELIPE 30588.1.1                 ity

 of



                                                3.412.2.7                 [place]



                                                .3.895601                 MD Beckford                      [facility]

 

        2021 [facility], 1.2.840.1 [id number]

[id number] Univers



        11:00:00 23:59:00 Encounter         Daron DE JESUS 28864.1.1                 it

y of



                                                3.412.2.7                 [place]



                                                .3.186398                 MD Beckford                      [facility]

 

        2021 Travel                  1.2.840.1 1.2.669.375 2666

[id number] Univers



        00:00:00 00:00:00                         96474.1.1 350.1.13.41         

ity of



                                                3.412.2.7 2.2.7.3.698         [place]

[place]



                                                .3.612026 084.8           MD Beckford                      [facility]

 

        2021 [facility]         [facility] [facility], 1.2.840.1 [id number] 

[id number] 

Univers



        00:00:00 00:00:00 ion             Kiya FALCON 10141.1.1                 ity 

of



                                                3.412.2.7                 [place]



                                                .3.732972                 MD Beckford                      [facility]

 

        [id number]

[id number]

                                        Univers



        13:20:00 23:59:00 Encounter         Vel Abdul 61887.1.1      

           ity of



                                                3.412.2.7                 [place]



                                                .3.970966                 MD Beckford                      [facility]

 

        2021 [facility], 1.2.840.1 [id number]

[id number] Univers



        11:00:00 13:19:00 Encounter         Daron DE JESUS 93850.1.1                 it

y of



                                                3.412.2.7                 [place]



                                                .3.886823                 MD Beckford                      [facility]

 

        2021 Travel                  1.2.840.1 1.2.612.633 4326

[id number] Univers



        00:00:00 00:00:00                         65457.1.1 350.1.13.41         

ity of



                                                3.412.2.7 2.2.7.3.698         [place]

[place]



                                                .3.820592 084.8           MD



                                                .8                      [facility]

 

        2021 Crystal Barrera, 1.2.840.1 [id number] 1086

[id number] Univers



        00:00:00 00:00:00 Only            Monique BURROUGHS 16858.1.1                 ity

 of



                                                3.412.2.7                 [place]



                                                .3.323129                 MD



                                                .8                      [facility]

 

        2021 Orders          Urias,  1.2.840.1 [id number]

[id number] Univers



        00:00:00 00:00:00 Only            Milly 07737.1.1                 ity 

of



                                                3.412.2.7                 [place]



                                                .3.264968                 MD



                                                .8                      [facility]

 

        2021 [facility], 1.2.840.1 [id number]

[id number] Univers



        10:53:48 23:59:00 Encounter         Daron DE JESUS 34671.1.1                 it

y of



                                                3.412.2.7                 [place]



                                                .3.953668                 MD



                                                .8                      [facility]

 

        2021 [facility], 1.2.840.1 [id number]

2194 Univers



        15:30:00 15:33:42 Visit           Daron DE JESUS 72425.1.1                 ity 

of



                                                3.412.2.7                 [place]



                                                .3.334905                 MD



                                                .8                      [facility]

 

        2021 [facility]      [facility] 1.2.840.1 [id number] 1

[id number] 

Univers



        10:19:49 10:52:00 Encounter         Pamela  97532.1.1                 it

y of



                                        Gabi   3.412.2.7                 [place]



                                                .3.782062                 MD Beckford                      [facility]

 

        2021 [facility], 1.2.840.1 [id number]

[id number] 

Univers



        09:00:00 10:18:00 Encounter         Monique BURROUGHS 93015.1.1                 i

ty of



                                                3.412.2.7                 [place]



                                                .3.725561                 MD



                                                .8                      [facility]

 

        2021 Travel                  1.2.840.1 1.2.467.086 9098

[id number] [facility]



        00:00:00 00:00:00                         04598.1.1 350.1.13.41         

ity of



                                                3.412.2.7 2.2.7.3.698         [place]

[place]



                                                .3.149284 084.8           MD Beckford                      [facility]

 

        2021 [facility], 1.2.840.1 [id number] 1086

[id number] Univers



        00:00:00 00:00:00 Only            Monique BURROUGHS 86539.1.1                 ity

 of



                                                3.412.2.7                 [place]



                                                .3.871217                 MD Beckford                      [facility]

 

        2021 [facility] Daron CHEEMA. 1.2.840.1 [id number]

[id number]

                                        Univers



        12:48:05 23:59:00 Encounter         Ananya Griffin 93283.1.1   

              ity of



                                                3.412.2.7                 [place]



                                                .3.313756                 MD Beckford                      [facility]

 

        2021 [facility], 1.2.840.1 [id number]

[id number] Univers



        09:47:54 12:47:00 Encounter         Daron DE JESUS 88521.1.1                 it

y of



                                                3.412.2.7                 [place]



                                                .3.869109                 MD Beckford                      [facility]

 

        2021 [facility], 1.2.840.1 [id number]

[id number] 

Univers



        09:19:55 09:46:00 Encounter         Pamela  56973.1.1                 it

y of



                                        Gabi   3.412.2.7                 [place]



                                                .3.979351                 MD Beckford                      [facility]

 

        2021 Travel                  1.2.840.1 1.2.927.498 9764

[id number] Univers



        00:00:00 00:00:00                         47327.1.1 350.1.13.41         

ity of



                                                3.412.2.7 2.2.7.3.698         [place]

[place]



                                                .3.353194 084.8           MD Beckford                      [facility]

 

        2021 Orders          Reyez, 1.2.840.1 [id number]

[id number] Univers



        00:00:00 00:00:00 Only            Abiola ORTEZMiesha 04209.1.1                 it

y of



                                                3.412.2.7                 [place]



                                                .3.338414                 MD Beckford                      [facility]

 

        2021 [facility], Daron CHEEMA. 1.2.840.1 [id number]

[id number]

                                        Univers



        12:58:57 23:59:00 Encounter         AgarwalLianne gatica IRINA 03207.1.1          

       ity of



                                                3.412.2.7                 [place]



                                                .3.582016                 MD Beckford                      [facility]

 

        2021 [facility], 1.2.840.1 [id number]

[id number] Univers



        10:44:13 12:57:00 Encounter         Daron DENIMiesha 25158.1.1                 it

y of



                                                3.412.2.7                 [place]



                                                .3.536917                 MD Beckford                      [facility]

 

        2021 [facility], 1.2.840.1 [id number]

[id number] 

Univers



        08:15:00 10:43:00 Encounter         Monique BURROUGHS 77466.1.1                 i

ty of



                                                3.412.2.7                 [place]



                                                .3.854296                 MD Beckford                      [facility]

 

        2021 Travel                  1.2.840.1 1.2.259.869 2717

[id number] Univers



        00:00:00 00:00:00                         74536.1.1 350.1.13.41         

ity of



                                                3.412.2.7 2.2.7.3.698         [place]

[place]



                                                .3.464163 084.8           MD Beckford                      [facility]

 

        2021 Crystal Barrera, 1.2.840.1 [id number] 1086

[id number] Univers



        00:00:00 00:00:00 Only            Monique BURROUGHS 44314.1.1                 ity

 of



                                                3.412.2.7                 [place]



                                                .3.630280                 MD



                                                .8                      [facility]

 

        2021 [facility], 1.2.840.1 [id number] 108

[id number] 

Univers



        14:40:38 23:59:00 Encounter         Daryl   83247.1.1                 it

y of



                                                3.412.2.7                 [place]



                                                .3.137652                 MD



                                                .8                      [facility]

 

        2021 [facility], 1.2.840.1 [id number] 1085

[id number] 

Univers



        14:20:00 16:13:32                 Daryl   14270.1.1                 ity 

of



                                                3.412.2.7                 [place]



                                                .3.560227                 MD



                                                .8                      [facility]

 

        2021 [facility], 1.2.840.1 [id number]

[id number] Univers



        10:50:54 14:39:00 Encounter         Daron DE JESUS 84979.1.1                 it

y of



                                                3.412.2.7                 [place]



                                                .3.080228                 MD



                                                .8                      [facility]

 

        2021 [facility].840[facility] [id number] 

[id number] 

Univers



        13:15:00 13:30:00 Procedure         Pamela  14366.1.1                 it

y of



                                        Gabi   3.412.2.7                 [place]



                                                .3.879807                 MD



                                                .8                      [facility]

 

        2021 Crystal Barrera, 1.2.840.1 [id number] 1086

[id number] Univers



        00:00:00 00:00:00 Only            Monique BURROUGHS 36412.1.1                 ity

 of



                                                3.412.2.7                 [place]



                                                .3.593168                 MD



                                                .8                      [facility]

 

        2021 Travel                  1.2.840.1 1.2.799.201 0085

[id number] Univers



        00:00:00 00:00:00                         76148.1.1 350.1.13.41         

ity of



                                                3.412.2.7 2.2.7.3.698         [place]

[place]



                                                .3.908690 084.8           MD



                                                .8                      [facility]

 

        2021 Crystal Lainez, 1.2.840.1 [id number] 10

[id number] Univers



        00:00:00 00:00:00 Only            Pamela  89188.1.1                 ity 

of



                                        Gabi   3.412.2.7                 [place]



                                                .3.145280                 MD Beckford                      [facility]

 

        2021 Refill          Maricarmen, 1.2.840.1 [id number]

6821 Univers



        00:00:00 00:00:00                 Daron DE JESUS 68475.1.1                 ity 

of



                                                3.412.2.7                 [place]



                                                .3.979787                 MD Beckford                      [facility]

 

        2021 Crystal Barrera, 1.2.840.1 [id number] 1086

[id number] Univers



        00:00:00 00:00:00 Only            Monique BURROUGHS 23333.1.1                 ity

 of



                                                3.412.2.7                 [place]



                                                .3.217613                 MD Beckford                      [facility]

 

        2021 [facility] GINA Coley, 1.2.840.1 [id number] [id number]

[id number] Univers



        10:41:29 23:59:00 Encounter         Daron DE JESUS 94177.1.1                 it

y of



                                                3.412.2.7                 [place]



                                                .3.887119                 MD Beckford                      [facility]

 

        2021 [facility]      Maricarmen, 1.2.840.1 [id number]

5839 Univers



        15:00:00 15:15:19 Visit           Daron DE JESUS 94041.1.1                 ity 

of



                                                3.412.2.7                 [place]



                                                .3.052226                 MD



                                                .8                      [facility]

 

        2021 [facility]      [facility], 1.2.840.1 [id number] 1

[id number] 

Univers



        09:00:00 10:40:00 Encounter         Pamela  91003.1.1                 it

y of



                                        Gabi   3.412.2.7                 [place]



                                                .3.953731                 MD



                                                .8                      [facility]

 

        2021 Travel                  1.2.840.1 1.2.765.297 8882

[id number] Univers



        00:00:00 00:00:00                         97648.1.1 350.1.13.41         

ity of



                                                3.412.2.7 2.2.7.3.698         Te

[place]



                                                .3.848629 084.8           MD



                                                .8                      [facility]

 

        2021-11-15 2021-11-15 Orders          Hiral, 1.2.840.1 [id number] 1086

[id number] Univers



        00:00:00 00:00:00 Only            Monique BURROUGHS 32440.1.1                 ity

 of



                                                3.412.2.7                 [place]



                                                .3.912538                 MD



                                                .8                      [facility]

 

        2021-11-15 2021-11-15 Orders          Victor M, 1.2.840.1 [id number]

8518 Univers



        00:00:00 00:00:00 Only            Nisha FALCON 50796.1.1                 ity

 of



                                                3.412.2.7                 [place]



                                                .3.924023                 MD



                                                .8                      [facility]

 

        2021-11-15 2021-11-15 Orders          Vignesh, 1.2.840.1 [id number] 1086

[id number] Univers



        00:00:00 00:00:00 Only            Kinza  95513.1.1                 ity 

of



                                                3.412.2.7                 [place]



                                                .3.968802                 MD



                                                .8                      [facility]

 

        2021 [facility], 1.2.840.1 [id number] 108

[id number] 

Univers



        11:00:00 23:59:00 Encounter         Monique BURROUGHS 20364.1.1                 i

ty of



                                                3.412.2.7                 [place]



                                                .3.300301                 MD Beckford                      [facility]

 

        2021 Orders          Migel, 1.2.840.1 [id number] 10

[id number] Univers



        00:00:00 00:00:00 Only            Pamela  26687.1.1                 ity 

of



                                        Gabi   3.412.2.7                 [place]



                                                .3.628715                 MD Beckford                      [facility]

 

        2021 Travel                  1.2.840.1 1.2.145.952 0036

[id number] Univers



        00:00:00 00:00:00                         45074.1.1 350.1.13.41         

ity of



                                                3.412.2.7 2.2.7.3.698         [place]

[place]



                                                .3.333177 084.8           MD Beckford                      [facility]

 

        2021 [facility]      Maricarmen Daron F. 1.2.840.1 [id number]

[id number]

                                        Univers



        14:20:00 23:59:00 Encounter         Jose Eduardo Maty M 70693.1.1            

     ity of



                                                3.412.2.7                 [place]



                                                .3.181470                 MD Beckford                      [facility]

 

        2021 Crystal Lainez, 1.2.840.1 [id number] 10

[id number] Univers



        00:00:00 00:00:00 Only            Pamela  44141.1.1                 ity 

of



                                        Gabi   3.412.2.7                 [place]



                                                .3.693383                 MD Beckford                      [facility]

 

        2021 Orders          Dereje 1.2.840.1 [id number]

2571 Univers



        00:00:00 00:00:00 Only            Abiola CRISOSTOMO 92100.1.1                 it

y of



                                                3.412.2.7                 [place]



                                                .3.872744                 MD Beckford                      [facility]

 

        2021 Orders          Reyez, 1.2.840.1 [id number]

2003 Univers



        00:00:00 00:00:00 Only            Abiola CRISOSTOMO 41400.1.1                 it

y of



                                                3.412.2.7                 [place]



                                                .3.715451                 MD



                                                .8                      [facility]

 

        2021 Travel                  1.2.840.1 1.2.730.492 4732

[id number] Univers



        00:00:00 00:00:00                         21494.1.1 350.1.13.41         

ity of



                                                3.412.2.7 2.2.7.3.698         Te

[place]



                                                .3.058368 084.8           MD



                                                .8                      [facility]

 

        2021-11-10 2021-11-10 Orders          Doctor  BRETT    1.2.840.114 [id number]

94 Univers



        00:00:00 00:00:00 Only            Unassigned, JEN   350.1.13.10       

  ity of



                                        [facility] 4.2.7.2.686         Conrado

as



                                                        [phone]         Medi

kaylee



                                                        009             Branch

 

        2021 [facility], 1.2.840.1 [id number] 108

[id number] 

Univers



        14:00:00 23:59:00 Encounter         Monique BURROUGHS 64515.1.1                 i

ty of



                                                3.412.2.7                 [place]



                                                .3.631128                 MD Beckford                      [facility]

 

        [facility]840[facility] [id number]

4262 

[id number]                              Univers



        10:58:13 13:59:00 Encounter         Kvng Ruvalcaba 20950.1.1            

     ity of



                                                3.412.2.7                 [place]



                                                .3.727194                 MD Beckford                      [facility]

 

        2021 [facility] 1.2.840.1 [id number] 108

[id number] 

Univers



        10:42:54 10:57:00 Encounter         Monique BURROUGHS 93383.1.1                 i

ty of



                                                3.412.2.7                 [place]



                                                .3.261231                 MD Beckford                      [facility]

 

        2021 Orders          Maricarmen, 1.2.840.1 [id number]

1361 Univers



        00:00:00 00:00:00 Only            Daron DE JESUS 62824.1.1                 ity 

of



                                                3.412.2.7                 [place]



                                                .3.277627                 MD



                                                .8                      [facility]

 

        2021 Travel                  1.2.840.1 1.2.894.935 5391

[id number] Univers



        00:00:00 00:00:00                         80845.1.1 350.1.13.41         

ity of



                                                3.412.2.7 2.2.7.3.698         Te

[place]



                                                .3.669799 084.8           MD



                                                .8                      [facility]

 

        2021 [facility]rera, 1.2.840.1 [id number] 1085

[id number] Univers



        00:00:00 00:00:00 Only            Monique BURROUGHS 69696.1.1                 ity

 of



                                                3.412.2.7                 [place]



                                                .3.431162                 MD



                                                .8                      [facility] [facility]

 

        2021 [facility], 1.2.840.1 [id number]

[id number] 

Univers



        10:10:53 23:59:00 Ester San 90506.1.1                 i

ty of



                                                3.412.2.7                 [place]



                                                .3.700816                 MD



                                                .8                      [facility]

 

        2021 Crystal Barrera, 1.2.840.1 [id number]

[id number] Univers



        00:00:00 00:00:00 Only            Monique BURROUGHS 12492.1.1                 ity

 of



                                                3.412.2.7                 [place]



                                                .3.075030                 MD



                                                .8                      [facility]

 

        2021 Crystal Reyez, 1.2.840.1 [id number]

4934 Univers



        00:00:00 00:00:00 Only            Abiola CRISOSTOMO 06245.1.1                 it

y of



                                                3.412.2.7                 [place]



                                                .3.845373                 MD



                                                .8                      [facility]



                                                                        Cancer



                                                                        Neihart

 

        2021 [facility]          Burch, 1.2.840.1 [id number]

[id number] Univers



        00:00:00 00:00:00 Only            Mena 50313.1.1                 ity

 of



                                                3.412.2.7                 [place]



                                                .3.850593                 MD



                                                .8                      [facility]

 

        2021 [facility]              1.2.840.1 [id number]

[id number] Univers



        08:00:00 23:59:00 Encounter                 90667.1.1                 it

y of



                                                3.412.2.7                 [place]



                                                .3.902572                 MD



                                                .8                      [facility]

 

        2021 Office  [facility], 1.2.840.1 [id number]

9800 Univers



        10:00:00 11:04:08 Visit           Daron DE JESUS 54050.1.1                 ity 

of



                                                3.412.2.7                 [place]



                                                .3.734080                 MD Beckford                      [facility]

 

        2021 Orders          Dereje, 1.2.840.1 [id number]

4660 Univers



        00:00:00 00:00:00 Only            Abiola CRISOSTOMO 12923.1.1                 it

y of



                                                3.412.2.7                 [place]



                                                .3.412251                 MD



                                                .8                      [facility]

 

        2021 Crystal Powers,  1.2.840.1 [id number]

6089 Univers



        00:00:00 00:00:00 Only            Danya  73857.1.1                 ity 

of



                                        Francisco   3.412.2.7                 [place]



                                                .3.559739                 MD



                                                .8                      [facility]

 

        2021 Orders          Dereje, 1.2.840.1 [id number]

4107 Univers



        00:00:00 00:00:00 Only            Abiola CRISOSTOMO 72167.1.1                 it

y of



                                                3.412.2.7                 [place]



                                                .3.986276                 MD Beckford                      [facility]

 

        2021 Orders          Reyez, 1.2.840.1 [id number]

3168 Univers



        00:00:00 00:00:00 Only            Abiola CRISOSTOMO 92686.1.1                 it

y of



                                                3.412.2.7                 [place]



                                                .3.937598                 MD Beckford                      [facility]

 

        2021 Travel                  1.2.840.1 1.2.449.808 3130

[id number] Univers



        00:00:00 00:00:00                         69296.1.1 350.1.13.41         

ity of



                                                3.412.2.7 2.2.7.3.698         [place]

[place]



                                                .3.653736 084.8           MD Beckford                      [facility]

 

        2021-10-25 2021-10-25 Hospital               1.2.840.1 [id number]

[id number] Univers



        15:07:17 23:59:00 Encounter                 39845.1.1                 it

y of



                                                3.412.2.7                 [place]



                                                .3.565026                 MD Beckford                      [facility]

 

        2021-10-25 2021-10-25 Follow-Up GINA Álvarez, 1.2.840.1 [id number]

[id number] 

Univers



        14:00:00 15:25:55                 Jose Alfredo JASON 61240.1.1                 

ity of



                                                3.412.2.7                 [place]



                                                .3.567358                 MD Beckford                      [facility]

 

        2021-10-25 2021-10-25 Travel                  1.2.840.1 1.2.889.074 [id number]

[id number] Univers



        00:00:00 00:00:00                         16505.1.1 350.1.13.41         

ity of



                                                3.412.2.7 2.2.7.3.698         [place]

[place]



                                                .3.163164 084.8           MD Beckford                      [facility]

 

        2021-10-15 2021-10-15 Crystal Lainez, 1.2.840.1 [id number]

[id number] Univers



        00:00:00 00:00:00 Only            Pamela  42652.1.1                 ity 

of



                                        Gabi   3.412.2.7                 [place]



                                                .3.589679                 MD



                                                .8                      [facility]

 

        2021-10-03 2021-10-09 [facility]      Merlin, Benjamin 1.2.840.1 [id number]

[id number]

                                        Univers



        19:58:00 13:21:00 Encounter         Daron Coley FMiesha 04660.1.1           

      ity of



                                        Collin Adler 3.412.2.7                 [place]

[place]



                                                .3.777795                 MD



                                                .8                      [facility]

 

        2021-10-08 2021-10-08 Orders          Ruel, 1.2.840.1 [id number]

[id number] Univers



        00:00:00 00:00:00 Only            Jenaro  64764.1.1                 ity 

of



                                                3.412.2.7                 [place]



                                                .3.119835                 MD



                                                .8                      [facility]

 

        2021-10-07 2021-10-07 Anesthesia         Opunleonie, 1.2.840.1 [id number]

[id number] 

Univers



        15:41:25 15:41:25 Event           Carmen  10597.1.1                 ity 

of



                                                3.412.2.7                 [place]



                                                .3.666202                 MD



                                                .8                      [facility]

 

        2021-10-07 2021-10-07 Surgery         Ruiz, 1.2.840.1 [id number]

4856 Univers



        12:00:00 12:45:00                 Melissa   85969.1.1                 ity 

of



                                                3.412.2.7                 [place]



                                                .3.695136                 MD



                                                .8                      [facility]

 

        2021-10-07 2021-10-07 Anesthesia         Mai Beltran 1.2.840.1 [id number]

 [id number] 

Univers



        08:36:00 09:10:00 Event           Elie Perez 87868.1.1              

   ity of



                                                3.412.2.7                 [place]



                                                .3.274835                 MD



                                                .8                      [facility]

 

        2021-10-06 2021-10-06 Prep for         Carrasco, 1.2.840.1 [id number]

[id number] Univers



        00:00:00 00:00:00 Surgery         Deanndrezekiel 66011.1.1                 ity

 of



                                        Monique  3.412.2.7                 [place]



                                                .3.458408                 MD Beckford                      [facility]

 

        2021-10-06 2021-10-06 Prep for         Danilo, 1.2.840.1 [id number] 108

[id number] Univers



        00:00:00 00:00:00 Surgery         aMrgymaria fernandaezekiel 36328.1.1                 ity

 of



                                        Monique  3.412.2.7                 [place]



                                                .3.410730                 MD



                                                .8                      [facility]

 

        2021-10-04 2021-10-04 Inpatient EL              [facility]     MOR     [id number]

93 MD



        01:10:28 01:36:02                                                 All

o



                                                                        n

 

        2021-10-04 2021-10-04 Documentat         Facciolli, 1.2.840.1 [id number] 

[id number] 

Univers



        00:00:00 00:00:00 hector FALCON 98903.1.1                 ity 

of



                                                3.412.2.7                 [place]



                                                .3.558071                 MD Beckford                      [facility]

 

        2021-10-04 2021-10-04 Travel                  1.2.840.1 1.2.155.469 0938

[id number] Univers



        00:00:00 00:00:00                         94780.1.1 350.1.13.41         

ity of



                                                3.412.2.7 2.2.7.3.698         [place]

[place]



                                                .3.972331 084.8           MD Beckford                      [facility]

 

        2021-10-03 2021-10-03 Travel                  1.2.840.1 1.2.365.625 4676

[id number] Univers



        00:00:00 00:00:00                         39244.1.1 350.1.13.41         

ity of



                                                3.412.2.7 2.2.7.3.698         [place]

[place]



                                                .3.730669 084.8           MD Beckford                      [facility]

 

        2021 Outpatient EL              MOR JUARES     [id number]

251 MD



        09:14:06 09:18:36                                                 All lopez

 

        2021 Outpatient EL      [facility] BAKER     [id number]

[id number] MD



        20:44:00 23:59:00                 SERENITY lopez

 

        2021 Outpatient       MARICARMEN, MDA     MDA     [id number]

646 MD



        08:18:34 20:43:00                 DARON lopez

 

        2021 Outpatient       MARICARMEN, MDA     MDA     [id number]

062 MD



        10:32:04 11:26:00                 DARON lopez

 

        2021 Outpatient       MARICARMEN, MDA     MDA     [id number]

063 MD



        07:36:46 08:17:00                 DARON lopez

 

        2021-09-15 2021-09-15 Outpatient       MARICARMEN, MDA     MDA     [id number]

123 MD



        10:54:40 11:29:21                 DARON lopez

 

        2021-09-15 2021-09-15 Outpatient [facility]     MDA     [id number]

650 MD



        08:28:08 08:28:08                                                 All lopez

 

        2021 Outpatient       MARICARMEN, MDA     MDA     [id number]

940 MD



        11:02:31 11:28:36                 DARON lopez

 

        2021 Outpatient       MARICARMEN, MDA     MDA     [id number]

852 MD



        09:21:04 23:59:00                 DARON lopez

 

        2021 Outpatient       MARICARMEN, MDA     MDA     [id number]

854 MD



        10:26:52 11:46:16                 DARON lopez

 

        2021 Outpatient [facility] JING MDA     MDA     108

[id number] MD



        08:36:03 09:19:07                                                 All lopez

 

        2021 Outpatient       MIGEL, MDA     MDA     108

[id number] MD



        09:52:59 09:55:01                 PAMELA Carrizales

o



                                                                        john

 

        2021 Outpatient       MIGEL, MDA     MDA     108

[id number] MD



        11:23:40 23:59:00                 PAMELA Carrizales

o



                                                                        john

 

        2021 Outpatient       MARICARMEN, MDA     MDA     [id number]

522 MD



        11:48:51 14:07:07                 DARON lopez

 

        2021 Outpatient GINA LAINEZ, MDA     MDA     108

[id number] MD



        12:40:14 23:59:00                 PAMELA lopez

 

        2021 Outpatient GINA COLEY, MDA     MDA     [id number]

127 MD



        13:55:45 15:43:17                 DARON lopez

 

        2021 Outpatient GINA COLEY, MDA     MDA     [id number]

126 MD



        12:20:26 12:39:00                 DARON lopez

 

        2021 Outpatient GINA LAINEZ, MDA     MDA     108

[id number] MD



        14:30:00 23:59:00                 PAMELA lopze

 

        2021 Outpatient GINA LAINEZ, MDA     MDA     108

[id number] MD



        15:18:07 15:18:07                 PAMELA lopez

 

        2021 [facility]     [id number]

[address]



        00:00:00 00:00:00                 SHADY                    [address]     [facility]

i



                                                                        st

 

        2021 Outpatient         [facility]     [id number]

[address] [place] [address]



        00:00:00 00:00:00                 SHADY                    [address]     [facility]

i



                                                                        st

 

        2020 Outpatient GINA VALENZUELAI, MDA     MDA     [id number]

093 MD



        00:00:00 00:00:00                 DARON lopez

 

        2020 Outpatient EL      SEANEYER, MDA     MDA     1065

[id number] MD



        00:00:00 00:00:00                 SANDRO lopez

 

        2020 Outpatient EL      FANGMEYER, MDA     MDA     1065

[id number] MD



        12:55:21 12:55:21                 SANDRO lopez

 

        2020 Outpatient EL      FANGMEYER, MDA     MDA     1065

[id number] MD



        00:00:00 00:00:00                 SANDRO lopez

 

        2020 Outpatient GINA COLEY, MDA     MDA     [id number]

788 MD



        12:05:56 23:59:00                 DARON lopez

 

        2020 Outpatient MOR RIOS     MDA     [id number]

028 MD



        13:01:31 14:06:11                 DARON Carrizales

o



                                                                        john

 

        2020 Outpatient MOR VEGA     MDA     1064

[id number] MD



        11:00:00 12:04:00                 SANDRO Joshipeter lopez

 

        2020 Outpatient MOR VEGA     MDA     1064

[id number] MD



        00:00:00 00:00:00                 SANDROEZEKIEL Joshipeter lopez

 

        2020 Outpatient MOR RIOS     MDA     [id number]

383 MD



        11:32:31 23:59:00                 DARON Carrizales

o



                                                                        john

 

        2020 Outpatient MOR VEGA     MDA     1064

[id number] MD



        09:00:00 11:31:00                 SANDRO                           Allpeter lopez

 

        2019 Outpatient               MOR     MDA     [id number]

117 MD



        08:04:55 08:04:55                                                 All lopez

 

        2018-10-16 2018-11-15 OP Therapy                 [facility]     [id number]

[id number] [facility]



        19:00:00 05:59:00 Patients                 r       Bell 01      l



                                                        [place]         Herm

[facility]            

 

        2018 2018-10-14 OP Therapy                 nullFlavo S[facility]     [id number]

[id number] Memoria



        18:00:00 04:59:00 Patients                 r       [place] 00      l



                                                        [place]         Herm

[facility]            

 

        2018 Berna MORENO [facility]     [id number]

889 [place]



        11:15:00 11:15:00 t;              Carley LIZ M.D. ans CHRISTI, M.D.                                            

 

        2017-10-27 2017-10-28 Day                     [facility] [id number]

775 [facility]



        13:55:00 04:59:00 Surgery                 r       Dami 04      l



                                                        [facility]         



                                                        [facility]         

 

        2017-09-15 2017 Day                     [facility] [id number]

[facility]



        14:04:00 04:59:00 Surgery                 r       [place] 02      l



                                                        Orthopedic         [facility]         



                                                        [facility]         

 

        2017 Day                     [facility] [id number]

775 [facility]



        13:38:00 04:59:00 Surgery                 r       [place] 01      l



                                                        Orth[facility]         [facility]         



                                                        [facility]         

 

        2017 2017 Day                     Sandie [facility] [id number]

775 [facility]



        13:32:00 04:59:00 Surgery                 r       Dami 00      l



                                                        [facility]         







Results







           Test Description Test Time  Test Comments Results    Result Comments 

Source









                    CMV Quant PCR       2022 21:40:15 









                      Test Item  Value      Reference Range Interpretation Comme

nts









             CMV DNA PCR  Target Not Detected Target Not Detected              N

ormal Range: Target Not



             (test code =              IU/mL                     Detected.Report

able Range:



             5214)                                               34.5 to 4,000,0

00 CMV DNA



                                                                 IU/mL; values b

etween



                                                                 4,000,000 to 10

,000,000 CMV



                                                                 DNA IU/mL may b

e reported but



                                                                 these should be

 interpreted



                                                                 with caution as

 this range of



                                                                 the assay has n

ot been



                                                                 internally veri

fied. The



                                                                 clinical signif

icance of CMV



                                                                 levels at 4,000

,000 IU/ml



                                                                 verses those ab

ove 4,000,000



                                                                 is unclear. Res

ults are



                                                                 expressed in CM

V DNA IU/ml



                                                                 plasma. Methodo

logy: The



                                                                 extraction and 

quantitation



                                                                 of cytomegalovi

joan (CMV) DNA



                                                                 in human plasma

 is performed



                                                                 using the [employer]

 [employer] System.



                                                                 Amplification o

f viral DNA is



                                                                 achieved using 

polymerase



                                                                 chain reaction 

(PCR).



                                                                 Internal contro

ls are



                                                                 included to ass

ess for



                                                                 possible amplif

ication



                                                                 inhibitors. If 

inhibition is



                                                                 detected, the s

pecimen is



                                                                 tested again an

d if



                                                                 inhibition is c

onfirmed the



                                                                 specimen is res

ulted as



                                                                 "Invalid". When

 an "Invalid"



                                                                 results occurs,

 it is



                                                                 recommended to 

wait a minimum



                                                                 of 7-10 days be

fore



                                                                 submitting a ne

w specimen for



                                                                 testing. This i

s an



                                                                 FDA-approved as

say and its



                                                                 performance mono

racteristics



                                                                 were verified b

y the



                                                                 microbiology la

boratory at



                                                                 the [facility]

 [facility].



                                                                 Results must be

 interpreted



                                                                 within the cont

ext of all



                                                                 relevant clinic

al and



                                                                 laboratory find

ings.



[facility] NeihartTMP Interpretation Antibody Screen 
[id number] 15:54:08





             Test Item    Value        Reference Range Interpretation Comments

 

             TMP Auto Neg  At the present                           ____________

____________



             ABSC Interp  time, patient                           ______________

__________



             (test code = plasma shows no                           ____PARKERNAIBAL



             7535)        evidence of RBC                           Nathalie HUBER

ed by:



                          alloantibodies.                           ANTHONY KLE

IN,Dictated



                                                                 Date/Time: 



                                                                 9:54 AM CST Tra

nscribed



                                                                 Date/Time: 



                                                                 9:54 AM



                                                                 CSTElectronical

ly Signed



                                                                 By: ANTHONY YEAGER, on



                                                                 2022 9:54

 AM C



[facility] CenterAntibody [id number] 23:01:13





             Test Item    Value        Reference Range Interpretation Comments

 

             ABSC. (test code = 890-4) Negative ABSC                           



[facility] CenterABORh2022-[facility] 23:01:12





             Test Item    Value        Reference Range Interpretation Comments

 

             ABORh. (test code = 882-1) O POS                                  



[facility] CenterClot Expiration [id number] 
23:00:42





             Test Item    Value        Reference Range Interpretation Comments

 

             T & S Expiration (test code = 2022                           

  



             5318)                                               



[facility] CenterTacrolimus2022-12-06 21:18:00





             Test Item    Value        Reference Range Interpretation Comments

 

             Tacro LD Time (test                                    



             code = 7598)                                        

 

             Tacro LD Date (test 2022                             



             code = 7597)                                        

 

             Tacro Level (test Random                                 



             code = 7599)                                        

 

             Tacrolimus (test code 15.0 ng/mL   5.0-20.0                  Therap

eutic range 5 -



             = 8554)                                             20 ng/mL for 12

 hour



                                                                 trough. The ran

ge



                                                                 varies with the

 method



                                                                 used, type of o

rgan



                                                                 transplant, dulce maria

e after



                                                                 transplant, and



                                                                 co-administrati

on with



                                                                 other



                                                                 immunosuppressa

nts.



                                                                 Analytical Meth

od:



                                                                 Immunoassay Met

hod



                                                                 Platform: Abbot

t



                                                                 



[facility] CenterFractionated [id number] 
19:26:57Bili Total&lt;0.3&lt;=1.2 mg/dLUT [facility]ER [place]Un[facility] EdinburgGlomerular Filtration [id number] 19:26:56





             Test Item    Value        Reference Range Interpretation Comments

 

             eGFR (test code = 55           See_Comment  L            The eGFRcr

 is



             15682)                                              calculated with

 the



                                                                  CKD-EPI cr

eatinine



                                                                 equation using



                                                                 creatinine, pat

ient's



                                                                 age, and sex fo

r adults



                                                                 18 years of age

 and



                                                                 older. Other fa

ctors,



                                                                 especially musc

le mass,



                                                                 may affect accu

racy and



                                                                 need to be



                                                                 considered.Acco

rding to



                                                                 the Kidney Dise

ase:



                                                                 Improving Globa

l



                                                                 Outcomes (KDIGO

) CKD



                                                                 Work Group 2012



                                                                 Clinical Practi

ce



                                                                 Guideline, 

roldan



                                                                 kidney disease 

(CKD) is



                                                                 defined as the



                                                                 abnormalities o

f kidney



                                                                 structure or fu

nction,



                                                                 present for mor

e than 3



                                                                 months, with



                                                                 implications fo

r



                                                                 health. CKD cecilia

uld be



                                                                 classified by shaan ugalde,



                                                                 GFR category, a

nd



                                                                 albuminuria cat

egory.



                                                                 KDIGO guideline

s



                                                                 provide the fol

lowing



                                                                 GFR categoriesS

tage



                                                                 Description GFR



                                                                 mL/min/1.73 m2G

1*



                                                                 Normal or high 

>= 90G2*



                                                                 Mildly decrease

d



                                                                 60-89G3a Mildly

 to



                                                                 moderately decr

eased



                                                                 45-59G3b Modera

tely to



                                                                 severely decrea

sed



                                                                 30-44G4 Severel

y



                                                                 decreased 15-29

G5



                                                                 Kidney failure 

<15*In



                                                                 the absence of 

evidence



                                                                 of kidney damag

e,



                                                                 neither G1 nor 

G2



                                                                 fulfill criteri

a for



                                                                 CKD. [Automated



                                                                 message] The sy

stem



                                                                 which generated

 this



                                                                 result transmit

chiara



                                                                 reference range

: >=60



                                                                 mL/min/1.73 sq.

 m. The



                                                                 reference range

 was not



                                                                 used to interpr

et this



                                                                 result as



                                                                 normal/abnormal

.

 

             Lab Interpretation Abnormal                               



             (test code = 39473-9)                                        



[facility] CenterUric [id number] 19:26:55





             Test Item    Value        Reference Range Interpretation Comments

 

             Uric Acid (test code = 3084-1) 4.8 mg/dL    2.4-5.7                

   



[facility] CenterTotal [id number] 19:26:54





             Test Item    Value        Reference Range Interpretation Comments

 

             Total Protein (test code = 2885-2) 6.5 g/dL     6.4-8.3            

       



[facility] CenterMagnesium [id number] 19:26:53





             Test Item    Value        Reference Range Interpretation Comments

 

             Magnesium (test code = 94212-9) 2.3 mg/dL    1.6-2.6               

    



[facility] CenterAlkaline [id number] 
19:26:52





             Test Item    Value        Reference Range Interpretation Comments

 

             Alk Phos (test code = 6768-6) 127 U/L             H          

  

 

             Lab Interpretation (test code = Abnormal                           

    



             95117-6)                                            



[facility] CenterALT2022-12-06 19:26:51





             Test Item    Value        Reference Range Interpretation Comments

 

             ALT (test code = 1742-6) 50 U/L       See_Comment  H             [A

utomated message]



                                                                 The system [employer]



                                                                 generated this 

result



                                                                 transmitted ref

erence



                                                                 range: <=33. Th

e



                                                                 reference range

 was



                                                                 not used to int

erpret



                                                                 this result as



                                                                 normal/abnormal

.

 

             Lab Interpretation (test Abnormal                               



             code = 45573-3)                                        



[facility].Serum [id number] 
19:26:50





             Test Item    Value        Reference Range Interpretation Comments

 

             Creatinine (test code = 2160-0) 1.14 mg/dL   0.51-0.95    H        

    

 

             Lab Interpretation (test code = Abnormal                           

    



             20856-0)                                            



[facility] CenterBUN2022-12-06 19:26:49





             Test Item    Value        Reference Range Interpretation Comments

 

             BUN (test code = 3094-0) 34 mg/dL     6-23         H            

 

             Lab Interpretation (test code = Abnormal                           

    



             58075-8)                                            



[facility] CenterPhosphorus [id number] 19:26:48





             Test Item    Value        Reference Range Interpretation Comments

 

             Phosphorus (test code = 2777-1) 4.3 mg/dL    2.5-4.5               

    



[facility] CenterCalcium [id number] 19:26:47





             Test Item    Value        Reference Range Interpretation Comments

 

             Calcium Lvl (test code = 43824-9) 9.0 mg/dL    8.4-10.2            

      



[facility] CenterAlbumin [id number] 19:26:46





             Test Item    Value        Reference Range Interpretation Comments

 

             Albumin Lvl (test code 4.0          See_Comment                [Aut

omated message] The



             = 1751-7)                                           system which ge

nerated



                                                                 this result tra

nsmitted



                                                                 reference range

: 3.5 - 5.2



                                                                 gm/dL. The refe

rence range



                                                                 was not used to

 interpret



                                                                 this result as



                                                                 normal/abnormal

.



[facility] CenterAspartate Aminotransferase
2022 19:26:45





             Test Item    Value        Reference Range Interpretation Comments

 

             AST (test code = 1920-8) 67 U/L       See_Comment  H             [A

utomated message]



                                                                 The system [employer]



                                                                 generated this 

result



                                                                 transmitted ref

erence



                                                                 range: <=32. Th

e



                                                                 reference range

 was



                                                                 not used to int

erpret



                                                                 this result as



                                                                 normal/abnormal

.

 

             Lab Interpretation (test Abnormal                               



             code = 81590-0)                                        



[facility] CenterElectrolyte [id number] 
19:26:44





             Test Item    Value        Reference Range Interpretation Comments

 

             Sodium Lvl (test code = 140          See_Comment                [Au

tomated message] The



             2951)                                             system which ge

nerated



                                                                 this result tra

nsmitted



                                                                 reference range

: 136 -



                                                                 145 mEq/L. The 

reference



                                                                 range was not u

sed to



                                                                 interpret this 

result as



                                                                 normal/abnormal

.

 

             Potassium Lvl (test 4.2          See_Comment                [Automa

chiara message] The



             code = 2823-3)                                        system which 

generated



                                                                 this result tra

nsmitted



                                                                 reference range

: 3.5 -



                                                                 5.1 mEq/L. The 

reference



                                                                 range was not u

sed to



                                                                 interpret this 

result as



                                                                 normal/abnormal

.

 

             Chloride (test code = 107          See_Comment                [Auto

mated message] The



             )                                             system which ge

nerated



                                                                 this result tra

nsmitted



                                                                 reference range

: 98 - 107



                                                                 mEq/L. The refe

rence



                                                                 range was not u

sed to



                                                                 interpret this 

result as



                                                                 normal/abnormal

.

 

             CO2 (test code = 26           See_Comment                [Automated

 message] The



             2028)                                             system which ge

nerated



                                                                 this result tra

nsmitted



                                                                 reference range

: 22 - 29



                                                                 mEq/L. The refe

rence



                                                                 range was not u

sed to



                                                                 interpret this 

result as



                                                                 normal/abnormal

.

 

             Anion Gap (test code = 7            See_Comment                [Aut

omated message] The



             )                                            system which ge

nerated



                                                                 this result tra

nsmitted



                                                                 reference range

: 4 - 14



                                                                 mEq/L. The refe

rence



                                                                 range was not u

sed to



                                                                 interpret this 

result as



                                                                 normal/abnormal

.



[facility] CenterGlucose [id number] 19:26:42





             Test Item    Value        Reference Range Interpretation Comments

 

             Glucose Level (test 98 mg/dL     70-99                     Effectiv

e 16, the



             code = 2345-7)                                        glucose refer

ence



                                                                 intervals have 

been



                                                                 updated based o

n



                                                                 American Diabet

es



                                                                 Association genevieve

delines



                                                                 (Standards of M

edical



                                                                 Care in Diabete

s 2016.



                                                                 Diabetes Care 2

016; 39:



                                                                 S13-S22).Fastin

g blood



                                                                 glucose:Normal:

 70-99



                                                                 mg/dLImpaired f

asting



                                                                 glucose (increa

sed risk



                                                                 for diabetes or



                                                                 pre-diabetes): 

100-125



                                                                 mg/dLDiabetes m

ellitus:



                                                                 >/=126 mg/dL Ra

ndom



                                                                 blood glucose:N

ormal:



                                                                  mg/dLNot

e: Random



                                                                 glucose >100 mg

/dL is



                                                                 associated with



                                                                 increased risk 

for



                                                                 diabetes



[facility] CenterLDH2022-12-06 19:23:22





             Test Item    Value        Reference Range Interpretation Comments

 

             LDH (test code = 214 U/L      135-214                   Results gre

ater than 1651



             77052-1)                                            U/L may not be 

reliable due



                                                                 to matrix effec

t with



                                                                 extended diluti

on as it



                                                                 exceeds the man

ufacturer's



                                                                 recommended vigil

it. Caution



                                                                 should be exerc

ised when



                                                                 interpreting cervantes

ch values



                                                                 and done in con

junction



                                                                 with clinical c

ontext.



[facility] CenterDifferential2022-12-06 19:02:24





             Test Item    Value        Reference Range Interpretation Comments

 

             Neutrophil % (test code = 44.0 %       42.0-66.0                 



             770-8)                                              

 

             Lymphocyte % (test code = 35.3 %       24.0-44.0                 



             736-9)                                              

 

             Monocyte % (test code = 13.9 %       2.0-7.0      H            



             5905-5)                                             

 

             Eosinophil % (test code = 5.0 %        1.0-4.0      H            



             713-8)                                              

 

             Basophil % (test code = 0.9 %        0.0-1.0                   



             706-2)                                              

 

             IGRE % (test code = 0.9 %        0.0-0.4      H            IGRE % c

ount



             82542-6)                                            includes



                                                                 Metamyelocytes,



                                                                 Myelocytes, and



                                                                 Promyelocytes.

 

             Neutrophil Abs (test code 3.00 K/uL    1.70-7.30                 



             = 751-8)                                            

 

             Lymphocyte Abs (test code 2.41 K/uL    1.00-4.80                 



             = 731-0)                                            

 

             Monocyte Abs (test code = 0.95 K/uL    0.08-0.70    H            



             742-7)                                              

 

             Eosinophil Abs (test code 0.34 K/uL    0.04-0.40                 



             = 711-2)                                            

 

             Basophil Abs (test code = 0.06 K/uL    0.00-0.10                 



             704-7)                                              

 

             IG Abs (test code = 0.06 K/uL    0.00-0.04    H            



             38437-2)                                            

 

             Lab Interpretation (test Abnormal                               



             code = 20842-3)                                        



[facility].MAM1296-02-49 19:02:17





             Test Item    Value        Reference Range Interpretation Comments

 

             WBC (test code = 6.8 K/uL     4.0-11.0                  



             6690-2)                                             

 

             RBC (test code = 789-8) 4.00         See_Comment                [Au

tomated message]



                                                                 The system [employer]



                                                                 generated this 

result



                                                                 transmitted ref

erence



                                                                 range: 4.00 - 5

.50



                                                                 M/uL. The refer

ence



                                                                 range was not u

sed to



                                                                 interpret this 

result



                                                                 as normal/abnor

mal.

 

             Hgb (test code = 718-7) 11.9         See_Comment  L             [Au

tomated message]



                                                                 The system [employer]



                                                                 generated this 

result



                                                                 transmitted ref

erence



                                                                 range: 12.0 - 1

6.0



                                                                 gm/dL. The refe

rence



                                                                 range was not u

sed to



                                                                 interpret this 

result



                                                                 as normal/abnor

mal.

 

             Hct (test code = 38.2 %       37.0-47.0                 



             4544-3)                                             

 

             MCV (test code = 787-2) 96 fL        82-98                     

 

             MCH (test code = 785-6) 29.8 pg      27.0-31.0                 

 

             MCHC (test code = 31.2         See_Comment                [Automate

d message]



             786-4)                                              The system [employer]



                                                                 generated this 

result



                                                                 transmitted ref

erence



                                                                 range: 31.0 - 3

6.0



                                                                 gm/dL. The refe

rence



                                                                 range was not u

sed to



                                                                 interpret this 

result



                                                                 as normal/abnor

mal.

 

             RDW-SD (test code = 55.3 fL      35.1-46.3    H            



             01732-5)                                            

 

             RDW-CV (test code = 15.6 %       12.0-15.5    H            



             788-0)                                              

 

             Platelet count (test 254 K/uL     140-440                   



             code = 777-3)                                        

 

             MPV (test code = 10.4 fL      4.0-10.4                  



             77295-3)                                            

 

             INRBC (test code = 0.0 %        See_Comment               The INRBC

 (instrument



             70423-9)                                            NRBC) value ref

lects



                                                                 the enumeration

of



                                                                 nucleated red b

lood



                                                                 cells contained

 in a



                                                                 200uL sampleof 

whole



                                                                 blood analyzed 

by the



                                                                 instrument. Thi

s value



                                                                 maydiffer from 

the



                                                                 NRBC value repo

rted in



                                                                 a manual



                                                                 differential,wh

ich is



                                                                 based on a 100 

cell



                                                                 differential.



                                                                 [Automated mess

age]



                                                                 The system [employer]



                                                                 generated this 

result



                                                                 transmitted ref

erence



                                                                 range: <=0.0. T

he



                                                                 reference range

 was



                                                                 not used to int

erpret



                                                                 this result as



                                                                 normal/abnormal

.

 

             Lab Interpretation Abnormal                               



             (test code = 37028-1)                                        



[facility] 
[facility] Collection, Blood2022-10-25 19:06:48





             Test Item    Value        Reference Range Interpretation Comments

 

             Molecular Diagnostics (Received) (test Yes                         

           



             code = 8400)                                        



[facility] 17[facility]53:58





             Test Item    Value        Reference Range Interpretation Comments

 

             Vitamin D 25 OH (test 56 ng/mL                         Refere

nce Range:



             code = 41174-2)                                        Deficiency: 

<10



                                                                 ng/mLInsufficie

ncy:



                                                                 10-29 ng/mLSuff

iciency:



                                                                  ng/mLPot

ential



                                                                 toxicity: >100 

ng/mL



[facility]22[facility] 17:19:50





             Test Item    Value        Reference Range Interpretation Comments

 

             T4 Free (test code = 3024-7) 1.35 ng/dL   0.93-1.70                

 



[facility] 17[facility]19[facility]46





             Test Item    Value        Reference Range Interpretation Comments

 

             TSH (test code = 1.20         See_Comment                [Automated

 message] The



             08330-4)                                            system which ge

nerated this



                                                                 result transmit

chiara



                                                                 reference range

: 0.27 -



                                                                 4.20 mcunit/mL.

 The



                                                                 reference range

 was not



                                                                 used to interpr

et this



                                                                 result as becky

l/abnormal.



[facility] 17[facility]04:30





             Test Item    Value        Reference Range Interpretation Comments

 

             Chol (test code = 179 mg/dL    See_Comment               ATP III Cl

assification



             9863-3)                                             of Total Choles

terol 







                                                                  Primary Target

 of



                                                                 Therapy (in



                                                                 mg/dL):<200



                                                                 [id number]

9



                                                                 Borderline high

>=240



                                                                 High [Automated



                                                                 message] The sy

stem



                                                                 which generated

 this



                                                                 result transmit

chiara



                                                                 reference range

:



                                                                 <=199. The refe

rence



                                                                 range was not u

sed to



                                                                 interpret this 

result



                                                                 as normal/abnor

mal.

 

             Trig (test code = 267 mg/dL    See_Comment  H            ATP III Cl

assification



             7811-8)                                             of Serum Trigly

cerides



                                                                 







                                                                  Primary Target

 of



                                                                 Therapy (in



                                                                 mg/dL):<150



                                                                 [id number]



                                                                 Borderline high

200-499



                                                                 High>=500 Very



                                                                 highNon-fasting



                                                                 triglycerides >

200



                                                                 mg/dL may be fo

llowed



                                                                 up with a fasti

ng



                                                                 Lipid Panel.



                                                                 Calculated LDL-

C may



                                                                 be falsely decr

eased



                                                                 when non-fastin

g



                                                                 triglycerides >

200



                                                                 mg/dL. [Automat

ed



                                                                 message] The sy

stem



                                                                 which generated

 this



                                                                 result transmit

chiara



                                                                 reference range

:



                                                                 <=149. The refe

rence



                                                                 range was not u

sed to



                                                                 interpret this 

result



                                                                 as normal/abnor

mal.

 

             HDL (test code = 38 mg/dL     See_Comment  L             [Automated

 message]



             2085)                                             The system [employer]



                                                                 generated this 

result



                                                                 transmitted ref

erence



                                                                 range: >=40. Th

e



                                                                 reference range

 was



                                                                 not used to int

erpret



                                                                 this result as



                                                                 normal/abnormal

.

 

             LDL (test code = 88 mg/dL     See_Comment               ATP III Cla

ssification



             79803-8)                                            of LDL Choleste

rol 







                                                                  Primary Target

 of



                                                                 Therapy (in



                                                                 mg/dL):<100



                                                                 [id number] 

Near



                                                                 optimal/above



                                                                 [id number]



                                                                 Borderline high

160-189



                                                                 High>=190 Very 

high



                                                                 [Automated mess

age]



                                                                 The system [employer]



                                                                 generated this 

result



                                                                 transmitted ref

erence



                                                                 range: <=100. T

he



                                                                 reference range

 was



                                                                 not used to int

erpret



                                                                 this result as



                                                                 normal/abnormal

.

 

             VLDL (test code = 53 mg/dL                               



             27072-8)                                            

 

             Lab Interpretation Abnormal                               



             (test code = 55853-4)                                        



[facility] Center[facility]2022[facility]
16:33:51





             Test Item    Value        Reference Range Interpretation Comments

 

             PT (test code 12.9         See_Comment                [Automated me

ssage]



             = 5902-2)                                           The system [employer]



                                                                 generated this 

result



                                                                 transmitted ref

erence



                                                                 range: 11.9 - 1

4.1



                                                                 second(s). The



                                                                 reference range

 was



                                                                 not used to int

erpret



                                                                 this result as



                                                                 normal/abnormal

.

 

             INR (test code 1.02         0.89-1.10                 



             = 6301-6)                                           

 

             [place] (test code This lab cannot be                           



             = [place])       scheduled at the                           



                          following locations                           



                          due to                                 



                          collection/proccess                           



                          ing restrictions:                           



                          [facility]                           



                          and [facility]                           



                          CTR.                                   



[facility], DLCO, LV[facility]
2022 00:00:00





             Test Item    Value        Reference Range Interpretation Comments

 

             FVC (L) pre (test code = 2.364 L      2.847-4.342  L            



             9507)                                               

 

             FEV1 (L) pre (test code 1.860 L      2.143-3.407  L            



             = 9505)                                             

 

             FEV1/FVC (%) pre (test 78.690 %     68.052-87.641              



             code = 9509)                                        

 

             DLCO_SB ml/(min*mmHg) 15.423       See_Comment  L             [Auto

mated message]



             (test code = 9515)                                        The syste

m which



                                                                 generated this 

result



                                                                 transmitted ref

erence



                                                                 range: 16.605 -



                                                                 36.771 ml/(min*

mmHg).



                                                                 The reference r

keli



                                                                 was not used to



                                                                 interpret this 

result



                                                                 as normal/abnor

mal.

 

             DLCOc_SB ml/(min*mmHg) 16.285       See_Comment  L             [Aut

omated message]



             (test code = 9516)                                        The syste

m which



                                                                 generated this 

result



                                                                 transmitted ref

erence



                                                                 range: 16.605 -



                                                                 36.771 ml/(min*

mmHg).



                                                                 The reference r

keli



                                                                 was not used to



                                                                 interpret this 

result



                                                                 as normal/abnor

mal.

 

             TLC (L) (test code = 4.745 L      4.377-6.351               



             9513)                                               

 

             RV (L) (test code = 2.302 L      1.459-2.611               



             9514)                                               

 

             RV/TLC (%) (test code = 48.514 %     30.110-49.290              



             9517)                                               

 

             FVC (% pred) pre (test 66 %                                   



             code = 9520)                                        

 

             FEV1 (%pred) pre (test 67 %                                   



             code = 9518)                                        

 

             FEV1/FVC (% pred) pre 101 %                                  



             (test code = 9522)                                        

 

             TLC (% pred) (test code 88 %                                   



             = 9526)                                             

 

             RV (% pred) (test code = 113 %                                  



             9527)                                               

 

             RV/TLC (% pred) (test 122 %                                  



             code = 9528)                                        

 

             DLCO_SB (% pred) (test 58 %                                   



             code = 9529)                                        

 

             DLCOc_SB (% pred) (test 61 %                                   



             code = 9530)                                        

 

             Lab Interpretation (test Abnormal                               



             code = 54405-3)                                        



[facility] ([place], DLCO, LV)
2022 00:00:00





             Test Item    Value        Reference Range Interpretation Comments

 

             FVC (L) pre (test code = 2.364 L      2.847-4.342  L            



             9507)                                               

 

             FEV1 (L) pre (test code 1.860 L      2.143-3.407  L            



             = 9505)                                             

 

             FEV1/FVC (%) pre (test 78.690 %     68.052-87.641              



             code = 9509)                                        

 

             DLCO_SB ml/(min*mmHg) 15.423       See_Comment  L             [Auto

mated message]



             (test code = 9515)                                        The syste

m which



                                                                 generated this 

result



                                                                 transmitted ref

erence



                                                                 range: 16.605 -



                                                                 36.771 ml/(min*

mmHg).



                                                                 The reference r

keli



                                                                 was not used to



                                                                 interpret this 

result



                                                                 as normal/abnor

mal.

 

             DLCOc_SB ml/(min*mmHg) 16.285       See_Comment  L             [Aut

omated message]



             (test code = 9516)                                        The syste

m which



                                                                 generated this 

result



                                                                 transmitted ref

erence



                                                                 range: 16.605 -



                                                                 36.771 ml/(min*

mmHg).



                                                                 The reference r

keli



                                                                 was not used to



                                                                 interpret this 

result



                                                                 as normal/abnor

mal.

 

             TLC (L) (test code = 4.745 L      4.377-6.351               



             9513)                                               

 

             RV (L) (test code = 2.302 L      1.459-2.611               



             9514)                                               

 

             RV/TLC (%) (test code = 48.514 %     30.110-49.290              



             9517)                                               

 

             FVC (% pred) pre (test 66 %                                   



             code = 9520)                                        

 

             FEV1 (%pred) pre (test 67 %                                   



             code = 9518)                                        

 

             FEV1/FVC (% pred) pre 101 %                                  



             (test code = 9522)                                        

 

             TLC (% pred) (test code 88 %                                   



             = 9526)                                             

 

             RV (% pred) (test code = 113 %                                  



             9527)                                               

 

             RV/TLC (% pred) (test 122 %                                  



             code = 9528)                                        

 

             DLCO_SB (% pred) (test 58 %                                   



             code = 9529)                                        

 

             DLCOc_SB (% pred) (test 61 %                                   



             code = 9530)                                        

 

             Lab Interpretation (test Abnormal                               



             code = 02715-2)                                        



[facility] NeihartCMV Quant [id number] 19:58:49





             Test Item    Value        Reference Range Interpretation Comments

 

             CMV DNA PCR  Target Not   Target Not                Normal Range: T

arget



             (test code = Detected     Detected IU/mL              Not Detected.

Reportable



             5214)                                               Range: 34.5 to



                                                                 4,000,000 CMV D

NA



                                                                 IU/mL; values b

etween



                                                                 4,000,000 to 10

,000,000



                                                                 CMV DNA IU/mL m

ay be



                                                                 reported but th

kristin



                                                                 should be inter

preted



                                                                 with caution as

 this



                                                                 range of the as

say has



                                                                 not been intern

ally



                                                                 verified. The c

linical



                                                                 significance of

 CMV



                                                                 levels at 4,000

,000



                                                                 IU/ml verses th

ose



                                                                 above 4,000,000

 is



                                                                 unclear. Result

s are



                                                                 expressed in CM

V DNA



                                                                 IU/ml plasma.



                                                                 Methodology: Th

e



                                                                 extraction and



                                                                 quantitation of



                                                                 cytomegalovirus

 (CMV)



                                                                 DNA in human pl

asma is



                                                                 performed using

 the



                                                                 VENESSA 6800 Syst

em.



                                                                 Amplification o

f viral



                                                                 DNA is achieved

 using



                                                                 polymerase bibi

n



                                                                 reaction (PCR).



                                                                 Internal contro

ls are



                                                                 included to ass

ess for



                                                                 possible amplif

ication



                                                                 inhibitors. If



                                                                 inhibition is d

etected,



                                                                 the specimen is

 tested



                                                                 again and if in

hibition



                                                                 is confirmed th

e



                                                                 specimen is res

ulted as



                                                                 "Invalid". When

 an



                                                                 "Invalid" resul

ts



                                                                 occurs, it is



                                                                 recommended to 

wait a



                                                                 minimum of 7-10

 days



                                                                 before submitti

ng a new



                                                                 specimen for te

sting.



                                                                 This is an FDA-

approved



                                                                 assay and its



                                                                 performance



                                                                 characteristics

 were



                                                                 verified by the



                                                                 microbiology la

boratory



                                                                 [facility]. 

Results



                                                                 must be interpr

eted



                                                                 within the cont

ext of



                                                                 all relevant cl

inical



                                                                 and laboratory



                                                                 findings.



[facility] Center[facility] 
[id number] 17:33:56





             Test Item    Value        Reference Range Interpretation Comments

 

             TMP Auto Neg  At the present                           ____________

____________



             ABSC Interp  time, patient                           ______________

__________



             (test code = plasma shows no                           ____MAZIN DAVIDSON MD



             7535)        evidence of RBC                           - [id number]

chiara by:



                          alloantibodies.                           MAZIN RUTHERFORD MD -



                                                                 [id number] D

ate/Time:



                                                                 2022 12:3

3 PM CDT



                                                                 Transcribed Leonid

e/Time:



                                                                 2022 12:3

3 PM



                                                                 CDTElectronical

ly Signed



                                                                 By: MAZIN CROW MD



                                                                 - [id number] on 



                                                                 12:33 PM



[facility] CenterTacrolimus2022-[facility]-06 20:48:01





             Test Item    Value        Reference Range Interpretation Comments

 

             Tacro LD Time (test 2200                                   



             code = 7598)                                        

 

             Tacro LD Date (test 2022                             



             code = 7597)                                        

 

             Tacro Level (test Random                                 



             code = 7599)                                        

 

             Tacrolimus (test code 13.0 ng/mL   5.0-20.0                  Therap

eutic range 5 -



             = 8554)                                             20 ng/mL for 12

 hour



                                                                 trough. The ran

ge



                                                                 varies with the

 method



                                                                 used, type of o

rgan



                                                                 transplant, dulce maria

e after



                                                                 transplant, and



                                                                 co-administrati

on with



                                                                 other



                                                                 immunosuppressa

nts.



                                                                 Analytical Meth

od:



                                                                 Immunoassay Met

hod



                                                                 Platform: Abbot

t



                                                                 



[facility] CenterABORh2022-[facility] 20:17:13





             Test Item    Value        Reference Range Interpretation Comments

 

             ABORh. (test code = 882-1) O POS                                  



[facility] CenterClot Expiration [id number] 
20:16:43





             Test Item    Value        Reference Range Interpretation Comments

 

             T & S Expiration (test code = 2022                           

  



             5318)                                               



[facility] CenterAntibody [id number] 20:13:10





             Test Item    Value        Reference Range Interpretation Comments

 

             ABSC. (test code = 890-4) Negative ABSC                           



[facility] CenterPhosphorus [id number] 18:44:16





             Test Item    Value        Reference Range Interpretation Comments

 

             Phosphorus (test code = 2777-1) 3.1 mg/dL    2.5-4.5               

    



[facility] CenterLDH2022-09-06 18:44:15





             Test Item    Value        Reference Range Interpretation Comments

 

             LDH (test code = 303 U/L      135-214      H            Results gre

ater than



             16473-5)                                            1651 U/L may no

t be



                                                                 reliable due to



                                                                 matrix effect w

ith



                                                                 extended diluti

on as



                                                                 it exceeds the



                                                                 's



                                                                 recommended vigil

it.



                                                                 Caution should 

be



                                                                 exercised when



                                                                 interpreting cervantes

ch



                                                                 values and done

 in



                                                                 conjunction wit

h



                                                                 clinical contex

t.

 

             Lab Interpretation (test Abnormal                               



             code = 33748-1)                                        



[facility] CenterCalcium [id number] 18:44:14





             Test Item    Value        Reference Range Interpretation Comments

 

             Calcium Lvl (test code = 31451-0) 9.0 mg/dL    8.4-10.2            

      



[facility] CenterAlbumin [id number] 18:44:13





             Test Item    Value        Reference Range Interpretation Comments

 

             Albumin Lvl (test code 4.1          See_Comment                [Aut

omated message] The



             = 4763)                                             system which ge

nerated



                                                                 this result tra

nsmitted



                                                                 reference range

: 3.5 - 5.2



                                                                 gm/dL. The refe

rence range



                                                                 was not used to

 interpret



                                                                 this result as



                                                                 normal/abnormal

.



[facility] CenterAspartate Aminotransferase
2022 18:44:12





             Test Item    Value        Reference Range Interpretation Comments

 

             AST (test code = 1920-8) 45 U/L       See_Comment  H             [A

utomated message]



                                                                 The system whic

h



                                                                 generated this 

result



                                                                 transmitted ref

erence



                                                                 range: <=32. Th

e



                                                                 reference range

 was



                                                                 not used to int

erpret



                                                                 this result as



                                                                 normal/abnormal

.

 

             Lab Interpretation (test Abnormal                               



             code = 26766-7)                                        



[facility] CenterElectrolyte [id number] 
18:44:11





             Test Item    Value        Reference Range Interpretation Comments

 

             Sodium Lvl (test code = 142          See_Comment                [Au

tomated message] The



             2951)                                             system which ge

nerated



                                                                 this result tra

nsmitted



                                                                 reference range

: 136 -



                                                                 145 mEq/L. The 

reference



                                                                 range was not u

sed to



                                                                 interpret this 

result as



                                                                 normal/abnormal

.

 

             Potassium Lvl (test 4.1          See_Comment                [Automa

chiara message] The



             code = 2823-3)                                        system which 

generated



                                                                 this result tra

nsmitted



                                                                 reference range

: 3.5 -



                                                                 5.1 mEq/L. The 

reference



                                                                 range was not u

sed to



                                                                 interpret this 

result as



                                                                 normal/abnormal

.

 

             Chloride (test code = 107          See_Comment                [Auto

mated message] The



             )                                             system which ge

nerated



                                                                 this result tra

nsmitted



                                                                 reference range

: 98 - 107



                                                                 mEq/L. The refe

rence



                                                                 range was not u

sed to



                                                                 interpret this 

result as



                                                                 normal/abnormal

.

 

             CO2 (test code = 26           See_Comment                [Automated

 message] The



             2028)                                             system which ge

nerated



                                                                 this result tra

nsmitted



                                                                 reference range

: 22 - 29



                                                                 mEq/L. The refe

rence



                                                                 range was not u

sed to



                                                                 interpret this 

result as



                                                                 normal/abnormal

.

 

             Anion Gap (test code = 9            See_Comment                [Aut

omated message] The



             69544-5)                                            system which ge

nerated



                                                                 this result tra

nsmitted



                                                                 reference range

: 4 - 14



                                                                 mEq/L. The refe

rence



                                                                 range was not u

sed to



                                                                 interpret this 

result as



                                                                 normal/abnormal

.



[facility] CenterGlucose [id number] 18:44:10





             Test Item    Value        Reference Range Interpretation Comments

 

             Glucose Level (test 91 mg/dL     70-99                     Effectiv

e 16, the



             code = 2345-7)                                        glucose refer

ence



                                                                 intervals have 

been



                                                                 updated based o

n



                                                                 American Diabet

es



                                                                 Association genevieve

delines



                                                                 (Standards of M

edical



                                                                 Care in Diabete

s 2016.



                                                                 Diabetes Care 2

016; 39:



                                                                 S13-S22).Fastin

g blood



                                                                 glucose:Normal:

 70-99



                                                                 mg/dLImpaired f

asting



                                                                 glucose (increa

sed risk



                                                                 for diabetes or



                                                                 pre-diabetes): 

100-125



                                                                 mg/dLDiabetes m

ellitus:



                                                                 >/=126 mg/dL Ra

ndom



                                                                 blood glucose:N

ormal:



                                                                  mg/dLNot

e: Random



                                                                 glucose >100 mg

/dL is



                                                                 associated with



                                                                 increased risk 

for



                                                                 diabetes



[facility] CenterFractionated [id number] 
18:44:09





             Test Item    Value        Reference Range Interpretation Comments

 

             Bili Total (test <0.3         See_Comment               Direct and 

indirect



             code = 1975-2)                                        bilirubin mau

l not be



                                                                 reported when T

otal



                                                                 bilirubin resul

t is <0.3



                                                                 mg/dLIndocyanin

e Green (ICG)



                                                                 may cause false

ly elevated



                                                                 bilirubin resul

ts. Total and



                                                                 direct bilirubi

n must not be



                                                                 measured from s

amples



                                                                 containing indo

cyanine



                                                                 green. False el

evation of



                                                                 total bilirubin

 can be seen



                                                                 in patients wit

h IgG



                                                                 concentrations 

above 28 g/L.



                                                                 [Automated mess

age] The



                                                                 system which ge

nerated this



                                                                 result transmit

chiara reference



                                                                 range: <=1.2 mg

/dL. The



                                                                 reference range

 was not used



                                                                 to interpret th

is result as



                                                                 normal/abnormal

.



[facility] CenterGlomerular Filtration Rate
2022 18:44:08





             Test Item    Value        Reference Range Interpretation Comments

 

             eGFR-AA (test code = 51           See_Comment  L            Normal 

eGFR: >= 60



             64362-1)                                            mL/min/1.73 m2N

ote:



                                                                 The eGFR is kaylee

culated



                                                                 using the CKD-E

PI



                                                                 equation. The e

GFR



                                                                 declines with a

ge.



                                                                 eGFR <60 mL/min

/1.73



                                                                 m2 is considere

d as



                                                                 "decreased". Th

is



                                                                 equation should

 only



                                                                 be used for pat

ients



                                                                 18 and older.



                                                                 According to th

e



                                                                 [employer]



                                                                 Foundation's Ki

dney



                                                                 Disease Outcome



                                                                 Quality Initiat

sena



                                                                 (KDOQI) classif

ication



                                                                 and 2012 Kidney



                                                                 Disease Improvi

ng



                                                                 Global Outcomes



                                                                 (KDIGO) Clinica

l



                                                                 Practice Guidel

ine,



                                                                 the stage of CK

D



                                                                 should be categ

orized



                                                                 based on estima

chiara



                                                                 GFR. Stage Desc

ription



                                                                 GFR mL/min/1.73

 m21



                                                                 Normal or high 

GFR



                                                                 >=902 Mildly de

creased



                                                                 GFR 60-893a Mil

dly to



                                                                 moderately decr

eased



                                                                 GFR  45-593b



                                                                 Moderately to s

everely



                                                                 decreased GFR 3

0-444



                                                                 Severely decrea

sed GFR



                                                                  Kidney f

ailure



                                                                 <15 [Automated



                                                                 message] The sy

stem



                                                                 which generated

 this



                                                                 result transmit

chiara



                                                                 reference range

: >=60



                                                                 mL/min/1.73 sq.

 m. The



                                                                 reference range

 was



                                                                 not used to int

erpret



                                                                 this result as



                                                                 normal/abnormal

.

 

             eGFR-LATESHA (test code = 44           See_Comment  L            Normal

 eGFR: >= 60



             84627-4)                                            mL/min/1.73 m2N

ote:



                                                                 The eGFR is kaylee

culated



                                                                 using the CKD-E

PI



                                                                 equation. The e

GFR



                                                                 declines with a

ge.



                                                                 eGFR <60 mL/min

/1.73



                                                                 m2 is considere

d as



                                                                 "decreased". Th

is



                                                                 equation should

 only



                                                                 be used for pat

ients



                                                                 18 and older.



                                                                 According to th

e



                                                                 National Kidney



                                                                 Foundation's Ki

dney



                                                                 Disease Outcome



                                                                 Quality Initiat

sena



                                                                 (KDOQI) classif

ication



                                                                 and 2012 Kidney



                                                                 Disease Improvi

ng



                                                                 Global Outcomes



                                                                 (KDIGO) Clinica

l



                                                                 Practice Guidel

ine,



                                                                 the stage of CK

D



                                                                 should be categ

orized



                                                                 based on estima

chiara



                                                                 GFR. Stage Desc

ription



                                                                 GFR mL/min/1.73

 m21



                                                                 Normal or high 

GFR



                                                                 >=902 Mildly de

creased



                                                                 GFR 60-893a Mil

dly to



                                                                 moderately decr

eased



                                                                 GFR 45-593b Mod

erately



                                                                 to severely dec

reased



                                                                 GFR  Jessie

rely



                                                                 decreased GFR 1

5-295



                                                                 Kidney failure 

<15



                                                                 [Automated mess

age]



                                                                 The system [employer]



                                                                 generated this 

result



                                                                 transmitted ref

erence



                                                                 range: >=60



                                                                 mL/min/1.73 sq.

 m. The



                                                                 reference range

 was



                                                                 not used to int

erpret



                                                                 this result as



                                                                 normal/abnormal

.

 

             Lab Interpretation Abnormal                               



             (test code = 80597-0)                                        



[id number]07





             Test Item    Value        Reference Range Interpretation Comments

 

             Uric Acid (test code = 3084-1) 4.7 mg/dL    2.4-5.7                

   



[facility] [id number] 18:44:06





             Test Item    Value        Reference Range Interpretation Comments

 

             Total Protein (test code = 2885-2) 6.4 g/dL     6.4-8.3            

       



[facility] [id number] 18:44:05





             Test Item    Value        Reference Range Interpretation Comments

 

             Magnesium (test code = 20267-5) 2.4 mg/dL    1.6-2.6               

    



[facility] [id number] 
18:44:04





             Test Item    Value        Reference Range Interpretation Comments

 

             Alk Phos (test code = 6768-6) 104 U/L                        

  



[facility] 18[facility]44[facility]03





             Test Item    Value        Reference Range Interpretation Comments

 

             ALT (test code = 22 U/L       See_Comment                [Automated

 message] The



             1742-6)                                             system which ge

nerated this



                                                                 result transmit

chiara



                                                                 reference range

: <=33. The



                                                                 reference range

 was not



                                                                 used to interpr

et this



                                                                 result as becky

l/abnormal.



[facility]Serum [id number] 
18:44:02





             Test Item    Value        Reference Range Interpretation Comments

 

             Creatinine (test code = 2160-0) 1.30 mg/dL   0.51-0.95    H        

    

 

             Lab Interpretation (test code = Abnormal                           

    



             31431-5)                                            



[facility]01





             Test Item    Value        Reference Range Interpretation Comments

 

             BUN (test code = 3094-0) 27 mg/dL     6-23         H            

 

             Lab Interpretation (test code = Abnormal                           

    



             26213-1)                                            



[facility] 18:24:39





             Test Item    Value        Reference Range Interpretation Comments

 

             Neutrophil % (test code = 44.0 %       42.0-66.0                 



             770-8)                                              

 

             Lymphocyte % (test code = 44.3 %       24.0-44.0    H            



             736-9)                                              

 

             Monocyte % (test code = 10.4 %       2.0-7.0      H            



             5905-5)                                             

 

             Eosinophil % (test code = 0.7 %        1.0-4.0      L            



             713-8)                                              

 

             Basophil % (test code = 0.3 %        0.0-1.0                   



             706-2)                                              

 

             IGRE % (test code = 0.3 %        0.0-0.4                   IGRE % c

ount



             39855-4)                                            includes



                                                                 Metamyelocytes,



                                                                 Myelocytes, and



                                                                 Promyelocytes.

 

             Neutrophil Abs (test code 2.54 K/uL    1.70-7.30                 



             = 751-8)                                            

 

             Lymphocyte Abs (test code 2.56 K/uL    1.00-4.80                 



             = 731-0)                                            

 

             Monocyte Abs (test code = 0.60 K/uL    0.08-0.70                 



             742-7)                                              

 

             Eosinophil Abs (test code 0.04 K/uL    0.04-0.40                 



             = 711-2)                                            

 

             Basophil Abs (test code = 0.02 K/uL    0.00-0.10                 



             704-7)                                              

 

             IG Abs (test code = 0.02 K/uL    0.00-0.04                 



             74442-8)                                            

 

             Lab Interpretation (test Abnormal                               



             code = 33197-6)                                        



[facility] Cancer [place].[id number] 18:24:24





             Test Item    Value        Reference Range Interpretation Comments

 

             WBC (test code = 5.8 K/uL     4.0-11.0                  



             6690-2)                                             

 

             RBC (test code = 789-8) 4.05         See_Comment                [Au

tomated message]



                                                                 The system [employer]



                                                                 generated this 

result



                                                                 transmitted ref

erence



                                                                 range: 4.00 - 5

.50



                                                                 M/uL. The refer

ence



                                                                 range was not u

sed to



                                                                 interpret this 

result



                                                                 as normal/abnor

mal.

 

             Hgb (test code = 718-7) 11.8         See_Comment  L             [Au

tomated message]



                                                                 The system [employer]



                                                                 generated this 

result



                                                                 transmitted ref

erence



                                                                 range: 12.0 - 1

6.0



                                                                 gm/dL. The refe

rence



                                                                 range was not u

sed to



                                                                 interpret this 

result



                                                                 as normal/abnor

mal.

 

             Hct (test code = 36.3 %       37.0-47.0    L            



             4544-3)                                             

 

             MCV (test code = 787-2) 90 fL        82-98                     

 

             MCH (test code = 785-6) 29.1 pg      27.0-31.0                 

 

             MCHC (test code = 32.5         See_Comment                [Automate

d message]



             786-4)                                              The system [employer]



                                                                 generated this 

result



                                                                 transmitted ref

erence



                                                                 range: 31.0 - 3

6.0



                                                                 gm/dL. The refe

rence



                                                                 range was not u

sed to



                                                                 interpret this 

result



                                                                 as normal/abnor

mal.

 

             RDW-SD (test code = 51.8 fL      35.1-46.3    H            



             94077-0)                                            

 

             RDW-CV (test code = 15.8 %       12.0-15.5    H            



             788-0)                                              

 

             Platelet count (test 257 K/uL     140-440                   



             code = 777-3)                                        

 

             MPV (test code = 9.5 fL       4.0-10.4                  



             26690-9)                                            

 

             INRBC (test code = 0.0 %        See_Comment               The INRBC

 (instrument



             82914-3)                                            NRBC) value ref

lects



                                                                 the enumeration

of



                                                                 nucleated red b

lood



                                                                 cells contained

 in a



                                                                 200uL sampleof 

whole



                                                                 blood analyzed 

by the



                                                                 instrument. Thi

s value



                                                                 maydiffer from 

the



                                                                 NRBC value repo

rted in



                                                                 a manual



                                                                 differential,wh

ich is



                                                                 based on a 100 

cell



                                                                 differential.



                                                                 [Automated mess

age]



                                                                 The system whic

h



                                                                 generated this 

result



                                                                 transmitted ref

erence



                                                                 range: <=0.0. T

he



                                                                 reference range

 was



                                                                 not used to int

erpret



                                                                 this result as



                                                                 normal/abnormal

.

 

             Lab Interpretation Abnormal                               



             (test code = 54049-6)                                        



[facility] NeihartHHV6 Quant, [id number] 
00:20:07





             Test Item    Value        Reference Range Interpretation Comments

 

             HHV6         Not Detected Not Detected               Assay Range: 1

88 copies/mL to



             Plasma-Leslie               copies/mL                 1.00E+08 copies

/mLThe limit of



             cor (test                                           quantitation (L

OQ) is 188



             code =                                              copies/mL. HHV-

6 DNA



             5782)                                               detectedbelow t

he LOQ will be



                                                                 reported as Det

ected:<188



                                                                 copies/mL.This 

test was



                                                                 developed and i

ts performance



                                                                 characteristics

determined by



                                                                 "[employer]

r. It has not



                                                                 been cleared or

 approvedby the



                                                                 [place] Food and D

rug



                                                                 Administration.

 Results should



                                                                 be used inconju

nction with



                                                                 clinical findin

gs, and should



                                                                 not form the so

lebasis for a



                                                                 diagnosis or tr

eatment



                                                                 decision.______

________________



                                                                 _______________

________________



                                                                 _______Performe

d At:[facility]



                                                                 [id number] [address]

S



                                                                 [id number]

 Director: Ángel Burton Ph.D., NIKOLAI

D (ABB)CLIA#:



                                                                 [id number]

e:



                                                                 [phone]



[facility], [id number] 
00:20:07





             Test Item    Value        Reference Range Interpretation Comments

 

             HHV6         Not Detected Not Detected               Assay Range: 1

88 copies/mL to



             Plasma-Leslie               copies/mL                 1.00E+08 copies

/mLThe limit of



             cor (test                                           quantitation (L

OQ) is 188



             code =                                              copies/mL. HHV-

6 DNA



             5782)                                               detectedbelow t

he LOQ will be



                                                                 reported as Det

ected:<188



                                                                 copies/mL.This 

test was



                                                                 developed and i

ts performance



                                                                 characteristics

determined by



                                                                 [employer]. It has not



                                                                 been cleared or

 approvedby the



                                                                 [place]. Food and D

rug



                                                                 Administration.

 Results should



                                                                 be used inconju

nction with



                                                                 clinical findin

gs, and should



                                                                 not form the so

lebasis for a



                                                                 diagnosis or tr

eatment



                                                                 decision.______

________________



                                                                 _______________

________________



                                                                 _______Performe

d At:[facility]



                                                                 [id number] [address]

S



                                                                 [id number]

 Director: Ángel Burton Ph.D., NIKOLAI

D (ABB)CLIA#:



                                                                 [id number]

e:



                                                                 [phone]



[facility] CenterAden[facility], [id number] 
00:20:06





             Test Item    Value        Reference Range Interpretation Comments

 

             ADV          Not Detected Not Detected               Assay Range: 1

90 copies/mL to



             Plasma-Leslie               copies/mL                 1.00E+10 copies

/mLThe limit of



             cor (test                                           quantitation (L

OQ) is 190



             code =                                              copies/mL. Frederic

ovirus



             4691)                                               DNAdetected bel

ow the LOQ will



                                                                 be reported as 

Detected:<190



                                                                 copies/mL.This 

test was



                                                                 developed and i

ts performance



                                                                 characteristics

determined by



                                                                 [employer]. It has not



                                                                 been cleared or

 approvedby the



                                                                 U.S. Food and D

rug



                                                                 Administration.

 Results should



                                                                 be used inconju

nction with



                                                                 clinical findin

gs, and should



                                                                 not form the so

lebasis for a



                                                                 diagnosis or tr

eatment



                                                                 decision.______

________________



                                                                 _______________

________________



                                                                 _______Performe

d At:[facility]



                                                                 [id number] [employer]

S



                                                                 [id number]

 Director: Ángel Burton Ph.D., NIKOLAI KESSLER (ABB)CLIA#:



                                                                 [id number]

e:



                                                                 [phone]



[facility], [id number] 
00:20:06





             Test Item    Value        Reference Range Interpretation Comments

 

             ADV          Not Detected Not Detected               Assay Range: 1

90 copies/mL to



             Plasma-Leslie               copies/mL                 1.00E+10 copies

/mLThe limit of



             cor (test                                           quantitation (L

OQ) is 190



             code =                                              copies/mL. Frederic

ovirus



             4691)                                               DNAdetected bel

ow the LOQ will



                                                                 be reported as 

Detected:<190



                                                                 copies/mL.This 

test was



                                                                 developed and i

ts performance



                                                                 characteristics

determined by



                                                                 "[employer]

r. It has not



                                                                 been cleared or

 approvedby the



                                                                 [place] Food and D

rug



                                                                 Administration.

 Results should



                                                                 be used inconju

nction with



                                                                 clinical findin

gs, and should



                                                                 not form the so

lebasis for a



                                                                 diagnosis or tr

eatment



                                                                 decision.______

________________



                                                                 _______________

________________



                                                                 _______Performe

d At:[facility]



                                                                 [id number] [facility]

S



                                                                 [id number]

 Director: Ángel Burton Ph.D., NIKOLAI KESSLER (ABB)CLIA#:



                                                                 [id number]

e:



                                                                 [phone]



[facility]MD [facility] 
PCR Collection, [id number] 19:44:11





             Test Item    Value        Reference Range Interpretation Comments

 

             Molecular Diagnostics (Received) (test Yes                         

           



             code = 8400)                                        



[facility] ([place], DLCO, LV)
2022 00:00:00





             Test Item    Value        Reference Range Interpretation Comments

 

             FVC (L) pre (test code = 2.267 L      2.806-4.283  L            



             9507)                                               

 

             FEV1 (L) pre (test code 1.759 L      2.111-3.360  L            



             = 9505)                                             

 

             FEV1/FVC (%) pre (test 77.601 %     68.052-87.641              



             code = 9509)                                        

 

             DLCO_SB ml/(min*mmHg) 14.440       See_Comment  L             [Auto

mated message]



             (test code = 9515)                                        The syste

m which



                                                                 generated this 

result



                                                                 transmitted ref

erence



                                                                 range: 16.858 -



                                                                 37.024 ml/(min*

mmHg).



                                                                 The reference r

keli



                                                                 was not used to



                                                                 interpret this 

result



                                                                 as normal/abnor

mal.

 

             TLC (L) (test code = 4.505 L      4.311-6.285               



             9513)                                               

 

             RV (L) (test code = 2.126 L      1.441-2.593               



             9514)                                               

 

             RV/TLC (%) (test code = 47.192 %     30.110-49.290              



             9517)                                               

 

             FVC (% pred) pre (test 64 %                                   



             code = 9520)                                        

 

             FEV1 (%pred) pre (test 64 %                                   



             code = 9518)                                        

 

             FEV1/FVC (% pred) pre 100 %                                  



             (test code = 9522)                                        

 

             TLC (% pred) (test code 85 %                                   



             = 9526)                                             

 

             RV (% pred) (test code = 105 %                                  



             9527)                                               

 

             RV/TLC (% pred) (test 119 %                                  



             code = 9528)                                        

 

             DLCO_SB (% pred) (test 54 %                                   



             code = 9529)                                        

 

             Lab Interpretation (test Abnormal                               



             code = 95908-8)                                        



[facility], DLCO, LV)
2022 00:00:00





             Test Item    Value        Reference Range Interpretation Comments

 

             FVC (L) pre (test code = 2.267 L      2.806-4.283  L            



             9507)                                               

 

             FEV1 (L) pre (test code 1.759 L      2.111-3.360  L            



             = 9505)                                             

 

             FEV1/FVC (%) pre (test 77.601 %     68.052-87.641              



             code = 9509)                                        

 

             DLCO_SB ml/(min*mmHg) 14.440       See_Comment  L             [Auto

mated message]



             (test code = 9515)                                        The syste

m which



                                                                 generated this 

result



                                                                 transmitted ref

erence



                                                                 range: 16.858 -



                                                                 37.024 ml/(min*

mmHg).



                                                                 The reference r

keli



                                                                 was not used to



                                                                 interpret this 

result



                                                                 as normal/abnor

mal.

 

             TLC (L) (test code = 4.505 L      4.311-6.285               



             9513)                                               

 

             RV (L) (test code = 2.126 L      1.441-2.593               



             9514)                                               

 

             RV/TLC (%) (test code = 47.192 %     30.110-49.290              



             9517)                                               

 

             FVC (% pred) pre (test 64 %                                   



             code = 9520)                                        

 

             FEV1 (%pred) pre (test 64 %                                   



             code = 9518)                                        

 

             FEV1/FVC (% pred) pre 100 %                                  



             (test code = 9522)                                        

 

             TLC (% pred) (test code 85 %                                   



             = 9526)                                             

 

             RV (% pred) (test code = 105 %                                  



             9527)                                               

 

             RV/TLC (% pred) (test 119 %                                  



             code = 9528)                                        

 

             DLCO_SB (% pred) (test 54 %                                   



             code = 9529)                                        

 

             Lab Interpretation (test Abnormal                               



             code = 57628-8)                                        



[facility] CenterBlood [id number] 02:48:40





             Test Item    Value        Reference Range Interpretation Comments

 

             Final Report (test No growth                              



             code = 8488)                                        

 

             Path Review - Immunity and antibiotic                           



             Bottle/Isolator use may render culture                           



             (test code = 8499) negative. Ongoing                           



                          infection requires repeat                           



                          culture. The results have                           



                          been reviewed and                           



                          electronically signed by                           



                          Pathologist:Jalil Richardson MD, PhD #[id number]                           



[facility] Center[facility] [id number] 02:48:40





             Test Item    Value        Reference Range Interpretation Comments

 

             Final Report (test No growth                              



             code = 8488)                                        

 

             Path Review - Immunity and antibiotic                           



             Bottle/Isolator use may render culture                           



             (test code = 8499) negative. Ongoing                           



                          infection requires repeat                           



                          culture. The results have                           



                          been reviewed and                           



                          electronically signed by                           



                          Pathologist:Jalil Richardson MD, PhD #[id number]                           



[facility]On [facility]
2022 17:29:05





             Test Item    Value        Reference Range Interpretation Comments

 

             Ordered (test code = 6568) Test Added                             

 

             Test Needed (test code = tacrolimus level                          

 



             7604)                                               



[facility]
2022 17:29:05





             Test Item    Value        Reference Range Interpretation Comments

 

             Ordered (test code = 6568) Test Added                             

 

             Test Needed (test code = tacrolimus level                          

 



             7604)                                               



[facility] Center[facility] [id number] 20:34:27





             Test Item    Value        Reference Range Interpretation Comments

 

             Final Report (test No Vancomycin resistant                         

  



             code = 8488) Enterococci isolated                           

 

             Path Review - VRE The results have been                           



             (test code = 8485) reviewed and                           



                          electronically signed by                           



                          Pathologist:Jalil Richardson MD, PhD #[id number]                           

 

             MONICA (test code = 10:00 AM                               



             MONICA)                                                



[facility] [id number] 20:34:27





             Test Item    Value        Reference Range Interpretation Comments

 

             Final Report (test No Vancomycin resistant                         

  



             code = 8488) Enterococci isolated                           

 

             Path Review - VRE The results have been                           



             (test code = 8485) reviewed and                           



                          electronically signed by                           



                          Pathologist:Jalil Richardson MD, PhD #[id number]                           

 

             MONICA (test code = 10:00 AM                               



             MONICA)                                                



[facility] [id number] 03:44:01





             Test Item    Value        Reference Range Interpretation Comments

 

             Final Report (test code <10,000 cfu/ml Normal              A       

     



             = 8488)      site sheela                             



                          present.Generally of                           



                          low                                    



                          significance.Correlate                           



                          with clinical data and                           



                          culture history.                           

 

             Path Review - Urine The results have been              A           

 



             (test code = 8483) reviewed and                           



                          electronically signed                           



                          by Pathologist:Kevyn Ruiz MD, PhD #[id number]                           

 

             Lab Interpretation Abnormal                               



             (test code = 60560-5)                                        



[facility] [id number] 03:44:01





             Test Item    Value        Reference Range Interpretation Comments

 

             Final Report (test code <10,000 cfu/ml Normal              A       

     



             = 8488)      site sheela                             



                          present.Generally of                           



                          low                                    



                          significance.Correlate                           



                          with clinical data and                           



                          culture history.                           

 

             Path Review - Urine The results have been              A           

 



             (test code = 8483) reviewed and                           



                          electronically signed                           



                          by Pathologist:Kevyn Ruiz MD, PhD #[id number]                           

 

             Lab Interpretation Abnormal                               



             (test code = 68551-8)                                        



[facility] Center[facility] Draw 30 min 
before the fourth [id number] 11:18:06





             Test Item    Value        Reference Range Interpretation Comments

 

             Vanco Trough 14.6         See_Comment               Toxic Trough Le

madeline: >20



             (test code =                                        mcg/mL [Automat

ed



             4092-3)                                             message] The sy

stem



                                                                 which generated

 this



                                                                 result transmit

chiara



                                                                 reference range

: 5.0 -



                                                                 20.0 mcg/mL. Th

e



                                                                 reference range

 was not



                                                                 used to interpr

et this



                                                                 result as



                                                                 normal/abnormal

.

 

             Vanco Tr Dose See note                               Level, date, a

nd time of



             Time (test code                                        previous dos

e is not



             = 79706-4)                                          availablefor 

is



                                                                 sample. The leonid

e



                                                                 reported is the



                                                                 samplecollectio

n date.

 

             Vanco Tr Dose 2022                             Level, date, a

nd time of



             Date (test code                                        previous dos

e is not



             = 68750-3)                                          availablefor 

is



                                                                 sample. The leonid

e



                                                                 reported is the



                                                                 samplecollectio

n date.

 

             MONICA (test code Draw 30 min                            



             = MONICA)       before the                             



                          fourth dose                            



[facility] 30 min 
before the fourth [id number] 11:18:06





             Test Item    Value        Reference Range Interpretation Comments

 

             Vanco Trough 14.6         See_Comment               Toxic Trough Le

madeline: >20



             (test code =                                        mcg/mL [Automat

ed



             4092-3)                                             message] The sy

stem



                                                                 which generated

 this



                                                                 result transmit

chiara



                                                                 reference range

: 5.0 -



                                                                 20.0 mcg/mL. Th

e



                                                                 reference range

 was not



                                                                 used to interpr

et this



                                                                 result as



                                                                 normal/abnormal

.

 

             Vanco Tr Dose See note                               Level, date, a

nd time of



             Time (test code                                        previous dos

e is not



             = 20502-0)                                          availablefor 

is



                                                                 sample. The leonid

e



                                                                 reported is the



                                                                 samplecollectio

n date.

 

             Vanco Tr Dose 2022                             Level, date, a

nd time of



             Date (test code                                        previous dos

e is not



             = 22266-7)                                          availablefor 

is



                                                                 sample. The leonid

e



                                                                 reported is the



                                                                 samplecollectio

n date.

 

             MONICA (test code Draw 30 min                            



             = MONICA)       before the                             



                          fourth dose                            



[facility] CenterTobram[facility] [id number] 07:36:13





             Test Item    Value        Reference Range Interpretation Comments

 

             Tobra Lvl    2.54         mcg/mL                    No reference ra

nges



             (test code =                                        established for

 random



             95721-0)                                            levels, please 

refer to



                                                                 trough and/or p

eak



                                                                 levels provided

: Toxic



                                                                 random level: >

10



                                                                 mcg/mL Therapeu

tic



                                                                 Trough Level: <

=2



                                                                 mcg/mLToxic Tro

ugh



                                                                 Level: >2 mcg/m

L



                                                                 Therapeutic Pea

k Level:



                                                                 5-10 mcg/mLToxi

c Peak



                                                                 Level: >10 mcg/

mL

 

             Tobra Ds Dt  2022                             



             (test code =                                        



             7623)                                               

 

             Tobra Ds Tm  see note                               Level, date, an

d time



             (test code =                                        of previous dos

e is not



             7624)                                               availablefor 

is



                                                                 sample. The leonid

e



                                                                 reported is the



                                                                 samplecollectio

n date.

 

             MONICA (test    Please draw level                           



             code = MONICA)  10 hours after                           



                          completion of                           



                          tobramycin                             



                          infusion.                              



[facility] CenterTobramycin [id number] 07:36:13





             Test Item    Value        Reference Range Interpretation Comments

 

             Tobra Lvl    2.54         mcg/mL                    No reference ra

nges



             (test code =                                        established for

 random



             43106-4)                                            levels, please 

refer to



                                                                 trough and/or p

eak



                                                                 levels provided

: Toxic



                                                                 random level: >

10



                                                                 mcg/mL Therapeu

tic



                                                                 Trough Level: <

=2



                                                                 mcg/mLToxic Tro

ugh



                                                                 Level: >2 mcg/m

L



                                                                 Therapeutic Pea

k Level:



                                                                 5-10 mcg/mLToxi

c Peak



                                                                 Level: >10 mcg/

mL

 

             Tobra Ds Dt  2022                             



             (test code =                                        



             7623)                                               

 

             Tobra Ds Tm  see note                               Level, date, an

d time



             (test code =                                        of previous dos

e is not



             7624)                                               availablefor 

is



                                                                 sample. The leonid

e



                                                                 reported is the



                                                                 samplecollectio

n date.

 

             MONICA (test    Please draw level                           



             code = MONICA)  10 hours after                           



                          completion of                           



                          tobramycin                             



                          infusion.                              



[facility]





             Test Item    Value        Reference    Interpretation Comments



                                       Range                     

 

             UA WBC (test code = <1           See_Comment                [Automa

chiara



             94535-1)                                            message] The



                                                                 system which



                                                                 generated this



                                                                 result



                                                                 transmitted



                                                                 reference range

:



                                                                 0 - 2 /HPF. The



                                                                 reference range



                                                                 was not used to



                                                                 interpret this



                                                                 result as



                                                                 normal/abnormal

.

 

             UA RBC (test code = 3            See_Comment  H             [Automa

chiara



             50202-1)                                            message] The



                                                                 system which



                                                                 generated this



                                                                 result



                                                                 transmitted



                                                                 reference range

:



                                                                 0 - 2 /HPF. The



                                                                 reference range



                                                                 was not used to



                                                                 interpret this



                                                                 result as



                                                                 normal/abnormal

.

 

             UA Mucous (test code NOT SEEN     Not Seen-Trace              



             = 16631-5)                /HPF                      

 

             UA Bacteria (test NOT SEEN     NOT SEEN /HPF              



             code = 82071-0)                                        

 

             UA Squam Epi (test OCC          None-Occasiona              



             code = 84616-7)              l /HPF                    

 

             MONICA (test code = Some reporting                           



             MONICA)         parameters within                           



                          the Urinalysis test                           



                          have changed due to                           



                          the implementation                           



                          of new                                 



                          instrumentation in                           



                          the [place],                           



                          allowing greater                           



                          sensitivity of                           



                          measurement.                           



                          Urinalysis results                           



                          reported by the                           



                          [facility] using                           



                          existing                               



                          instrumentation, as                           



                          well as Urinalysis                           



                          testing performed                           



                          manually or by                           



                          backup methodology                           



                          at the [facility]                           



                          will remain                            



                          relatively                             



                          unchanged. New                           



                          reporting parameters                           



                          and units will now                           



                          be reported for all                           



                          [facility].                              

 

             Lab Interpretation Abnormal                               



             (test code =                                        



             93365-0)                                            



[facility]19[facility]27





             Test Item    Value        Reference    Interpretation Comments



                                       Range                     

 

             UA WBC (test code =              See_Comment                [Automa

chiara



             49677-3)                                            message] The



                                                                 system which



                                                                 generated this



                                                                 result



                                                                 transmitted



                                                                 reference range

:



                                                                 0 - 2 /HPF. The



                                                                 reference range



                                                                 was not used to



                                                                 interpret this



                                                                 result as



                                                                 normal/abnormal

.

 

             UA RBC (test code = 3            See_Comment  H             [Automa

chiara



             93573-3)                                            message] The



                                                                 system which



                                                                 generated this



                                                                 result



                                                                 transmitted



                                                                 reference range

:



                                                                 0 - 2 /HPF. The



                                                                 reference range



                                                                 was not used to



                                                                 interpret this



                                                                 result as



                                                                 normal/abnormal

.

 

             UA Mucous (test code NOT SEEN     Not Seen-Trace              



             = 36164-6)                /HPF                      

 

             UA Bacteria (test NOT SEEN     NOT SEEN /HPF              



             code = 70438-4)                                        

 

             UA Squam Epi (test OCC          None-Occasiona              



             code = 68066-5)              l /HPF                    

 

             MONICA (test code = Some reporting                           



             MONICA)         parameters within                           



                          the Urinalysis test                           



                          have changed due to                           



                          the implementation                           



                          of new                                 



                          instrumentation in                           



                          the [place],                           



                          allowing greater                           



                          sensitivity of                           



                          measurement.                           



                          Urinalysis results                           



                          reported by the                           



                          [facility] using                           



                          existing                               



                          instrumentation, as                           



                          well as Urinalysis                           



                          testing performed                           



                          manually or by                           



                          backup methodology                           



                          at the [facility]                           



                          will remain                            



                          relatively                             



                          unchanged. New                           



                          reporting parameters                           



                          and units will now                           



                          be reported for all                           



                          campus.                              

 

             Lab Interpretation Abnormal                               



             (test code =                                        



             31388-4)                                            



[facility] 
[id number] 05:47:56





             Test Item    Value        Reference Range Interpretation Comments

 

             UA Color (test code = 08693-6) Yellow       Straw-Yellow           

   

 

             UA Appear (test code = 5767-9) Hazy         Clear        A         

   

 

             UA Glucose (test code = 5792-7) NEG          NEG mg/dL             

    

 

             UA Bili (test code = 5770-3) NEG          NEG                      

 

 

             UA Ketones (test code = 5797-6) NEG          NEG mg/dL             

    

 

             UA Spec Grav (test code = 5810-7) 1.014        1.003-1.035         

      

 

             UA Blood (test code = 5794-3) NEG          NEG                     

  

 

             UA pH (test code = 5803-2) 5.0          5.0-9.0                   

 

             UA Protein (test code = 5804-0) NEG          NEG mg/dL             

    

 

             UA Urobilinogen (test code = 5818-0) NEG          NEG              

         

 

             UA Nitrite (test code = 5802-4) NEG          NEG                   

    

 

             UA Leuk Est (test code = 5799-2) NEG          NEG                  

     

 

             Lab Interpretation (test code = Abnormal                           

    



             21027-6)                                            



[facility] 
[id number] 05:47:56





             Test Item    Value        Reference Range Interpretation Comments

 

             UA Color (test code = 07676-8) Yellow       Straw-Yellow           

   

 

             UA Appear (test code = 5767-9) Hazy         Clear        A         

   

 

             UA Glucose (test code = 5792-7) NEG          NEG mg/dL             

    

 

             UA Bili (test code = 5770-3) NEG          NEG                      

 

 

             UA Ketones (test code = 5797-6) NEG          NEG mg/dL             

    

 

             UA Spec Grav (test code = 5810-7) 1.014        1.003-1.035         

      

 

             UA Blood (test code = 5794-3) NEG          NEG                     

  

 

             UA pH (test code = 5803-2) 5.0          5.0-9.0                   

 

             UA Protein (test code = 5804-0) NEG          NEG mg/dL             

    

 

             UA Urobilinogen (test code = 5818-0) NEG          NEG              

         

 

             UA Nitrite (test code = 5802-4) NEG          NEG                   

    

 

             UA Leuk Est (test code = 5799-2) NEG          NEG                  

     

 

             Lab Interpretation (test code = Abnormal                           

    



             69008-4)                                            



[facility]
Nasopharyngeal [id number] 23:47:35





             Test Item    Value        Reference Range Interpretation Comments

 

             Adenovirus (test code = Not Detected Not Detected              



             3652)                                               

 

             Coronavirus 229E (test Not Detected Not Detected              



             code = 5349)                                        

 

             Coronavirus HKU1 (test Not Detected Not Detected              



             code = 5350)                                        

 

             Coronavirus NL63 (test Not Detected Not Detected              



             code = 5351)                                        

 

             Coronavirus OC43 (test Not Detected Not Detected              



             code = 5352)                                        

 

             COVID19 (SARS-CoV-2) Not Detected Not Detected              



             (test code = 07648-4)                                        

 

             Human Metapneumovirus Not Detected Not Detected              



             (test code = 6401)                                        

 

             Human        Not Detected Not Detected              



             Rhinovirus/Enterovirus                                        



             (test code = 7212)                                        

 

             Influenza A (test code Not Detected Not Detected              



             = 5618)                                             

 

             Influenza A H1 (test Not Detected Not Detected              



             code = 5619)                                        

 

             Influenza A H1 2009 Not Detected Not Detected              



             (test code = 5620)                                        

 

             Influenza A H3 (test Not Detected Not Detected              



             code = 5621)                                        

 

             Influenza B (test code Not Detected Not Detected              



             = 5622)                                             

 

             Parainfluenza 1 (test Not Detected Not Detected              



             code = 6779)                                        

 

             Parainfluenza 2 (test Not Detected Not Detected              



             code = 6780)                                        

 

             Parainfluenza 3 (test Not Detected Not Detected              



             code = 6781)                                        

 

             Parainfluenza 4 (test Not Detected Not Detected              



             code = 6782)                                        

 

             Respiratory Syncytial Not Detected Not Detected              



             Virus (test code =                                        



             7157)                                               

 

             Bordetella   Not Detected Not Detected              



             Parapertussis (test                                        



             code = 48532)                                        

 

             Bordetella pertussis Not Detected Not Detected              



             (test code = 4854)                                        

 

             Chlamydiophila Not Detected Not Detected              



             pneumoniae (test code =                                        



             5139)                                               

 

             Mycoplasma pneumoniae Not Detected Not Detected              



             (test code = 6203)                                        

 

             MONICA (test code = MONICA) The [employer] RP2.1 is a                       

    



                          real-time, nested                           



                          multiplexed polymerase                           



                          chain reaction test                           



                          designed to                            



                          simultaneously                           



                          identify nucleic acids                           



                          from 22 different                           



                          viruses and bacteria                           



                          associated with                           



                          respiratory tract                           



                          infection, including                           



                          SARS-CoV-2, from a                           



                          single nasopharyngeal                           



                          swab (NPS) specimen                           



                          obtained from                           



                          individuals suspected                           



                          of respiratory tract                           



                          infections, including                           



                          COVID-19. Results must                           



                          be interpreted within                           



                          the context of all                           



                          relevant clinical and                           



                          laboratory findings                           



                          and should not form                           



                          the sole basis for a                           



                          diagnosis or treatment                           



                          decision. Positive                           



                          results do not rule                           



                          out coninfection with                           



                          other organisms.                           



                          Negative results in                           



                          the setting of a                           



                          respiratory illness                           



                          may be due to                           



                          infection with                           



                          pathogens that are not                           



                          detected by this                           



                          panel, or a lower                           



                          respiratory tract                           



                          infection that may not                           



                          be detected by an NPS                           



                          specimen. Internal                           



                          controls are used to                           



                          monitor all stages of                           



                          the test process and                           



                          assess for possible                           



                          amplification                           



                          inhibitors. If                           



                          inhibition is                           



                          detected, testing is                           



                          repeated and if                           



                          inhibition is                           



                          confirmed the specimen                           



                          is resulted as                           



                          "Invalid". When an                           



                          "Invalid" result                           



                          occurs, it is                           



                          recommended to wait 3                           



                          days before submitting                           



                          a new specimen for                           



                          testing if clinically                           



                          indicated. This assay                           



                          has been approved by                           



                          the FDA for use in                           



                          laboratories that have                           



                          been CLIA-certified to                           



                          perform                                



                          moderate-complexity                           



                          and high-complexity                           



                          tests. The                             



                          Microbiology                           



                          Laboratory at [facility], CLIA                           



                          Accreditation                           



                          #[id number] and [place]                           



                          Accreditation                           



                          #[id number], verified the                           



                          performance                            



                          characteristics of                           



                          this assay.                            



                          Microbiology                           



                          Laboratory at [facility]                           



                          performs the assay                           



                          using the [employer] System. The                           



                          [employer] RP2.1 is a                           



                          real-time, nested                           



                          multiplexed polymerase                           



                          chain reaction test                           



                          designed to                            



                          simultaneously                           



                          identify nucleic acids                           



                          from 22 different                           



                          viruses and bacteria                           



                          associated with                           



                          respiratory tract                           



                          infection, including                           



                          SARS-CoV-2, from a                           



                          single nasopharyngeal                           



                          swab (NPS) specimen                           



                          obtained from                           



                          individuals suspected                           



                          of respiratory tract                           



                          infections, including                           



                          COVID-19. Results must                           



                          be interpreted within                           



                          the context of all                           



                          relevant clinical and                           



                          laboratory findings                           



                          and should not form                           



                          the sole basis for a                           



                          diagnosis or treatment                           



                          decision. Positive                           



                          results do not rule                           



                          out coninfection with                           



                          other organisms.                           



                          Negative results in                           



                          the setting of a                           



                          respiratory illness                           



                          may be due to                           



                          infection with                           



                          pathogens that are not                           



                          detected by this                           



                          panel, or a lower                           



                          respiratory tract                           



                          infection that may not                           



                          be detected by an NPS                           



                          specimen. Internal                           



                          controls are used to                           



                          monitor all stages of                           



                          the test process and                           



                          assess for possible                           



                          amplification                           



                          inhibitors. If                           



                          inhibition is                           



                          detected, testing is                           



                          repeated and if                           



                          inhibition is                           



                          confirmed the specimen                           



                          is resulted as                           



                          "Invalid". When an                           



                          "Invalid" result                           



                          occurs, it is                           



                          recommended to wait 3                           



                          days before submitting                           



                          a new specimen for                           



                          testing if clinically                           



                          indicated. This assay                           



                          has been approved by                           



                          the FDA for use in                           



                          laboratories that have                           



                          been CLIA-certified to                           



                          perform                                



                          moderate-complexity                           



                          and high-complexity                           



                          tests. The                             



                          Microbiology                           



                          Laboratory at [facility], CLIA                           



                          Accreditation                           



                          #[id number] and [facility]                           



                          Accreditation                           



                          #[id number], verified the                           



                          performance                            



                          characteristics of                           



                          this assay.                            



                          Microbiology                           



                          Laboratory at [facility]                           



                          performs the assay                           



                          using the [employer] System.                           



[facility]
Nasopharyngeal [id number] 23:47:35





             Test Item    Value        Reference Range Interpretation Comments

 

             Adenovirus (test code = Not Detected Not Detected              



             0645)                                               

 

             Coronavirus 229E (test Not Detected Not Detected              



             code = 5349)                                        

 

             Coronavirus HKU1 (test Not Detected Not Detected              



             code = 5350)                                        

 

             Coronavirus NL63 (test Not Detected Not Detected              



             code = 5351)                                        

 

             Coronavirus OC43 (test Not Detected Not Detected              



             code = 5352)                                        

 

             COVID19 (SARS-CoV-2) Not Detected Not Detected              



             (test code = 46159-2)                                        

 

             Human Metapneumovirus Not Detected Not Detected              



             (test code = 6401)                                        

 

             Human        Not Detected Not Detected              



             Rhinovirus/Enterovirus                                        



             (test code = 7212)                                        

 

             Influenza A (test code Not Detected Not Detected              



             = 5618)                                             

 

             Influenza A H1 (test Not Detected Not Detected              



             code = 5619)                                        

 

             Influenza A H1 2009 Not Detected Not Detected              



             (test code = 5620)                                        

 

             Influenza A H3 (test Not Detected Not Detected              



             code = 5621)                                        

 

             Influenza B (test code Not Detected Not Detected              



             = 5622)                                             

 

             Parainfluenza 1 (test Not Detected Not Detected              



             code = 6779)                                        

 

             Parainfluenza 2 (test Not Detected Not Detected              



             code = 6780)                                        

 

             Parainfluenza 3 (test Not Detected Not Detected              



             code = 6781)                                        

 

             Parainfluenza 4 (test Not Detected Not Detected              



             code = 6782)                                        

 

             Respiratory Syncytial Not Detected Not Detected              



             Virus (test code =                                        



             7157)                                               

 

             Bordetella   Not Detected Not Detected              



             Parapertussis (test                                        



             code = 96338)                                        

 

             Bordetella pertussis Not Detected Not Detected              



             (test code = 4854)                                        

 

             Chlamydiophila Not Detected Not Detected              



             pneumoniae (test code =                                        



             5139)                                               

 

             Mycoplasma pneumoniae Not Detected Not Detected              



             (test code = 6203)                                        

 

             MONICA (test code = MONICA) The [employer] RP2.1 is a                       

    



                          real-time, nested                           



                          multiplexed polymerase                           



                          chain reaction test                           



                          designed to                            



                          simultaneously                           



                          identify nucleic acids                           



                          from 22 different                           



                          viruses and bacteria                           



                          associated with                           



                          respiratory tract                           



                          infection, including                           



                          SARS-CoV-2, from a                           



                          single nasopharyngeal                           



                          swab (NPS) specimen                           



                          obtained from                           



                          individuals suspected                           



                          of respiratory tract                           



                          infections, including                           



                          COVID-19. Results must                           



                          be interpreted within                           



                          the context of all                           



                          relevant clinical and                           



                          laboratory findings                           



                          and should not form                           



                          the sole basis for a                           



                          diagnosis or treatment                           



                          decision. Positive                           



                          results do not rule                           



                          out coninfection with                           



                          other organisms.                           



                          Negative results in                           



                          the setting of a                           



                          respiratory illness                           



                          may be due to                           



                          infection with                           



                          pathogens that are not                           



                          detected by this                           



                          panel, or a lower                           



                          respiratory tract                           



                          infection that may not                           



                          be detected by an NPS                           



                          specimen. Internal                           



                          controls are used to                           



                          monitor all stages of                           



                          the test process and                           



                          assess for possible                           



                          amplification                           



                          inhibitors. If                           



                          inhibition is                           



                          detected, testing is                           



                          repeated and if                           



                          inhibition is                           



                          confirmed the specimen                           



                          is resulted as                           



                          "Invalid". When an                           



                          "Invalid" result                           



                          occurs, it is                           



                          recommended to wait 3                           



                          days before submitting                           



                          a new specimen for                           



                          testing if clinically                           



                          indicated. This assay                           



                          has been approved by                           



                          the FDA for use in                           



                          laboratories that have                           



                          been CLIA-certified to                           



                          perform                                



                          moderate-complexity                           



                          and high-complexity                           



                          tests. The                             



                          Microbiology                           



                          Laboratory at [facility], CLIA                           



                          Accreditation                           



                          #[id number] and [facility]                           



                          Accreditation                           



                          #[id number], verified the                           



                          performance                            



                          characteristics of                           



                          this assay.                            



                          Microbiology                           



                          Laboratory at [facility]                           



                          performs the assay                           



                          using the [employer] System. The                           



                          [employer] RP2.1 is a                           



                          real-time, nested                           



                          multiplexed polymerase                           



                          chain reaction test                           



                          designed to                            



                          simultaneously                           



                          identify nucleic acids                           



                          from 22 different                           



                          viruses and bacteria                           



                          associated with                           



                          respiratory tract                           



                          infection, including                           



                          SARS-CoV-2, from a                           



                          single nasopharyngeal                           



                          swab (NPS) specimen                           



                          obtained from                           



                          individuals suspected                           



                          of respiratory tract                           



                          infections, including                           



                          COVID-19. Results must                           



                          be interpreted within                           



                          the context of all                           



                          relevant clinical and                           



                          laboratory findings                           



                          and should not form                           



                          the sole basis for a                           



                          diagnosis or treatment                           



                          decision. Positive                           



                          results do not rule                           



                          out coninfection with                           



                          other organisms.                           



                          Negative results in                           



                          the setting of a                           



                          respiratory illness                           



                          may be due to                           



                          infection with                           



                          pathogens that are not                           



                          detected by this                           



                          panel, or a lower                           



                          respiratory tract                           



                          infection that may not                           



                          be detected by an NPS                           



                          specimen. Internal                           



                          controls are used to                           



                          monitor all stages of                           



                          the test process and                           



                          assess for possible                           



                          amplification                           



                          inhibitors. If                           



                          inhibition is                           



                          detected, testing is                           



                          repeated and if                           



                          inhibition is                           



                          confirmed the specimen                           



                          is resulted as                           



                          "Invalid". When an                           



                          "Invalid" result                           



                          occurs, it is                           



                          recommended to wait 3                           



                          days before submitting                           



                          a new specimen for                           



                          testing if clinically                           



                          indicated. This assay                           



                          has been approved by                           



                          the FDA for use in                           



                          laboratories that have                           



                          been CLIA-certified to                           



                          perform                                



                          moderate-complexity                           



                          and high-complexity                           



                          tests. The                             



                          Microbiology                           



                          Laboratory at [facility], CLIA                           



                          Accreditation                           



                          #[id number] and [facility]                           



                          Accreditation                           



                          #[id number], verified the                           



                          performance                            



                          characteristics of                           



                          this assay.                            



                          Microbiology                           



                          Laboratory at [facility]                           



                          performs the assay                           



                          using the [employer] System.                           



[facility] 
23:02:51





             Test Item    Value        Reference Range Interpretation Comments

 

             CRP (test code = 3.73 mg/L                              Reference r

anges for HS CRP



             33880-6)                                            assay are as fo

llows:



                                                                 Reference range

s when used



                                                                 to assess cardi

ac risk:



                                                                 <1.00 mg/L Low



                                                                 cardiovascular 

risk



                                                                 1.00-3.00 mg/L 

Average



                                                                 cardiovascular 

risk >3.00



                                                                 mg/L High cardi

ovascular



                                                                 risk.Reference 

ranges when



                                                                 used to assess 

inflammatory



                                                                 responses: Less

 than or



                                                                 equal to 10.00 

mg/L.



[facility] 
23:02:51





             Test Item    Value        Reference Range Interpretation Comments

 

             CRP (test code = 3.73 mg/L                              Reference r

anges for HS CRP



             10988-2)                                            assay are as fo

llows:



                                                                 Reference range

s when used



                                                                 to assess cardi

ac risk:



                                                                 <1.00 mg/L Low



                                                                 cardiovascular 

risk



                                                                 1.00-3.00 mg/L 

Average



                                                                 cardiovascular 

risk >3.00



                                                                 mg/L High cardi

ovascular



                                                                 risk.Reference 

ranges when



                                                                 used to assess 

inflammatory



                                                                 responses: Less

 than or



                                                                 equal to 10.00 

mg/L.



[facility] 
22:39:31





             Test Item    Value        Reference Range Interpretation Comments

 

             Procalcitonin (test 0.95 ng/mL   See_Comment  H            Procalci

tonin > 2.00



             code = 69684-3)                                        ng/mL: Proca

lcitonin



                                                                 levels above 2.

00



                                                                 ng/mL are highl

y



                                                                 suggestive of a

 high



                                                                 risk for system

atic



                                                                 bacterial infec

tion/



                                                                 severe sepsis a

nd/or



                                                                 septic shock.



                                                                 Procalcitonin <

 0.50



                                                                 ng/mL: Procalci

tonin



                                                                 levels below 0.

50



                                                                 ng/mL are at lo

w risk



                                                                 for progression

 to



                                                                 severe sepsis a

nd/ or



                                                                 septic shock.



                                                                 Procalcitonin (

ProCT)



                                                                 between 0.15 an

d 2.0



                                                                 ng/mL do not ex

clude



                                                                 infection, zita

use



                                                                 localized infec

tions



                                                                 (without system

ic



                                                                 signs) may be



                                                                 associated with

 such



                                                                 low levels. Res

ults



                                                                 greater than 40

0



                                                                 ng/mL may not b

e



                                                                 reliable due to

 the



                                                                 matrix effect w

ith



                                                                 extended diluti

on as



                                                                 it exceeds the



                                                                 's



                                                                 recommended vigil

it.



                                                                 Caution should 

be



                                                                 exercised when



                                                                 interpreting cervantes

ch



                                                                 values and done

 in



                                                                 conjunction wit

h



                                                                 clinical contex

t.



                                                                 [Automated mess

age]



                                                                 The system [employer]



                                                                 generated this 

result



                                                                 transmitted ref

erence



                                                                 range: <=0.08. 

The



                                                                 reference range

 was



                                                                 not used to int

erpret



                                                                 this result as



                                                                 normal/abnormal

.

 

             Lab Interpretation Abnormal                               



             (test code = 91507-8)                                        



[facility] 
22:39:31





             Test Item    Value        Reference Range Interpretation Comments

 

             Procalcitonin (test 0.95 ng/mL   See_Comment  H            Procalci

tonin > 2.00



             code = 10427-6)                                        ng/mL: Proca

lcitonin



                                                                 levels above 2.

00



                                                                 ng/mL are highl

y



                                                                 suggestive of a

 high



                                                                 risk for system

atic



                                                                 bacterial infec

tion/



                                                                 severe sepsis a

nd/or



                                                                 septic shock.



                                                                 Procalcitonin <

 0.50



                                                                 ng/mL: Procalci

tonin



                                                                 levels below 0.

50



                                                                 ng/mL are at lo

w risk



                                                                 for progression

 to



                                                                 severe sepsis a

nd/ or



                                                                 septic shock.



                                                                 Procalcitonin (

ProCT)



                                                                 between 0.15 an

d 2.0



                                                                 ng/mL do not ex

clude



                                                                 infection, zita

use



                                                                 localized infec

tions



                                                                 (without system

ic



                                                                 signs) may be



                                                                 associated with

 such



                                                                 low levels. Res

ults



                                                                 greater than 40

0



                                                                 ng/mL may not b

e



                                                                 reliable due to

 the



                                                                 matrix effect w

ith



                                                                 extended diluti

on as



                                                                 it exceeds the



                                                                 's



                                                                 recommended vigil

it.



                                                                 Caution should 

be



                                                                 exercised when



                                                                 interpreting cervantes

ch



                                                                 values and done

 in



                                                                 conjunction wit

h



                                                                 clinical contex

t.



                                                                 [Automated mess

age]



                                                                 The system [facility]



                                                                 generated this 

result



                                                                 transmitted ref

erence



                                                                 range: <=0.08. 

The



                                                                 reference range

 was



                                                                 not used to int

erpret



                                                                 this result as



                                                                 normal/abnormal

.

 

             Lab Interpretation Abnormal                               



             (test code = 19834-9)                                        



[facility] [id number] 22:13:36





             Test Item    Value        Reference Range Interpretation Comments

 

             POC VB pH (test code 7.40         7.31-7.41                 



             = 6719)                                             

 

             POC VB pCO2 (test 28           See_Comment  L             [Automate

d message]



             code = 6718)                                        The system [facility]



                                                                 generated this 

result



                                                                 transmitted ref

erence



                                                                 range: 41 - 51 

mmHg.



                                                                 The reference r

keli



                                                                 was not used to



                                                                 interpret this 

result



                                                                 as normal/abnor

mal.

 

             POC VB pO2 (test 56           mmHg                      



             code = 6720)                                        

 

             POC VB TCO2 (test 18           See_Comment  L             [Automate

d message]



             code = -)                                        The system [facility]



                                                                 generated this 

result



                                                                 transmitted ref

erence



                                                                 range: 24 - 29 

mEq/L.



                                                                 The reference r

keli



                                                                 was not used to



                                                                 interpret this 

result



                                                                 as normal/abnor

mal.

 

             POC VB Bicarb (test 17 mmol/L    23-28        L            



             code = 40929-2)                                        

 

             POC VB Base Ex (test -6 mmol/L    -2-3         L            



             code = 1927-3)                                        

 

             POC VB O2 Sat (test 89 %                                   



             code = 2711-0)                                        

 

             POC VB LAC (test 1.4 mmol/L   0.9-1.7                   Method desc

ription:



             code = 2519-7)                                        The i-STAT is

 an



                                                                 analyzer used f

or in



                                                                 vitro quantific

ation



                                                                 of various anal

ytes



                                                                 in whole blood.

 The



                                                                 device uses a s

ezequiel



                                                                 disposable cart

ridge



                                                                 which contains



                                                                 microfabricated



                                                                 sensors, a



                                                                 calibration sol

ution,



                                                                 fluidics system

, and



                                                                 a waste chamber

. Each



                                                                 test cartridge



                                                                 contains chemic

ally



                                                                 sensitive biose

nsors



                                                                 on a silicon ch

ip



                                                                 that are config

ured



                                                                 to perform spec

ific



                                                                 tests. The



                                                                 microfabricated



                                                                 sensors measure



                                                                 analyte concent

ration



                                                                 by an electroch

emical



                                                                 assay.

 

             POC Sample Type Venous                                 



             (test code = 6690)                                        

 

             POC Clean Dev (test Yes                                    



             code = 6672)                                        

 

             Performing Lab (test [facility] Main                               Main Ca

mpus



             code = 65048) [facility]

nical



                                                                 Lab, [address] [place] [address]



                                                                 [place] [address]; Lab



                                                                 Director: Josie Pike MD

 

             Lab Interpretation Abnormal                               



             (test code =                                        



             70504-4)                                            



[id number] 22:13:36





             Test Item    Value        Reference Range Interpretation Comments

 

             POC VB pH (test code 7.40         7.31-7.41                 



             = 6719)                                             

 

             POC VB pCO2 (test 28           See_Comment  L             [Automate

d message]



             code = 6718)                                        The system [facility]

[employer]



                                                                 generated this 

result



                                                                 transmitted ref

erence



                                                                 range: 41 - 51 

mmHg.



                                                                 The reference r

keli



                                                                 was not used to



                                                                 interpret this 

result



                                                                 as normal/abnor

mal.

 

             POC VB pO2 (test 56           mmHg                      



             code = 6720)                                        

 

             POC VB TCO2 (test 18           See_Comment  L             [Automate

d message]



             code = -)                                        The system [facility]



                                                                 generated this 

result



                                                                 transmitted ref

erence



                                                                 range: 24 - 29 

mEq/L.



                                                                 The reference r

keli



                                                                 was not used to



                                                                 interpret this 

result



                                                                 as normal/abnor

mal.

 

             POC VB Bicarb (test 17 mmol/L    23-28        L            



             code = 93063-8)                                        

 

             POC VB Base Ex (test -6 mmol/L    -2-3         L            



             code = 1927-3)                                        

 

             POC VB O2 Sat (test 89 %                                   



             code = 2711-0)                                        

 

             POC VB LAC (test 1.4 mmol/L   0.9-1.7                   Method desc

ription:



             code = 2519-7)                                        The i-STAT is

 an



                                                                 analyzer used f

or in



                                                                 vitro quantific

ation



                                                                 of various anal

ytes



                                                                 in whole blood.

 The



                                                                 device uses a s

[employer]iel



                                                                 disposable cart

ridge



                                                                 which contains



                                                                 microfabricated



                                                                 sensors, a



                                                                 calibration sol

ution,



                                                                 fluidics system

, and



                                                                 a waste chamber

. Each



                                                                 test cartridge



                                                                 contains chemic

ally



                                                                 sensitive biose

nsors



                                                                 on a silicon ch

ip



                                                                 that are config

ured



                                                                 to perform spec

ific



                                                                 tests. The



                                                                 microfabricated



                                                                 sensors measure



                                                                 analyte concent

ration



                                                                 by an electroch

emical



                                                                 assay.

 

             POC Sample Type Venous                                 



             (test code = 6690)                                        

 

             POC Clean Dev (test Yes                                    



             code = 6672)                                        

 

             Performing Lab (test [facility] Main                               Main Ca

mpus



             code = 67464) [facility]

nical



                                                                 Lab, [address] [place] [address],



                                                                 [place] [address]; Lab



                                                                 Director: Josie Pike MD

 

             Lab Interpretation Abnormal                               



             (test code =                                        



             49351-5)                                            



[facility] [id number] 16:25:03





             Test Item    Value        Reference Range Interpretation Comments

 

             Chol (test code = 156 mg/dL    See_Comment               ATP III Cl

assification of



             2093-3)                                             Total Cholester

ol 







                                                                  Primary Target

 of Therapy



                                                                 (in mg/dL):<200



                                                                 [id number]

9 Borderline



                                                                 high>=240  High

 [Automated



                                                                 message] The sy

stem which



                                                                 generated this 

result



                                                                 transmitted ref

erence



                                                                 range: <=199. T

he reference



                                                                 range was not u

sed to



                                                                 interpret this 

result as



                                                                 normal/abnormal

.

 

             Trig (test code = 119 mg/dL    See_Comment               ATP III Cl

assification of



             7181-8)                                             Serum Triglycer

ides 







                                                                  Primary Target

 of Therapy



                                                                 (in mg/dL):<150



                                                                 [id number] B

orderline



                                                                 [id number] Hig

h>=500 Very



                                                                 highNon-fasting



                                                                 triglycerides >

200 mg/dL



                                                                 may be followed

 up with a



                                                                 fasting Lipid P

usman.



                                                                 Calculated LDL-

C may be



                                                                 falsely decreas

ed when



                                                                 non-fasting tri

glycerides



                                                                 >200 mg/dL. [Au

tomated



                                                                 message] The sy

stem which



                                                                 generated this 

result



                                                                 transmitted ref

erence



                                                                 range: <=149. T

he reference



                                                                 range was not u

sed to



                                                                 interpret this 

result as



                                                                 normal/abnormal

.

 

             HDL (test code = 49 mg/dL     See_Comment                [Automated

 message] The



             2085)                                             system which ge

nerated this



                                                                 result transmit

chiara



                                                                 reference range

: >=40. The



                                                                 reference range

 was not



                                                                 used to interpr

et this



                                                                 result as becky

l/abnormal.

 

             LDL (test code = 83 mg/dL     See_Comment               ATP III Cla

ssification of



             53987-3)                                            LDL Cholesterol

 







                                                                  Primary Target

 of Therapy



                                                                 (in mg/dL):<100



                                                                 Atetajt529-021 

Near



                                                                 optimal/above



                                                                 yuqzxev046-496 

Borderline



                                                                 xzvl313-921 Hig

h>=190 Very



                                                                 high [Automated

 message]



                                                                 The system [employer] generated



                                                                 this result tra

nsmitted



                                                                 reference range

: <=100. The



                                                                 reference range

 was not



                                                                 used to interpr

et this



                                                                 result as becky

l/abnormal.

 

             VLDL (test code = 24 mg/dL                               



             21347-0)                                            



[facility]ihartParvovirus B19 Quant, Plasma
2022 16:45:55





             Test Item    Value        Reference    Interpretation Comments



                                       Range                     

 

             Parvo B19    Not Detected Not Detected               Assay Range: 1

99 IU/mL to



             Plasma-Vir                IU/mL                     1.38E+10 IU/mLO

ne IU is equal



             acor (test                                          to 0.73 copies 

of Parvovirus



             code =                                              B19.The limit o

f quantitation



             6794)                                               (LOQ) is 199 IU

/mL.



                                                                 Parvovirus B19 

DNAdetected



                                                                 below the LOQ w

ill be



                                                                 reported as Det

ected:<199



                                                                 IU/mL.This test

 was developed



                                                                 and its perform

ance



                                                                 characteristics

determined by



                                                                 [facility]

r. It has not



                                                                 been cleared or

 approvedby



                                                                 the U.[place] Food a

nd Drug



                                                                 Administration.

 Results



                                                                 should be used 

inconjunction



                                                                 with clinical f

indings, and



                                                                 should not form

 the solebasis



                                                                 for a diagnosis

 or treatment



                                                                 decision.______

______________



                                                                 _______________

______________



                                                                 ___________Perf

ormed



                                                                 At:[facility]

[id number]



                                                                 [address] [place]



                                                                 [id number]

 Director:



                                                                 Ángel Burton Ph.D

., SERG



                                                                 (ABB)CLIA#: [id number]

[id number]:



                                                                 [phone]

 

             MONICA (test    Coordinate w/                           



             code =       photophresis                           



             MONICA)                                                



[facility]
2022 16:45:55





             Test Item    Value        Reference    Interpretation Comments



                                       Range                     

 

             Parvo B19    Not Detected Not Detected               Assay Range: 1

99 IU/mL to



             Plasma-Vir                IU/mL                     1.38E+10 IU/mLO

ne IU is equal



             acor (test                                          to 0.73 copies 

of Parvovirus



             code =                                              B19.The limit o

f quantitation



             6794)                                               (LOQ) is 199 IU

/mL.



                                                                 Parvovirus B19 

DNAdetected



                                                                 below the LOQ w

ill be



                                                                 reported as Det

ected:<199



                                                                 IU/mL.This test

 was developed



                                                                 and its perform

ance



                                                                 characteristics

determined by



                                                                 [facility]

r. It has not



                                                                 been cleared or

 approvedby



                                                                 the U.S. Food a

nd Drug



                                                                 Administration.

 Results



                                                                 should be used 

inconjunction



                                                                 with clinical f

indings, and



                                                                 should not form

 the solebasis



                                                                 for a diagnosis

 or treatment



                                                                 decision.______

______________



                                                                 _______________

______________



                                                                 ___________Perf

ormed



                                                                 At:[facility]

[id number] 



                                                                 [address] [place]



                                                                 [id number]

 Director:



                                                                 Ángel Burton Ph.D

., BCLD



                                                                 (ABB)CLIA#: [id number]

[id number]:



                                                                 [phone]

 

             MONICA (test    Coordinate w/                           



             code =       photophresis                           



             MONICA)                                                



[facility] [id number] 17:39:49





             Test Item    Value        Reference Range Interpretation Comments

 

             Vitamin D 25 OH 47 ng/mL                         Reference Ra

nge:



             (test code =                                        Deficiency: <10



             87451-9)                                            ng/mLInsufficie

nc



                                                                 y: 10-29



                                                                 ng/mLSufficienc

y:



                                                                 



                                                                 ng/mLPotential



                                                                 toxicity: >100



                                                                 ng/mL

 

             MONICA (test code = Coordinate w/                           



             MONICA)         photophresis                           



[facility] 16:37:59





             Test Item    Value        Reference Range Interpretation Comments

 

             TSH (test    1.82         See_Comment                [Automated mes

brisa]



             code =                                              The system [employer]

h



             37899-9)                                            generated this



                                                                 result transmit

chiara



                                                                 reference range

:



                                                                 0.27 - 4.20



                                                                 mcunit/mL. The



                                                                 reference range

 was



                                                                 not used to



                                                                 interpret this



                                                                 result as



                                                                 normal/abnormal

.

 

             MONICA (test    Coordinate w/                           



             code = MONICA)  photophresis                           



[facility] CenterFree [id number] 16:37:40





             Test Item    Value        Reference Range Interpretation Comments

 

             T4 Free (test code 1.30 ng/dL   0.93-1.70                 



             = 3024-7)                                           

 

             MONICA (test code = Coordinate w/ photophresis                        

   



             MONICA)                                                



[facility] CenterHemoglobin [id number] 16:21:18





             Test Item    Value        Reference Range Interpretation Comments

 

             A1C (test code = 5.8 %        4.3-5.6      H            HbA1c value

s



             4548-4)                                             >=6.5% are



                                                                 diagnostic of



                                                                 diabetes



                                                                 mellitus.Diagno

s



                                                                 is should be



                                                                 confirmed by



                                                                 repeat



                                                                 testing.Therape

u



                                                                 tic Action



                                                                 suggested: >8.0

%



                                                                 HbA1c; Goal



                                                                 oftherapy: <7.0

%



                                                                 HbA1c

 

             MONICA (test code = MONICA) Coordinate w/                           



                          photophresis                           

 

             Lab Interpretation Abnormal                               



             (test code = 00138-8)                                        



[facility] CenterHemoglobin [id number] 16:21:18





             Test Item    Value        Reference Range Interpretation Comments

 

             A1C (test code = 5.8 %        4.3-5.6      H            HbA1c value

s



             4548-4)                                             >=6.5% are



                                                                 diagnostic of



                                                                 diabetes



                                                                 mellitus.Diagno

s



                                                                 is should be



                                                                 confirmed by



                                                                 repeat



                                                                 testing.Therape

u



                                                                 tic Action



                                                                 suggested: >8.0

%



                                                                 HbA1c; Goal



                                                                 oftherapy: <7.0

%



                                                                 HbA1c

 

             MONICA (test code = MONICA) Coordinate w/                           



                          photophresis                           

 

             Lab Interpretation Abnormal                               



             (test code = 80576-6)                                        



[facility] CenterTMP Interpretation ABORh [facility] 
[place]arrow [id number] 03:34:00





             Test Item    Value        Reference Range Interpretation Comments

 

             TMP BMT ABORh .                                      ______________

_______________



             Interp (test code =                                        ________

_______________SYL



             7538)                                               DO LETY MD 

-



                                                                 [id number] b

y: MAZIN DAVIDSON MD - 1

Dictated



                                                                 Date/Time:  21:33



                                                                 PM CST Transcri

bed Date/Time:



                                                                 2022 21:3

3 PM



                                                                 CSTElectronical

ly Signed By:



                                                                 MD Petr GALLAGHER [id number]



                                                                 on 2022 2

1:33 PM



[facility] CenterTMP Interpretation ABORh [facility] 
[place] [id number] 03:34:00





             Test Item    Value        Reference Range Interpretation Comments

 

             TMP BMT ABORh .                                      ______________

_______________



             Interp (test code =                                        ________

_______________FERELIZABETH



             7538)                                               MD Petr KING



                                                                 [id number] b

y: MD Petr WILKINS 1

Dictated



                                                                 Date/Time:  21:33



                                                                 PM CST Transcri

bed Date/Time:



                                                                 2022 21:3

3 PM



                                                                 CSTElectronical

ly Signed By:



                                                                 MD Petr GALLAGHER [id number]



                                                                 on 2022 2

1:33 PM



[facility] [id number] 
20:22:17





             Test Item    Value        Reference Range Interpretation Comments

 

             CURRENT ABO REVERSE (test code = 883-9) O                          

            



[facility] [id number] 
20:22:17





             Test Item    Value        Reference Range Interpretation Comments

 

             CURRENT ABO REVERSE (test code = 883-9) O                          

            



[facility] [id number] 
20:22:16





             Test Item    Value        Reference Range Interpretation Comments

 

             Current ABOR (test code = 882-1) O POS                            

      



[facility] CenterBMT ABORh [id number] 
20:22:16





             Test Item    Value        Reference Range Interpretation Comments

 

             Current ABOR (test code = 882-1) O POS                            

      



[facility] CenterComplete [facility] function test
2022 00:00:00





             Test Item    Value        Reference Range Interpretation Comments

 

             FVC (L) pre (test code = 2.329 L      2.834-4.311  L            



             9507)                                               

 

             FEV1 (L) pre (test code 1.710 L      2.138-3.388  L            



             = 9505)                                             

 

             FEV1/FVC (%) pre (test 73.440 %     68.265-87.853              



             code = 9509)                                        

 

             DLCO_SB ml/(min*mmHg) 17.221       See_Comment                [Auto

mated message]



             (test code = 9515)                                        The syste

m which



                                                                 generated this 

result



                                                                 transmitted ref

erence



                                                                 range: 16.177 -



                                                                 36.343 ml/(min*

mmHg).



                                                                 The reference r

keli



                                                                 was not used to



                                                                 interpret this 

result



                                                                 as normal/abnor

mal.

 

             DLCOc_SB ml/(min*mmHg) 17.496       See_Comment                [Aut

omated message]



             (test code = 9516)                                        The syste

m which



                                                                 generated this 

result



                                                                 transmitted ref

erence



                                                                 range: 16.177 -



                                                                 36.343 ml/(min*

mmHg).



                                                                 The reference r

keli



                                                                 was not used to



                                                                 interpret this 

result



                                                                 as normal/abnor

mal.

 

             TLC (L) (test code = 4.629 L      4.311-6.285               



             9513)                                               

 

             RV (L) (test code = 2.300 L      1.425-2.577               



             9514)                                               

 

             RV/TLC (%) (test code = 49.687 %     29.770-48.950 H            



             9517)                                               

 

             FVC (% pred) pre (test 65 %                                   



             code = 9520)                                        

 

             FEV1 (%pred) pre (test 62 %                                   



             code = 9518)                                        

 

             FEV1/FVC (% pred) pre 94 %                                   



             (test code = 9522)                                        

 

             TLC (% pred) (test code 87 %                                   



             = 9526)                                             

 

             RV (% pred) (test code = 115 %                                  



             9527)                                               

 

             RV/TLC (% pred) (test 126 %                                  



             code = 9528)                                        

 

             DLCO_SB (% pred) (test 66 %                                   



             code = 9529)                                        

 

             DLCOc_SB (% pred) (test 67 %                                   



             code = 9530)                                        

 

             Lab Interpretation (test Abnormal                               



             code = 03938-0)                                        



[facility]
2022 00:00:00





             Test Item    Value        Reference Range Interpretation Comments

 

             FVC (L) pre (test code = 2.329 L      2.834-4.311  L            



             9507)                                               

 

             FEV1 (L) pre (test code 1.710 L      2.138-3.388  L            



             = 9505)                                             

 

             FEV1/FVC (%) pre (test 73.440 %     68.265-87.853              



             code = 9509)                                        

 

             DLCO_SB ml/(min*mmHg) 17.221       See_Comment                [Auto

mated message]



             (test code = 9515)                                        The syste

m which



                                                                 generated this 

result



                                                                 transmitted ref

erence



                                                                 range: 16.177 -



                                                                 36.343 ml/(min*

mmHg).



                                                                 The reference r

keli



                                                                 was not used to



                                                                 interpret this 

result



                                                                 as normal/abnor

mal.

 

             DLCOc_SB ml/(min*mmHg) 17.496       See_Comment                [Aut

omated message]



             (test code = 9516)                                        The syste

m which



                                                                 generated this 

result



                                                                 transmitted ref

erence



                                                                 range: 16.177 -



                                                                 36.343 ml/(min*

mmHg).



                                                                 The reference r

keli



                                                                 was not used to



                                                                 interpret this 

result



                                                                 as normal/abnor

mal.

 

             TLC (L) (test code = 4.629 L      4.311-6.285               



             9513)                                               

 

             RV (L) (test code = 2.300 L      1.425-2.577               



             9514)                                               

 

             RV/TLC (%) (test code = 49.687 %     29.770-48.950 H            



             9517)                                               

 

             FVC (% pred) pre (test 65 %                                   



             code = 9520)                                        

 

             FEV1 (%pred) pre (test 62 %                                   



             code = 9518)                                        

 

             FEV1/FVC (% pred) pre 94 %                                   



             (test code = 9522)                                        

 

             TLC (% pred) (test code 87 %                                   



             = 9526)                                             

 

             RV (% pred) (test code = 115 %                                  



             9527)                                               

 

             RV/TLC (% pred) (test 126 %                                  



             code = 9528)                                        

 

             DLCO_SB (% pred) (test 66 %                                   



             code = 9529)                                        

 

             DLCOc_SB (% pred) (test 67 %                                   



             code = 9530)                                        

 

             Lab Interpretation (test Abnormal                               



             code = 91457-1)                                        



[facility], DLCO, LV)
2021 00:00:00





             Test Item    Value        Reference Range Interpretation Comments

 

             FVC (L) pre (test code = 2.359 L      2.834-4.311  L            



             9507)                                               

 

             FEV1 (L) pre (test code 1.725 L      2.138-3.388  L            



             = 9505)                                             

 

             FEV1/FVC (%) pre (test 73.134 %     68.265-87.853              



             code = 9509)                                        

 

             DLCO_SB ml/(min*mmHg) 16.588       See_Comment                [Auto

mated message]



             (test code = 9515)                                        The syste

m which



                                                                 generated this 

result



                                                                 transmitted ref

erence



                                                                 range: 15.767 -



                                                                 35.934 ml/(min*

mmHg).



                                                                 The reference r

keli



                                                                 was not used to



                                                                 interpret this 

result



                                                                 as normal/abnor

mal.

 

             DLCOc_SB ml/(min*mmHg) 17.139       See_Comment                [Aut

omated message]



             (test code = 9516)                                        The syste

m which



                                                                 generated this 

result



                                                                 transmitted ref

erence



                                                                 range: 15.767 -



                                                                 35.934 ml/(min*

mmHg).



                                                                 The reference r

keli



                                                                 was not used to



                                                                 interpret this 

result



                                                                 as normal/abnor

mal.

 

             TLC (L) (test code = 4.725 L      4.311-6.285               



             9513)                                               

 

             RV (L) (test code = 2.366 L      1.425-2.577               



             9514)                                               

 

             RV/TLC (%) (test code = 50.074 %     29.770-48.950 H            



             9517)                                               

 

             FVC (% pred) pre (test 66 %                                   



             code = 9520)                                        

 

             FEV1 (%pred) pre (test 62 %                                   



             code = 9518)                                        

 

             FEV1/FVC (% pred) pre 94 %                                   



             (test code = 9522)                                        

 

             TLC (% pred) (test code 89 %                                   



             = 9526)                                             

 

             RV (% pred) (test code = 118 %                                  



             9527)                                               

 

             RV/TLC (% pred) (test 127 %                                  



             code = 9528)                                        

 

             DLCO_SB (% pred) (test 64 %                                   



             code = 9529)                                        

 

             DLCOc_SB (% pred) (test 66 %                                   



             code = 9530)                                        

 

             Lab Interpretation (test Abnormal                               



             code = 02996-9)                                        



[facility] NeihartHSV 1+2 Quant [id number] 
23:16:44





             Test Item    Value        Reference Range Interpretation Comments

 

             HSV1         Not Detected Not Detected              



             Plasma-Leslie               copies/mL                 



             cor (test                                           



             code =                                              



             5829)                                               

 

             HSV2         Not Detected Not Detected               Assay Range fo

r HSV 1 is 37



             Plasma-Leslie               copies/mL                 copies/mL to 1.

00E+08



             cor (test                                           copies/mLAssay 

Range for HSV 2



             code =                                              is 73 copies/mL

 to 1.00E+08



             5836)                                               copies/mLThe li

adrianne of



                                                                 quantitation (L

OQ) is 37 (HSV



                                                                 1) and 73 (HSV 

2)copies/mL. HSV



                                                                 DNA detected be

low the LOQ will



                                                                 be reported asD

etected:<37



                                                                 copies/mL (HSV 

1) or



                                                                 Detected:<73 co

pies/mL (HSV



                                                                 2).This test wa

s developed and



                                                                 its performance



                                                                 characteristics

determined by



                                                                 "[employer]

r. It has not



                                                                 been cleared or

 approvedby the



                                                                 [place]. Food and D

rug



                                                                 Administration.

 Results should



                                                                 be used inconju

nction with



                                                                 clinical findin

gs, and should



                                                                 not form the so

lebasis for a



                                                                 diagnosis or tr

eatment



                                                                 decision.______

________________



                                                                 _______________

________________



                                                                 _______Performe

d At:[facility]



                                                                 [address] 

[employer]



                                                                 [place]

 [place]



                                                                 [id number]

 Director: Ángel Burton Ph.D., BCL

D (ABB)CLIA#:



                                                                 [id number]

e:



                                                                 [phone]



[facility] NeihartCatheter Site [id number] 
22:57:35





             Test Item    Value        Reference Range Interpretation Comments

 

             Final Report (test No growth                              



             code = 8488)                                        

 

             Path Review (test The results have been                           



             code = 8492) reviewed and                           



                          electronically signed by                           



                          Pathologist:BENJAMIN HICKMAN MD #[id number]                           

 

             MONICA (test code = Catheter red hub                           



             MONICA)                                                



[id number] 17:20:18





             Test Item    Value        Reference Range Interpretation Comments

 

             PT (test code 12.3         See_Comment               Testing Perfor

med



             = 5902-2)                                           [facility]

atory



                                                                 [address]



                                                                 [address] 

[address]



                                                                 [address] [address]

[address]



                                                                 [Automated mess

age]



                                                                 The system [employer]



                                                                 generated this 

result



                                                                 transmitted ref

erence



                                                                 range: 11.5 - 1

3.9



                                                                 second(s). The



                                                                 reference range

 was



                                                                 not used to int

erpret



                                                                 this result as



                                                                 normal/abnormal

.

 

             INR (test code 0.99         0.90-1.10                 Testing Perfo

rmed



             = 6301-6)                                           [facility]

atory



                                                                 [address]



                                                                 [address] 

[address]



                                                                 [address] [address]

[address]

 

             MONICA (test code This lab cannot be                           



             = MONICA)       scheduled at the                           



                          following locations                           



                          due to                                 



                          collection/proccess                           



                          ing restrictions:                           



                          [facility]                           



                          and [facility]                           



                          CTR.                                   



[facility] NeihartPathology Biopsy Interpretation
2021-10-08 21:50:21





             Test Item    Value        Reference Range Interpretation Comments

 

             Submitted Clinical History [id number]                     

      



             (test code = 98855) mbGFuZzEwMzNcZnRuYm                           



                          pcdWMxIHtccnRmMVxzc                           



                          6PmB2DmLgPgIPikzpVm                           



                          XGRlZmxhbmcxMDMzXGZ                           



                          0bmJqXHVjMVxkZWZmMH                           



                          mkDr7gvQOenRfwNeLrE                           



                          XLnu5rvxfQGuowhjIh6                           



                          h6yyXINfHhZ9xGHlDNf                           



                          kE5zcrxXecPSiKDZkUP                           



                          t3aJ18FVSixA1fzQCpS                           



                          BlgwkGjIkY6OAquIBXn                           



                          OwH5KWRflZBmGSJfR5e                           



                          yZWQwXGdyZWVuMFxibH                           



                          YkGAU7cHlnu4Q0gUHzx                           



                          GVldHtcZjBcZnMyMiBO                           



                          y9XpGZq6xMxqO8BjDXP                           



                          tCyF5nMTlERFlOYlcAS                           



                          MfRCQwjmL4zB44CFmkc                           



                          yF1yJWbu0Lin96ec539                           



                          tH4umJBiUHL2ZYHmCCF                           



                          rqXJyMNIgMDE5BTJtoJ                           



                          JfQ0swTJRaBA2qabpuO                           



                          NsyXGruOXGqfUB4IOQu                           



                          zACnS3EbVGQgVQpnJIZ                           



                          jxnz8LvYwWw7ndIXesL                           



                          anAKfbr6jpp8sitDNnZ                           



                          ds0WDUzCrNtOkwzCCgx                           



                          u2Pai9naRJZxkt0qLEB                           



                          2mZWtkFcox5P2dWDkEU                           



                          YfjWJgiuEeMQTmXbR8S                           



                          GdpXX7bxu91HGEgPSW6                           



                          gx5coTNkeSfjjbDrqDR                           



                          dBGqdD0OnSGXnm672QW                           



                          LnY4JwJLHvn2T7joQcU                           



                          xGnPLRfrWM1guF3AKTy                           



                          JOo4gNZqmcW1znZhcKM                           



                          lO0ualR3bQBXuBY0zkg                           



                          dqy4fqSXmbOEkbAVRao                           



                          LZ8rkE3JKNlfMHmR1Yx                           



                          pZ0uNXRpDWlvILUqsoe                           



                          5ErBiRs4yfRUpvOclHS                           



                          xzYmtwYWdlXHBnbmNvb                           



                          nRccGduZGVjXHBsYWlu                           



                          XHBsYWluXGYwXGZzMjR                           



                          hfKnfvMponJ6hXiTnRh                           



                          InTOykLQ0sLRFqD2vly                           



                          XLoPTFrCOHvL5cbWoNf                           



                          kB3flEbeYHcxbxEzDFR                           



                          5AKE4lyHde7N0GDG7UO                           



                          3hI1AtcBN8fdGfw7CbD                           



                          Z36VXlmSYYtFPYgHDVt                           



                          ejGXQWDwNARcf6l4fBT                           



                          2p49uqQukNeFjLwInZw                           



                          JdXHBsYWluXGYxXGZzM                           



                          jJcbGFuZzEwMzNcaGlj                           



                          aFxmMVxkYmNoXGYxXGx                           



                          dR8deRoEoAwItVxgfMI                           



                          J9fQ==                                 

 

             Diagnosis (test code = 34) d3fkhLGgIYPekLR3ZRB                     

      



                          iXPJtc9egi0AemDGtoN                           



                          SjEWlreCAtjrAdis43d                           



                          EJ8rZ19GE9uEFJqJaJ4                           



                          YFGrcgO2Dws5FZJxMVJ                           



                          tyPZbN900e0yge1fdti                           



                          ThvQW3lNdfFGAigznsJ                           



                          tT3IXucMAZhxdoqEWi3                           



                          MEbbYEKivTM8FRNihUC                           



                          yI1OqCMMmKU4sest4RP                           



                          X3RRxdVOKsSzZ0MTCsf                           



                          JYxOJAzwXxzRTqbx081                           



                          CUM3HgYdGJKqdmPujWy                           



                          psP4kVxEpQYPOTvOwPE                           



                          MvALHvnU8gRAIpf5Hba                           



                          TpcbGluZSAgXHRhYiBE                           



                          oU4nRZ8teQVefBSdf8Q                           



                          kw4e3sFWin10oNSQuJL                           



                          VwolfvkDLyxT4ftE34o                           



                          KZpHo9jtJYsKMWff8Dt                           



                          tH8jr0jpD6VfzPwgB9G                           



                          yXfBkshPkd6YlETavj4                           



                          SvCOlhCLTcJZYcM8KiV                           



                          UIlWHVfYgB7UHvuNUJl                           



                          ZWs7TRYcY4UtZQFdaH1                           



                          lbnRcbGluZSBCLiAgU3                           



                          FtgEYnbKgxCN08kzGhK                           



                          DYjuX1ac3w7EVgqagAu                           



                          HEr2OVIiAQ65zmWxQFD                           



                          qMBA6bdQoo3v2sS1tTS                           



                          pfbSgeEEBklVNon1Cwi                           



                          7b9fULhYRYoPOtha6uy                           



                          lGQjTLDwtPP5IFMne3R                           



                          1d8LwJdZvd4HeHBFdQE                           



                          4sbYYqfBQetj7blBviG                           



                          GZvciBHVkhEXHBhciAg                           



                          PKOgUyMBMJJiZ49zeXH                           



                          imBgvfQ0iKBQaNICPcC                           



                          9lZDSfFVIxRFF9rcvyT                           



                          HQyIIqqXR5bSPImHBXi                           



                          tB0okXRfRMWet0IxyRq                           



                          cbGluZSAgXHRhYiBPeH                           



                          lfrBorRM54W79kEMT4o                           



                          WXtYSOuU4BkNLmdV9Pq                           



                          YXNlZCBjcnlwdCBhcG9                           



                          lkV62bJYyZf1oqIAcYZ                           



                          Thh1OkjP3yq7mqR3Sro                           



                          EmkJ96rmGC3dFClWGV1                           



                          aXRoIEdWSEQgKGdyYWR                           



                          iQRKqm7XbYJqtrJapGN                           



                          TnPYIkTjRWWDCwL07sd                           



                          MXudMlyvA2rWIKnqt9=                           

 

             Comment (test code = 9835) r2omjUBaPWElzSE3IXV                     

      



                          mFYFsh1oms1ArcGWtbF                           



                          PuBEkkcDHaekHofc13p                           



                          ZM1gU77JX1dUYDnOiW4                           



                          HCSbmeO5Qvu7HVWcFEC                           



                          rySQzN107d3cuj8godo                           



                          ObzAD0iOloNXCmozscX                           



                          fA3LFtfTJZvniywMAu3                           



                          JDlrCGFukFQ9OBIgdIY                           



                          bX9QfTAHfLG5goat8RI                           



                          Z0DUipGXKuLxX0JSFtv                           



                          MBgJUImzLtmGFkws830                           



                          SPT1TfHuRLTwzzQciMy                           



                          xvM8mWoWtPLCHjwG1bM                           



                          CwzOStffBieBVte44qM                           



                          RvzUS91fMNuFZJnzD7m                           



                          iWtbYRErf1KsOZLkDMM                           



                          pfNGoqx4zKOpbzmNqX0                           



                          ainbxlSTryP45alcTxO                           



                          FMgo75idGJjlxReo33b                           



                          DC9vWZRcFAThtc0=                           

 

             Gross Description (test k4mpeTBdOGVgfKQNPEQ                        

   



             code = [id number]) sC2dbdtHtQKFltPHwE4                           



                          YmonezYNpzBE5cDR9cr                           



                          SxacXVveASkIK2ZMVPj                           



                          ZmYxXHBhcGVydzEyMjQ                           



                          jNXIgjONslFH7PQAfFD                           



                          1hcmdsMTgwMFxtYXJnc                           



                          iO4CLTbtEXhL4JeASBv                           



                          TD6uxxqtOAZ1KGabzB2                           



                          vkqHIUzspGc6ogEJwwI                           



                          tcZjFcZmNoYXJzZXQwX                           



                          ELaaPnmGODeSYy8gY8P                           



                          LnnlT36mm3Y1Yuf9MNK                           



                          dFQAsW6QkMM8qJQMutJ                           



                          ItJ23EZryoPMD4EPQSP                           



                          ndhHVGrPN4Xp8sjNCRd                           



                          yRLiZAR8WCopgZBrGMK                           



                          sPTHtWGp1YWAqVPxfhV                           



                          CtIG2dwPlpRyoazBhlh                           



                          2VjdCBcXGlkIDUxMDAy                           



                          AAauMXXuVX2RJmOyFIT                           



                          cHxLsRNrqBMt1ZWt0XN                           



                          5FRcRpACTpNMefSOI6B                           



                          eYjSSa2ZOhlYW8WKZl9                           



                          YoKpOUSaNpC1TBSgTrJ                           



                          cXHQgMiBcXGYgQXJpYW                           



                          wgXFxmcyAxMCBcXGZiI                           



                          QtuRzkkTGgkF36xzVjy                           



                          wI3dUfghghKgZPW0LKG                           



                          hciANClxwbGFpblxlcG                           



                          ljTmVzdERvYzEgDQpcb                           



                          HRycGFyXGxpbjBccmlu                           



                          MCANClxsdHJjaFxiXGN                           



                          rGXhsycAyKLY4s4Yzxk                           



                          YtNGZndY5gKW72mWO5F                           



                          SStKDTwNYGnME56QHjw                           



                          ZD0gJXcnSE9kZAAtHKP                           



                          xE4SdD3G2IGSbYnYzwM                           



                          0lNJTrdyAus1B3VWLei                           



                          4E0UCcgYM32yKZxkRhd                           



                          m4QprXv3zUHbYEkkDFP                           



                          bTkIaQTJjr6RvV0X9VF                           



                          YbGIubt0lyYVQkWGznb                           



                          7OoMGyNDNXJLE3JMP8m                           



                          mRH3ZByEI2KRI5pTtVS                           



                          uCTB2gGT8YJNKZfukzS                           



                          C0fXM2pR16JRNdZXBby                           



                          TZvZJgxR607OapPs3xu                           



                          aWVsZHtcKlxmbGRpbnN                           



                          0IEhZUEVSTElOSyBuYW                           



                          5vGIsZKvwDCrC7JfNkG                           



                          VF7LWnGU6SZtWH2Uwu7                           



                          QBw8zMtgDzqbxlVqkDN                           



                          tNuPQuF6qkJfdgK5zwB                           



                          IzG7jsOpYnVAATFtsif                           



                          GFpblxlcGljTmVzdERv                           



                          DiYzgJvxcI11BFFarHJ                           



                          mFWM7XG8vIVCfjotwJI                           



                          DwTQOaOVM7RDoxyO86d                           



                          FQcHLWxCONmmAVoqC2J                           



                          w5bwTVPihOGdCZT6DOs                           



                          caWQgNTEwMDIgXFxkYi                           



                          NmG4SQMYZzBdC9GTJ2E                           



                          REmSGt6ZGwrT5RMRMXp                           



                          NJZ7GOO0KgC4JrI8SQe                           



                          1BQHVBa0dDHDzSBY4Ue                           



                          xhCBU7WNZ2RZvsjBAgC                           



                          FxcZiBBcmlhbCBcXGZz                           



                          IDEwIFxcZmIgXFxmbCB                           



                          lBY7kbRgkZSOhSvUcBn                           



                          peuRMrIM1ERTSsVLwhA                           



                          XFqiMFACKU5LV6cOOGC                           



                          ClxsdHJwYXJcbGluMFx                           



                          jzM9xYZ0EYPv1jyRkZI                           



                          UiG2MjYAWnJfIdN0Ibd                           



                          RVubKhlOI48mrSqEQmq                           



                          SpZfZ9QnDMReNqAgrBZ                           



                          xXwDciWJpXnSeY79cGA                           



                          pwzyTwMALtRW6oBJMel                           



                          mstdGFuIHNvZnQgdGlz                           



                          p0LmUVRsjcYkfgZjkEW                           



                          wzUSjaGE1CWJldI4eGz                           



                          EuICBccHJvdGVjdHtcZ                           



                          iyiwFV5GAbkYnvluI5j                           



                          dCBIWVBFUkxJTksgbmF                           



                          cVP8YSA4YAoDWPY42Ht                           



                          YeRJW8IWlOU6MDmUS6H                           



                          hj0WCa3gVmbHkgfunUx                           



                          eMUrNuRDgQ9VSEW4QHI                           



                          oTRhqk8xbARNbVLijn8                           



                          NnBNlMKQSESF8UMN8am                           



                          IW2FIqLZ2ZERVqqPSWu                           



                          ZhopgENPUZJ7QYxukNm                           



                          khRv7y4pirBAoa3k9OV                           



                          wjPWM5bInwcGMpljgsp                           



                          KVgdNpdxpIxAB2MOFSa                           



                          SBldKDZdbMEUZKJ9NM7                           



                          jMFxwbGFpbntcZXBpY3                           



                          DqG0KpskRlcSIqIUCfx                           



                          xGzy8gaFKL2OUGypCFi                           



                          uNDzSlDmVwtkNPD3NKq                           



                          1DSxyEMTuZ2RrL9MuSV                           



                          xhUPO4LBPcFcPcLMZtV                           



                          CBPVlIgIiBDMjYwMDQ5                           



                          LyJ8PGs9LDLPTsNrOyN                           



                          wBLV7HtTcPODgHLh2DW                           



                          j2TMoOKsC8AAUgMgf0Z                           



                          YPiCMI8EYslENn7INIq                           



                          XFxmIEFyaWFsIFxcZnM                           



                          gMTAgXFxmYiBcXGZsIF                           



                          geinG8JOXyAeInRXHCQ                           



                          dqxNVIbKKbewKkmtJ7l                           



                          BKScK72wc2IGu4EyEH2                           



                          NIXp6ipYkzlcwmL4hEJ                           



                          RbgbZvPXcrnBVcM8nwI                           



                          equVjHiXaRxYHTLnG7d                           



                          PDMqXSHkIEY7lbozFQN                           



                          fUEbpID7uUMT1pzF5bd                           



                          jlAcQjU9LbQLUlHaeik                           



                          VYgUtSqxIZvKwNcW19f                           



                          PRnuxeDbLDIsRX6rAXZ                           



                          pbmstdGFuIHNvZnQgdG                           



                          gua9IrJTUpkbLkblTpy                           



                          NXrlMFimNB6JUYllB5x                           



                          QzEuICBccHJvdGVjdHt                           



                          fIlmgqMJ4FWnpDkdqkY                           



                          5zdCBIWVBFUkxJTksgb                           



                          yYkTZ4ENY7FYcJFDH33                           



                          YdDrEON8ODwCU3YGkON                           



                          8Puq5UXy0wTzqLbplyr                           



                          IlwHFeVbRNxI5DHRM7O                           



                          XZaDNegf1roAKHbXWkb                           



                          w7AkYTzTZNSAOY4PZC4                           



                          onZQ5JGbVA3AOBWikBU                           



                          DeJhubqLLGBQV9EHlma                           



                          VxxeGw3c9zjwMTpw8e5                           



                          XCoqCLX7yQqrtQDnrvc                           



                          iuRSczLcepeWpMY7MDW                           



                          WoMIijPSVceIRFBNN9B                           



                          Z8uKWhthHSlbfenFVNo                           



                          D2XbU6FvedQllULsOLN                           



                          ouuShr0zwKPL9WRBduW                           



                          ArxXYbTpTkSnngORC7J                           



                          Epzr8ubAJP1BXShhSFp                           



                          dDAgDQpcZnMxNntcZXB                           



                          tZ1BtV3AgcpO9PUj6                           

 

             Disclaimer (test code = [id number]                        

   



             9844)        uSQSkFYZxp7bxVRSpwI                           



                          FuZzEwMzNcZnRuYmpcd                           



                          LWmUSNcViPln2lya148                           



                          qLDbs2ahJSCoUiC0wXV                           



                          lBVOheLHsL781QHZrMK                           



                          njj2muq4KbAPJxeTGsr                           



                          2U9MSBGuiucmNa6kWvo                           



                          Q24yf9D2TycaW3ovPWI                           



                          mWZXsK6EnSB1eIDLfLs                           



                          s5VQP5UQL7OKEiPVSwU                           



                          5NhHR7kXRBqlKOuOOp3                           



                          r7igeVsoMIWmFCY9m7n                           



                          nFBwxzpUsKU0vjt6ykM                           



                          q3f0zllbRoTBWrBQDpy                           



                          KLZONNaL4LbdIpgBj0u                           



                          rJh3gSicZwyvCIF5Hcc                           



                          9SK0tng33jpo0gXquUB                           



                          ZzsmwoPhN8KZypXLUng                           



                          nyjIDx9TXywDOJazLR4                           



                          OZDnjUUoJ1NdYWWxWH4                           



                          hfll9GGJ7AQntKTLgWj                           



                          O7ZMFcyXHyGZQijUhsV                           



                          Ygjd395CTW3HcWhKL8a                           



                          J1Xvb9R3lM3hdLYxZBI                           



                          cgDTxHlZgZUWpfb6zqL                           



                          DiAMctl3TqFPB9skC4f                           



                          FLphSXhCLXxQY32Mtdo                           



                          o6ZdJhywQAX1KQOpxtZ                           



                          dr5Khh5ebYdAxqsFhL9                           



                          udM0EuLVXtEDIpCSEbF                           



                          dFjpdAis2Cob6DnuZEd                           



                          gBj5n4gmSWMuJFOtiDu                           



                          el1tqLJM5SVRpG9W9yU                           



                          Voy8dhOSvbVHKpiFO6r                           



                          pK1XDIatTDvM1GdbV0t                           



                          UQSdLH9xwap8r4pyPTJ                           



                          2NImtWFJnDsY5vkM7ZU                           



                          BcaGVhZGVyeTcyMFxmb                           



                          473RFN4LjVoLMJcj4Zm                           



                          L1BcfIqvF66naZlxJ72                           



                          kEXMboFebgN3shWmmaD                           



                          5cZjBcZnMyNFxxbFxwb                           



                          XJujxsbZLzjjlM1SXch                           



                          sztoJRYeKKbyF9zvDqI                           



                          pNLAeaNotHTjud5BfIW                           



                          HzGZMjUmpstnU2KRBGq                           



                          27tIKPhc9CnVKCghY4w                           



                          wZWsSTnnxfMviAV6VPb                           



                          hdmUgYmVlbiBkZXZlbG                           



                          8cRZVpYA1iKEMpgfVtn                           



                          f4hywMyDEMhSXVqO1Mh                           



                          cmlzdGljcyBkZXRlcm1                           



                          dssZxSPV3VPCDJN4HKG                           



                          LjUBScr26gDWUmwTcsw                           



                          U9izTPmkfQqGPSrk1Le                           



                          gR1xbGLUGZSrK1kqYS0                           



                          hEKiaf8KmfMFfbNXodT                           



                          G6KAWwt6MzVuCexkIlz                           



                          BYpmXThR1DxsYkuM0ck                           



                          DITpFGZijnCprNFdv8T                           



                          fUDRpnPP1eWQbCL6ORd                           



                          MPh90cKBAfTJUBfcRkD                           



                          FInnGryfMJ0kkU9rE8x                           



                          LiBJZiBhcHBsaWNhYmx                           



                          aMRBan923rn4geqF6OY                           



                          EtNTVizksuv4DxYCKbQ                           



                          ULanA32IFXuNJXwon6e                           



                          lmzudUAxfiBvI2Lrknw                           



                          8bR2oOBWrRAztCTRpLE                           



                          ZzMjJcbGFuZzEwMzNca                           



                          GljaFxmMVxkYmNoXGYx                           



                          LMtuR3xtFyIyUtVpJux                           



                          wYXJ9                                  



[facility] NeihartaPTT2021-10-06 12:19:23





             Test Item    Value        Reference Range Interpretation Comments

 

             aPTT (test code = 30.5         See_Comment                [Automate

d message] The



             6777)                                               system which ge

nerated this



                                                                 result transmit

chiara



                                                                 reference range

: 24.7 -



                                                                 36.8 second(s).

 The



                                                                 reference range

 was not



                                                                 used to interpr

et this



                                                                 result as becky

l/abnormal.



[facility]





             Test Item    Value        Reference Range Interpretation Comments

 

             Topiramate   6.8          mcg/mL                     --------------

-----REFERENCE



             l[place] (test                                        VALUE--------

------------------



             code = 98018-4)                                        Reference va

lues depend on



                                                                 clinical use: A

nticonvulsant:



                                                                 5.0-20.0 mcg/mL

 Psychiatric:



                                                                 2.0-8.0 mcg/mL



                                                                 ---------------

----ADDITIONAL



                                                                 INFORMATION----

---------------T



                                                                 his test was de

veloped and its



                                                                 performance



                                                                 characteristics

determined by



                                                                 [facility] in 

a manner



                                                                 consistent with



                                                                 CLIArequirement

s. This test has



                                                                 not been cleare

d or approved



                                                                 bythe U.S. Food

 and Drug



                                                                 Administration.

 Test Performed



                                                                 by[facility] [id number]



                                                                 [place], [place] [address]

rodney: Shalom Noble M.D. 

Ph.D.; CLIA#



                                                                 [id number]



[facility] 
Asymptomatic- -10-04 02:08:19





             Test Item    Value        Reference Range Interpretation Comments

 

             COVID19      Not Detected Not Detected              



             (SARS-CoV-2)                                        



             (test code =                                        



             80058-3)                                            

 

             Influenza A (test Not Detected Not Detected              



             code = 13678-8)                                        

 

             Influenza B (test Not Detected Not Detected              



             code = 08375-3)                                        

 

             COVID19 SARS Inpatient                              



             Indication (test Admission                              



             code = 83623)                                        

 

             Inf AB+Cov19 See Note                               The venessa SARS-

CoV-2



             Comment (test                                        & Influenza A/

B



             code = 41915)                                        nucleic acid t

est for



                                                                 use on the laverne

s Graciela



                                                                 System is a mul

tiplex



                                                                 real-time RT-PC

R



                                                                 assay intended 

for



                                                                 the simultaneou

s,



                                                                 qualitative det

ection



                                                                 and differentia

l of



                                                                 SARS-CoV-2



                                                                 (COVID-19), inf

luenza



                                                                 A, and influenz

a B



                                                                 viral RNA in



                                                                 nasopharyngeal 

swabs



                                                                 in transport me

rick



                                                                 from patients



                                                                 suspected of ha

ving a



                                                                 respiratory inf

ection



                                                                 with one of the

se



                                                                 viruses or poss

ibly



                                                                 exposure to COV

ID-19



                                                                 by a healthcare



                                                                 provider. Resul

ts



                                                                 must be interpr

eted



                                                                 within the cont

ext of



                                                                 all relevant cl

inical



                                                                 and laboratory



                                                                 findings and sh

ould



                                                                 not form the so

le



                                                                 basis for a rick

gnosis



                                                                 or treatment



                                                                 decision. A fac

t



                                                                 sheet for patie

nts



                                                                 provided by the



                                                                  ([employer]



                                                                 [employer]) can be rev

iewed



                                                                 at:



                                                                 https://www.fda

.gov/m



                                                                 [email or web address]/mp

nloadA



                                                                 fact sheet for 

Health



                                                                 Care providers 

is



                                                                 provided by the



                                                                  ([employer]



                                                                 [employer]) and can be



                                                                 reviewed at:



                                                                 https://www.fda

.gov/m



                                                                 [email or web address]/mp

nload



                                                                 Influenza A and



                                                                 Influenza B neg

ative



                                                                 results should 

be



                                                                 considered



                                                                 presumptive in



                                                                 samples that ha

ve a



                                                                 positive SARS-C

oV-2



                                                                 result. If



                                                                 co-infection wi

th



                                                                 influenza A or



                                                                 influenza B vir

us is



                                                                 suspected in sa

mples



                                                                 with a positive



                                                                 SARS-CoV-2 resu

lts,



                                                                 the sample shou

ld be



                                                                 re-tested with



                                                                 another approve

d



                                                                 influenza test.

 This



                                                                 assay has been



                                                                 authorized by t

 FDA



                                                                 for use only un

ki



                                                                 Emergency Use



                                                                 Authorization (

[facility])



                                                                 in laboratories

 that



                                                                 have been



                                                                 CLIA-certified 

to



                                                                 perform



                                                                 moderate-comple

xity



                                                                 and high-comple

xity



                                                                 tests. The



                                                                 Microbiology



                                                                 Laboratory at [facility], CLIA



                                                                 Accreditation



                                                                 #[id number] and

 [employer]



                                                                 Accreditation



                                                                 #[id number], verif

ied



                                                                 the performance



                                                                 characteristics

 of



                                                                 this assay. Int

ernal



                                                                 controls are us

ed to



                                                                 monitor all sta

ges of



                                                                 the test proces

s.



[facility]





             Test Item    Value        Reference Range Interpretation Comments

 

             Lipase Lvl (test code = 6165) 29 U/L       13-60                   

  



[facility]

## 2022-12-10 NOTE — XMS REPORT
Clinical Summary

                          Created on:December 10, 2022



Patient:Gita Haile

Sex:Female

:1960

External Reference #:[id number]





Demographics







                          Address                   [address]



                                                    [place] [address]

 

                          Mobile Phone              [phone]

 

                          Home Phone                [phone]

 

                          Email Address             [email or web address]

 

                          Preferred Language        English

 

                          Marital Status            

 

                          Rastafari Affiliation     Unknown

 

                          Race                      White

 

                          Ethnic Group              Not  or 









Author







                          Organization              [facility]

[facility]

 

                          Address                   [address] [place] [address]









Care Team Providers







                    Name                Role                Phone

 

                    Vivian Schmidt MD Unavailable         [phone]

 

                    Mena Lombardo RN Unavailable         [phone]

 

                    Juanita Olivas Unavailable         [phone]

 

                    Daron Hernadez MD  Primary Care Provider [phone]









Allergies







             Active Allergy Reactions    Severity     Noted Date   Comments

 

             Penicillins  Rash         Low          2019   







Medications







          Medication Sig       Dispensed Refills   Start Date End Date  Status

 

          LORazepam (ATIVAN) 0.5 Take 1 tablet 30 tablet 0         2022   

        Active



          mg tabletIndications: (0.5 mg) by mouth                               

          



          Acute myeloblastic every 6 (six)                                      

   



          leukemia not having hours as needed                                   

      



          achieved remission, for anxiety.                                      

   



          Status post stem cell                                                 

  



          transplant, Anxiety,                                                  

 



          not otherwise specified                                               

    

 

          tiotropium (Spiriva Inhale 1 capsule 90 each   11        2022   

        Active



          with HandiHaler) 18 mcg (18 mcg) by mouth                             

            



          inhalation daily.                                            



          capsuleIndications:                                                   



          Dyspnea, not otherwise                                                

   



          specified, Simple                                                   



          chronic bronchitis                                                   









                                                    



                                                    Additional Information



                                                    Patient not taking. Reason: 

No longer taking, Reported on 2022









          voriconazole (VFEND) 200 mg Take 1 tablet (200 60 tablet 5         07/

15/2022           Active



          tabletIndications: Acute mg) by mouth twice                           

              



          myeloblastic leukemia not daily.                                      

      



          having achieved remission,                                            

       



          Status post stem cell                                                 

  



          transplant, Bypwo-vuepvb-bcuz                                         

          



          disease, not otherwise                                                

   



          specified                                                   

 

          ketorolac (TORADOL) 10 mg Take 1 tablet (10 mg) 30 tablet 1         07

/15/2022           Active



          tabletIndications: Headache by mouth every 12                         

                



                    (twelve) hours as                                         



                    needed for moderate                                         



                    pain (headache).                                         

 

          promethazine (PHENERGAN) 12.5 Take 1 tablet (12.5 30 tablet 1         

07/15/2022           Active



          mg tabletIndications: Headache mg) by mouth every 6                   

                      



                    (six) hours as needed                                       

  



                    for nausea.                                         

 

          levothyroxine (SYNTHROID, Take 1 tablet (25 90 tablet 1         07/15/

2022           Active



          LEVOTHROID) 25 mcg mcg) by mouth daily.                               

          



          tabletIndications: Abdominal                                          

         



          pain, Stem cells transplant                                           

        



          status, Acute myeloblastic                                            

       



          leukemia not having achieved                                          

         



          remission, Acute myeloblastic                                         

          



          leukemia not having achieved                                          

         



          remission, Status post stem                                           

        



          cell transplant                                                   

 

          sulfamethoxazole-trimethoprim Take 1 tablet by 60 tablet 2         07/

15/2022           Active



          (BACTRIM DS) 800 mg-160 mg per mouth 3 (three) times                  

                       



          tabletIndications: Acute a week Monday,                               

          



          myeloblastic leukemia not Wednesday and Friday.                       

                  



          having achieved remission,                                            

       



          Status post stem cell                                                 

  



          transplant, Strwt-ogwfwf-jvyw                                         

          



          disease, not otherwise                                                

   



          specified                                                   

 

          topiramate (TOPAMAX) 200 mg Take 1 tablet (200 30 tablet 3         07/

15/2022           Active



          tabletIndications: Status post mg) by mouth at                        

                 



          stem cell transplant bedtime. To prevent                              

           



                    migraine                                          

 

          fluticasone Inhale 1 puff by 1 Inhaler 6         07/15/2022           

Active



          propionate-salmeterol (Advair mouth twice daily.                      

                   



          Diskus) 500 mcg-50                                                   



          mcg/inhalation diskus                                                 

  



          inhalerIndications: Chronic                                           

        



          obstructive pulmonary disease                                         

          



          with acute exacerbation                                               

    

 

          gabapentin (NEURONTIN) 300 mg Take 2 capsules (600 180 capsule 3      

   07/15/2022           

Active



          capsuleIndications: Acute mg) by mouth 3                              

           



          myeloblastic leukemia not (three) times a day.                        

                 



          having achieved remission,                                            

       



          Status post stem cell                                                 

  



          transplant, Abdominal pain,                                           

        



          Stem cells transplant status,                                         

          



          Acute myeloblastic leukemia                                           

        



          not having achieved remission                                         

          

 

          valACYclovir (VALTREX) 500 mg Take 1 tablet (500 30 tablet 5         0

7/15/2022           Active



          tabletIndications: Acute mg) by mouth daily.                          

               



          myeloblastic leukemia not                                             

      



          having achieved remission,                                            

       



          Status post stem cell                                                 

  



          transplant, Anahr-eogutk-mpjy                                         

          



          disease, not otherwise                                                

   



          specified, Cellulitis, not                                            

       



          otherwise specified                                                   

 

          SUMAtriptan (IMITREX) 50 mg Take 2 tablets (100 9 tablet  6         07

/15/2022           Active



          tabletIndications: Acute mg) by mouth once as                         

                



          myeloblastic leukemia not needed for migraine                         

                



          having achieved remission, (Not to exceed 2                           

              



          Status post stem cell doses per day).                                 

        



          transplant                                                   

 

          metoprolol succinate (TOPROL Take 1 tablet (100 90 tablet 2         07

/15/2022           Active



          XL) 100 mg 24 hr mg) by mouth daily                                   

      



          tabletIndications: Stem cells for blood pressure.                     

                    



          transplant status Hold if top number is                               

          



                    less than 100 or                                         



                    heart rate is less                                         



                    than 60                                           

 

          Ventolin HFA 90 mcg/actuation Inhale 2 puffs by 6.7 g     2         07

/15/2022           Active



          inhalerIndications: Other form mouth every 6 (six)                    

                     



          of dyspnea hours as needed for                                        

 



                    wheezing or shortness                                       

  



                    of breath.                                         

 

          cholecalciferol, vitamin D3, Take 400 Units by           0            

                 Active



          (VITAMIN D3) 5,000 units tab mouth daily.                             

            



          tablet                                                      

 

          tacrolimus (PROGRAF) 0.5 mg Take 2 capsules (1.0 150 capsule 5        

 2022           

Active



          capsuleIndications: Acute mg) by mouth twice                          

               



          myeloblastic leukemia not daily                                       

      



          having achieved remission,                                            

       



          Status post stem cell                                                 

  



          transplant, Areiq-obarlh-yznl                                         

          



          disease, not otherwise                                                

   



          specified                                                   









                                                    



                                                    Additional Information



                                                    Patient taking differently: 

2 mg oral Every 12 hours, Take 2 capsules (1.0 mg) 

by mouth twice daily, Reason: Other, Reported on 11/3/2022









          ursodiol (ACTIGALL) 300 Take 1 capsule 270 capsule 5         10/14/202

2           Active



          mg capsuleIndications: (300 mg) by                                    

     



          Acute myeloblastic mouth 3 (three)                                    

     



          leukemia not having times a day.                                      

   



          achieved remission,                                                   



          Status post stem cell                                                 

  



          transplant,                                                   



          Fadtu-bkjcaj-afyz                                                   



          disease, not otherwise                                                

   



          specified                                                   

 

          ezetimibe (ZETIA) 10 mg TAKE ONE (1)           0         10/18/2022   

        Active



          tablet    TABLET(S) BY                                         



                    MOUTH ONCE A                                         



                    DAY.                                              

 

          polyethylene glycol Take 17 g by           0                          

   Active



          (GLYCOLAX) 17 gram/dose mouth twice                                   

      



          powder    daily.                                            

 

          HYDROmorphone (DILAUDID) Take 2 mg by           0         2022  

         Active



          2 mg tablet mouth every 4                                         



                    (four) hours as                                         



                    needed.                                           

 

          esomeprazole (NexIUM) 20 Take 20 mg by           0                    

         Active



          MG capsule mouth.                                            

 

          FLUoxetine (PROzac) 40 Take 40 mg by           0         2022   

        Active



          mg capsule mouth daily.                                         

 

          traZODone (DESYREL) 50 TAKE ONE (1) TO           0         2022 

          Active



          mg tablet THREE (3)                                         



                    TABLET(S) BY                                         



                    MOUTH AT BEDTIME                                         



                    AS NEEDED.                                         

 

          diclofenac sodium APPLY FOUR (4)           0         2022       

    Active



          (Voltaren) 1 % gel GRAM(S) TO                                         



                    AFFECTED AREA                                         



                    THREE TIMES                                         



                    DAILY AS NEEDED.                                         



                    DO NOT EXCEED 16                                         



                    GRAMS DAILY.                                         

 

          meloxicam (MOBIC) 7.5 mg Take 1 tablet           0         2022 

          Active



          tablet    (7.5 mg) by                                         



                    mouth daily as                                         



                    needed.                                           

 

          cyclobenzaprine Take 1 tablet 60 tablet 0         2022          

 Active



          (FLEXERIL) 10 mg (10 mg) by mouth                                     

    



          tabletIndications: Acute 3 (three) times                              

           



          myeloblastic leukemia a day as needed                                 

        



          not having achieved for muscle                                        

 



          remission, Status post spasms.                                        

   



          stem cell transplant                                                  

 

 

          unknown patient-supplied by intrathecal           0                   

      Discontinued



          medication in sodium route                                   3/20     

 (Stop Taking at



          chloride 0.9% (PF) continuous.                                      

 Discharge)



          intrathecal pump Morphine                                          

 

          esomeprazole (NexIUM) 20 Take 20 mg by           0                   0

      Discontinued



          MG capsule mouth daily.                               22        

 

          aluminum-magnesium Take 15 mL by 355 mL    0         2021  

    Discontinued



          hydroxide-simethicone mouth every 4                               3/20

      (Stop Taking at



          (MAALOX PLUS) 200 mg-200 (four) hours as                              

         Discharge)



          mg-20 mg/5 mL needed for                                         



          suspensionIndications: heartburn.                                     

    



          Abdominal pain                                                   

 

          levothyroxine Take 1 tablet 90 tablet 1         2021 03/2      D

iscontinued



          (SYNTHROID, LEVOTHROID) (25 mcg) by                               3/20

      (Reorder)



          25 mcg    mouth daily.                               22        



          tabletIndications:                                                   



          Abdominal pain, Stem                                                  

 



          cells transplant status,                                              

     



          Acute myeloblastic                                                   



          leukemia not having                                                   



          achieved remission,                                                   



          Acute myeloblastic                                                   



          leukemia not having                                                   



          achieved remission,                                                   



          Status post stem cell                                                 

  



          transplant                                                   

 

          metoprolol succinate Take 1 tablet 90 tablet 2         2021

      Discontinued



          (TOPROL XL) 100 mg 24 hr (100 mg) by                               

0      (Reorder)



          tabletIndications: Stem mouth daily for                               

22        



          cells transplant status blood pressure.                               

          



                    Hold if top                                         



                    number is less                                         



                    than 100 or                                         



                    heart rate is                                         



                    less than 60                                         

 

          senna-docusate Take 2 tablets 120 tablet 2         2021    

  Discontinued



          (SENOKOT-S) 8.6 mg-50 mg by mouth twice                               

      



          tabletIndications: Acute daily.                                  22   

     



          myeloblastic leukemia                                                 

  



          not having achieved                                                   



          remission                                                   

 

          oxyCODONE (ROXICODONE) Take 1 tablet 60 tablet 0         2021      Discontinued



          10 mg immediate release (10 mg) by mouth                              

       



          tabletIndications: every 6 (six)                               22     

   



          Abdominal pain hours as needed                                        

 



                    for severe pain.                                         

 

          dicyclomine (BENTYL) 10 Take 1 capsule 30 capsule 2         2021

 05/      Discontinued



          mg capsuleIndications: (10 mg) by mouth                               

3/20      (Stop Taking at



          Acute myeloblastic every 6 (six)                               22     

   Discharge)



          leukemia not having hours as needed                                   

      



          achieved remission, (Cramps).                                         



          Status post stem cell                                                 

  



          transplant, Colitis                                                   



          presumed infectious,                                                  

 



          Cramp                                                       

 

          valACYclovir (VALTREX) Take 1 tablet 30 tablet 5         2021 01

/3      Discontinued



          500 mg    (500 mg) by                                     (Reorder

)



          tabletIndications: Acute mouth daily.                               22

        



          myeloblastic leukemia                                                 

  



          not having achieved                                                   



          remission, Status post                                                

   



          stem cell transplant,                                                 

  



          Mnvbq-wjziyk-puul                                                   



          disease, not otherwise                                                

   



          specified, Cellulitis,                                                

   



          not otherwise specified                                               

    

 

          sulfamethoxazole-trimeth Take 1 tablet by 60 tablet 2         20      

Discontinued



          oprim (BACTRIM DS) 800 mouth 3 (three)                                     (Reorder)



          mg-160 mg per times a week                               22        



          tabletIndications: Acute Monday,                                      

     



          myeloblastic leukemia Wednesday and                                   

      



          not having achieved Friday.                                           



          remission, Status post                                                

   



          stem cell transplant,                                                 

  



          Bvaym-zeupvr-kckx                                                   



          disease, not otherwise                                                

   



          specified                                                   

 

          tacrolimus (PROGRAF) 0.5 Take 3 capsules 150 capsule 5         

021       

Discontinued



          mg capsuleIndications: (1.5 mg) by                                

     (Reorder)



          Acute myeloblastic mouth every                               22       

 



          leukemia not having morning AND 2                                     

    



          achieved remission, capsules (1 mg)                                   

      



          Status post stem cell every evening.                                  

       



          transplant,                                                   



          Nhxbo-vkdbon-gwlj                                                   



          disease, not otherwise                                                

   



          specified                                                   

 

          topiramate (TOPAMAX) 200 Take 1 tablet 30 tablet 3         2021      Discontinued



          mg tabletIndications: (200 mg) by                                 

    (Reorder)



          Status post stem cell mouth at                                21      

  



          transplant bedtime. To                                         



                    prevent migraine                                         

 

          SUMAtriptan (IMITREX) 50 Take 2 tablets 9 tablet  6         2021      Discontinued



          mg tabletIndications: (100 mg) by                                 

    (Reorder)



          Acute myeloblastic mouth once as                               22     

   



          leukemia not having needed for                                        

 



          achieved remission, migraine (Not to                                  

       



          Status post stem cell exceed 2 doses                                  

       



          transplant per day).                                         

 

          gabapentin (NEURONTIN) Take 2 capsules 180 capsule 3         09/16/202

1 01/3      

Discontinued



          300 mg    (600 mg) by                                     (Reorder

)



          capsuleIndications: mouth 3 (three)                               22  

      



          Acute myeloblastic times a day.                                       

  



          leukemia not having                                                   



          achieved remission,                                                   



          Status post stem cell                                                 

  



          transplant, Abdominal                                                 

  



          pain, Stem cells                                                   



          transplant status, Acute                                              

     



          myeloblastic leukemia                                                 

  



          not having achieved                                                   



          remission                                                   

 

          ursodiol (ACTIGALL) 300 Take 1 capsule 270 capsule 5         09/16/202

1 10/1      

Discontinued



          mg capsuleIndications: (300 mg) by                                

     (Reorder)



          Acute myeloblastic mouth 3 (three)                               22   

     



          leukemia not having times a day.                                      

   



          achieved remission,                                                   



          Status post stem cell                                                 

  



          transplant,                                                   



          Yveuu-nioric-rkyj                                                   



          disease, not otherwise                                                

   



          specified                                                   

 

          isavuconazonium Take 2 capsules 60 capsule 5         2021  

    Discontinued



          (Cresemba) 186 mg (372 mg) by                                     



          capsuleIndications: mouth daily.                               21     

   



          Acute myeloblastic                                                   



          leukemia not having                                                   



          achieved remission,                                                   



          Status post stem cell                                                 

  



          transplant,                                                   



          Lybms-qkgsyz-nddw                                                   



          disease, not otherwise                                                

   



          specified                                                   

 

          promethazine (PHENERGAN) Take 1 tablet 30 tablet 1         10/08/2021 

01/3      Discontinued



          12.5 mg   (12.5 mg) by                                     (Reorde

r)



          tabletIndications: mouth every 6                               22     

   



          Headache  (six) hours as                                         



                    needed for                                         



                    nausea.                                           

 

          ketorolac (TORADOL) 10 Take 1 tablet 30 tablet 1         10/08/2021 01

/3      Discontinued



          mg tabletIndications: (10 mg) by mouth                                     (Reorder)



          Headache  every 12                                22        



                    (twelve) hours                                         



                    as needed for                                         



                    moderate pain                                         



                    (headache).                                         

 

          heparin, PF, 100 Inject 2 ml (200 60 Syringe 6         2021

      Discontinued



          units/mL  units) into each                               3/20      (

erapy



          injectionIndications: lumen of central                               2

2        completed)



          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                                     

    



          disease, Complication of daily as                                     

     



          bone marrow transplant, directed.                                     

    



          not otherwise specified Discard excess                                

         



                    volume to                                         



                    administer 2 mL.                                         

 

          Ventolin HFA 90 Inhale 2 puffs 6.7 g     2         2021    

  Discontinued



          mcg/actuation by mouth every 6                                    

 (Reorder)



          inhalerIndications: (six) hours as                               22   

     



          Other form of dyspnea needed for                                      

   



                    wheezing or                                         



                    shortness of                                         



                    breath.                                           

 

          predniSONE (DELTASONE) Take 3 tablets 90 tablet 2         2021      Discontinued



          20 mg tabletIndications: (60 mg) by mouth                             

        



          Acute myeloblastic daily.                                  21        



          leukemia not having                                                   



          achieved remission,                                                   



          Status post stem cell                                                 

  



          transplant,                                                   



          Ncqhc-sirvzq-nxwu                                                   



          disease, not otherwise                                                

   



          specified                                                   

 

          voriconazole (VFEND) 200 Take 1 tablet 60 tablet 5         2021      Discontinued



          mg tabletIndications: (200 mg) by                                 

    (Reorder)



          Acute myeloblastic mouth twice                               22       

 



          leukemia not having daily.                                            



          achieved remission,                                                   



          Status post stem cell                                                 

  



          transplant,                                                   



          Yjuny-khucvk-ptkn                                                   



          disease, not otherwise                                                

   



          specified                                                   

 

          letermovir (Prevymis) Take 1 tablet 30 tablet 5         2021      Discontinued



          480 mg    (480 mg) by                                     



          tabletIndications: Acute mouth daily.                               21

        



          myeloblastic leukemia                                                 

  



          not having achieved                                                   



          remission, Status post                                                

   



          stem cell transplant,                                                 

  



          Oiwcm-nmwvna-dauk                                                   



          disease, not otherwise                                                

   



          specified                                                   

 

          ruxolitinib (Jakafi) 5 Take 1 tablet (5 60 tablet 5         2021      Discontinued



          mg tabletIndications: mg) by mouth                                

     (Other )



          Nxlfn-vhggdd-uovw twice daily.                               22       

 



          disease, not otherwise                                                

   



          specified                                                   

 

          letermovir (PREVYMIS) Take 1 tablet 30 tablet 5         2021      Discontinued



          480 mg    (480 mg) by                                     



          tabletIndications: Acute mouth daily.                               21

        



          myeloblastic leukemia                                                 

  



          not having achieved                                                   



          remission, Status post                                                

   



          stem cell transplant,                                                 

  



          Vhcik-frrwbk-mroj                                                   



          disease, not otherwise                                                

   



          specified,                                                   



          Cytomegalovirus                                                   



          infection                                                   

 

          dexamethasone (DECADRON) Swish and spit 5 500 mL    0         20      Discontinued



          0.5 mg/5 mL mL 3 (three)                                     



          mouthwashIndications: times a day for                               22

        



          Ammxw-odfcbq-yptk 1 week. Use as                                      

   



          disease, not otherwise needed up to 3                                 

        



          specified times daily                                         



                    thereafter. Do                                         



                    not eat food or                                         



                    drink 30 minutes                                         



                    after use.                                         

 

          predniSONE (DELTASONE) Take 1 tablet 90 tablet 2         2021      Discontinued



          20 mg tabletIndications: (20 mg) by mouth                             

        (Reorder)



          Acute myeloblastic daily.                                  22        



          leukemia not having                                                   



          achieved remission,                                                   



          Status post stem cell                                                 

  



          transplant,                                                   



          Lqkqi-ovthvd-bvnm                                                   



          disease, not otherwise                                                

   



          specified                                                   

 

          topiramate (TOPAMAX) 200 Take 1 tablet 30 tablet 3         2021      Discontinued



          mg tabletIndications: (200 mg) by                                 

    (Reorder)



          Status post stem cell mouth at                                22      

  



          transplant bedtime. To                                         



                    prevent migraine                                         

 

          budesonide-formoterol Inhale 2 puffs 10.2 g    6         2022      Discontinued



          (Symbicort) 160-4.5 by mouth twice                                

     



          mcg/actuation daily.                                  22        



          inhalerIndications:                                                   



          Dyspnea, not otherwise                                                

   



          specified                                                   

 

          fluticasone Inhale 1 puff by 1 Inhaler 6         2022      

Discontinued



          propionate-salmeterol mouth twice                               3/20  

    (Reorder)



          (Advair Diskus) 500 daily.                                  22        



          mcg-50 mcg/inhalation                                                 

  



          diskus                                                      



          inhalerIndications:                                                   



          Chronic obstructive                                                   



          pulmonary disease with                                                

   



          acute exacerbation                                                   

 

          predniSONE (DELTASONE) Take a HALF 15 tablet 2         2022

      Discontinued



          20 mg tabletIndications: tablet (10 mg)                               

      (Reorder)



          Acute myeloblastic by mouth daily.                               22   

     



          leukemia not having                                                   



          achieved remission,                                                   



          Status post stem cell                                                 

  



          transplant,                                                   



          Bdloh-hrebzv-ztja                                                   



          disease, not otherwise                                                

   



          specified                                                   

 

          sulfamethoxazole-trimeth Take 1 tablet by 60 tablet 2         01/10/20

22       

Discontinued



          oprim (BACTRIM DS) 800 mouth 3 (three)                                     (Reorder)



          mg-160 mg per times a week                               22        



          tabletIndications: Acute Monday,                                      

     



          myeloblastic leukemia Wednesday and                                   

      



          not having achieved Friday.                                           



          remission, Status post                                                

   



          stem cell transplant,                                                 

  



          Xqxdr-edfhfu-zehf                                                   



          disease, not otherwise                                                

   



          specified                                                   

 

          voriconazole (VFEND) 200 Take 1 tablet 60 tablet 5         01/10/2022 

02/1      Discontinued



          mg tabletIndications: (200 mg) by                                 

    



          Acute myeloblastic mouth twice                               22       

 



          leukemia not having daily.                                            



          achieved remission,                                                   



          Status post stem cell                                                 

  



          transplant,                                                   



          Wvmym-modwfc-qsgi                                                   



          disease, not otherwise                                                

   



          specified                                                   

 

          tacrolimus (PROGRAF) 0.5 Take 3 capsules 150 capsule 5         01/10/2

022       

Discontinued



          mg capsuleIndications: (1.5 mg) by                                

     (Reorder)



          Acute myeloblastic mouth twice                               22       

 



          leukemia not having daily                                             



          achieved remission,                                                   



          Status post stem cell                                                 

  



          transplant,                                                   



          Jmpbx-zeviam-siha                                                   



          disease, not otherwise                                                

   



          specified                                                   

 

          ruxolitinib (Jakafi) 5 Take 2 tablets 60 tablet 5         2022 0

      Discontinued



          mg tabletIndications: (10 mg) by mouth                                     (Other )



          Acute myeloblastic twice daily.                               22      

  



          leukemia not having                                                   



          achieved remission,                                                   



          Status post stem cell                                                 

  



          transplant                                                   

 

          gabapentin (NEURONTIN) Take 2 capsules 180 capsule 3         

2       

Discontinued



          300 mg    (600 mg) by                                     (Reorder

)



          capsuleIndications: mouth 3 (three)                               22  

      



          Acute myeloblastic times a day.                                       

  



          leukemia not having                                                   



          achieved remission,                                                   



          Status post stem cell                                                 

  



          transplant, Abdominal                                                 

  



          pain, Stem cells                                                   



          transplant status, Acute                                              

     



          myeloblastic leukemia                                                 

  



          not having achieved                                                   



          remission                                                   

 

          ketorolac (TORADOL) 10 Take 1 tablet 30 tablet 1         2022      Discontinued



          mg tabletIndications: (10 mg) by mouth                               0

      (Reorder)



          Headache  every 12                                22        



                    (twelve) hours                                         



                    as needed for                                         



                    moderate pain                                         



                    (headache).                                         

 

          promethazine (PHENERGAN) Take 1 tablet 30 tablet 1         2022      Discontinued



          12.5 mg   (12.5 mg) by                                     (Reorde

r)



          tabletIndications: mouth every 6                               22     

   



          Headache  (six) hours as                                         



                    needed for                                         



                    nausea.                                           

 

          valACYclovir (VALTREX) Take 1 tablet 30 tablet 5         2022      Discontinued



          500 mg    (500 mg) by                                     (Reorder

)



          tabletIndications: Acute mouth daily.                               22

        



          myeloblastic leukemia                                                 

  



          not having achieved                                                   



          remission, Status post                                                

   



          stem cell transplant,                                                 

  



          Sxakq-obubip-fawd                                                   



          disease, not otherwise                                                

   



          specified, Cellulitis,                                                

   



          not otherwise specified                                               

    

 

          ruxolitinib (Jakafi) 10 Take 1 tablet 60 tablet 5         2022 0

2/0      Discontinued



          mg tabletIndications: (10 mg) by mouth                               7

      (Other )



          Acute myeloblastic twice daily.                               22      

  



          leukemia not having                                                   



          achieved remission,                                                   



          Status post stem cell                                                 

  



          transplant                                                   

 

          predniSONE (DELTASONE) 5 Take 1 tablet (5 30 tablet 3         20

22       

Discontinued



          mg tabletIndications: mg) by mouth                               2/20 

     



          Acute myeloblastic daily.                                  22        



          leukemia not having                                                   



          achieved remission,                                                   



          Status post stem cell                                                 

  



          transplant,                                                   



          Yeocp-zwpwkd-calp                                                   



          disease, not otherwise                                                

   



          specified                                                   

 

          fluticasone Inhale 1 puff by 1 Inhaler 6         2022      

Discontinued



          propionate-salmeterol mouth twice                                 

    (Reorder)



          (Advair Diskus) 500 daily.                                  22        



          mcg-50 mcg/inhalation                                                 

  



          diskus                                                      



          inhalerIndications:                                                   



          Chronic obstructive                                                   



          pulmonary disease with                                                

   



          acute exacerbation                                                   

 

          ruxolitinib (Jakafi) 10 Take 1 tablet 60 tablet 5         2022 1

00      Discontinued



          mg tabletIndications: (10 mg) by mouth                                     (Other )



          Acute myeloblastic twice daily.                               22      

  



          leukemia not having                                                   



          achieved remission,                                                   



          Status post stem cell                                                 

  



          transplant                                                   

 

          tacrolimus (PROGRAF) 0.5 Take 3 capsules 150 capsule 5         

022       

Discontinued



          mg capsuleIndications: (1.5 mg) by                                

     



          Acute myeloblastic mouth twice                               22       

 



          leukemia not having daily                                             



          achieved remission,                                                   



          Status post stem cell                                                 

  



          transplant,                                                   



          Ldhlf-qipclx-nwua                                                   



          disease, not otherwise                                                

   



          specified                                                   

 

          topiramate (TOPAMAX) 200 Take 1 tablet 30 tablet 3         2022      Discontinued



          mg tabletIndications: (200 mg) by                                 

    (Reorder)



          Status post stem cell mouth at                                      

  



          transplant bedtime. To                                         



                    prevent migraine                                         

 

          sulfamethoxazole-trimeth Take 1 tablet by 60 tablet 2         20      

Discontinued



          oprim (BACTRIM DS) 800 mouth 3 (three)                                     (Reorder)



          mg-160 mg per times a week                                       



          tabletIndications: Acute Monday,                                      

     



          myeloblastic leukemia Wednesday and                                   

      



          not having achieved Friday.                                           



          remission, Status post                                                

   



          stem cell transplant,                                                 

  



          Rvvnr-ztwsbn-mbtd                                                   



          disease, not otherwise                                                

   



          specified                                                   

 

          voriconazole (VFEND) 200 Take 1 tablet 60 tablet 5         2022      Discontinued



          mg tabletIndications: (200 mg) by                                 

    (Reorder)



          Acute myeloblastic mouth twice                                      

 



          leukemia not having daily.                                            



          achieved remission,                                                   



          Status post stem cell                                                 

  



          transplant,                                                   



          Lqnkz-alajuv-knsn                                                   



          disease, not otherwise                                                

   



          specified                                                   

 

          clobetasol (Temovate) Apply topically 30 g      6         2022 0

      Discontinued



          0.05% creamIndications: to affected                               

      (Not Applicable)



          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      

  



          disease   daily.                                            

 

          triamcinolone (KENALOG) Apply topically 80 g      5         2022

 06/      Discontinued



          ointment  to affected                                     



          0.1%Indications: Graft area(s) twice                               22 

       



          versus host disease daily.                                            

 

          predniSONE (DELTASONE) 5 Take 1 tablet (5 30 tablet 3         20

22       

Discontinued



          mg tabletIndications: mg) by mouth                                

     (Therapy



          Acute myeloblastic every other day.                               22  

      completed)



          leukemia not having For 2 weeks,                                      

   



          achieved remission, then STOP                                         



          Status post stem cell                                                 

  



          transplant,                                                   



          Cvhkb-mebiht-wayc                                                   



          disease, not otherwise                                                

   



          specified                                                   

 

          tacrolimus (PROGRAF) 0.5 Take 4 capsules 150 capsule 5         

022       

Discontinued



          mg capsuleIndications: (2.0 mg) by                                

     



          Acute myeloblastic mouth twice                               22       

 



          leukemia not having daily                                             



          achieved remission,                                                   



          Status post stem cell                                                 

  



          transplant,                                                   



          Suwik-waufxp-zumc                                                   



          disease, not otherwise                                                

   



          specified                                                   

 

          levothyroxine Take 1 tablet 90 tablet 1         2022      D

iscontinued



          (SYNTHROID, LEVOTHROID) (25 mcg) by                               

      (Reorder)



          25 mcg    mouth daily.                               22        



          tabletIndications:                                                   



          Abdominal pain, Stem                                                  

 



          cells transplant status,                                              

     



          Acute myeloblastic                                                   



          leukemia not having                                                   



          achieved remission,                                                   



          Acute myeloblastic                                                   



          leukemia not having                                                   



          achieved remission,                                                   



          Status post stem cell                                                 

  



          transplant                                                   

 

          tacrolimus (PROGRAF) 0.5 Take 4 capsules 150 capsule 5         

022       

Discontinued



          mg capsuleIndications: (2.0 mg) by                                

     (Error)



          Acute myeloblastic mouth twice                               22       

 



          leukemia not having daily                                             



          achieved remission,                                                   



          Status post stem cell                                                 

  



          transplant,                                                   



          Wqoct-aygvuz-gsws                                                   



          disease, not otherwise                                                

   



          specified                                                   

 

          tacrolimus (PROGRAF) 0.5 Take 4 capsules 150 capsule 5               

Discontinued



          mg capsuleIndications: (2.0 mg) by                                

     (Reorder)



          Acute myeloblastic mouth twice                               22       

 



          leukemia not having daily                                             



          achieved remission,                                                   



          Status post stem cell                                                 

  



          transplant,                                                   



          Nackn-zacmhe-kuxm                                                   



          disease, not otherwise                                                

   



          specified                                                   

 

          ciprofloxacin HCl Take 1 tablet 24 tablet 0         2022   

   



          (CIPRO) 750 mg (750 mg) by                                     



          tabletIndications: mouth every 12                                   

    



          Sepsis due to (twelve) hours                                         



          Pseudomonas for 12 days.                                         

 

          potassium chloride Take 1 tablet 4 tablet  0         2022  

    Discontinued



          (Klor-Con M20) 20 mEq (20 mEq) by                               3/20  

    



          tabletIndications: mouth daily. For                               22  

      



          Status post stem cell 4 days                                          

  



          transplant                                                   

 

          ketorolac (TORADOL) 10 Take 1 tablet 30 tablet 1         2022      Discontinued



          mg tabletIndications: (10 mg) by mouth                                     (Reorder)



          Headache  every 12                                22        



                    (twelve) hours                                         



                    as needed for                                         



                    moderate pain                                         



                    (headache).                                         

 

          promethazine (PHENERGAN) Take 1 tablet 30 tablet 1         2022      Discontinued



          12.5 mg   (12.5 mg) by                                     (Reorde

r)



          tabletIndications: mouth every 6                               22     

   



          Headache  (six) hours as                                         



                    needed for                                         



                    nausea.                                           

 

          predniSONE (DELTASONE) Take 1 tablet 30 tablet 0         2022      Discontinued



          10 mg tabletIndications: (10 mg) by mouth                             

        



          Acute myeloblastic daily. Take 10                               22    

    



          leukemia not having mg po daily x 2                                   

      



          achieved remission, weeks then 5 mg                                   

      



          Status post stem cell po daily                                        

  



          transplant,                                                   



          Mxkxo-iadfzd-zxsf                                                   



          disease, not otherwise                                                

   



          specified                                                   

 

          levoFLOXacin (LEVAQUIN) Take 1 tablet 7 tablet  0         2022 0

      Discontinued



          500 mg    (500 mg) by                                     



          tabletIndications: Acute mouth daily.                               22

        



          myeloblastic leukemia                                                 

  



          not having achieved                                                   



          remission, Status post                                                

   



          stem cell transplant,                                                 

  



          Sore throat, not                                                   



          otherwise specified                                                   

 

          tacrolimus (PROGRAF) 0.5 Take 4 capsules 150 capsule 5         

022       

Discontinued



          mg capsuleIndications: (2.0 mg) by                                

     (Reorder)



          Acute myeloblastic mouth twice                               22       

 



          leukemia not having daily                                             



          achieved remission,                                                   



          Status post stem cell                                                 

  



          transplant,                                                   



          Emzbl-pjlann-qord                                                   



          disease, not otherwise                                                

   



          specified                                                   

 

          voriconazole (VFEND) 200 Take 1 tablet 60 tablet 5         2022      Discontinued



          mg tabletIndications: (200 mg) by                                 

    (Reorder)



          Acute myeloblastic mouth twice                               22       

 



          leukemia not having daily.                                            



          achieved remission,                                                   



          Status post stem cell                                                 

  



          transplant,                                                   



          Skqdg-axmeqb-dpsl                                                   



          disease, not otherwise                                                

   



          specified                                                   

 

          tacrolimus (PROGRAF) 0.5 Take 4 capsules 150 capsule 5         07/15/2

022 09/0      

Discontinued



          mg capsuleIndications: (2.0 mg) by                                

     



          Acute myeloblastic mouth twice                               22       

 



          leukemia not having daily                                             



          achieved remission,                                                   



          Status post stem cell                                                 

  



          transplant,                                                   



          Nerjc-kggvxs-vgfp                                                   



          disease, not otherwise                                                

   



          specified                                                   

 

          calcium   Take 1 tablet by           0                   10/2      Dis

continued



          carbonate-vitamin D3 500 mouth daily.                                     (Not Applicable)



          mg - 200 units (1,250 mg                                         22   

     



          calcium carbonate)                                                   



          tablet                                                      

 

          magnesium oxide (MAOX) Take 1 tablet           0                         Discontinued



          400 mg tablet (400 mg) by                                     



                    mouth daily.                               22        

 

          ruxolitinib (Jakafi) 10 Take 1 tablet 60 tablet 3         10/04/2022 1

      Discontinued



          mg tabletIndications: (10 mg) by mouth                               7

/20      (Therapy



          enkaj-dlbicl-zmmn twice daily.                               22       

 completed)



          disease                                                     







Active Problems







                          Problem                   Noted Date

 

                          Chronic obstructive pulmonary disease 2022









                                        Last Assessment & Plan: Formatting of th

is note might be different from the 

original.



                                        Patient reports unchanged dyspnea report

s she is quite sedentary at home. 

Recommend that she initiate a home exercise regimen as she is unable to 
participate in pulmonary rehab. Recommend she continue t



                                        o use Advair and Spiriva inhaled therapi

es. Her FEV1 and total lung capacity are

improved on PFTs. Recommend follow-up in 6 months with PFTs.









                          Hoarseness                2022

 

                          Gram-negative sepsis      2022

 

                          Central line associated bloodstream infection 20

22

 

                          Supraventricular tachycardia 10/26/2021









                                        Last Assessment & Plan: Formatting of th

is note might be different from the 

original.



                                        Patient has a history of supraventricula

r tachycardia for which she wore a 30-

day event monitor in September that Dr. Daryl Acevedo reviewed showing no 
significant arrhythmias during that time she had it on. Continue metoprolol XL 
100 mg daily









                          Nausea                    2020









                                        Overview: Formatting of this note might 

be different from the original.



                                        Added automatically from request for edwardo

rey [id number]









                          Loose stool               2020









                                        Overview: Formatting of this note might 

be different from the original.



                                        Added automatically from request for edwardo

rey [id number]









                          Vacsd-nxryha-bshw disease 2020









                                        Overview: Formatting of this note might 

be different from the original.



                                        Added automatically from request for edwardo

rey [id number]









                          Hypokalemia               2020

 

                          Hypomagnesemia            2020

 

                          Abdominal pain            2019









                                        Overview: Formatting of this note might 

be different from the original.



                                        Added automatically from request for edwardo

rey [id number]









                          Physical deconditioning   2019

 

                          Difficulty in walking     2019

 

                          Impairment of balance     2019

 

                          Generalized muscle weakness 2019

 

                          Patient immunocompromised 2019

 

                          Cytomegalovirus serostatus positive 2019

 

                          Pain due to neoplastic disease 10/28/2019

 

                          Status post stem cell transplant 10/15/2019

 

                          Myelodysplastic syndrome  2019

 

                          Encounter for antineoplastic chemotherapy 2019

 

                          Rash                      2019

 

                          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

 

                          Pain in right knee        2019

 

                          Cellulitis of right lower limb 2019

 

                          Weakness                  2019

 

                          Other disorders of electrolyte and fluid balance, not 

elsewhere classified 

2019

 

                          High grade myelodysplastic syndrome lesions 2019

 

                          Tumor lysis syndrome      2019

 

                          Anemia in neoplastic disease 2019

 

                          Other secondary thrombocytopenia 2019

 

                          Bipolar disorder          2019

 

                          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

ion 2019

 

                          Painful mouth             

 

                          Pain in right arm         

 

                          Inguinal pain             

 

                          Headache                  

 

                          Nausea and vomiting       

 

                          Chronic pain              







Encounters







             Date         Type         Specialty    Care Team    Description

 

             2022   Follow-Up    Stem Cell    Daron Hernadez, Acute myelo

blastic leukemia not 

having achieved remission;



                                       Transplant   MD           Status post [facility]

m cell transplant

 

             2022   Ancillary Procedure Radiology    Debra Paige Pelvic 

pain



                                                    SAMPSON Pineda 

 

             2022   Hospital Encounter Lab          Daron Hernadez, Acute

 myeloblastic leukemia 

not having achieved remission;



                                                    MD           Status post [facility]

m cell transplant

 

             2022   Documentation Leukemia     FacciKiya anderson 



                                                    A            

 

             2022   Travel                                 

 

             2022   Follow-Up    Stem Cell    Daron Hernadez, Pelvic pain

 (Primary Dx);



                                       Transplant   MD           Acute myeloblas

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



                                                                 Status post [facility]

m cell transplant

 

             2022   Hospital Encounter Lab          Daron Hernadez, Acute

 myeloblastic leukemia 

not having achieved remission;



                                                    MD           Status post [facility]

m cell transplant

 

             2022   Ancillary Procedure Radiology    Daron Hernadez, Acut

e myeloblastic 

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



                                                    MD           Status post [facility]

m cell transplant

 

             2022   Travel                                 

 

             2022   Telemedicine Stem Cell    Daron Hernadez, Acute myelo

blastic leukemia 

not having achieved remission;



                                       Transplant   MD           Status post [facility]

m cell transplant

 

             2022   Hospital Encounter Lab          Debra Paige Acute my

eloblastic leukemia not 

having achieved remission;



                                                    SAMPSON Pineda Status post [facility] cell transplant

 

             2022   Telemedicine Stem Cell    Daron Hernadez, Acute myelo

blastic leukemia 

not having achieved remission;



                                       Transplant   MD           Status post [facility]

m cell transplant

 

             2022   Hospital Encounter Lab          Daron Hernadez, Acute

 myeloblastic leukemia 

not having achieved remission;



                                                    MD           Status post [facility]

m cell transplant

 

             10/25/2022   Hospital Encounter Vascular Access Daron Hernadez, 



                                                and Procedures  Maty Salgado, 



                                                    RN           

 

             10/25/2022   Office Visit Stem Cell    Daron Hernadez, Acute myelo

blastic leukemia 

not having achieved remission;



                                       Transplant   MD           Status post [facility]

m cell transplant

 

             10/25/2022   Hospital Encounter Lab          Daron Hernadez, Acute

 myeloblastic leukemia 

not having achieved remission;



                                                    MD           Status post [facility]

m cell transplant

 

             10/25/2022   Hospital Encounter Stem Cell    Monique Blackman Chron

ic



                                       Transplant   M, NP        [id number]-qw

st



                                                                 disease

 

             10/25/2022   Orders Only  Pheresis     Monique Blackman Chronic



                                                    M, NP        [id number]-pt

st



                                                                 disease (Primar

y Dx)

 

             10/25/2022   Travel                                 

 

             10/20/2022   Hospital Encounter Speech Pathology Dima Bacon, Ho

sandy



                                                                FNP



                                        



                                                    Sadie Vickers, PhD 

 

             10/20/2022   Hospital Encounter Head and Neck Kvng Gallegos MD Acut

e myeloblastic 

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



                                       Surgery                   Status post [facility]

m cell transplant

 

             10/20/2022   Travel                                 

 

             10/14/2022   Refill       Stem Cell    Akosua Medeiros, Acute myelob

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 

having achieved remission;



                                       Transplant   RN           Status post [facility]

m cell transplant;



                                                                 Zwzuc-befjni-by

st disease, not otherwise specified

 

             10/06/2022   Documentation Leukemia     FacKiya neal 



                                                    A            

 

             10/05/2022   Telemedicine Cardiology   Frankie Espositoventricvinayak

r



                                                                SAMPSON Prince



                                        tachycardia



                                                    Karli Alcala PA           

 

             10/04/2022   Orders Only  Infusion Services Adelaida Bryan Acute my

eloblastic 

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



                                                    MMiesha, RP      Status post [facility]

m cell transplant

 

             10/03/2022   Refill       Stem Cell    Milly Urias, Acute myelob

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 

having achieved remission;



                                       Transplant   PA           Status post [facility]

m cell transplant

 

             2022   Documentation Cardiology   Daryl Acevedo MD           

 

             2022   Hospital Encounter Cardiology   Cate      Supravent

ricular



                                                    SAMPSON Prince tachycardia

 

             2022   Telephone    Cardiology   Niki Esposito APN 

 

             2022   Travel                                 

 

             2022   Telephone    Head and Neck Yuval Ramos, 



                                       Surgery      RN           

 

             2022   Refill       Bone Marrow  Germania,  Acute myeloblas

tic leukemia not having

achieved remission;



                                                    XIOMARA Moreno Status post

 stem cell transplant;



                                                                 Exrnn-xlmtdz-tf

st disease, not otherwise specified

 

             2022   Office Visit Pulmonology  Sameer Campuzano, Dyspnea, no

t otherwise 

specified;



                                                                MD



                                        Simple chronic bronchitis;



                                                    Oyekanmi,    High grade myel

odysplastic syndrome lesions;



                                                    Kinza, NP   Chronic graft-v

ersus-host disease;



                                                                 Chronic obstruc

tive pulmonary disease, not otherwise specified

 

             2022   Office Visit Dermatology  Cyrus Juan MD Melanocytic 

nevus of trunk 

(Primary Dx);



                                                                 Graft versus ho

st disease;



                                                                 Lentigo

 

             2022   Hospital Encounter Pulmonology  Sameer Campuzano, Dyspn

ea, not 

otherwise specified;



                                                    MD           Simple chronic 

bronchitis;



                                                                 High grade myel

odysplastic syndrome lesions;



                                                                 Chronic graft-v

ersus-host disease

 

             2022   Travel                                 

 

             2022   Orders Only  Head and Neck Dima Bacon, Hoarseness 

(Primary



                                       Surgery      FNP          Dx)

 

             2022   Office Visit Stem Cell    Daron Hernadez, Acute myelo

blastic leukemia 

not having achieved remission;



                                       Transplant   MD           Status post [facility]

m cell transplant;



                                                                 Osmil-urkchb-ce

st disease, not otherwise specified

 

             2022   Hospital Encounter Lab          Daron Hernadez, Acute

 myeloblastic leukemia 

not having achieved remission;



                                                    MD           Status post [facility]

m cell transplant

 

             2022   Travel                                 

 

             2022   Hospital Encounter Cardiology   Cate,      Supravent

ricular



                                                    SAMPSON Prince tachycardia

 

             2022   Follow-Up    Cardiology   Gaby LoydricXIOMARA Juan



                                        tachycardia (Primary



                                                    Calame,      Dx)



                                                    SAMPSON Prince 

 

             2022   Travel                                 

 

             2022   Documentation Leukemia     FacciolliKiya 



                                                    A            

 

             07/15/2022   Refill       Stem Cell    Akosua Medeiros, Headache;



                                       Transplant   RN           Abdominal pain;



                                                                 Stem cells keating

splant status;



                                                                 Acute myeloblas

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



                                                                 Acute myeloblas

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



                                                                 Status post [facility]

m cell transplant;



                                                                 Ruylh-zoouwl-kt

st disease, not otherwise specified;



                                                                 Chronic obstruc

tive pulmonary disease with acute exacerbation;



                                                                 Cellulitis, not

 otherwise specified;



                                                                 Other form of d

yspnea

 

             07/15/2022   Orders Only  Bone Marrow  Germania,  Acute myeloblas

tic leukemia not 

having achieved remission;



                                                    Amalia Ashley PA Status post

 stem cell transplant;



                                                                 Xuqnm-iczhjd-fr

st disease, not otherwise specified

 

             2022   Refill       Stem Cell    Akosua Medeiros, Acute myelob

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 

having achieved remission;



                                       Transplant   RN           Status post [facility]

m cell transplant;



                                                                 Vlrrz-eufmxb-ge

st disease, not otherwise specified

 

             2022   Refill       Stem Cell    Akosua Medeiros, 



                                       Transplant   RN           

 

             2022   Office Visit Stem Cell    Maricarmen Daron F., Sore throat

, not otherwise 

specified (Primary Dx);



                                       Transplant   MD           Acute myeloblas

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



                                                                 Status post [facility]

m cell transplant;



                                                                 Nedhg-whabih-wk

st disease, not otherwise specified

 

             2022   Hospital Encounter Lab          Milly Urias, Acute 

myeloblastic leukemia 

not having achieved remission;



                                                    PA           Status post [facility]

m cell transplant;



                                                                 Zvekv-ifedno-yd

st disease, not otherwise specified

 

             2022   Travel                                 

 

             2022   Refill       Stem Cell    Milly Urias, Headache



                                       Transplant   PA           

 

             2022   Documentation Leukemia     FacciKiya anderson 



                                                    A            

 

             2022   Office Visit Stem Cell    Maricarmen Daron F., Anxiety, no

t otherwise 

specified (Primary Dx);



                                       Transplant   MD           Acute myeloblas

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



                                                                 Status post [facility]

m cell transplant;



                                                                 Yfgct-yckifc-fo

st disease, not otherwise specified

 

             2022   Office Visit Pulmonology  Sameer Campuzano, Simple 

roldan bronchitis 

(Primary Dx);



                                                    MD           Dyspnea, not ot

herwise specified;



                                                                 High grade myel

odysplastic syndrome lesions;



                                                                 Chronic graft-v

ersus-host disease

 

             2022   Hospital Encounter Pulmonology  Alejandra Adler, NP Dyspn

ea, not 

otherwise



                                                                 specified

 

             2022   Hospital Encounter Lab          Milly Urias, Acute 

myeloblastic leukemia 

not having achieved remission;



                                                    PA           Status post [facility]

m cell transplant;



                                                                 Mhnyq-eunept-td

st disease, not otherwise specified

 

             2022   Orders Only  Stem Cell    Milly Urias, Status post 

stem cell



                                       Transplant   PA           transplant (Julia

yvual



                                                                 Dx)

 

             2022   Travel                                 

 

             2022   Office Visit Stem Cell    Maricarmen Daron F., Acute myelo

blastic leukemia 

not having achieved remission;



                                       Transplant   MD           Status post [facility]

m cell transplant;



                                                                 Nwbpr-njzlgt-ev

st disease, not otherwise specified

 

             2022   Hospital Encounter Lab          Vanna Villagran, Acute

 myeloblastic leukemia 

not having achieved remission;



                                                    PA           Status post [facility]

m cell transplant;



                                                                 Skcuj-sjjcpg-nr

st disease, not otherwise specified

 

             2022   Travel                                 

 

             2022   Orders Only  Stem Cell    Akosua Munoz, Myelodysplas

tic syndrome, not 

otherwise specified (Primary Dx);



                                       Transplant   APN          Status post [facility]

m cell transplant

 

             2022   Hospital Encounter Stem Cell    Luis,     Myelodysp

lastic syndrome 

(Primary Dx);



                -                               Transplant      MD Micky Grahamlls;



             2022                             Daron Hernadez, Status post

 stem cell transplant;



                                                                MD



                                        Dhkke-ucyezg-vimh disease;



                                                                Perry Painter MD



                                        Hypomagnesemia;



                                                    Collin Adler MD Hypophospha

temia;



                                                                 Migraine;



                                                                 Sepsis due to P

seudomonas

 

             2022   Office Visit Stem Cell    Daron Hernadez, Acute myelo

blastic leukemia 

not having achieved remission;



                                       Transplant   MD           Status post [facility]

m cell transplant;



                                                                 Ducua-ktcten-xl

st disease, not otherwise specified

 

             2022   Hospital Encounter Pheresis     Daron Hernadez, Statu

s post stem cell



                                                    MD           transplant

 

             2022   Hospital Encounter Vascular Access Daron Hernadez, En

counter for



                                                and Procedures  MD lawson and



                                                    Angelica, management of v

ascular



                                                    Simon S, LVN access device

 

             2022   Hospital Encounter Pheresis     Monique Blackman Chron

ic 

unflq-mnbsqs-nxlf disease;



                                                    M, NP        Status post [facility]

m cell transplant;



                                                                 Complication of

 stem cell transplant

 

             2022   Hospital Encounter Lab          Daron Hernadez, Acute

 myeloblastic leukemia 

not having achieved remission;



                                                    MD           Status post [facility]

m cell transplant;



                                                                 Rjrcb-jelbzr-qw

st disease, not otherwise specified

 

             2022   Hospital Encounter Lab          Monique Blackman Chron

ic ujrxi-itrozb-fcak 

disease;



                                                    M, NP        Complication of

 stem cell transplant

 

             2022   Orders Only  Pheresis     Douglas Shankar Status post 

stem cell



                                                    B, RN        transplant (Julia

yuval



                                                                 Dx)

 

             2022   Travel                                 

 

             2022   Orders Only  Pheresis     Monique Blackman Chronic gra

kc-caximn-ybmh 

disease (Primary Dx);



                                                    M, NP        Complication of

 stem cell transplant

 

             2022   Orders Only  Pheresis     Monique Blackman Chronic gra

th-uiadnl-wutl 

disease (Primary Dx);



                                                    M, NP        Complication of

 stem cell transplant

 

             04/15/2022   Hospital Encounter Vascular Access Daron Hernadez, En

counter for



                                                and Procedures  MD



                                        adjustment and



                                                    Chantell,   management of v

ascular



                                                    Vel ROSE RN access device

 

             04/15/2022   Hospital Encounter PherDaron Elaine, Chron

ic 

nsktn-jtsaoo-wjtj disease;



                                                    MD           Status post [facility]

m cell transplant;



                                                                 Complication of

 stem cell transplant

 

             04/15/2022   Hospital Encounter Lab          Valeriano, Chronic g

mptn-sgrffq-ebqx 

disease;



                                                    Smiley, FNP    Status post [facility]

m cell transplant

 

             04/15/2022   Travel                                 

 

             2022   Orders Only  Pheresis     Monique Blackman Chronic gra

tl-ctebbp-mvfe 

disease (Primary Dx);



                                                    M, NP        Status post [facility]

m cell transplant;



                                                                 Complication of

 stem cell transplant

 

             2022   Orders Only  Pheresis     Monique Blackman Chronic gra

nl-qbsbse-wntb 

disease (Primary Dx);



                                                    M, NP        Complication of

 stem cell transplant

 

             2022   Orders Only  Pheresis     Valeriano, Chronic graft-v

ersus-host disease 

(Primary Dx);



                                                    Smiley, FNP    Status post [facility]

m cell transplant

 

             2022   Hospital Encounter Vascular Access Daron Hernadez, En

counter for



                                                and Procedures  MD



                                        adjustment and



                                                    Severino Hahn, management of

 vascular



                                                    RN           access device

 

             2022   Hospital Encounter PherMonique Paige Graft

 versus host 

disease;



                                                    M, NP        Chronic graft-v

ersus-host disease;



                                                                 Complication of

 stem cell transplant;



                                                                 Myelodysplastic

 syndrome, not otherwise specified

 

             2022   Hospital Encounter Lab          Monique Blackman Graft

 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



                                                    M, NP        Chronic graft-v

ersus-host disease

 

             2022   Orders Only  Avinash Almaguer, HAILEE    

 

             2022   Documentation Avinash Almaguer RN    

 

             2022   Telephone    Avinash Almaguer RN    

 

             2022   Travel                                 

 

             2022   Orders Only  Monique Smith Graft versu

s host disease 

(Primary Dx);



                                                    M, NP        Chronic graft-v

ersus-host disease

 

             2022   Documentation Leukemia     Kiya Chavez 



                                                    A            

 

             2022   Orders Only  Pheresis     Monique Blackman Graft versu

s host disease 

(Primary Dx);



                                                    M, NP        Chronic graft-v

ersus-host disease

 

             2022   Refill       Stem Cell    Milly Urias, Acute myelob

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 

having achieved remission;



                                       Transplant   PA           Status post [facility]

m cell transplant;



                                                                 Rhakc-tziwmt-bp

st disease, not otherwise specified

 

             2022   Office Visit Stem Cell    Jack Hernadeza F., Acute myelo

blastic leukemia 

not having achieved remission;



                                       Transplant   MD           Status post [facility]

m cell transplant;



                                                                 Okhpb-upwrkn-fw

st disease, not otherwise specified

 

             2022   Hospital Encounter Vascular Access Daron Hernadez, En

counter for



                                                and Procedures  MD lawson and



                                                    Yuval Choi management of

 vascular



                                                    J, RN        access device

 

             2022   Hospital Encounter Pheresis     Daron Hernadez, Graft

 versus host



                                                    MD           disease

 

             2022   Hospital Encounter Lab          Milly Urias, Acute 

myeloblastic leukemia 

not having achieved remission;



                                                    PA           Status post [facility]

m cell transplant;



                                                                 Lymsk-ydpgfo-la

st disease, not otherwise specified

 

             2022   Orders Only  Pheresis     Monique Blackman Graft versu

s host disease 

(Primary Dx);



                                                    M, NP        Chronic graft-v

ersus-host disease

 

             2022   Travel                                 

 

             2022   Refill       Stem Cell    Akosua Medeiros, Abdominal pa

in;



                                       Transplant   RN           Stem cells keating

splant status;



                                                                 Acute myeloblas

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



                                                                 Acute myeloblas

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



                                                                 Status post [facility]

m cell transplant

 

             2022   Orders Only  Pheresis     Abiola Reyez Graft versus 

host



                                                    R., NP       disease (Primar

y Dx)

 

             2022   Hospital Encounter Vascular Access Daron Hernadez, En

counter for



                                                and Procedures  MD



                                        adjustment and



                                                    Haley Rogel management 

of vascular



                                                    M, RN        access device

 

             2022   Hospital Encounter Pheresis     Daron Hernadez F., Graft

 versus host 

disease;



                                                    MD           Chronic graft-v

ersus-host disease

 

             2022   Hospital Encounter Lab          Monique Blackman Graft

 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



                                                    M, NP        Chronic graft-v

ersus-host disease

 

             2022   Travel                                 

 

             2022   Orders Only  Pheresis     Reyez, Abiola Graft versus 

host



                                                    R., NP       disease (Primar

y Dx)

 

             03/10/2022   Hospital Encounter Vascular Access Daron Hernadez F., En

counter for



                                                and Procedures  MD lawson and



                                                    Reyes,       management of v

ascular



                                                                Adriana SHEIKH RN



                                        access device



                                                    Marlene Causey RN           

 

             03/10/2022   Hospital Encounter Pheresis     Jack Hernadeza F., Graft

 versus host 

disease;



                                                    MD           Chronic graft-v

ersus-host disease;



                                                                 High grade myel

odysplastic syndrome lesions;



                                                                 Complication of

 stem cell transplant

 

             03/10/2022   Hospital Encounter Lab          Reyez, Abiola Graft v

ersus host



                                                    R., NP       disease

 

             03/10/2022   Orders Only  PherMonique Paige Graft versu

s host disease 

(Primary Dx);



                                                    M, NP        Chronic graft-v

ersus-host disease

 

             03/10/2022   Travel                                 

 

             2022   Orders Only  Pheresis     Reyez, Abiola Graft versus 

host



                                                    R., NP       disease (Primar

y Dx)

 

             2022   Orders Only  Pheresis     Reyez, Abiola Graft versus 

host



                                                    R., NP       disease (Primar

y Dx)

 

             2022   Hospital Encounter Vascular Access Daron Hernadez F., En

counter for



                                                and Procedures  MD lawson and



                                                    Szymanski-Gauldin, management of

 vascular



                                                    Lisbeth ORTEZ RN   access device (

Primary



                                                                 Dx)

 

             2022   Hospital Encounter PherDaron Elaine F., Compl

ication of stem 

cell transplant;



                                                    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

st disease;



                                                                 Myelodysplastic

 syndrome, not otherwise specified

 

             2022   Hospital Encounter Monique Dupont Compl

ication of stem cell 

transplant;



                                                    M, NP        Graft versus ho

st disease

 

             2022   Travel                                 

 

             2022   Orders Only  Monique Smith Complicatio

n of stem cell 

transplant (Primary Dx);



                                                    M, NP        Graft versus ho

st disease

 

             2022   Telemedicine Stem Cell    Daron Hernadez F., Acute myelo

blastic leukemia 

not having achieved remission;



                                       Transplant   MD           Status post solange

m cell transplant;



                                                                 Csudr-gqexxj-er

st disease, not otherwise specified

 

             2022   Hospital Encounter Pheresis     Daron Hernadez, Compl

ication of stem 

cell transplant;



                                                    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

st disease;



                                                                 High grade myel

odysplastic syndrome lesions

 

             2022   Hospital Encounter Lab          Milly Urias, Acute 

myeloblastic leukemia 

not having achieved remission;



                                                    PA           Status post solange

m cell transplant;



                                                                 Yoliw-nlhihq-mo

st disease, not otherwise specified

 

             2022   Orders Only  Pheresis     Monique Blackman Complicatio

n of stem cell 

transplant (Primary Dx);



                                                    M, NP        Graft versus ho

st disease

 

             2022   Travel                                 

 

             2022   Hospital Encounter Vascular Access Daron Hernadez, En

counter for



                                                and Procedures  MD



                                        adjustment and



                                                    Tania Harley management o

f vascular



                                                    N, RN        access device (

Primary



                                                                 Dx)

 

             2022   Office Visit Stem Cell    Daron Hernadez, Acute myelo

blastic leukemia 

not having achieved remission;



                                       Transplant   MD           Status post solange

m cell transplant;



                                                                 Rymoy-qavzho-rs

st disease, not otherwise specified

 

             2022   Hospital Encounter Pheresis     Daron Hernadez, Compl

ication of stem 

cell transplant;



                                                    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

st disease;



                                                                 High grade myel

odysplastic syndrome lesions

 

             2022   Hospital Encounter Lab          Monique Blackman Compl

ication of stem cell 

transplant;



                                                    M, NP        Graft versus ho

st disease

 

             2022   Hospital Encounter Lab          Milly Urias, Acute 

myeloblastic leukemia 

not having achieved remission;



                                                    PA           Status post solange

m cell transplant

 

             2022   Travel                                 

 

             2022   Orders Only  Pheresis     Abiola Reyez Graft versus 

host



                                                    R., NP       disease (Primar

y Dx)

 

             2022   Orders Only  Pheresis     Monique Blackman Complicatio

n of stem cell 

transplant (Primary Dx);



                                                    M, NP        Graft versus ho

st disease

 

             2022   Hospital Encounter Pheresis     Daron Hernadez., Compl

ication of stem 

cell transplant;



                                                    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

st disease;



                                                                 High grade myel

odysplastic syndrome lesions

 

             2022   Office Visit Dermatology  Cyrus Juan MD Graft versus

 host



                                                                 disease

 

             2022   Travel                                 

 

             2022   Orders Only  Pheresis     Monique Blackman Complicatio

n of stem cell 

transplant (Primary Dx);



                                                    M, NP        Graft versus ho

st disease

 

             02/15/2022   Hospital Encounter Pheresis     Daron Hernadez, Compl

ication of stem 

cell transplant;



                                                    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

st disease;



                                                                 Iokdg-reydci-gv

st disease, not otherwise specified;



                                                                 High grade myel

odysplastic syndrome lesions

 

             02/15/2022   Hospital Encounter Vascular Access Daron Hernadez, En

counter for



                                                and Procedures  MD lawson and



                                                    Silvio Guzmán, management 

of vascular



                                                    RN           access device

 

             02/15/2022   Hospital Encounter Lab          Monique Blackman Compl

ication of stem cell 

transplant;



                                                    M, NP        Graft versus ho

st disease

 

             02/15/2022   Travel                                 

 

             2022   Orders Only  Pheresis     Monique Blackman Complicatio

n of stem cell 

transplant (Primary Dx);



                                                    M, NP        Graft versus ho

st disease

 

             2022   Refill       Stem Cell    Sorich, Akosua, Acute myelob

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 

having achieved remission;



                                       Transplant   RN           Status post solange

m cell transplant;



                                                                 Eihpg-dlwyco-gk

st disease, not otherwise specified

 

             2022   Orders Only  Pheresis     Monique Blackman Complicatio

n of stem cell 

transplant (Primary Dx);



                                                    M, NP        Graft versus ho

st disease

 

             2022   Refill       Stem Cell    Sorich, Akosua, Acute myelob

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 

having achieved remission;



                                       Transplant   RN           Status post solange

m cell transplant;



                                                                 Srquw-sbmknk-jh

st disease, not otherwise specified

 

             02/10/2022   Hospital Encounter Daron Barrett, Graft

 versus host 

disease;



                                                    MD           Complication of

 stem cell transplant

 

             02/10/2022   Hospital Encounter Lab          Monique Blackman Compl

ication of stem



                                                    M, NP        cell transplant

 

             02/10/2022   Orders Only  Pheresis     Monique Blackman Complicatio

n of stem cell 

transplant (Primary Dx);



                                                    M, NP        Graft versus ho

st disease

 

             02/10/2022   Travel                                 

 

             02/10/2022   Orders Only  Pheresis     Santos Casper Complicatio

n of stem



                                                    RN           cell transplant



                                                                 (Primary Dx)

 

             2022   Orders Only  PherMonique Paige versu

s host disease 

(Primary Dx);



                                                    M, NP        Complication of

 stem cell transplant

 

             2022   Orders Only  Stem Cell    Milly Urias, Acute myelob

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

having achieved remission;



                                       Transplant   PA           Status post solange

m cell transplant

 

             2022   Orders Only  Pheresis     Hiral, Monique Graft versu

s host disease 

(Primary Dx);



                                                    M, NP        Complication of

 stem cell transplant

 

             2022   Refill       Stem Cell    Akosua Medeiros, 



                                       Transplant   RN           

 

             2022   Refill       Stem Cell    Akosua Medeiros, 



                                       Transplant   RN           

 

             2022   Hospital Encounter Pheresis     Daron Hernadez FMiesha, Graft

 versus host 

disease;



                                                    MD           Complication of

 stem cell transplant

 

             2022   Hospital Encounter Vascular Access Daron Hernadez F., En

counter for



                                                and Procedures  MD



                                        adjustment and



                                                    Max,    management of v

ascular



                                                    Shree SHABAZZ RN access device

 

             2022   Hospital Encounter Radiology    Alejandra Adler, NP Dyspn

ea, not otherwise



                                                                 specified

 

             2022   Refill       Stem Cell    Akosua Medeiros, Chronic obst

ructive



                                       Transplant   RN           pulmonary disea

se with



                                                                 acute exacerbat

ion

 

             2022   Travel                                 

 

             2022   Orders Only  Pheresis     Monique Blackman Graft versu

s host disease 

(Primary Dx);



                                                    M, NP        Complication of

 stem cell transplant

 

             2022   Hospital Encounter Pheresis     Jack Hernadeza FMiesha, Graft

 versus host 

disease;



                                                    MD           Complication of

 stem cell transplant;



                                                                 Acute myeloblas

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



                                                                 Status post solange

m cell transplant;



                                                                 Ovwjk-rycyih-ry

st disease, not otherwise specified

 

             2022   Hospital Encounter Lab          Monique Blackman Graft

 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



                                                    M, NP        Complication of

 stem cell transplant

 

             2022   Orders Only  Stem Cell    Urias, Milly, Acute myelob

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

having achieved remission;



                                       Transplant   PA           Status post solange

m cell transplant

 

             2022   Travel                                 

 

             2022   Documentation Leukemia     Facciolli, Kiya 



                                                    A            

 

             2022   Orders Only  Pheresis     Monique Blackman Graft versu

s host disease 

(Primary Dx);



                                                    M, NP        Complication of

 stem cell transplant

 

             2022   Refill       Stem Cell    Akosua Medeiros, Acute myelob

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 

having achieved remission;



                                       Transplant   RN           Status post solange

m cell transplant;



                                                                 Abdominal pain;



                                                                 Stem cells keating

splant status;



                                                                 Acute myeloblas

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



                                                                 Headache;



                                                                 Pmjct-bgewdm-zt

st disease, not otherwise specified;



                                                                 Cellulitis, not

 otherwise specified

 

             2022   Hospital Encounter Vascular Access Jack Hernadeza F., En

counter for



                                                and Procedures  MD



                                        adjustment and



                                                    Chantell,   management of v

ascular



                                                    Vel J, RN access device

 

             2022   Hospital Encounter Pheresis     Jack Hernadeza F., Graft

 versus host 

disease;



                                                    MD           Complication of

 stem cell transplant;



                                                                 High grade myel

odysplastic syndrome lesions

 

             2022   Hospital Encounter Lab          Milad Blackmanle Graft

 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



                                                    M, NP        Complication of

 stem cell transplant

 

             2022   Orders Only  Pheresis     Hiral, Monique Graft versu

s host



                                                    M, NP        disease (Primar

y Dx)

 

             2022   Travel                                 

 

             2022   Orders Only  Pheresis     Hiral, Monique Graft versu

s host disease 

(Primary Dx);



                                                    M, NP        Complication of

 stem cell transplant

 

             2022   Office Visit Stem Cell    Daron Hernadez F., Acute myelo

blastic leukemia 

not having achieved remission;



                                       Transplant   MD           Status post solange

m cell transplant;



                                                                 Zklur-jkqisr-kt

st disease, not otherwise specified

 

             2022   Hospital Encounter Vascular Access Daron Hernadez, En

counter for



                                                and Procedures  MD



                                        adjustment and



                                                    Chantell,   management of v

ascular



                                                    Vel J, RN access device

 

             2022   Hospital Encounter Pheresis     Jack Hernadeza F., Graft

 versus host 

disease;



                                                    MD           Complication of

 stem cell transplant;



                                                                 High grade myel

odysplastic syndrome lesions

 

             2022   Hospital Encounter Lab          Jack Hernadeza F., Acute

 myeloblastic leukemia 

not having achieved remission;



                                                    MD           Status post solange

m cell transplant;



                                                                 Ywswt-uwgqnx-ob

st disease, not otherwise specified

 

             2022   Orders Only  Pheresis     Hiral, Monique Graft versu

s host disease 

(Primary Dx);



                                                    M, NP        Complication of

 stem cell transplant

 

             2022   Travel                                 

 

             2022   Orders Only  Pheresis     Hiral, Monique Graft versu

s host disease 

(Primary Dx);



                                                    M, NP        Complication of

 stem cell transplant

 

             2022   Hospital Encounter Pheresis     Jack Hernadeza F., Graft

 versus host 

disease;



                                                    MD           Complication of

 stem cell transplant;



                                                                 High grade myel

odysplastic syndrome lesions

 

             2022   Hospital Encounter Lab          Hiral, Monique Graft

 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



                                                    M, NP        Complication of

 stem cell transplant

 

             2022   Travel                                 

 

             2022   Orders Only  Pheresis     Hiral, Monique Graft versu

s host disease 

(Primary Dx);



                                                    M, NP        Complication of

 stem cell transplant

 

             2022   Hospital Encounter Vascular Access Daron Hernadez, En

counter for



                                                and Procedures  MD lawson and



                                                    Lianne Agarwal management of

 vascular



                                                    H, RN        access device

 

             2022   Hospital Encounter Pheresis     Daron Hernadez, Graft

 versus host 

disease;



                                                    MD           Complication of

 stem cell transplant;



                                                                 High grade myel

odysplastic syndrome lesions

 

             2022   Orders Only  Pheresis     Hiral, Monique Graft versu

s host disease 

(Primary Dx);



                                                    M, NP        Complication of

 stem cell transplant

 

             2022   Documentation Cardiology   Lilliana Loyd, PA 

 

             2022   Travel                                 

 

             2022   Orders Only  Pheresis     Hiral, Monique Graft versu

s host disease 

(Primary Dx);



                                                    M, NP        Complication of

 stem cell transplant

 

             2022   Hospital Encounter Pheresis     Daron Hernadez, Graft

 versus host 

disease;



                                                    MD           Complication of

 stem cell transplant;



                                                                 Chronic graft-v

ersus-host disease;



                                                                 High grade myel

odysplastic syndrome lesions

 

             2022   Hospital Encounter Lab          Hiral Monique Graft

 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



                                                    M, NP        Complication of

 stem cell transplant

 

             2022   Travel                                 

 

             01/10/2022   Orders Only  Pheresis     Hiral, Monique Graft versu

s host disease 

(Primary Dx);



                                                    M, NP        Complication of

 stem cell transplant

 

             01/10/2022   Refill       Stem Cell    Akosua Medeiros, Acute myelob

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 

having achieved remission;



                                       Transplant   RN           Status post solange

m cell transplant;



                                                                 Eqinx-yjvpwg-ff

st disease, not otherwise specified

 

             2022   Orders Only  Pheresis     Hiral, Monique Graft versu

s host disease 

(Primary Dx);



                                                    M, NP        Complication of

 stem cell transplant

 

             2022   Office Visit Stem Cell    Daron Hernadez, Acute myelo

blastic leukemia 

not having achieved remission;



                                       Transplant   MD           Status post solange

m cell transplant;



                                                                 Ryaeh-kdsifc-vi

st disease, not otherwise specified

 

             2022   Hospital Encounter Pheresis     Daron Hernadez., Compl

ication of stem 

cell transplant;



                                                    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

st disease;



                                                                 High grade myel

odysplastic syndrome lesions

 

             2022   Hospital Encounter Lab          Germania,  Acute mye

loblastic leukemia not 

having achieved remission;



                                                    Amalia Gabi, PA Status post

 stem cell transplant;



                                                                 Llaom-gmtxku-no

st disease, not otherwise specified

 

             2022   Travel                                 

 

             2022   Orders Only  Monique Smith Graft versu

s host disease 

(Primary Dx);



                                                    M, NP        Complication of

 stem cell transplant

 

             2022   Telemedicine Cardiology   Daryl Acevedo, Supraventri

cular



                                                                MD



                                        tachycardia



                                                    Lilliana Loyd PA 

 

             2022   Orders Only  Pulmonology  Alejandra Adler, ODILON Chronic obs

tructive



                                                                 pulmonary disea

se with



                                                                 acute exacerbat

ion



                                                                 (Primary Dx)

 

             2022   Office Visit Pulmonology  Sameer Campuzano, Asthma-

roldan



                                                                MD



                                        obstructive pulmonary



                                                    Enrike Pollack, disease ove

rlap



                                                    MD           syndrome

 

             2022   Hospital Encounter Pulmonology  Daron Hernadez, Asthm

a-chronic



                                                    MD           obstructive pul

monary



                                                                 disease overlap



                                                                 syndrome

 

             2022   Hospital Encounter Pheresis     Daron Hernadez, Compl

ication of stem 

cell transplant;



                                                    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

st disease

 

             2022   Hospital Encounter Lab          Daron Hernadez, Compl

ication of stem



                                                    MD           cell transplant

 

             2022   Orders Only  Pulmonology  Alejandra Adler, ODILON Dyspnea, no

t otherwise



                                                                 specified (Prim

rogelio Dx)

 

             2022   Travel                                 

 

             2022   Orders Only  Monique Smith Complicatio

n of stem cell 

transplant (Primary Dx);



                                                    M, NP        Graft versus ho

st disease

 

             2021   Orders Only  PherSantos Winston, Complicatio

n of stem



                                                    RN           cell transplant



                                                                 (Primary Dx)

 

             2021   Hospital Encounter Vascular Access Daron Hernadez, En

counter for



                                                and Procedures  MD lawson and



                                                    Angelica, management of v

ascular



                                                    Simon S, LVN access device

 

             2021   Hospital Encounter PherAbiola Brito Chronic

 onrnj-ebxosc-btzx 

disease;



                                                    R., NP       Graft versus ho

st disease;



                                                                 Complication of

 bone marrow transplant, not otherwise specified;



                                                                 Hypokalemia

 

             2021   Hospital Encounter Lab          Abiola Reyez Chronic



                                                    R., NP       hiunq-ktyjov-nj

st



                                                                 disease

 

             2021   Travel                                 

 

             2021   Orders Only  Pheresis     Monique Blackman Graft versu

s host disease 

(Primary Dx);



                                                    M, NP        Complication of

 bone marrow transplant, not otherwise specified

 

             2021   Refill       Stem Cell    Kiya Carter, Status post

 stem cell



                                       Transplant   RN           transplant

 

             2021   Hospital Encounter [facility]     Monique Blackman Graft

 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 

disease;



                                                    M, NP        Complication of

 bone marrow transplant, not otherwise specified;



                                                                 Acute myeloblas

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



                                                                 High grade myel

odysplastic syndrome lesions

 

             2021   Travel                                 

 

             2021   Hospital Encounter Pheresis     Daron Hernadez F., Chron

ic 

ezehn-odavcu-iemm disease (Primary Dx);



                                                    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

st disease;



                                                                 Complication of

 bone marrow transplant, not otherwise specified

 

             2021   Hospital Encounter Lab          Monique Blackman Graft

 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



                                                    M, NP        Complication of

 bone marrow transplant, not otherwise specified

 

             2021   Travel                                 

 

             2021   Orders Only  Pheresis     Monique Blackman Graft versu

s host disease 

(Primary Dx);



                                                    M, NP        Complication of

 bone marrow transplant, not otherwise specified

 

             2021   Office Visit Stem Cell    Jack Hernadeza F., Acute myelo

blastic leukemia 

not having achieved remission;



                                       Transplant   MD           Status post solange

m cell transplant;



                                                                 Jgliq-gvlutn-ji

st disease, not otherwise specified;



                                                                 Shortness of br

eath

 

             2021   Hospital Encounter Lab          [facility] Acute

 myeloblastic leukemia 

not having achieved remission;



                                                                 Status post solange

m cell transplant;



                                                                 Fuedf-gwiwrf-ye

st disease, not otherwise specified;



                                                                 Shortness of br

eath

 

             2021   Hospital Encounter PherDaron Elaine F., Graft

 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 

disease;



                                                    MD           Complication of

 bone marrow transplant, not otherwise specified

 

             2021   Orders Only  Stem Cell    [place],  Acute myeloblas

tic leukemia not 

having achieved remission (Primary Dx);



                                       Transplant   XIOMARA Moreno Status post

 stem cell transplant;



                                                                 Yfjnl-yvweup-dq

st disease, not otherwise specified

 

             2021   Travel                                 

 

             2021   Orders Only  Pheresis     Monique Blackman 



                                                    M, NP        

 

             2021   Hospital Encounter Vascular Access Daron Hernadez F., En

counter for



                                                and Procedures  MD



                                        adjustment and



                                                    Aranas, Rodulfo management o

f vascular



                                                    T JrMiesha, RN    access device

 

             2021   Hospital Encounter Daron Barrtet, Graft

 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 

disease;



                                                    MD           Complication of

 bone marrow transplant, not otherwise specified;



                                                                 High grade myel

odysplastic syndrome lesions;



                                                                 Complication of

 stem cell transplant

 

             2021   Hospital Encounter Monique Dupont Graft

 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



                                                    M, NP        Complication of

 bone marrow transplant, not otherwise specified

 

             2021   Travel                                 

 

             2021   Orders Only  Monique Smith Graft versu

s host disease 

(Primary Dx);



                                                    M, NP        Complication of

 bone marrow transplant, not otherwise specified

 

             12/10/2021   Orders Only  Abiola Rodriguez NP       



after 12/10/2021



Immunizations







                    Name                Administration Dates Next Due

 

                    DTaP / IPV          2021, 09/15/2020, 2020 

 

                    Hepatitis A         2021, 2020 

 

                    Hepatitis B         2021, 09/15/2020, 2020 

 

                    Hib (PRP-OMP)       2021, 09/15/2020, 2020 

 

                    Influenza Quadrivalent High Dose 10/25/2022, 2021 

 

                    Influenza, Quadrivalent 10/08/2020, 2018 

 

                    Influenza, Unspecified 10/08/2020          

 

                    David SARS-CoV-2 Vaccination 2021          

 

                    Pneumococcal Conjugate 13-Valent 2021, 09/15/2020, 2020 

 

                    Pneumococcal Polysaccharide 2021          







Surgical History







                Surgery         Date            Site/Laterality Comments

 

                APPENDECTOMY    1995 -                      



                                1995                      

 

                BACK SURGERY    2018 -                      



                                2018                      

 

                COLONOSCOPY     2016 -                      



                                2016                      

 

                HERNIA REPAIR   1997 -                      



                                1997                      

 

                HYSTERECTOMY    1992 -                      



                                1992                      

 

                CHOLECYSTECTOMY 1997 -                      



                                1997                      

 

                HIP ARTHROPLASTY 2015 -                       right hip 

replacement



                                2015                      and  left hi

p



                                                                replacement

 

                INFUSION PUMP IMPLANTATION                 Right           inser

chiara  and changed



                                                                in  with new

 battery



                                                                (MORPHINE )

 

                INCONTINENCE SURGERY 2016 -                      



                                2016                      

 

                TONSILLECTOMY   1966 -                      



                                1966                      

 

                CARPAL TUNNEL RELEASE 2006 -      Right           



                                2006                      

 

                OR EGD TRANSORAL BIOPSY 2019      Esophagus/N/A   Procedur

e: UPPER



                SINGLE/MULTIPLE                                 GASTROINTESTINAL

 ENDOSCOPY



                                                                OF ESOPHAGUS, ST

OMACH, AND



                                                                DUODENUM WITH BI

OPSY;



                                                                Surgeon: Jesus Paige MD; Location: [facility]

IN



                                                                ENDOSCOPY; Servi

ce:



                                                                GASTROENTEROLOGY

 

                OR SIGMOIDOSCOPY FLX 2019      Anus/N/A        Procedure: 

FLEXIBLE



                W/BIOPSY SINGLE/MULTIPLE                                 SIGMOID

OSCOPY WITH BIOPSY;



                                                                Surgeon: Jesus Paige MD; Location: [facility]

IN



                                                                ENDOSCOPY; Servi

ce:



                                                                GASTROENTEROLOGY

 

                OR SIGMOIDOSCOPY FLX 2020        Anus/N/A        Procedure: 

FLEXIBLE



                W/BIOPSY SINGLE/MULTIPLE                                 SIGMOID

OSCOPY WITH BIOPSY;



                                                                Surgeon: Jesus Paige MD; Location: [facility]

IN



                                                                ENDOSCOPY; Servi

ce:



                                                                GASTROENTEROLOGY

 

                OR EGD TRANSORAL BIOPSY 2020        Esophagus/N/A   Procedur

e: UPPER



                SINGLE/MULTIPLE                                 GASTROINTESTINAL

 ENDOSCOPY



                                                                OF ESOPHAGUS, ST

OMACH, AND



                                                                DUODENUM WITH BI

OPSY;



                                                                Surgeon: Jesus Paige MD; Location: [facility]

IN



                                                                ENDOSCOPY; Servi

ce:



                                                                GASTROENTEROLOGY

 

                OR EGD TRANSORAL BIOPSY 10/7/2021       Esophagus/N/A   Procedur

e: UPPER



                SINGLE/MULTIPLE                                 GASTROINTESTINAL

 ENDOSCOPY



                                                                OF ESOPHAGUS, ST

OMACH, AND



                                                                DUODENUM WITH BI

OPSY;



                                                                Surgeon: Melissa frankel MD;



                                                                Location[facility]

NDOSCOPY;



                                                                Service: GASTROE

NTEROLOGY







Medical History







                    Medical History     Date                Comments

 

                    Hypertension        2000                

 

                    Hyperlipidemia      2000                

 

                    Migraine            1976                

 

                    Asthma              2005                

 

                    Gastric reflux                      

 

                    Irritable bowel syndrome 2000                

 

                    Urinary incontinence 2005                urinary incontinenc

e with coughing



                                                            and sneezing

 

                    Menopause           1991                

 

                    Anemia              2019                

 

                    Blood transfusion, without reported 2019                



                    diagnosis                               

 

                    Arthritis           2000                

 

                    Scoliosis           1974                

 

                    Depressive disorder 1992                

 

                    Anxiety             1992                

 

                    Fracture of humerus                     

 

                    Fracture of humerus 1973                rigth broken arm

 

                    Status post total hysterectomy 1988                

 

                    Gastroesophageal reflux disease                     

 

                    Cancer                                  

 

                    Chronic obstructive pulmonary disease 2022           







Family History







                Medical History Relation        Name            Comments

 

                Acute myelogenous leukemia Daughter        Amalia          

 

                Myelodysplastic syndrome Daughter        Amalia          

 

                -Other cancer   Father          Abdifatah         Bladder cancer

 

                Bladder Cancer  Father          Abdifatah         

 

                Breast cancer   Paternal Aunt                   

 

                Breast cancer   Paternal Cousin                 









                Relation        Name            Status          Comments

 

                Daughter        Amalia           (Age 27) 

 

                Father          Abdifatah          (Age 87) 

 

                Half-Brother 1  Chintan           Alive           

 

                Half-Brother 2  Garcia             Alive           

 

                Half-Brother 3  Rachid            Alive           

 

                Maternal Aunt 1                 Alive           

 

                Maternal Aunt 2                 Alive           

 

                Maternal Grandfather                  (Age 50s) 

 

                Maternal Grandmother                  (Age 98) 

 

                Mother                          Alive           

 

                Other 1                         Alive           

 

                Other 2                         Alive           

 

                Other 3                         Alive           

 

                Other 4                         Alive           

 

                Other 5                         Alive           

 

                Other 6                         Alive           

 

                Paternal Aunt                   Alive           

 

                Paternal Cousin                 Alive           

 

                Paternal Grandfather                         

 

                Paternal Grandmother                         

 

                Paternal Uncle                  Alive           







Social History







             Tobacco Use  Types        Packs/Day    Years Used   Date

 

             Smoking Tobacco: Former Cigarettes   1977 - 2010

 

             Smokeless Tobacco: Never                                        









                                        Tobacco Cessation: Counseling Given: Yes









                    Alcohol Use         Standard Drinks/Week Comments

 

                    Not Asked           0 (1 standard drink = 0.6 oz pure alcoho

l) 









                          Sex Assigned at Birth     Date Recorded

 

                          Female                    2021 3:30 PM CDT









                    Job Start Date      Occupation          Industry

 

                    Not on file         Not on file         Not on file









                    COVID-19 Exposure   Response            Date Recorded

 

                    In the last 10 days, have you been in contact with No / Unsu

re         2022 12:21 

PM CST



                    someone who was confirmed or suspected to have              

       



                    Coronavirus/COVID-19?                     







Obstetrics History





Last Filed Vital Signs







                Vital Sign      Reading         Time Taken      Comments

 

                Blood Pressure  115/65          2022 1:24 PM CST 

 

                Pulse           79              2022 1:24 PM CST 

 

                Temperature     36.7 C (98.1 F) 2022 1:24 PM CST 

 

                Respiratory Rate 18              2022 1:24 PM CST 

 

                Oxygen Saturation 96%             2022 1:24 PM CST 

 

                Inhaled Oxygen Concentration -               -               

 

                Weight          105 kg (231 lb 7.7 oz) 2022 1:15 PM CST 

 

                Height          162.6 cm (5' 4") 2022 10:35 AM CST 

 

                Body Mass Index 39.73           2022 10:35 AM CST 







Plan of Treatment







             Date         Type         Specialty    Care Team    Description

 

                2023      Appointment     Lab             Daron Hernadez MD



                                        



                                                                [address], [place] [address]

[address]



                                        



                                                                [phone] (Wo

rk)



                                        



                                                    [phone] (Fax) 

 

                2023      Follow-Up       Stem Cell Transplant Jack Hernadez MD



                                        



                                                                [address], [place] [address]

0



                                        



                                                                [phone] (Ap

rk)



                                        



                                                    [phone] (Fax) 

 

                2023      Appointment     Speech Pathology Dima Bacon FNP



[address] [place] [address]



[phone] (Work)



[phone] (Fax)                      



                                                                



Alicia Sharma, [facility]



[address] [place] [address]                       

 

                2023      Follow-Up       Dermatology     Cyrus Juan MD



                                        



                                                                [address] [place] [address]

0



                                        



                                                                [phone] (Wo

rk)



                                        



                                                    [phone] (Fax) 

 

                2023      Appointment     Pulmonology     Daron Hernadez MD



                                        



                                                                [address] [place] [address]

0



                                        



                                                                [phone] (Wo

rk)



                                        



                                                    [phone] (Fax) 

 

                2023      Follow-Up       Pulmonology     Daron Hernadez MD



[address] [place] [address]



[phone] (Work)



[phone] (Fax)                      



                                                                



Kinza Medellin NP



[address] [place] [address]



[phone] (Work)



[phone] (Fax)                      

 

                2023      Follow-Up       Cardiology      Karli Alcala PA



                                        



                                                                [address] [place], [place] [address]

[address]



                                        



                                                                [phone] (Wo

rk)



                                        



                                                    [phone] (Fax) 









                Health Maintenance Due Date        Last Done       Comments

 

                COVID-19 Vaccination (2 - David risk series) 2021      0

3/12/2021      







Medical Devices







          Implanted Type      Area       Device    Shelf     Model /



                                                  Identifier Expiration Date Ser

ial /



                                                                      Lot

 

          Neck C5-C6,Hips Metalware                     unsure              



          Bilateral                                                   









                                        Description: Patient has pain pump on rt

 abdominal region, metal plate in neck 

at



                                        c5-c6, screws in hip replacement









          Pump-10/24/2019 Pump      Abdomen                                 8637

-20 20 ML RESERVOIR /



          Implanted: 10/24/2019 (Quantity not on file)                          

                         [id number] /









                                        Description: Implanted on  changed b

attery on : Morphine pump: 

Synchromed IIb



                                        Conditional up to 3T Ok to scan by Dr. ANAT Marti on 10/24/19







Procedures







             Procedure Name Priority     Date/Time    Associated Diagnosis Comme

nts

 

             XR HIP 3 OR 4 VW Routine      2022 1:18 Pelvic pain  Results 

for



             BILATERAL W PELVIS              PM CST                    this proc

edure



                                                                 are in the



                                                                 results



                                                                 section.

 

             TMP INTERPRETATION Routine      2022                Results f

or



             ANTIBODY SCREEN              11:53 AM CST              this procedu

re



             NEGATIVE                                            are in the



                                                                 results



                                                                 section.

 

             CLOT EXPIRATION DATE Routine      2022                Results

 for



                                       11:53 AM CST              this procedure



                                                                 are in the



                                                                 results



                                                                 section.

 

             ANTIBODY SCREEN Routine      2022   Acute myeloblastic Result

s for



                                       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

cedure



                                                                achieved remissi

on



                                        are in the



                                                    Status post stem cell result

s



                                                    transplant   section.

 

             ABORH        Routine      2022   Acute myeloblastic Results f

or



                                       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

cedure



                                                                achieved remissi

on



                                        are in the



                                                    Status post stem cell result

s



                                                    transplant   section.

 

             FRACTIONATED BILIRUBIN Routine      2022   Acute myeloblastic

 Results for



                                       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

cedure



                                                                achieved remissi

on



                                        are in the



                                                    Status post stem cell result

s



                                                    transplant   section.

 

             TOTAL PROTEIN Routine      2022   Acute myeloblastic Results 

for



                                       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

cedure



                                                                achieved remissi

on



                                        are in the



                                                    Status post stem cell result

s



                                                    transplant   section.

 

             ASPARTATE    Routine      2022   Acute myeloblastic Results f

or



             AMINOTRANSFERASE              11:53 AM CST leukemia not having this

 procedure



                                                                achieved remissi

on



                                        are in the



                                                    Status post stem cell result

s



                                                    transplant   section.

 

             ALANINE      Routine      2022   Acute myeloblastic Results f

or



             AMINOTRANSFERASE              11:53 AM CST leukemia not having this

 procedure



                                                                achieved remissi

on



                                        are in the



                                                    Status post stem cell result

s



                                                    transplant   section.

 

             ALKALINE PHOSPHATASE Routine      2022   Acute myeloblastic R

esults for



                                       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

cedure



                                                                achieved remissi

on



                                        are in the



                                                    Status post stem cell result

s



                                                    transplant   section.

 

             ALBUMIN LEVEL Routine      2022   Acute myeloblastic Results 

for



                                       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

cedure



                                                                achieved remissi

on



                                        are in the



                                                    Status post stem cell result

s



                                                    transplant   section.

 

             CALCIUM LEVEL TOTAL Routine      2022   Acute myeloblastic Re

sults for



                                       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

cedure



                                                                achieved remissi

on



                                        are in the



                                                    Status post stem cell result

s



                                                    transplant   section.

 

             .GLOMERULAR FILTRATION Routine      2022   Acute myeloblastic

 Results for



             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

cedure



                                                                achieved remissi

on



                                        are in the



                                                    Status post stem cell result

s



                                                    transplant   section.

 

             SERUM CREATININE Routine      2022   Acute myeloblastic Resul

ts for



                                       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

cedure



                                                                achieved remissi

on



                                        are in the



                                                    Status post stem cell result

s



                                                    transplant   section.

 

             ELECTROLYTE PANEL Routine      2022   Acute myeloblastic Resu

lts for



                                       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

cedure



                                                                achieved remissi

on



                                        are in the



                                                    Status post stem cell result

s



                                                    transplant   section.

 

             BLOOD UREA NITROGEN Routine      2022   Acute myeloblastic Re

sults for



                                       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

cedure



                                                                achieved remissi

on



                                        are in the



                                                    Status post stem cell result

s



                                                    transplant   section.

 

             GLUCOSE LEVEL Routine      2022   Acute myeloblastic Results 

for



                                       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

cedure



                                                                achieved remissi

on



                                        are in the



                                                    Status post stem cell result

s



                                                    transplant   section.

 

             MANUAL DIFFERENTIAL Routine      2022   Acute myeloblastic Re

sults for



                                       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

cedure



                                                                achieved remissi

on



                                        are in the



                                                    Status post stem cell result

s



                                                    transplant   section.

 

             Results CBC  Routine      2022   Acute myeloblastic Results f

or



                                       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

cedure



                                                                achieved remissi

on



                                        are in the



                                                    Status post stem cell result

s



                                                    transplant   section.

 

             CMV QUANT PCR Routine      2022   Acute myeloblastic Results 

for



                                       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

cedure



                                                                achieved remissi

on



                                        are in the



                                                    Status post stem cell result

s



                                                    transplant   section.

 

             TACROLIMUS LEVEL Routine      2022   Acute myeloblastic Resul

ts for



                                       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

cedure



                                                                achieved remissi

on



                                        are in the



                                                    Status post stem cell result

s



                                                    transplant   section.

 

             MAGNESIUM LEVEL Routine      2022   Acute myeloblastic Result

s for



                                       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

cedure



                                                                achieved remissi

on



                                        are in the



                                                    Status post stem cell result

s



                                                    transplant   section.

 

             LACTATE DEHYDROGENASE Routine      2022   Acute myeloblastic 

Results for



                                       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

cedure



                                                                achieved remissi

on



                                        are in the



                                                    Status post stem cell result

s



                                                    transplant   section.

 

             URIC ACID    Routine      2022   Acute myeloblastic Results f

or



                                       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

cedure



                                                                achieved remissi

on



                                        are in the



                                                    Status post stem cell result

s



                                                    transplant   section.

 

             PHOSPHORUS LEVEL Routine      2022   Acute myeloblastic Resul

ts for



                                       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

cedure



                                                                achieved remissi

on



                                        are in the



                                                    Status post stem cell result

s



                                                    transplant   section.

 

             COMPREHENSIVE Routine      2022   Acute myeloblastic 



             METABOLIC PANEL              11:53 AM CST leukemia not having 



                                                                achieved remissi

on



                                        



                                                    Status post stem cell 



                                                    transplant   

 

             TYPE AND SCREEN Routine      2022   Acute myeloblastic 



                                       11:53 AM CST leukemia not having 



                                                                achieved remissi

on



                                        



                                                    Status post stem cell 



                                                    transplant   

 

             COMPLETE BLOOD COUNT Routine      2022   Acute myeloblastic 



             W/ DIFFERENTIAL              11:53 AM CST leukemia not having 



                                                                achieved remissi

on



                                        



                                                    Status post stem cell 



                                                    transplant   

 

             FRACTIONATED BILIRUBIN Routine      2022   Acute myeloblastic

 Results for



                                       11:20 AM CST leukemia not having this pro

cedure



                                                                achieved remissi

on



                                        are in the



                                                    Status post stem cell result

s



                                                    transplant   section.

 

             TOTAL PROTEIN Routine      2022   Acute myeloblastic Results 

for



                                       11:20 AM CST leukemia not having this pro

cedure



                                                                achieved remissi

on



                                        are in the



                                                    Status post stem cell result

s



                                                    transplant   section.

 

             ASPARTATE    Routine      2022   Acute myeloblastic Results f

or



             AMINOTRANSFERASE              11:20 AM CST leukemia not having this

 procedure



                                                                achieved remissi

on



                                        are in the



                                                    Status post stem cell result

s



                                                    transplant   section.

 

             ALANINE      Routine      2022   Acute myeloblastic Results f

or



             AMINOTRANSFERASE              11:20 AM CST leukemia not having this

 procedure



                                                                achieved remissi

on



                                        are in the



                                                    Status post stem cell result

s



                                                    transplant   section.

 

             ALKALINE PHOSPHATASE Routine      2022   Acute myeloblastic R

esults for



                                       11:20 AM CST leukemia not having this pro

cedure



                                                                achieved remissi

on



                                        are in the



                                                    Status post stem cell result

s



                                                    transplant   section.

 

             ALBUMIN LEVEL Routine      2022   Acute myeloblastic Results 

for



                                       11:20 AM CST leukemia not having this pro

cedure



                                                                achieved remissi

on



                                        are in the



                                                    Status post stem cell result

s



                                                    transplant   section.

 

             CALCIUM LEVEL TOTAL Routine      2022   Acute myeloblastic Re

sults for



                                       11:20 AM CST leukemia not having this pro

cedure



                                                                achieved remissi

on



                                        are in the



                                                    Status post stem cell result

s



                                                    transplant   section.

 

             .GLOMERULAR FILTRATION Routine      2022   Acute myeloblastic

 Results for



             RATE                      11:20 AM CST leukemia not having this pro

cedure



                                                                achieved remissi

on



                                        are in the



                                                    Status post stem cell result

s



                                                    transplant   section.

 

             SERUM CREATININE Routine      2022   Acute myeloblastic Resul

ts for



                                       11:20 AM CST leukemia not having this pro

cedure



                                                                achieved remissi

on



                                        are in the



                                                    Status post stem cell result

s



                                                    transplant   section.

 

             ELECTROLYTE PANEL Routine      2022   Acute myeloblastic Resu

lts for



                                       11:20 AM CST leukemia not having this pro

cedure



                                                                achieved remissi

on



                                        are in the



                                                    Status post stem cell result

s



                                                    transplant   section.

 

             BLOOD UREA NITROGEN Routine      2022   Acute myeloblastic Re

sults for



                                       11:20 AM CST leukemia not having this pro

cedure



                                                                achieved remissi

on



                                        are in the



                                                    Status post stem cell result

s



                                                    transplant   section.

 

             GLUCOSE LEVEL Routine      2022   Acute myeloblastic Results 

for



                                       11:20 AM CST leukemia not having this pro

cedure



                                                                achieved remissi

on



                                        are in the



                                                    Status post stem cell result

s



                                                    transplant   section.

 

             MANUAL DIFFERENTIAL Routine      2022   Acute myeloblastic Re

sults for



                                       11:20 AM CST leukemia not having this pro

cedure



                                                                achieved remissi

on



                                        are in the



                                                    Status post stem cell result

s



                                                    transplant   section.

 

             Results CBC  Routine      2022   Acute myeloblastic Results f

or



                                       11:20 AM CST leukemia not having this pro

cedure



                                                                achieved remissi

on



                                        are in the



                                                    Status post stem cell result

s



                                                    transplant   section.

 

             MAGNESIUM LEVEL Routine      2022   Acute myeloblastic Result

s for



                                       11:20 AM CST leukemia not having this pro

cedure



                                                                achieved remissi

on



                                        are in the



                                                    Status post stem cell result

s



                                                    transplant   section.

 

             PHOSPHORUS LEVEL Routine      2022   Acute myeloblastic Resul

ts for



                                       11:20 AM CST leukemia not having this pro

cedure



                                                                achieved remissi

on



                                        are in the



                                                    Status post stem cell result

s



                                                    transplant   section.

 

             TACROLIMUS LEVEL Routine      2022   Acute myeloblastic Resul

ts for



                                       11:20 AM CST leukemia not having this pro

cedure



                                                                achieved remissi

on



                                        are in the



                                                    Status post stem cell result

s



                                                    transplant   section.

 

             LACTATE DEHYDROGENASE Routine      2022   Acute myeloblastic 

Results for



                                       11:20 AM CST leukemia not having this pro

cedure



                                                                achieved remissi

on



                                        are in the



                                                    Status post stem cell result

s



                                                    transplant   section.

 

             COMPREHENSIVE Routine      2022   Acute myeloblastic 



             METABOLIC PANEL              11:20 AM CST leukemia not having 



                                                                achieved remissi

on



                                        



                                                    Status post stem cell 



                                                    transplant   

 

             COMPLETE BLOOD COUNT Routine      2022   Acute myeloblastic 



             W/ DIFFERENTIAL              11:20 AM CST leukemia not having 



                                                                achieved remissi

on



                                        



                                                    Status post stem cell 



                                                    transplant   

 

             CMV QUANT PCR Routine      2022   Acute myeloblastic Results 

for



                                       11:20 AM CST leukemia not having this pro

cedure



                                                                achieved remissi

on



                                        are in the



                                                    Status post stem cell result

s



                                                    transplant   section.

 

             NM BONE MINERAL Routine      2022   Acute myeloblastic Result

s for



             DENSITY APPENDICULAR              10:53 AM CST leukemia not having 

this procedure



                FOREARM ONLY                                    achieved remissi

on



                                        are in the



                                                    Status post stem cell result

s



                                                    transplant   section.

 

             TMP INTERPRETATION Routine      2022 9:48              Result

s for



             ANTIBODY SCREEN              AM CST                    this procedu

re



             NEGATIVE                                            are in the



                                                                 results



                                                                 section.

 

             CLOT EXPIRATION DATE Routine      2022 9:48              Resu

lts for



                                       AM CST                    this procedure



                                                                 are in the



                                                                 results



                                                                 section.

 

             ANTIBODY SCREEN Routine      2022 9:48 Acute myeloblastic Res

ults for



                                       AM CST       leukemia not having this pro

cedure



                                                                achieved remissi

on



                                        are in the



                                                    Status post stem cell result

s



                                                    transplant   section.

 

             ABORH        Routine      2022 9:48 Acute myeloblastic Result

s for



                                       AM CST       leukemia not having this pro

cedure



                                                                achieved remissi

on



                                        are in the



                                                    Status post stem cell result

s



                                                    transplant   section.

 

             FRACTIONATED BILIRUBIN Routine      2022 9:48 Acute myeloblas

tic Results for



                                       AM CST       leukemia not having this pro

cedure



                                                                achieved remissi

on



                                        are in the



                                                    Status post stem cell result

s



                                                    transplant   section.

 

             TOTAL PROTEIN Routine      2022 9:48 Acute myeloblastic Resul

ts for



                                       AM CST       leukemia not having this pro

cedure



                                                                achieved remissi

on



                                        are in the



                                                    Status post stem cell result

s



                                                    transplant   section.

 

             ASPARTATE    Routine      2022 9:48 Acute myeloblastic Result

s for



             AMINOTRANSFERASE              AM CST       leukemia not having this

 procedure



                                                                achieved remissi

on



                                        are in the



                                                    Status post stem cell result

s



                                                    transplant   section.

 

             ALANINE      Routine      2022 9:48 Acute myeloblastic Result

s for



             AMINOTRANSFERASE              AM CST       leukemia not having this

 procedure



                                                                achieved remissi

on



                                        are in the



                                                    Status post stem cell result

s



                                                    transplant   section.

 

             ALKALINE PHOSPHATASE Routine      2022 9:48 Acute myeloblasti

c Results for



                                       AM CST       leukemia not having this pro

cedure



                                                                achieved remissi

on



                                        are in the



                                                    Status post stem cell result

s



                                                    transplant   section.

 

             ALBUMIN LEVEL Routine      2022 9:48 Acute myeloblastic Resul

ts for



                                       AM CST       leukemia not having this pro

cedure



                                                                achieved remissi

on



                                        are in the



                                                    Status post stem cell result

s



                                                    transplant   section.

 

             CALCIUM LEVEL TOTAL Routine      2022 9:48 Acute myeloblastic

 Results for



                                       AM CST       leukemia not having this pro

cedure



                                                                achieved remissi

on



                                        are in the



                                                    Status post stem cell result

s



                                                    transplant   section.

 

             .GLOMERULAR FILTRATION Routine      2022 9:48 Acute myeloblas

tic Results for



             RATE                      AM CST       leukemia not having this pro

cedure



                                                                achieved remissi

on



                                        are in the



                                                    Status post stem cell result

s



                                                    transplant   section.

 

             SERUM CREATININE Routine      2022 9:48 Acute myeloblastic Re

sults for



                                       AM CST       leukemia not having this pro

cedure



                                                                achieved remissi

on



                                        are in the



                                                    Status post stem cell result

s



                                                    transplant   section.

 

             ELECTROLYTE PANEL Routine      2022 9:48 Acute myeloblastic R

esults for



                                       AM CST       leukemia not having this pro

cedure



                                                                achieved remissi

on



                                        are in the



                                                    Status post stem cell result

s



                                                    transplant   section.

 

             BLOOD UREA NITROGEN Routine      2022 9:48 Acute myeloblastic

 Results for



                                       AM CST       leukemia not having this pro

cedure



                                                                achieved remissi

on



                                        are in the



                                                    Status post stem cell result

s



                                                    transplant   section.

 

             GLUCOSE LEVEL Routine      2022 9:48 Acute myeloblastic Resul

ts for



                                       AM CST       leukemia not having this pro

cedure



                                                                achieved remissi

on



                                        are in the



                                                    Status post stem cell result

s



                                                    transplant   section.

 

             MANUAL DIFFERENTIAL Routine      2022 9:48 Acute myeloblastic

 Results for



                                       AM CST       leukemia not having this pro

cedure



                                                                achieved remissi

on



                                        are in the



                                                    Status post stem cell result

s



                                                    transplant   section.

 

             Results CBC  Routine      2022 9:48 Acute myeloblastic Result

s for



                                       AM CST       leukemia not having this pro

cedure



                                                                achieved remissi

on



                                        are in the



                                                    Status post stem cell result

s



                                                    transplant   section.

 

             CMV QUANT PCR Routine      2022 9:48 Acute myeloblastic Resul

ts for



                                       AM CST       leukemia not having this pro

cedure



                                                                achieved remissi

on



                                        are in the



                                                    Status post stem cell result

s



                                                    transplant   section.

 

             TACROLIMUS LEVEL Routine      2022 9:48 Acute myeloblastic Re

sults for



                                       AM CST       leukemia not having this pro

cedure



                                                                achieved remissi

on



                                        are in the



                                                    Status post stem cell result

s



                                                    transplant   section.

 

             MAGNESIUM LEVEL Routine      2022 9:48 Acute myeloblastic Res

ults for



                                       AM CST       leukemia not having this pro

cedure



                                                                achieved remissi

on



                                        are in the



                                                    Status post stem cell result

s



                                                    transplant   section.

 

             LACTATE DEHYDROGENASE Routine      2022 9:48 Acute myeloblast

ic Results for



                                       AM CST       leukemia not having this pro

cedure



                                                                achieved remissi

on



                                        are in the



                                                    Status post stem cell result

s



                                                    transplant   section.

 

             URIC ACID    Routine      2022 9:48 Acute myeloblastic Result

s for



                                       AM CST       leukemia not having this pro

cedure



                                                                achieved remissi

on



                                        are in the



                                                    Status post stem cell result

s



                                                    transplant   section.

 

             PHOSPHORUS LEVEL Routine      2022 9:48 Acute myeloblastic Re

sults for



                                       AM CST       leukemia not having this pro

cedure



                                                                achieved remissi

on



                                        are in the



                                                    Status post stem cell result

s



                                                    transplant   section.

 

             COMPREHENSIVE Routine      2022 9:48 Acute myeloblastic 



             METABOLIC PANEL              AM CST       leukemia not having 



                                                                achieved remissi

on



                                        



                                                    Status post stem cell 



                                                    transplant   

 

             TYPE AND SCREEN Routine      2022 9:48 Acute myeloblastic 



                                       AM CST       leukemia not having 



                                                                achieved remissi

on



                                        



                                                    Status post stem cell 



                                                    transplant   

 

             COMPLETE BLOOD COUNT Routine      2022 9:48 Acute myeloblasti

c 



             W/ DIFFERENTIAL              AM CST       leukemia not having 



                                                                achieved remissi

on



                                        



                                                    Status post stem cell 



                                                    transplant   

 

             FRACTIONATED BILIRUBIN Routine      2022 2:31 Acute myeloblas

tic Results for



                                       PM CDT       leukemia not having this pro

cedure



                                                                achieved remissi

on



                                        are in the



                                                    Status post stem cell result

s



                                                    transplant   section.

 

             TOTAL PROTEIN Routine      2022 2:31 Acute myeloblastic Resul

ts for



                                       PM CDT       leukemia not having this pro

cedure



                                                                achieved remissi

on



                                        are in the



                                                    Status post stem cell result

s



                                                    transplant   section.

 

             ASPARTATE    Routine      2022 2:31 Acute myeloblastic Result

s for



             AMINOTRANSFERASE              PM CDT       leukemia not having this

 procedure



                                                                achieved remissi

on



                                        are in the



                                                    Status post stem cell result

s



                                                    transplant   section.

 

             ALANINE      Routine      2022 2:31 Acute myeloblastic Result

s for



             AMINOTRANSFERASE              PM CDT       leukemia not having this

 procedure



                                                                achieved remissi

on



                                        are in the



                                                    Status post stem cell result

s



                                                    transplant   section.

 

             ALKALINE PHOSPHATASE Routine      2022 2:31 Acute myeloblasti

c Results for



                                       PM CDT       leukemia not having this pro

cedure



                                                                achieved remissi

on



                                        are in the



                                                    Status post stem cell result

s



                                                    transplant   section.

 

             ALBUMIN LEVEL Routine      2022 2:31 Acute myeloblastic Resul

ts for



                                       PM CDT       leukemia not having this pro

cedure



                                                                achieved remissi

on



                                        are in the



                                                    Status post stem cell result

s



                                                    transplant   section.

 

             CALCIUM LEVEL TOTAL Routine      2022 2:31 Acute myeloblastic

 Results for



                                       PM CDT       leukemia not having this pro

cedure



                                                                achieved remissi

on



                                        are in the



                                                    Status post stem cell result

s



                                                    transplant   section.

 

             .GLOMERULAR FILTRATION Routine      2022 2:31 Acute myeloblas

tic Results for



             RATE                      PM CDT       leukemia not having this pro

cedure



                                                                achieved remissi

on



                                        are in the



                                                    Status post stem cell result

s



                                                    transplant   section.

 

             SERUM CREATININE Routine      2022 2:31 Acute myeloblastic Re

sults for



                                       PM CDT       leukemia not having this pro

cedure



                                                                achieved remissi

on



                                        are in the



                                                    Status post stem cell result

s



                                                    transplant   section.

 

             ELECTROLYTE PANEL Routine      2022 2:31 Acute myeloblastic R

esults for



                                       PM CDT       leukemia not having this pro

cedure



                                                                achieved remissi

on



                                        are in the



                                                    Status post stem cell result

s



                                                    transplant   section.

 

             BLOOD UREA NITROGEN Routine      2022 2:31 Acute myeloblastic

 Results for



                                       PM CDT       leukemia not having this pro

cedure



                                                                achieved remissi

on



                                        are in the



                                                    Status post stem cell result

s



                                                    transplant   section.

 

             GLUCOSE LEVEL Routine      2022 2:31 Acute myeloblastic Resul

ts for



                                       PM CDT       leukemia not having this pro

cedure



                                                                achieved remissi

on



                                        are in the



                                                    Status post stem cell result

s



                                                    transplant   section.

 

             MANUAL DIFFERENTIAL Routine      2022 2:31 Acute myeloblastic

 Results for



                                       PM CDT       leukemia not having this pro

cedure



                                                                achieved remissi

on



                                        are in the



                                                    Status post stem cell result

s



                                                    transplant   section.

 

             Results CBC  Routine      2022 2:31 Acute myeloblastic Result

s for



                                       PM CDT       leukemia not having this pro

cedure



                                                                achieved remissi

on



                                        are in the



                                                    Status post stem cell result

s



                                                    transplant   section.

 

             PHOSPHORUS LEVEL Routine      2022 2:31 Acute myeloblastic Re

sults for



                                       PM CDT       leukemia not having this pro

cedure



                                                                achieved remissi

on



                                        are in the



                                                    Status post stem cell result

s



                                                    transplant   section.

 

             MAGNESIUM LEVEL Routine      2022 2:31 Acute myeloblastic Res

ults for



                                       PM CDT       leukemia not having this pro

cedure



                                                                achieved remissi

on



                                        are in the



                                                    Status post stem cell result

s



                                                    transplant   section.

 

             LACTATE DEHYDROGENASE Routine      2022 2:31 Acute myeloblast

ic Results for



                                       PM CDT       leukemia not having this pro

cedure



                                                                achieved remissi

on



                                        are in the



                                                    Status post stem cell result

s



                                                    transplant   section.

 

             COMPREHENSIVE Routine      2022 2:31 Acute myeloblastic 



             METABOLIC PANEL              PM CDT       leukemia not having 



                                                                achieved remissi

on



                                        



                                                    Status post stem cell 



                                                    transplant   

 

             COMPLETE BLOOD COUNT Routine      2022 2:31 Acute myeloblasti

c 



             W/ DIFFERENTIAL              PM CDT       leukemia not having 



                                                                achieved remissi

on



                                        



                                                    Status post stem cell 



                                                    transplant   

 

             HP MD LAURA-CHINO Routine      10/25/2022                



             VIRUS QUANTITATIVE PCR              10:56 AM CDT              



             ANALYSIS                                            



             INTERPRETATION AND                                        



             REPORT                                              

 

             FRACTIONATED BILIRUBIN Routine      10/25/2022   Acute myeloblastic

 Results for



                                       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

cedure



                                                                achieved remissi

on



                                        are in the



                                                    Status post stem cell result

s



                                                    transplant   section.

 

             TOTAL PROTEIN Routine      10/25/2022   Acute myeloblastic Results 

for



                                       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

cedure



                                                                achieved remissi

on



                                        are in the



                                                    Status post stem cell result

s



                                                    transplant   section.

 

             ASPARTATE    Routine      10/25/2022   Acute myeloblastic Results f

or



             AMINOTRANSFERASE              10:56 AM CDT leukemia not having this

 procedure



                                                                achieved remissi

on



                                        are in the



                                                    Status post stem cell result

s



                                                    transplant   section.

 

             ALANINE      Routine      10/25/2022   Acute myeloblastic Results f

or



             AMINOTRANSFERASE              10:56 AM CDT leukemia not having this

 procedure



                                                                achieved remissi

on



                                        are in the



                                                    Status post stem cell result

s



                                                    transplant   section.

 

             ALKALINE PHOSPHATASE Routine      10/25/2022   Acute myeloblastic R

esults for



                                       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

cedure



                                                                achieved remissi

on



                                        are in the



                                                    Status post stem cell result

s



                                                    transplant   section.

 

             ALBUMIN LEVEL Routine      10/25/2022   Acute myeloblastic Results 

for



                                       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

cedure



                                                                achieved remissi

on



                                        are in the



                                                    Status post stem cell result

s



                                                    transplant   section.

 

             CALCIUM LEVEL TOTAL Routine      10/25/2022   Acute myeloblastic Re

sults for



                                       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

cedure



                                                                achieved remissi

on



                                        are in the



                                                    Status post stem cell result

s



                                                    transplant   section.

 

             .GLOMERULAR FILTRATION Routine      10/25/2022   Acute myeloblastic

 Results for



             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

cedure



                                                                achieved remissi

on



                                        are in the



                                                    Status post stem cell result

s



                                                    transplant   section.

 

             SERUM CREATININE Routine      10/25/2022   Acute myeloblastic Resul

ts for



                                       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

cedure



                                                                achieved remissi

on



                                        are in the



                                                    Status post stem cell result

s



                                                    transplant   section.

 

             ELECTROLYTE PANEL Routine      10/25/2022   Acute myeloblastic Resu

lts for



                                       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

cedure



                                                                achieved remissi

on



                                        are in the



                                                    Status post stem cell result

s



                                                    transplant   section.

 

             BLOOD UREA NITROGEN Routine      10/25/2022   Acute myeloblastic Re

sults for



                                       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

cedure



                                                                achieved remissi

on



                                        are in the



                                                    Status post stem cell result

s



                                                    transplant   section.

 

             GLUCOSE LEVEL Routine      10/25/2022   Acute myeloblastic Results 

for



                                       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

cedure



                                                                achieved remissi

on



                                        are in the



                                                    Status post stem cell result

s



                                                    transplant   section.

 

             MANUAL DIFFERENTIAL Routine      10/25/2022   Acute myeloblastic Re

sults for



                                       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

cedure



                                                                achieved remissi

on



                                        are in the



                                                    Status post stem cell result

s



                                                    transplant   section.

 

             Results CBC  Routine      10/25/2022   Acute myeloblastic Results f

or



                                       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

cedure



                                                                achieved remissi

on



                                        are in the



                                                    Status post stem cell result

s



                                                    transplant   section.

 

             LIPID PANEL  Routine      10/25/2022   Acute myeloblastic Results f

or



                                       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

cedure



                                                                achieved remissi

on



                                        are in the



                                                    Status post stem cell result

s



                                                    transplant   section.

 

             FREE THYROXINE Routine      10/25/2022   Acute myeloblastic Results

 for



                                       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

cedure



                                                                achieved remissi

on



                                        are in the



                                                    Status post stem cell result

s



                                                    transplant   section.

 

             THYROID STIMULATING Routine      10/25/2022   Acute myeloblastic Re

sults for



             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

cedure



                                                                achieved remissi

on



                                        are in the



                                                    Status post stem cell result

s



                                                    transplant   section.

 

             VITAMIN D 25 HYDROXY Routine      10/25/2022   Acute myeloblastic R

esults for



             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

cedure



                                                                achieved remissi

on



                                        are in the



                                                    Status post stem cell result

s



                                                    transplant   section.

 

             HP MD LAURA-CHINO Routine      10/25/2022   Acute myeloblastic Res

ults for



             VIRUS QUANTITATIVE PCR              10:56 AM CDT leukemia not havin

g this procedure



                ANALYSIS COLLECTION,                                 achieved re

mission



                                        are in the



             BLOOD                                  Status post stem cell result

s



                                                    transplant   section.

 

             URIC ACID    Routine      10/25/2022   Acute myeloblastic Results f

or



                                       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

cedure



                                                                achieved remissi

on



                                        are in the



                                                    Status post stem cell result

s



                                                    transplant   section.

 

             MAGNESIUM LEVEL Routine      10/25/2022   Acute myeloblastic Result

s for



                                       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

cedure



                                                                achieved remissi

on



                                        are in the



                                                    Status post stem cell result

s



                                                    transplant   section.

 

             PHOSPHORUS LEVEL Routine      10/25/2022   Acute myeloblastic Resul

ts for



                                       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

cedure



                                                                achieved remissi

on



                                        are in the



                                                    Status post stem cell result

s



                                                    transplant   section.

 

             PROTHROMBIN TIME Routine      10/25/2022   Acute myeloblastic Resul

ts for



                                       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

cedure



                                                                achieved remissi

on



                                        are in the



                                                    Status post stem cell result

s



                                                    transplant   section.

 

             TACROLIMUS LEVEL Routine      10/25/2022   Acute myeloblastic Resul

ts for



                                       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

cedure



                                                                achieved remissi

on



                                        are in the



                                                    Status post stem cell result

s



                                                    transplant   section.

 

             LACTATE DEHYDROGENASE Routine      10/25/2022   Acute myeloblastic 

Results for



                                       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

cedure



                                                                achieved remissi

on



                                        are in the



                                                    Status post stem cell result

s



                                                    transplant   section.

 

             COMPREHENSIVE Routine      10/25/2022   Acute myeloblastic 



             METABOLIC PANEL              10:56 AM CDT leukemia not having 



                                                                achieved remissi

on



                                        



                                                    Status post stem cell 



                                                    transplant   

 

             COMPLETE BLOOD COUNT Routine      10/25/2022   Acute myeloblastic 



             W/ DIFFERENTIAL              10:56 AM CDT leukemia not having 



                                                                achieved remissi

on



                                        



                                                    Status post stem cell 



                                                    transplant   

 

             CMV QUANT PCR Routine      10/25/2022   Acute myeloblastic Results 

for



                                       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

cedure



                                                                achieved remissi

on



                                        are in the



                                                    Status post stem cell result

s



                                                    transplant   section.

 

             SLP VIDEOSTROBOSCOPY Routine      10/20/2022   Hoarseness   Results

 for



                                       12:39 PM CDT              this procedure



                                                                 are in the



                                                                 results



                                                                 section.

 

             ECHOCARDIOGRAM 2D Routine      2022 2:16 Supraventricular Res

ults for



             COMPLETE W CONTRAST              PM CDT       tachycardia  this pro

cedure



                                                                 are in the



                                                                 results



                                                                 section.

 

             SPIROMETRY W/O Routine      2022 3:05 Dyspnea, not otherwise 

Results for



                DILATORS, DLCO AND                 PM CDT          specified



                                        this procedure



             BODY PLETHSMOGRAPHIC                           Simple chronic are i

n the



                LUNG VOLUMES                                    bronchitis



                                        results



                                                    High grade   section.



                                                    myelodysplastic 



                                                                syndrome lesions



                                        



                                                    Chronic      



                                                    [id number] 



                                                    disease      

 

             CLOT EXPIRATION DATE Routine      2022                Results

 for



                                       12:58 PM CDT              this procedure



                                                                 are in the



                                                                 results



                                                                 section.

 

             TMP INTERPRETATION Routine      2022                Results f

or



             ANTIBODY SCREEN              12:58 PM CDT              this procedu

re



             NEGATIVE                                            are in the



                                                                 results



                                                                 section.

 

             ANTIBODY SCREEN Routine      2022   Acute myeloblastic Result

s for



                                       12:58 PM CDT leukemia not having this pro

cedure



                                                                achieved remissi

on



                                        are in the



                                                    Status post stem cell result

s



                                                    transplant   section.

 

             ABORH        Routine      2022   Acute myeloblastic Results f

or



                                       12:58 PM CDT leukemia not having this pro

cedure



                                                                achieved remissi

on



                                        are in the



                                                    Status post stem cell result

s



                                                    transplant   section.

 

             FRACTIONATED BILIRUBIN Routine      2022   Acute myeloblastic

 Results for



                                       12:58 PM CDT leukemia not having this pro

cedure



                                                                achieved remissi

on



                                        are in the



                                                    Status post stem cell result

s



                                                    transplant   section.

 

             TOTAL PROTEIN Routine      2022   Acute myeloblastic Results 

for



                                       12:58 PM CDT leukemia not having this pro

cedure



                                                                achieved remissi

on



                                        are in the



                                                    Status post stem cell result

s



                                                    transplant   section.

 

             ASPARTATE    Routine      2022   Acute myeloblastic Results f

or



             AMINOTRANSFERASE              12:58 PM CDT leukemia not having this

 procedure



                                                                achieved remissi

on



                                        are in the



                                                    Status post stem cell result

s



                                                    transplant   section.

 

             ALANINE      Routine      2022   Acute myeloblastic Results f

or



             AMINOTRANSFERASE              12:58 PM CDT leukemia not having this

 procedure



                                                                achieved remissi

on



                                        are in the



                                                    Status post stem cell result

s



                                                    transplant   section.

 

             ALKALINE PHOSPHATASE Routine      2022   Acute myeloblastic R

esults for



                                       12:58 PM CDT leukemia not having this pro

cedure



                                                                achieved remissi

on



                                        are in the



                                                    Status post stem cell result

s



                                                    transplant   section.

 

             ALBUMIN LEVEL Routine      2022   Acute myeloblastic Results 

for



                                       12:58 PM CDT leukemia not having this pro

cedure



                                                                achieved remissi

on



                                        are in the



                                                    Status post stem cell result

s



                                                    transplant   section.

 

             CALCIUM LEVEL TOTAL Routine      2022   Acute myeloblastic Re

sults for



                                       12:58 PM CDT leukemia not having this pro

cedure



                                                                achieved remissi

on



                                        are in the



                                                    Status post stem cell result

s



                                                    transplant   section.

 

             .GLOMERULAR FILTRATION Routine      2022   Acute myeloblastic

 Results for



             RATE                      12:58 PM CDT leukemia not having this pro

cedure



                                                                achieved remissi

on



                                        are in the



                                                    Status post stem cell result

s



                                                    transplant   section.

 

             SERUM CREATININE Routine      2022   Acute myeloblastic Resul

ts for



                                       12:58 PM CDT leukemia not having this pro

cedure



                                                                achieved remissi

on



                                        are in the



                                                    Status post stem cell result

s



                                                    transplant   section.

 

             ELECTROLYTE PANEL Routine      2022   Acute myeloblastic Resu

lts for



                                       12:58 PM CDT leukemia not having this pro

cedure



                                                                achieved remissi

on



                                        are in the



                                                    Status post stem cell result

s



                                                    transplant   section.

 

             BLOOD UREA NITROGEN Routine      2022   Acute myeloblastic Re

sults for



                                       12:58 PM CDT leukemia not having this pro

cedure



                                                                achieved remissi

on



                                        are in the



                                                    Status post stem cell result

s



                                                    transplant   section.

 

             GLUCOSE LEVEL Routine      2022   Acute myeloblastic Results 

for



                                       12:58 PM CDT leukemia not having this pro

cedure



                                                                achieved remissi

on



                                        are in the



                                                    Status post stem cell result

s



                                                    transplant   section.

 

             MANUAL DIFFERENTIAL Routine      2022   Acute myeloblastic Re

sults for



                                       12:58 PM CDT leukemia not having this pro

cedure



                                                                achieved remissi

on



                                        are in the



                                                    Status post stem cell result

s



                                                    transplant   section.

 

             Results CBC  Routine      2022   Acute myeloblastic Results f

or



                                       12:58 PM CDT leukemia not having this pro

cedure



                                                                achieved remissi

on



                                        are in the



                                                    Status post stem cell result

s



                                                    transplant   section.

 

             TACROLIMUS LEVEL Routine      2022   Acute myeloblastic Resul

ts for



                                       12:58 PM CDT leukemia not having this pro

cedure



                                                                achieved remissi

on



                                        are in the



                                                    Status post stem cell result

s



                                                    transplant   section.

 

             CMV QUANT PCR Routine      2022   Acute myeloblastic Results 

for



                                       12:58 PM CDT leukemia not having this pro

cedure



                                                                achieved remissi

on



                                        are in the



                                                    Status post stem cell result

s



                                                    transplant   section.

 

             MAGNESIUM LEVEL Routine      2022   Acute myeloblastic Result

s for



                                       12:58 PM CDT leukemia not having this pro

cedure



                                                                achieved remissi

on



                                        are in the



                                                    Status post stem cell result

s



                                                    transplant   section.

 

             LACTATE DEHYDROGENASE Routine      2022   Acute myeloblastic 

Results for



                                       12:58 PM CDT leukemia not having this pro

cedure



                                                                achieved remissi

on



                                        are in the



                                                    Status post stem cell result

s



                                                    transplant   section.

 

             URIC ACID    Routine      2022   Acute myeloblastic Results f

or



                                       12:58 PM CDT leukemia not having this pro

cedure



                                                                achieved remissi

on



                                        are in the



                                                    Status post stem cell result

s



                                                    transplant   section.

 

             PHOSPHORUS LEVEL Routine      2022   Acute myeloblastic Resul

ts for



                                       12:58 PM CDT leukemia not having this pro

cedure



                                                                achieved remissi

on



                                        are in the



                                                    Status post stem cell result

s



                                                    transplant   section.

 

             COMPREHENSIVE Routine      2022   Acute myeloblastic 



             METABOLIC PANEL              12:58 PM CDT leukemia not having 



                                                                achieved remissi

on



                                        



                                                    Status post stem cell 



                                                    transplant   

 

             TYPE AND SCREEN Routine      2022   Acute myeloblastic 



                                       12:58 PM CDT leukemia not having 



                                                                achieved remissi

on



                                        



                                                    Status post stem cell 



                                                    transplant   

 

             COMPLETE BLOOD COUNT Routine      2022   Acute myeloblastic 



             W/ DIFFERENTIAL              12:58 PM CDT leukemia not having 



                                                                achieved remissi

on



                                        



                                                    Status post stem cell 



                                                    transplant   

 

             EKG, 12-LEAD Routine      2022   Supraventricular 



             (SCHEDULED)                            tachycardia  

 

             TMP INTERPRETATION Routine      2022                Results f

or



             ANTIBODY SCREEN              12:18 PM CDT              this procedu

re



             NEGATIVE                                            are in the



                                                                 results



                                                                 section.

 

             CLOT EXPIRATION DATE Routine      2022                Results

 for



                                       12:18 PM CDT              this procedure



                                                                 are in the



                                                                 results



                                                                 section.

 

             EVANS DAVID LAURA-CHINO Routine      2022                



             VIRUS QUANTITATIVE PCR              12:18 PM CDT              



             ANALYSIS                                            



             INTERPRETATION AND                                        



             REPORT                                              

 

             ANTIBODY SCREEN Routine      2022   Acute myeloblastic Result

s for



                                       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

cedure



                                                                achieved remissi

on



                                        are in the



                                                    Status post stem cell result

s



                                                                transplant



                                        section.



                                                    [id number] 



                                                    disease, not otherwise 



                                                    specified    

 

             ABORH        Routine      2022   Acute myeloblastic Results f

or



                                       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

cedure



                                                                achieved remissi

on



                                        are in the



                                                    Status post stem cell result

s



                                                                transplant



                                        section.



                                                    [id number] 



                                                    disease, not otherwise 



                                                    specified    

 

             FRACTIONATED BILIRUBIN Routine      2022   Acute myeloblastic

 Results for



                                       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

cedure



                                                                achieved remissi

on



                                        are in the



                                                    Status post stem cell result

s



                                                                transplant



                                        section.



                                                    Jways-pwcfkw-tpus 



                                                    disease, not otherwise 



                                                    specified    

 

             TOTAL PROTEIN Routine      2022   Acute myeloblastic Results 

for



                                       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

cedure



                                                                achieved remissi

on



                                        are in the



                                                    Status post stem cell result

s



                                                                transplant



                                        section.



                                                    Shdcq-rcjncz-txqt 



                                                    disease, not otherwise 



                                                    specified    

 

             ASPARTATE    Routine      2022   Acute myeloblastic Results f

or



             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

 procedure



                                                                achieved remissi

on



                                        are in the



                                                    Status post stem cell result

s



                                                                transplant



                                        section.



                                                    Bdqcu-boykjk-uero 



                                                    disease, not otherwise 



                                                    specified    

 

             ALANINE      Routine      2022   Acute myeloblastic Results f

or



             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

 procedure



                                                                achieved remissi

on



                                        are in the



                                                    Status post stem cell result

s



                                                                transplant



                                        section.



                                                    Kmmbe-efnlxc-vpwn 



                                                    disease, not otherwise 



                                                    specified    

 

             ALKALINE PHOSPHATASE Routine      2022   Acute myeloblastic R

esults for



                                       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

cedure



                                                                achieved remissi

on



                                        are in the



                                                    Status post stem cell result

s



                                                                transplant



                                        section.



                                                    Wjezu-jcljmc-cggy 



                                                    disease, not otherwise 



                                                    specified    

 

             ALBUMIN LEVEL Routine      2022   Acute myeloblastic Results 

for



                                       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

cedure



                                                                achieved remissi

on



                                        are in the



                                                    Status post stem cell result

s



                                                                transplant



                                        section.



                                                    Fzkpd-mwdkcv-rcur 



                                                    disease, not otherwise 



                                                    specified    

 

             CALCIUM LEVEL TOTAL Routine      2022   Acute myeloblastic Re

sults for



                                       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

cedure



                                                                achieved remissi

on



                                        are in the



                                                    Status post stem cell result

s



                                                                transplant



                                        section.



                                                    Qqfrq-sibfvh-yvly 



                                                    disease, not otherwise 



                                                    specified    

 

             .GLOMERULAR FILTRATION Routine      2022   Acute myeloblastic

 Results for



             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

cedure



                                                                achieved remissi

on



                                        are in the



                                                    Status post stem cell result

s



                                                                transplant



                                        section.



                                                    Muxvt-rvrmme-iroa 



                                                    disease, not otherwise 



                                                    specified    

 

             SERUM CREATININE Routine      2022   Acute myeloblastic Resul

ts for



                                       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

cedure



                                                                achieved remissi

on



                                        are in the



                                                    Status post stem cell result

s



                                                                transplant



                                        section.



                                                    Mfjic-rxdbph-iawl 



                                                    disease, not otherwise 



                                                    specified    

 

             ELECTROLYTE PANEL Routine      2022   Acute myeloblastic Resu

lts for



                                       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

cedure



                                                                achieved remissi

on



                                        are in the



                                                    Status post stem cell result

s



                                                                transplant



                                        section.



                                                    Obtls-ycudiz-ueoi 



                                                    disease, not otherwise 



                                                    specified    

 

             BLOOD UREA NITROGEN Routine      2022   Acute myeloblastic Re

sults for



                                       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

cedure



                                                                achieved remissi

on



                                        are in the



                                                    Status post stem cell result

s



                                                                transplant



                                        section.



                                                    Bkfsp-wyxurx-clzu 



                                                    disease, not otherwise 



                                                    specified    

 

             GLUCOSE LEVEL Routine      2022   Acute myeloblastic Results 

for



                                       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

cedure



                                                                achieved remissi

on



                                        are in the



                                                    Status post stem cell result

s



                                                                transplant



                                        section.



                                                    Wpqcp-ojszva-slfi 



                                                    disease, not otherwise 



                                                    specified    

 

             MANUAL DIFFERENTIAL Routine      2022   Acute myeloblastic Re

sults for



                                       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

cedure



                                                                achieved remissi

on



                                        are in the



                                                    Status post stem cell result

s



                                                                transplant



                                        section.



                                                    Qeglw-gpxpik-ncoa 



                                                    disease, not otherwise 



                                                    specified    

 

             Results CBC  Routine      2022   Acute myeloblastic Results f

or



                                       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

cedure



                                                                achieved remissi

on



                                        are in the



                                                    Status post stem cell result

s



                                                                transplant



                                        section.



                                                    Phwyh-yioydn-cend 



                                                    disease, not otherwise 



                                                    specified    

 

             TACROLIMUS LEVEL Routine      2022   Acute myeloblastic Resul

ts for



                                       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

cedure



                                                                achieved remissi

on



                                        are in the



                                                    Status post stem cell result

s



                                                                transplant



                                        section.



                                                    Tgzzs-qyxjoy-jplo 



                                                    disease, not otherwise 



                                                    specified    

 

             CMV QUANT PCR Routine      2022   Acute myeloblastic Results 

for



                                       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

cedure



                                                                achieved remissi

on



                                        are in the



                                                    Status post stem cell result

s



                                                                transplant



                                        section.



                                                    Vocis-wkdbpg-asie 



                                                    disease, not otherwise 



                                                    specified    

 

             EVANS DAVID LAURA-CHINO Routine      2022   Acute myeloblastic Res

ults for



             VIRUS QUANTITATIVE PCR              12:18 PM CDT leukemia not havin

g this procedure



                ANALYSIS COLLECTION,                                 achieved re

mission



                                        are in the



             BLOOD                                  Status post stem cell result

s



                                                                transplant



                                        section.



                                                    Xxqvz-vldypn-cvva 



                                                    disease, not otherwise 



                                                    specified    

 

             MAGNESIUM LEVEL Routine      2022   Acute myeloblastic Result

s for



                                       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

cedure



                                                                achieved remissi

on



                                        are in the



                                                    Status post stem cell result

s



                                                                transplant



                                        section.



                                                    Fmgsh-nvuzud-dwnj 



                                                    disease, not otherwise 



                                                    specified    

 

             LACTATE DEHYDROGENASE Routine      2022   Acute myeloblastic 

Results for



                                       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

cedure



                                                                achieved remissi

on



                                        are in the



                                                    Status post stem cell result

s



                                                                transplant



                                        section.



                                                    Nbjaq-djbloq-jgtt 



                                                    disease, not otherwise 



                                                    specified    

 

             URIC ACID    Routine      2022   Acute myeloblastic Results f

or



                                       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

cedure



                                                                achieved remissi

on



                                        are in the



                                                    Status post stem cell result

s



                                                                transplant



                                        section.



                                                    Obyvp-mfixsm-lbfu 



                                                    disease, not otherwise 



                                                    specified    

 

             PHOSPHORUS LEVEL Routine      2022   Acute myeloblastic Resul

ts for



                                       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

cedure



                                                                achieved remissi

on



                                        are in the



                                                    Status post stem cell result

s



                                                                transplant



                                        section.



                                                    Iblru-uobmoe-fjum 



                                                    disease, not otherwise 



                                                    specified    

 

             COMPREHENSIVE Routine      2022   Acute myeloblastic 



             METABOLIC PANEL              12:18 PM CDT leukemia not having 



                                                                achieved remissi

on



                                        



                                                    Status post stem cell 



                                                                transplant



                                        



                                                    Dztoz-ytorjx-ojvj 



                                                    disease, not otherwise 



                                                    specified    

 

             TYPE AND SCREEN Routine      2022   Acute myeloblastic 



                                       12:18 PM CDT leukemia not having 



                                                                achieved remissi

on



                                        



                                                    Status post stem cell 



                                                                transplant



                                        



                                                    Lmodv-wclozb-tyqb 



                                                    disease, not otherwise 



                                                    specified    

 

             COMPLETE BLOOD COUNT Routine      2022   Acute myeloblastic 



             W/ DIFFERENTIAL              12:18 PM CDT leukemia not having 



                                                                achieved remissi

on



                                        



                                                    Status post stem cell 



                                                                transplant



                                        



                                                    Wlctn-pxjdpn-whrg 



                                                    disease, not otherwise 



                                                    specified    

 

             ADENOVIRUS   Routine      2022   Acute myeloblastic Results f

or



             QUANTITATIVE, PLASMA              12:18 PM CDT leukemia not having 

this procedure



                                                                achieved remissi

on



                                        are in the



                                                    Status post stem cell result

s



                                                                transplant



                                        section.



                                                    Cjsev-ctoaja-fvgc 



                                                    disease, not otherwise 



                                                    specified    

 

             HHV6 QUANT, PLASMA Routine      2022   Acute myeloblastic Res

ults for



                                       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

cedure



                                                                achieved remissi

on



                                        are in the



                                                    Status post stem cell result

s



                                                                transplant



                                        section.



                                                    Kovxf-jcnbzk-fuqy 



                                                    disease, not otherwise 



                                                    specified    

 

             SPIROMETRY W/O Routine      2022 1:22 Dyspnea, not otherwise 

Results for



             DILATORS, DLCO AND              PM CDT       specified    this proc

edure



             BODY PLETHSMOGRAPHIC                                        are in 

the



             LUNG VOLUMES                                        results



                                                                 section.

 

             CLOT EXPIRATION DATE Routine      2022                Results

 for



                                       12:25 PM CDT              this procedure



                                                                 are in the



                                                                 results



                                                                 section.

 

             TMP INTERPRETATION Routine      2022                Results f

or



             ANTIBODY SCREEN              12:25 PM CDT              this procedu

re



             NEGATIVE                                            are in the



                                                                 results



                                                                 section.

 

             EVANS DAVID LAURA-CHINO Routine      2022                



             VIRUS QUANTITATIVE PCR              12:25 PM CDT              



             ANALYSIS                                            



             INTERPRETATION AND                                        



             REPORT                                              

 

             ANTIBODY SCREEN Routine      2022   Acute myeloblastic Result

s for



                                       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

cedure



                                                                achieved remissi

on



                                        are in the



                                                    Status post stem cell result

s



                                                                transplant



                                        section.



                                                    Hwmra-evzvgk-xlhg 



                                                    disease, not otherwise 



                                                    specified    

 

             ABORH        Routine      2022   Acute myeloblastic Results f

or



                                       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

cedure



                                                                achieved remissi

on



                                        are in the



                                                    Status post stem cell result

s



                                                                transplant



                                        section.



                                                    Yzsob-mjlwkm-pqdy 



                                                    disease, not otherwise 



                                                    specified    

 

             FRACTIONATED BILIRUBIN Routine      2022   Acute myeloblastic

 Results for



                                       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

cedure



                                                                achieved remissi

on



                                        are in the



                                                    Status post stem cell result

s



                                                                transplant



                                        section.



                                                    Nxmhi-aryyih-pula 



                                                    disease, not otherwise 



                                                    specified    

 

             TOTAL PROTEIN Routine      2022   Acute myeloblastic Results 

for



                                       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

cedure



                                                                achieved remissi

on



                                        are in the



                                                    Status post stem cell result

s



                                                                transplant



                                        section.



                                                    Itkkd-shekse-xcft 



                                                    disease, not otherwise 



                                                    specified    

 

             ASPARTATE    Routine      2022   Acute myeloblastic Results f

or



             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

 procedure



                                                                achieved remissi

on



                                        are in the



                                                    Status post stem cell result

s



                                                                transplant



                                        section.



                                                    Cnfdh-htkzom-roni 



                                                    disease, not otherwise 



                                                    specified    

 

             ALANINE      Routine      2022   Acute myeloblastic Results f

or



             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

 procedure



                                                                achieved remissi

on



                                        are in the



                                                    Status post stem cell result

s



                                                                transplant



                                        section.



                                                    Viqwn-pfiaul-grrs 



                                                    disease, not otherwise 



                                                    specified    

 

             ALKALINE PHOSPHATASE Routine      2022   Acute myeloblastic R

esults for



                                       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

cedure



                                                                achieved remissi

on



                                        are in the



                                                    Status post stem cell result

s



                                                                transplant



                                        section.



                                                    Jtvvt-zhzavq-ocpm 



                                                    disease, not otherwise 



                                                    specified    

 

             ALBUMIN LEVEL Routine      2022   Acute myeloblastic Results 

for



                                       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

cedure



                                                                achieved remissi

on



                                        are in the



                                                    Status post stem cell result

s



                                                                transplant



                                        section.



                                                    Cmzzo-fanukr-arnz 



                                                    disease, not otherwise 



                                                    specified    

 

             CALCIUM LEVEL TOTAL Routine      2022   Acute myeloblastic Re

sults for



                                       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

cedure



                                                                achieved remissi

on



                                        are in the



                                                    Status post stem cell result

s



                                                                transplant



                                        section.



                                                    Ozqcw-ombwvw-tzez 



                                                    disease, not otherwise 



                                                    specified    

 

             .GLOMERULAR FILTRATION Routine      2022   Acute myeloblastic

 Results for



             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

cedure



                                                                achieved remissi

on



                                        are in the



                                                    Status post stem cell result

s



                                                                transplant



                                        section.



                                                    Mlgrn-mogqge-xjxi 



                                                    disease, not otherwise 



                                                    specified    

 

             SERUM CREATININE Routine      2022   Acute myeloblastic Resul

ts for



                                       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

cedure



                                                                achieved remissi

on



                                        are in the



                                                    Status post stem cell result

s



                                                                transplant



                                        section.



                                                    Vwiju-oremsj-pwbr 



                                                    disease, not otherwise 



                                                    specified    

 

             ELECTROLYTE PANEL Routine      2022   Acute myeloblastic Resu

lts for



                                       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

cedure



                                                                achieved remissi

on



                                        are in the



                                                    Status post stem cell result

s



                                                                transplant



                                        section.



                                                    Ddvfv-ywhjxm-nfcc 



                                                    disease, not otherwise 



                                                    specified    

 

             BLOOD UREA NITROGEN Routine      2022   Acute myeloblastic Re

sults for



                                       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

cedure



                                                                achieved remissi

on



                                        are in the



                                                    Status post stem cell result

s



                                                                transplant



                                        section.



                                                    Tvwfl-oxvsdj-fhcq 



                                                    disease, not otherwise 



                                                    specified    

 

             GLUCOSE LEVEL Routine      2022   Acute myeloblastic Results 

for



                                       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

cedure



                                                                achieved remissi

on



                                        are in the



                                                    Status post stem cell result

s



                                                                transplant



                                        section.



                                                    Ilafp-dprnju-asfw 



                                                    disease, not otherwise 



                                                    specified    

 

             MANUAL DIFFERENTIAL Routine      2022   Acute myeloblastic Re

sults for



                                       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

cedure



                                                                achieved remissi

on



                                        are in the



                                                    Status post stem cell result

s



                                                                transplant



                                        section.



                                                    Yiqjf-uccwza-gnwu 



                                                    disease, not otherwise 



                                                    specified    

 

             Results CBC  Routine      2022   Acute myeloblastic Results f

or



                                       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

cedure



                                                                achieved remissi

on



                                        are in the



                                                    Status post stem cell result

s



                                                                transplant



                                        section.



                                                    Cdqrg-awdcov-dzoy 



                                                    disease, not otherwise 



                                                    specified    

 

             TACROLIMUS LEVEL Routine      2022   Acute myeloblastic Resul

ts for



                                       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

cedure



                                                                achieved remissi

on



                                        are in the



                                                    Status post stem cell result

s



                                                                transplant



                                        section.



                                                    Wikro-mxkvqd-idnv 



                                                    disease, not otherwise 



                                                    specified    

 

             CMV QUANT PCR Routine      2022   Acute myeloblastic Results 

for



                                       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

cedure



                                                                achieved remissi

on



                                        are in the



                                                    Status post stem cell result

s



                                                                transplant



                                        section.



                                                    Otbqv-ledlhs-ekwb 



                                                    disease, not otherwise 



                                                    specified    

 

             EVANS DAVID LAURA-CHINO Routine      2022   Acute myeloblastic Res

ults for



             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



                ANALYSIS COLLECTION,                                 achieved re

mission



                                        are in the



             BLOOD                                  Status post stem cell result

s



                                                                transplant



                                        section.



                                                    Ypaud-tcbhcp-eusc 



                                                    disease, not otherwise 



                                                    specified    

 

             MAGNESIUM LEVEL Routine      2022   Acute myeloblastic Result

s for



                                       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

cedure



                                                                achieved remissi

on



                                        are in the



                                                    Status post stem cell result

s



                                                                transplant



                                        section.



                                                    Xalnu-jzlssy-pclm 



                                                    disease, not otherwise 



                                                    specified    

 

             LACTATE DEHYDROGENASE Routine      2022   Acute myeloblastic 

Results for



                                       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

cedure



                                                                achieved remissi

on



                                        are in the



                                                    Status post stem cell result

s



                                                                transplant



                                        section.



                                                    Tvcvb-fnxgrt-vrrg 



                                                    disease, not otherwise 



                                                    specified    

 

             URIC ACID    Routine      2022   Acute myeloblastic Results f

or



                                       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

cedure



                                                                achieved remissi

on



                                        are in the



                                                    Status post stem cell result

s



                                                                transplant



                                        section.



                                                    Dflfr-jfuvft-vbgv 



                                                    disease, not otherwise 



                                                    specified    

 

             PHOSPHORUS LEVEL Routine      2022   Acute myeloblastic Resul

ts for



                                       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

cedure



                                                                achieved remissi

on



                                        are in the



                                                    Status post stem cell result

s



                                                                transplant



                                        section.



                                                    Rjiuk-kpphdk-oikz 



                                                    disease, not otherwise 



                                                    specified    

 

             COMPREHENSIVE Routine      2022   Acute myeloblastic 



             METABOLIC PANEL              12:25 PM CDT leukemia not having 



                                                                achieved remissi

on



                                        



                                                    Status post stem cell 



                                                                transplant



                                        



                                                    Mkpwv-grtwec-kxcv 



                                                    disease, not otherwise 



                                                    specified    

 

             TYPE AND SCREEN Routine      2022   Acute myeloblastic 



                                       12:25 PM CDT leukemia not having 



                                                                achieved remissi

on



                                        



                                                    Status post stem cell 



                                                                transplant



                                        



                                                    Ptzme-tlmdky-rigs 



                                                    disease, not otherwise 



                                                    specified    

 

             COMPLETE BLOOD COUNT Routine      2022   Acute myeloblastic 



             W/ DIFFERENTIAL              12:25 PM CDT leukemia not having 



                                                                achieved remissi

on



                                        



                                                    Status post stem cell 



                                                                transplant



                                        



                                                    Ksrmi-emczuq-uust 



                                                    disease, not otherwise 



                                                    specified    

 

             ADENOVIRUS   Routine      2022   Acute myeloblastic Results f

or



             QUANTITATIVE, PLASMA              12:25 PM CDT leukemia not having 

this procedure



                                                                achieved remissi

on



                                        are in the



                                                    Status post stem cell result

s



                                                                transplant



                                        section.



                                                    Mockp-pjdlxe-jaom 



                                                    disease, not otherwise 



                                                    specified    

 

             HHV6 QUANT, PLASMA Routine      2022   Acute myeloblastic Res

ults for



                                       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

cedure



                                                                achieved remissi

on



                                        are in the



                                                    Status post stem cell result

s



                                                                transplant



                                        section.



                                                    Kapva-vqxscd-eiuu 



                                                    disease, not otherwise 



                                                    specified    

 

             GENERAL LABORATORY ADD STAT         2022 2:59 Acute myeloblas

tic Results for



             ON TEST                   PM CDT       leukemia not having this pro

cedure



                                                                achieved remissi

on



                                        are in the



                                                    Status post stem cell result

s



                                                                transplant



                                        section.



                                                    Dprwg-irriut-ffif 



                                                    disease, not otherwise 



                                                    specified    

 

             TACROLIMUS LEVEL Routine      2022 1:02              Results 

for



                                       PM CDT                    this procedure



                                                                 are in the



                                                                 results



                                                                 section.

 

             TMP INTERPRETATION Routine      2022 1:02              Result

s for



             ANTIBODY SCREEN              PM CDT                    this procedu

re



             NEGATIVE                                            are in the



                                                                 results



                                                                 section.

 

             CLOT EXPIRATION DATE Routine      2022 1:02              Resu

lts for



                                       PM CDT                    this procedure



                                                                 are in the



                                                                 results



                                                                 section.

 

             MANUAL DIFFERENTIAL Routine      2022 1:02 Acute myeloblastic

 Results for



                                       PM CDT       leukemia not having this pro

cedure



                                                                achieved remissi

on



                                        are in the



                                                    Status post stem cell result

s



                                                                transplant



                                        section.



                                                    Qjecb-hfriyr-eozj 



                                                    disease, not otherwise 



                                                    specified    

 

             ANTIBODY SCREEN Routine      2022 1:02 Acute myeloblastic Res

ults for



                                       PM CDT       leukemia not having this pro

cedure



                                                                achieved remissi

on



                                        are in the



                                                    Status post stem cell result

s



                                                                transplant



                                        section.



                                                    Tifzy-lafjyv-knkh 



                                                    disease, not otherwise 



                                                    specified    

 

             ABORH        Routine      2022 1:02 Acute myeloblastic Result

s for



                                       PM CDT       leukemia not having this pro

cedure



                                                                achieved remissi

on



                                        are in the



                                                    Status post stem cell result

s



                                                                transplant



                                        section.



                                                    Azrnh-cknvjd-ooop 



                                                    disease, not otherwise 



                                                    specified    

 

             FRACTIONATED BILIRUBIN Routine      2022 1:02 Acute myeloblas

tic Results for



                                       PM CDT       leukemia not having this pro

cedure



                                                                achieved remissi

on



                                        are in the



                                                    Status post stem cell result

s



                                                                transplant



                                        section.



                                                    Unpuv-awyuxy-efol 



                                                    disease, not otherwise 



                                                    specified    

 

             TOTAL PROTEIN Routine      2022 1:02 Acute myeloblastic Resul

ts for



                                       PM CDT       leukemia not having this pro

cedure



                                                                achieved remissi

on



                                        are in the



                                                    Status post stem cell result

s



                                                                transplant



                                        section.



                                                    Yucpw-pywlzv-kciu 



                                                    disease, not otherwise 



                                                    specified    

 

             ASPARTATE    Routine      2022 1:02 Acute myeloblastic Result

s for



             AMINOTRANSFERASE              PM CDT       leukemia not having this

 procedure



                                                                achieved remissi

on



                                        are in the



                                                    Status post stem cell result

s



                                                                transplant



                                        section.



                                                    Gzujy-qosuyr-pysz 



                                                    disease, not otherwise 



                                                    specified    

 

             ALANINE      Routine      2022 1:02 Acute myeloblastic Result

s for



             AMINOTRANSFERASE              PM CDT       leukemia not having this

 procedure



                                                                achieved remissi

on



                                        are in the



                                                    Status post stem cell result

s



                                                                transplant



                                        section.



                                                    [id number] 



                                                    disease, not otherwise 



                                                    specified    

 

             ALKALINE PHOSPHATASE Routine      2022 1:02 Acute myeloblasti

c Results for



                                       PM CDT       leukemia not having this pro

cedure



                                                                achieved remissi

on



                                        are in the



                                                    Status post stem cell result

s



                                                                transplant



                                        section.



                                                    Egzef-fgnbis-ifha 



                                                    disease, not otherwise 



                                                    specified    

 

             ALBUMIN LEVEL Routine      2022 1:02 Acute myeloblastic Resul

ts for



                                       PM CDT       leukemia not having this pro

cedure



                                                                achieved remissi

on



                                        are in the



                                                    Status post stem cell result

s



                                                                transplant



                                        section.



                                                    Aimce-ceoqio-sffy 



                                                    disease, not otherwise 



                                                    specified    

 

             CALCIUM LEVEL TOTAL Routine      2022 1:02 Acute myeloblastic

 Results for



                                       PM CDT       leukemia not having this pro

cedure



                                                                achieved remissi

on



                                        are in the



                                                    Status post stem cell result

s



                                                                transplant



                                        section.



                                                    [id number]-dvxleb-[id number] 



                                                    disease, not otherwise 



                                                    specified    

 

             .GLOMERULAR FILTRATION Routine      2022 1:02 Acute myeloblas

tic Results for



             RATE                      PM CDT       leukemia not having this pro

cedure



                                                                achieved remissi

on



                                        are in the



                                                    Status post stem cell result

s



                                                                transplant



                                        section.



                                                    [id number]-rxeneb-[id number] 



                                                    disease, not otherwise 



                                                    specified    

 

             SERUM CREATININE Routine      2022 1:02 Acute myeloblastic Re

sults for



                                       PM CDT       leukemia not having this pro

cedure



                                                                achieved remissi

on



                                        are in the



                                                    Status post stem cell result

s



                                                                transplant



                                        section.



                                                    [id number]-[id number] 



                                                    disease, not otherwise 



                                                    specified    

 

             ELECTROLYTE PANEL Routine      2022 1:02 Acute myeloblastic R

esults for



                                       PM CDT       leukemia not having this pro

cedure



                                                                achieved remissi

on



                                        are in the



                                                    Status post stem cell result

s



                                                                transplant



                                        section.



                                                    Olvmo-lhcmql-hpdv 



                                                    disease, not otherwise 



                                                    specified    

 

             BLOOD UREA NITROGEN Routine      2022 1:02 Acute myeloblastic

 Results for



                                       PM CDT       leukemia not having this pro

cedure



                                                                achieved remissi

on



                                        are in the



                                                    Status post stem cell result

s



                                                                transplant



                                        section.



                                                    Qoclp-eazzpp-djyz 



                                                    disease, not otherwise 



                                                    specified    

 

             GLUCOSE LEVEL Routine      2022 1:02 Acute myeloblastic Resul

ts for



                                       PM CDT       leukemia not having this pro

cedure



                                                                achieved remissi

on



                                        are in the



                                                    Status post stem cell result

s



                                                                transplant



                                        section.



                                                    [id number]-kflxgz-brwt 



                                                    disease, not otherwise 



                                                    specified    

 

             Results CBC  Routine      2022 1:02 Acute myeloblastic Result

s for



                                       PM CDT       leukemia not having this pro

cedure



                                                                achieved remissi

on



                                        are in the



                                                    Status post stem cell result

s



                                                                transplant



                                        section.



                                                    [id number]-mtittl-[id number] 



                                                    disease, not otherwise 



                                                    specified    

 

             MAGNESIUM LEVEL Routine      2022 1:02 Acute myeloblastic Res

ults for



                                       PM CDT       leukemia not having this pro

cedure



                                                                achieved remissi

on



                                        are in the



                                                    Status post stem cell result

s



                                                                transplant



                                        section.



                                                    [id number]-kkunxe-[id number] 



                                                    disease, not otherwise 



                                                    specified    

 

             LACTATE DEHYDROGENASE Routine      2022 1:02 Acute myeloblast

ic Results for



                                       PM CDT       leukemia not having this pro

cedure



                                                                achieved remissi

on



                                        are in the



                                                    Status post stem cell result

s



                                                                transplant



                                        section.



                                                    Vxnta-dykbnn-bbag 



                                                    disease, not otherwise 



                                                    specified    

 

             URIC ACID    Routine      2022 1:02 Acute myeloblastic Result

s for



                                       PM CDT       leukemia not having this pro

cedure



                                                                achieved remissi

on



                                        are in the



                                                    Status post stem cell result

s



                                                                transplant



                                        section.



                                                    Reqvp-byjcme-zdko 



                                                    disease, not otherwise 



                                                    specified    

 

             PHOSPHORUS LEVEL Routine      2022 1:02 Acute myeloblastic Re

sults for



                                       PM CDT       leukemia not having this pro

cedure



                                                                achieved remissi

on



                                        are in the



                                                    Status post stem cell result

s



                                                                transplant



                                        section.



                                                    Mjfdk-zoapsc-piag 



                                                    disease, not otherwise 



                                                    specified    

 

             COMPREHENSIVE Routine      2022 1:02 Acute myeloblastic 



             METABOLIC PANEL              PM CDT       leukemia not having 



                                                                achieved remissi

on



                                        



                                                    Status post stem cell 



                                                                transplant



                                        



                                                    [id number]-osbici-hpqr 



                                                    disease, not otherwise 



                                                    specified    

 

             TYPE AND SCREEN Routine      2022 1:02 Acute myeloblastic 



                                       PM CDT       leukemia not having 



                                                                achieved remissi

on



                                        



                                                    Status post stem cell 



                                                                transplant



                                        



                                                    [id number]-cegfbd-atdw 



                                                    disease, not otherwise 



                                                    specified    

 

             COMPLETE BLOOD COUNT Routine      2022 1:02 Acute myeloblasti

c 



             W/ DIFFERENTIAL              PM CDT       leukemia not having 



                                                                achieved remissi

on



                                        



                                                    Status post stem cell 



                                                                transplant



                                        



                                                    Azrlx-thltrc-ofax 



                                                    disease, not otherwise 



                                                    specified    

 

             CLOT EXPIRATION DATE Routine      2022 5:38              Resu

lts for



                                       AM CDT                    this procedure



                                                                 are in the



                                                                 results



                                                                 section.

 

             TMP INTERPRETATION Routine      2022 5:38              Result

s for



             ANTIBODY SCREEN              AM CDT                    this procedu

re



             NEGATIVE                                            are in the



                                                                 results



                                                                 section.

 

             MANUAL DIFFERENTIAL AM           2022 5:38              Resul

ts for



                                       AM CDT                    this procedure



                                                                 are in the



                                                                 results



                                                                 section.

 

             Results CBC  AM           2022 5:38              Results for



                                       AM CDT                    this procedure



                                                                 are in the



                                                                 results



                                                                 section.

 

             CALCIUM LEVEL TOTAL Routine      2022 5:38              Resul

ts for



                                       AM CDT                    this procedure



                                                                 are in the



                                                                 results



                                                                 section.

 

             .GLOMERULAR FILTRATION Routine      2022 5:38              Re

sults for



             RATE                      AM CDT                    this procedure



                                                                 are in the



                                                                 results



                                                                 section.

 

             SERUM CREATININE Routine      2022 5:38              Results 

for



                                       AM CDT                    this procedure



                                                                 are in the



                                                                 results



                                                                 section.

 

             ELECTROLYTE PANEL Routine      2022 5:38              Results

 for



                                       AM CDT                    this procedure



                                                                 are in the



                                                                 results



                                                                 section.

 

             BLOOD UREA NITROGEN Routine      2022 5:38              Resul

ts for



                                       AM CDT                    this procedure



                                                                 are in the



                                                                 results



                                                                 section.

 

             GLUCOSE LEVEL Routine      2022 5:38              Results for



                                       AM CDT                    this procedure



                                                                 are in the



                                                                 results



                                                                 section.

 

             ANTIBODY SCREEN Routine      2022 5:38              Results f

or



                                       AM CDT                    this procedure



                                                                 are in the



                                                                 results



                                                                 section.

 

             ABORH        Routine      2022 5:38              Results for



                                       AM CDT                    this procedure



                                                                 are in the



                                                                 results



                                                                 section.

 

             TYPE AND SCREEN Routine      2022 5:38              



                                       AM CDT                    

 

             BASIC METABOLIC PANEL, Routine      2022 5:38              



             CALCIUM TOTAL              AM CDT                    

 

             COMPLETE BLOOD COUNT AM           2022 5:38              



             W/ DIFFERENTIAL              AM CDT                    

 

             FRACTIONATED BILIRUBIN AM           2022 6:19              Re

sults for



                                       AM CDT                    this procedure



                                                                 are in the



                                                                 results



                                                                 section.

 

             TOTAL PROTEIN AM           2022 6:19              Results for



                                       AM CDT                    this procedure



                                                                 are in the



                                                                 results



                                                                 section.

 

             ASPARTATE    AM           2022 6:19              Results for



             AMINOTRANSFERASE              AM CDT                    this proced

ure



                                                                 are in the



                                                                 results



                                                                 section.

 

             ALANINE      AM           2022 6:19              Results for



             AMINOTRANSFERASE              AM CDT                    this proced

ure



                                                                 are in the



                                                                 results



                                                                 section.

 

             ALKALINE PHOSPHATASE AM           2022 6:19              Resu

lts for



                                       AM CDT                    this procedure



                                                                 are in the



                                                                 results



                                                                 section.

 

             ALBUMIN LEVEL AM           2022 6:19              Results for



                                       AM CDT                    this procedure



                                                                 are in the



                                                                 results



                                                                 section.

 

             CALCIUM LEVEL TOTAL AM           2022 6:19              Resul

ts for



                                       AM CDT                    this procedure



                                                                 are in the



                                                                 results



                                                                 section.

 

             .GLOMERULAR FILTRATION AM           2022 6:19              Re

sults for



             RATE                      AM CDT                    this procedure



                                                                 are in the



                                                                 results



                                                                 section.

 

             SERUM CREATININE AM           2022 6:19              Results 

for



                                       AM CDT                    this procedure



                                                                 are in the



                                                                 results



                                                                 section.

 

             ELECTROLYTE PANEL AM           2022 6:19              Results

 for



                                       AM CDT                    this procedure



                                                                 are in the



                                                                 results



                                                                 section.

 

             BLOOD UREA NITROGEN AM           2022 6:19              Resul

ts for



                                       AM CDT                    this procedure



                                                                 are in the



                                                                 results



                                                                 section.

 

             GLUCOSE LEVEL AM           2022 6:19              Results for



                                       AM CDT                    this procedure



                                                                 are in the



                                                                 results



                                                                 section.

 

             MANUAL DIFFERENTIAL AM           2022 6:19              Resul

ts for



                                       AM CDT                    this procedure



                                                                 are in the



                                                                 results



                                                                 section.

 

             Results CBC  AM           2022 6:19              Results for



                                       AM CDT                    this procedure



                                                                 are in the



                                                                 results



                                                                 section.

 

             TACROLIMUS LEVEL AM           2022 6:19              Results 

for



                                       AM CDT                    this procedure



                                                                 are in the



                                                                 results



                                                                 section.

 

             CMV QUANT PCR AM           2022 6:19              Results for



                                       AM CDT                    this procedure



                                                                 are in the



                                                                 results



                                                                 section.

 

             LACTATE DEHYDROGENASE AM           2022 6:19              Res

ults for



                                       AM CDT                    this procedure



                                                                 are in the



                                                                 results



                                                                 section.

 

             URIC ACID    AM           2022 6:19              Results for



                                       AM CDT                    this procedure



                                                                 are in the



                                                                 results



                                                                 section.

 

             MAGNESIUM LEVEL AM           2022 6:19              Results f

or



                                       AM CDT                    this procedure



                                                                 are in the



                                                                 results



                                                                 section.

 

             PHOSPHORUS LEVEL AM           2022 6:19              Results 

for



                                       AM CDT                    this procedure



                                                                 are in the



                                                                 results



                                                                 section.

 

             COMPREHENSIVE AM           2022 6:19              



             METABOLIC PANEL              AM CDT                    

 

             COMPLETE BLOOD COUNT AM           2022 6:19              



             W/ DIFFERENTIAL              AM CDT                    

 

             BLOODCULTURE AM           2022 6:19              Results for



                                       AM CDT                    this procedure



                                                                 are in the



                                                                 results



                                                                 section.

 

             EKG, 12-LEAD Routine      2022                



             (PORTABLE)                                          

 

             MANUAL DIFFERENTIAL AM           2022 5:23              Resul

ts for



                                       AM CDT                    this procedure



                                                                 are in the



                                                                 results



                                                                 section.

 

             Results CBC  AM           2022 5:23              Results for



                                       AM CDT                    this procedure



                                                                 are in the



                                                                 results



                                                                 section.

 

             CALCIUM LEVEL TOTAL Routine      2022 5:23              Resul

ts for



                                       AM CDT                    this procedure



                                                                 are in the



                                                                 results



                                                                 section.

 

             .GLOMERULAR FILTRATION Routine      2022 5:23              Re

sults for



             RATE                      AM CDT                    this procedure



                                                                 are in the



                                                                 results



                                                                 section.

 

             SERUM CREATININE Routine      2022 5:23              Results 

for



                                       AM CDT                    this procedure



                                                                 are in the



                                                                 results



                                                                 section.

 

             ELECTROLYTE PANEL Routine      2022 5:23              Results

 for



                                       AM CDT                    this procedure



                                                                 are in the



                                                                 results



                                                                 section.

 

             BLOOD UREA NITROGEN Routine      2022 5:23              Resul

ts for



                                       AM CDT                    this procedure



                                                                 are in the



                                                                 results



                                                                 section.

 

             GLUCOSE LEVEL Routine      2022 5:23              Results for



                                       AM CDT                    this procedure



                                                                 are in the



                                                                 results



                                                                 section.

 

             VANCOMYCIN LEVEL Timed Study  2022 5:23              Results 

for



             TROUGH                    AM CDT                    this procedure



                                                                 are in the



                                                                 results



                                                                 section.

 

             BASIC METABOLIC PANEL, Routine      2022 5:23              



             CALCIUM TOTAL              AM CDT                    

 

             COMPLETE BLOOD COUNT AM           2022 5:23              



             W/ DIFFERENTIAL              AM CDT                    

 

             BLOODCULTURE AM           2022 5:23              Results for



                                       AM CDT                    this procedure



                                                                 are in the



                                                                 results



                                                                 section.

 

             VRE CULTURE  Routine      2022 6:04              Results for



                                       PM CDT                    this procedure



                                                                 are in the



                                                                 results



                                                                 section.

 

             TMP INTERPRETATION Routine      2022 1:14              Result

s for



             ANTIBODY SCREEN              AM CDT                    this procedu

re



             NEGATIVE                                            are in the



                                                                 results



                                                                 section.

 

             CLOT EXPIRATION DATE Routine      2022 1:14              Resu

lts for



                                       AM CDT                    this procedure



                                                                 are in the



                                                                 results



                                                                 section.

 

             MANUAL DIFFERENTIAL AM           2022 1:14              Resul

ts for



                                       AM CDT                    this procedure



                                                                 are in the



                                                                 results



                                                                 section.

 

             Results CBC  AM           2022 1:14              Results for



                                       AM CDT                    this procedure



                                                                 are in the



                                                                 results



                                                                 section.

 

             CALCIUM LEVEL TOTAL Routine      2022 1:14              Resul

ts for



                                       AM CDT                    this procedure



                                                                 are in the



                                                                 results



                                                                 section.

 

             .GLOMERULAR FILTRATION Routine      2022 1:14              Re

sults for



             RATE                      AM CDT                    this procedure



                                                                 are in the



                                                                 results



                                                                 section.

 

             SERUM CREATININE Routine      2022 1:14              Results 

for



                                       AM CDT                    this procedure



                                                                 are in the



                                                                 results



                                                                 section.

 

             ELECTROLYTE PANEL Routine      2022 1:14              Results

 for



                                       AM CDT                    this procedure



                                                                 are in the



                                                                 results



                                                                 section.

 

             BLOOD UREA NITROGEN Routine      2022 1:14              Resul

ts for



                                       AM CDT                    this procedure



                                                                 are in the



                                                                 results



                                                                 section.

 

             GLUCOSE LEVEL Routine      2022 1:14              Results for



                                       AM CDT                    this procedure



                                                                 are in the



                                                                 results



                                                                 section.

 

             ANTIBODY SCREEN Routine      2022 1:14              Results f

or



                                       AM CDT                    this procedure



                                                                 are in the



                                                                 results



                                                                 section.

 

             ABORH        Routine      2022 1:14              Results for



                                       AM CDT                    this procedure



                                                                 are in the



                                                                 results



                                                                 section.

 

             TYPE AND SCREEN Routine      2022 1:14              



                                       AM CDT                    

 

             BASIC METABOLIC PANEL, Routine      2022 1:14              



             CALCIUM TOTAL              AM CDT                    

 

             COMPLETE BLOOD COUNT AM           2022 1:14              



             W/ DIFFERENTIAL              AM CDT                    

 

             TOBRAMYCIN LEVEL Timed Study  2022 1:14              Results 

for



             RANDOM                    AM CDT                    this procedure



                                                                 are in the



                                                                 results



                                                                 section.

 

             BLOODCULTURE AM           2022 1:14              Results for



                                       AM CDT                    this procedure



                                                                 are in the



                                                                 results



                                                                 section.

 

             OR RMVL GISELE CVC W/O Routine      2022   Myelodysplastic Resul

ts for



             SUBQ PORT/              10:26 AM CDT syndrome     this procedure



                                                                 are in the



                                                                 results



                                                                 section.

 

             CALCIUM LEVEL TOTAL Timed Study  2022 3:30              Resul

ts for



                                       AM CDT                    this procedure



                                                                 are in the



                                                                 results



                                                                 section.

 

             .GLOMERULAR FILTRATION Timed Study  2022 3:30              Re

sults for



             RATE                      AM CDT                    this procedure



                                                                 are in the



                                                                 results



                                                                 section.

 

             SERUM CREATININE Timed Study  2022 3:30              Results 

for



                                       AM CDT                    this procedure



                                                                 are in the



                                                                 results



                                                                 section.

 

             ELECTROLYTE PANEL Timed Study  2022 3:30              Results

 for



                                       AM CDT                    this procedure



                                                                 are in the



                                                                 results



                                                                 section.

 

             BLOOD UREA NITROGEN Timed Study  2022 3:30              Resul

ts for



                                       AM CDT                    this procedure



                                                                 are in the



                                                                 results



                                                                 section.

 

             GLUCOSE LEVEL Timed Study  2022 3:30              Results for



                                       AM CDT                    this procedure



                                                                 are in the



                                                                 results



                                                                 section.

 

             MANUAL DIFFERENTIAL Timed Study  2022 3:30              Resul

ts for



                                       AM CDT                    this procedure



                                                                 are in the



                                                                 results



                                                                 section.

 

             Results CBC  Timed Study  2022 3:30              Results for



                                       AM CDT                    this procedure



                                                                 are in the



                                                                 results



                                                                 section.

 

             PHOSPHORUS LEVEL Timed Study  2022 3:30              Results 

for



                                       AM CDT                    this procedure



                                                                 are in the



                                                                 results



                                                                 section.

 

             MAGNESIUM LEVEL Timed Study  2022 3:30              Results f

or



                                       AM CDT                    this procedure



                                                                 are in the



                                                                 results



                                                                 section.

 

             BASIC METABOLIC PANEL, Timed Study  2022 3:30              



             CALCIUM TOTAL              AM CDT                    

 

             COMPLETE BLOOD COUNT Timed Study  2022 3:30              



             W/ DIFFERENTIAL              AM CDT                    

 

             BLOODCULTURE Now          2022 3:30              Results for



                                       AM CDT                    this procedure



                                                                 are in the



                                                                 results



                                                                 section.

 

             CLOT EXPIRATION DATE Routine      2022 3:00              Resu

lts for



                                       AM CDT                    this procedure



                                                                 are in the



                                                                 results



                                                                 section.

 

             TMP INTERPRETATION Routine      2022 3:00              Result

s for



             ANTIBODY SCREEN              AM CDT                    this procedu

re



             NEGATIVE                                            are in the



                                                                 results



                                                                 section.

 

             ANTIBODY SCREEN Routine      2022 3:00 Chronic      Results f

or



                                       AM CDT       [id number] this proce

dure



                                                                disease



                                        are in the



                                                    Complication of stem results



                                                    cell transplant section.

 

             ABORH        Routine      2022 3:00 Chronic      Results for



                                       AM CDT       [id number]-ojfiwv-nkga this proce

dure



                                                                disease



                                        are in the



                                                    Complication of stem results



                                                    cell transplant section.

 

             FRACTIONATED BILIRUBIN Routine      2022 3:00 Chronic      Re

sults for



                                       AM CDT       bvbrg-inutxb-mien this proce

dure



                                                                disease



                                        are in the



                                                    Complication of stem results



                                                    cell transplant section.

 

             TOTAL PROTEIN Routine      2022 3:00 Chronic      Results for



                                       AM CDT       yzwnq-iyjxye-telk this proce

dure



                                                                disease



                                        are in the



                                                    Complication of stem results



                                                    cell transplant section.

 

             ASPARTATE    Routine      2022 3:00 Chronic      Results for



             AMINOTRANSFERASE              AM CDT       jrdnd-qnyorb-itjq this p

rocedure



                                                                disease



                                        are in the



                                                    Complication of stem results



                                                    cell transplant section.

 

             ALANINE      Routine      2022 3:00 Chronic      Results for



             AMINOTRANSFERASE              AM CDT       oktya-prlrqc-xevl this p

rocedure



                                                                disease



                                        are in the



                                                    Complication of stem results



                                                    cell transplant section.

 

             ALKALINE PHOSPHATASE Routine      2022 3:00 Chronic      Resu

lts for



                                       AM CDT       mthpv-hwsexz-qgjl this proce

dure



                                                                disease



                                        are in the



                                                    Complication of stem results



                                                    cell transplant section.

 

             ALBUMIN LEVEL Routine      2022 3:00 Chronic      Results for



                                       AM CDT       [id number]-tlheir-ydgu this proce

dure



                                                                disease



                                        are in the



                                                    Complication of stem results



                                                    cell transplant section.

 

             CALCIUM LEVEL TOTAL Routine      2022 3:00 Chronic      Resul

ts for



                                       AM CDT       vxxyp-yprbti-nleb this proce

dure



                                                                disease



                                        are in the



                                                    Complication of stem results



                                                    cell transplant section.

 

             .GLOMERULAR FILTRATION Routine      2022 3:00 Chronic      Re

sults for



             RATE                      AM CDT       fnrxu-hazllk-bpzn this proce

dure



                                                                disease



                                        are in the



                                                    Complication of stem results



                                                    cell transplant section.

 

             SERUM CREATININE Routine      2022 3:00 Chronic      Results 

for



                                       AM CDT       ryvfp-cbrziq-lsji this proce

dure



                                                                disease



                                        are in the



                                                    Complication of stem results



                                                    cell transplant section.

 

             ELECTROLYTE PANEL Routine      2022 3:00 Chronic      Results

 for



                                       AM CDT       zfpep-scuqoe-qkxz this proce

dure



                                                                disease



                                        are in the



                                                    Complication of stem results



                                                    cell transplant section.

 

             BLOOD UREA NITROGEN Routine      2022 3:00 Chronic      Resul

ts for



                                       AM CDT       nmuqh-erlzgs-nscz this proce

dure



                                                                disease



                                        are in the



                                                    Complication of stem results



                                                    cell transplant section.

 

             GLUCOSE LEVEL Routine      2022 3:00 Chronic      Results for



                                       AM CDT       [id number]-[id number] this proce

dure



                                                                disease



                                        are in the



                                                    Complication of stem results



                                                    cell transplant section.

 

             MANUAL DIFFERENTIAL Routine      2022 3:00 Chronic      Resul

ts for



                                       AM CDT       [id number]-[id number] this proce

dure



                                                                disease



                                        are in the



                                                    Complication of stem results



                                                    cell transplant section.

 

             Results CBC  Routine      2022 3:00 Chronic      Results for



                                       AM CDT       pusfm-gtesqu-ozag this proce

dure



                                                                disease



                                        are in the



                                                    Complication of stem results



                                                    cell transplant section.

 

             TYPE AND SCREEN Routine      2022 3:00 Chronic      



                                       AM CDT       frkze-vskplh-umbe 



                                                                disease



                                        



                                                    Complication of stem 



                                                    cell transplant 

 

             MAGNESIUM LEVEL Routine      2022 3:00 Chronic      Results f

or



                                       AM CDT       ucdfs-kfsaun-[id number] this proce

dure



                                                                disease



                                        are in the



                                                    Complication of stem results



                                                    cell transplant section.

 

             COMPREHENSIVE Routine      2022 3:00 Chronic      



             METABOLIC PANEL              AM CDT       qkwsd-rgeijx-dkba 



                                                                disease



                                        



                                                    Complication of stem 



                                                    cell transplant 

 

             COMPLETE BLOOD COUNT Routine      2022 3:00 Chronic      



             W/ DIFFERENTIAL              AM CDT       myqhe-xtzxrh-jncz 



                                                                disease



                                        



                                                    Complication of stem 



                                                    cell transplant 

 

             BLOODCULTURE Now          2022 3:00              Results for



                                       AM CDT                    this procedure



                                                                 are in the



                                                                 results



                                                                 section.

 

             URINALYSIS MICROSCOPIC Routine      2022                Resul

ts for



                                       11:07 PM CDT              this procedure



                                                                 are in the



                                                                 results



                                                                 section.

 

             URINALYSIS WITH Now          2022                Results for



             MICROSCOPIC IF              11:07 PM CDT              this procedur

e



             INDICATED                                           are in the



                                                                 results



                                                                 section.

 

             URINE CULTURE Now          2022                Results for



                                       11:07 PM CDT              this procedure



                                                                 are in the



                                                                 results



                                                                 section.

 

             POC VENOUS BLOOD GAS + Routine      2022 5:10              Re

sults for



             LACTATE                   PM CDT                    this procedure



                                                                 are in the



                                                                 results



                                                                 section.

 

             XR CHEST 1 VW Routine      2022 5:00              Results for



                                       PM CDT                    this procedure



                                                                 are in the



                                                                 results



                                                                 section.

 

             BLOODCULTURE Now          2022 4:47              Results for



                                       PM CDT                    this procedure



                                                                 are in the



                                                                 results



                                                                 section.

 

             FRACTIONATED BILIRUBIN Now          2022 4:46              Re

sults for



                                       PM CDT                    this procedure



                                                                 are in the



                                                                 results



                                                                 section.

 

             TOTAL PROTEIN Now          2022                Results for



                                       4:46 PM CDT               this procedure



                                                                 are in the



                                                                 results



                                                                 section.

 

             ASPARTATE    Now          2022 4:46              Results for



             AMINOTRANSFERASE              PM CDT                    this proced

ure



                                                                 are in the



                                                                 results



                                                                 section.

 

             ALANINE      Now          2022 4:46              Results for



             AMINOTRANSFERASE              PM CDT                    this proced

ure



                                                                 are in the



                                                                 results



                                                                 section.

 

             ALKALINE PHOSPHATASE Now          2022 4:46              Resu

lts for



                                       PM CDT                    this procedure



                                                                 are in the



                                                                 results



                                                                 section.

 

             ALBUMIN LEVEL Now          2022 4:46              Results for



                                       PM CDT                    this procedure



                                                                 are in the



                                                                 results



                                                                 section.

 

             CALCIUM LEVEL TOTAL Now          2022 4:46              Resul

ts for



                                       PM CDT                    this procedure



                                                                 are in the



                                                                 results



                                                                 section.

 

             .GLOMERULAR FILTRATION Now          2022 4:46              Re

sults for



             RATE                      PM CDT                    this procedure



                                                                 are in the



                                                                 results



                                                                 section.

 

             SERUM CREATININE Now          2022 4:46              Results 

for



                                       PM CDT                    this procedure



                                                                 are in the



                                                                 results



                                                                 section.

 

             ELECTROLYTE PANEL Now          2022 4:46              Results

 for



                                       PM CDT                    this procedure



                                                                 are in the



                                                                 results



                                                                 section.

 

             BLOOD UREA NITROGEN Now          2022 4:46              Resul

ts for



                                       PM CDT                    this procedure



                                                                 are in the



                                                                 results



                                                                 section.

 

             GLUCOSE LEVEL Now          2022 4:46              Results for



                                       PM CDT                    this procedure



                                                                 are in the



                                                                 results



                                                                 section.

 

             MANUAL DIFFERENTIAL STAT         2022 4:46              Resul

ts for



                                       PM CDT                    this procedure



                                                                 are in the



                                                                 results



                                                                 section.

 

             Results CBC  STAT         2022 4:46              Results for



                                       PM CDT                    this procedure



                                                                 are in the



                                                                 results



                                                                 section.

 

             PROCALCITONIN Now          2022 4:46              Results for



                                       PM CDT                    this procedure



                                                                 are in the



                                                                 results



                                                                 section.

 

             C REACTIVE PROTEIN Now          2022 4:46              Result

s for



                                       PM CDT                    this procedure



                                                                 are in the



                                                                 results



                                                                 section.

 

             LACTATE DEHYDROGENASE Now          2022 4:46              Res

ults for



                                       PM CDT                    this procedure



                                                                 are in the



                                                                 results



                                                                 section.

 

             PHOSPHORUS LEVEL Now          2022 4:46              Results 

for



                                       PM CDT                    this procedure



                                                                 are in the



                                                                 results



                                                                 section.

 

             MAGNESIUM LEVEL Now          2022 4:46              Results f

or



                                       PM CDT                    this procedure



                                                                 are in the



                                                                 results



                                                                 section.

 

             COMPREHENSIVE Now          2022 4:46              



             METABOLIC PANEL              PM CDT                    

 

             COMPLETE BLOOD COUNT Now          2022 4:46              



             W/ DIFFERENTIAL              PM CDT                    

 

             RESPIRATORY VIRAL Now          2022 4:46              Results

 for



             MULTIPLEX PCR PANEL,              PM CDT                    this pr

ocedure



             NASOPHARYNGEAL SWAB                                        are in t

he



                                                                 results



                                                                 section.

 

             CT HEAD WO CONTRAST Routine      2022 4:28              Resul

ts for



                                       PM CDT                    this procedure



                                                                 are in the



                                                                 results



                                                                 section.

 

             BLOODCULTURE Routine      2022   Chronic      Results for



                                       11:47 AM CDT qevfi-pwwgqq-gsgd this proce

dure



                                                                disease



                                        are in the



                                                    Complication of stem results



                                                    cell transplant section.

 

             CLOT EXPIRATION DATE Routine      2022                Results

 for



                                       10:23 AM CDT              this procedure



                                                                 are in the



                                                                 results



                                                                 section.

 

             TMP INTERPRETATION Routine      2022                Results f

or



             ANTIBODY SCREEN              10:23 AM CDT              this procedu

re



             NEGATIVE                                            are in the



                                                                 results



                                                                 section.

 

             ANTIBODY SCREEN Routine      2022   Acute myeloblastic Result

s for



                                       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

cedure



                                                                achieved remissi

on



                                        are in the



                                                    Status post stem cell result

s



                                                                transplant



                                        section.



                                                    Zswkb-ghiwkt-hvku 



                                                    disease, not otherwise 



                                                    specified    

 

             ABORH        Routine      2022   Acute myeloblastic Results f

or



                                       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

cedure



                                                                achieved remissi

on



                                        are in the



                                                    Status post stem cell result

s



                                                                transplant



                                        section.



                                                    [id number]-[id number] 



                                                    disease, not otherwise 



                                                    specified    

 

             FRACTIONATED BILIRUBIN Routine      2022   Acute myeloblastic

 Results for



                                       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

cedure



                                                                achieved remissi

on



                                        are in the



                                                    Status post stem cell result

s



                                                                transplant



                                        section.



                                                    Qzwac-prkdhj-uell 



                                                    disease, not otherwise 



                                                    specified    

 

             TOTAL PROTEIN Routine      2022   Acute myeloblastic Results 

for



                                       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

cedure



                                                                achieved remissi

on



                                        are in the



                                                    Status post stem cell result

s



                                                                transplant



                                        section.



                                                    Cfvcf-crxmhw-[id number] 



                                                    disease, not otherwise 



                                                    specified    

 

             ASPARTATE    Routine      2022   Acute myeloblastic Results f

or



             AMINOTRANSFERASE              10:23 AM CDT leukemia not having this

 procedure



                                                                achieved remissi

on



                                        are in the



                                                    Status post stem cell result

s



                                                                transplant



                                        section.



                                                    Kowws-ycbdnj-knci 



                                                    disease, not otherwise 



                                                    specified    

 

             ALANINE      Routine      2022   Acute myeloblastic Results f

or



             AMINOTRANSFERASE              10:23 AM CDT leukemia not having this

 procedure



                                                                achieved remissi

on



                                        are in the



                                                    Status post stem cell result

s



                                                                transplant



                                        section.



                                                    Jiseq-jesfbp-mazx 



                                                    disease, not otherwise 



                                                    specified    

 

             ALKALINE PHOSPHATASE Routine      2022   Acute myeloblastic R

esults for



                                       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

cedure



                                                                achieved remissi

on



                                        are in the



                                                    Status post stem cell result

s



                                                                transplant



                                        section.



                                                    Oljqm-ksiwdu-zkou 



                                                    disease, not otherwise 



                                                    specified    

 

             ALBUMIN LEVEL Routine      2022   Acute myeloblastic Results 

for



                                       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

cedure



                                                                achieved remissi

on



                                        are in the



                                                    Status post stem cell result

s



                                                                transplant



                                        section.



                                                    Vwycu-dzztzy-eaaj 



                                                    disease, not otherwise 



                                                    specified    

 

             CALCIUM LEVEL TOTAL Routine      2022   Acute myeloblastic Re

sults for



                                       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

cedure



                                                                achieved remissi

on



                                        are in the



                                                    Status post stem cell result

s



                                                                transplant



                                        section.



                                                    Xzxuk-pnziwp-[id number] 



                                                    disease, not otherwise 



                                                    specified    

 

             .GLOMERULAR FILTRATION Routine      2022   Acute myeloblastic

 Results for



             RATE                      10:23 AM CDT leukemia not having this pro

cedure



                                                                achieved remissi

on



                                        are in the



                                                    Status post stem cell result

s



                                                                transplant



                                        section.



                                                    Fwnrs-ghzzsv-ricf 



                                                    disease, not otherwise 



                                                    specified    

 

             SERUM CREATININE Routine      2022   Acute myeloblastic Resul

ts for



                                       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

cedure



                                                                achieved remissi

on



                                        are in the



                                                    Status post stem cell result

s



                                                                transplant



                                        section.



                                                    Owlpt-svpxge-mbeh 



                                                    disease, not otherwise 



                                                    specified    

 

             ELECTROLYTE PANEL Routine      2022   Acute myeloblastic Resu

lts for



                                       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

cedure



                                                                achieved remissi

on



                                        are in the



                                                    Status post stem cell result

s



                                                                transplant



                                        section.



                                                    Afgcy-cwxuge-ptpg 



                                                    disease, not otherwise 



                                                    specified    

 

             BLOOD UREA NITROGEN Routine      2022   Acute myeloblastic Re

sults for



                                       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

cedure



                                                                achieved remissi

on



                                        are in the



                                                    Status post stem cell result

s



                                                                transplant



                                        section.



                                                    Qpglx-caklrh-prnj 



                                                    disease, not otherwise 



                                                    specified    

 

             GLUCOSE LEVEL Routine      2022   Acute myeloblastic Results 

for



                                       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

cedure



                                                                achieved remissi

on



                                        are in the



                                                    Status post stem cell result

s



                                                                transplant



                                        section.



                                                    Tlrat-xrwcei-biee 



                                                    disease, not otherwise 



                                                    specified    

 

             MANUAL DIFFERENTIAL Routine      2022   Acute myeloblastic Re

sults for



                                       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

cedure



                                                                achieved remissi

on



                                        are in the



                                                    Status post stem cell result

s



                                                                transplant



                                        section.



                                                    Qijmp-wcbyud-ayzs 



                                                    disease, not otherwise 



                                                    specified    

 

             Results CBC  Routine      2022   Acute myeloblastic Results f

or



                                       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

cedure



                                                                achieved remissi

on



                                        are in the



                                                    Status post stem cell result

s



                                                                transplant



                                        section.



                                                    Xzzmy-dlasmg-dbct 



                                                    disease, not otherwise 



                                                    specified    

 

             LIPID PANEL  Routine      2022   Acute myeloblastic Results f

or



                                       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

cedure



                                                                achieved remissi

on



                                        are in the



                                                    Status post stem cell result

s



                                                                transplant



                                        section.



                                                    Pelnr-smkiet-mmul 



                                                    disease, not otherwise 



                                                    specified    

 

             TACROLIMUS LEVEL Routine      2022   Acute myeloblastic Resul

ts for



                                       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

cedure



                                                                achieved remissi

on



                                        are in the



                                                    Status post stem cell result

s



                                                                transplant



                                        section.



                                                    Mhlmr-sjkggc-gufm 



                                                    disease, not otherwise 



                                                    specified    

 

             CMV QUANT PCR Routine      2022   Acute myeloblastic Results 

for



                                       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

cedure



                                                                achieved remissi

on



                                        are in the



                                                    Status post stem cell result

s



                                                                transplant



                                        section.



                                                    Qaqcx-pbftmw-gqru 



                                                    disease, not otherwise 



                                                    specified    

 

             MAGNESIUM LEVEL Routine      2022   Acute myeloblastic Result

s for



                                       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

cedure



                                                                achieved remissi

on



                                        are in the



                                                    Status post stem cell result

s



                                                                transplant



                                        section.



                                                    Ddqgg-baagho-ehcj 



                                                    disease, not otherwise 



                                                    specified    

 

             LACTATE DEHYDROGENASE Routine      2022   Acute myeloblastic 

Results for



                                       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

cedure



                                                                achieved remissi

on



                                        are in the



                                                    Status post stem cell result

s



                                                                transplant



                                        section.



                                                    Hbuwb-psqytp-ybym 



                                                    disease, not otherwise 



                                                    specified    

 

             URIC ACID    Routine      2022   Acute myeloblastic Results f

or



                                       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

cedure



                                                                achieved remissi

on



                                        are in the



                                                    Status post stem cell result

s



                                                                transplant



                                        section.



                                                    Hehyf-krrfhz-qssa 



                                                    disease, not otherwise 



                                                    specified    

 

             PHOSPHORUS LEVEL Routine      2022   Acute myeloblastic Resul

ts for



                                       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

cedure



                                                                achieved remissi

on



                                        are in the



                                                    Status post stem cell result

s



                                                                transplant



                                        section.



                                                    Rzvvz-glbula-loyr 



                                                    disease, not otherwise 



                                                    specified    

 

             COMPREHENSIVE Routine      2022   Acute myeloblastic 



             METABOLIC PANEL              10:23 AM CDT leukemia not having 



                                                                achieved remissi

on



                                        



                                                    Status post stem cell 



                                                                transplant



                                        



                                                    Mbhub-shtmbf-xcxj 



                                                    disease, not otherwise 



                                                    specified    

 

             TYPE AND SCREEN Routine      2022   Acute myeloblastic 



                                       10:23 AM CDT leukemia not having 



                                                                achieved remissi

on



                                        



                                                    Status post stem cell 



                                                                transplant



                                        



                                                    Cpyfi-mvoatm-xdbl 



                                                    disease, not otherwise 



                                                    specified    

 

             COMPLETE BLOOD COUNT Routine      2022   Acute myeloblastic 



             W/ DIFFERENTIAL              10:23 AM CDT leukemia not having 



                                                                achieved remissi

on



                                        



                                                    Status post stem cell 



                                                                transplant



                                        



                                                    Upxsc-sqayiq-mbpt 



                                                    disease, not otherwise 



                                                    specified    

 

             ADENOVIRUS   Routine      2022   Acute myeloblastic Results f

or



             QUANTITATIVE, PLASMA              10:23 AM CDT leukemia not having 

this procedure



                                                                achieved remissi

on



                                        are in the



                                                    Status post stem cell result

s



                                                                transplant



                                        section.



                                                    Nshdt-ugdyaj-ffia 



                                                    disease, not otherwise 



                                                    specified    

 

             HHV6 QUANT, PLASMA Routine      2022   Acute myeloblastic Res

ults for



                                       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

cedure



                                                                achieved remissi

on



                                        are in the



                                                    Status post stem cell result

s



                                                                transplant



                                        section.



                                                    Fqmdj-ohccew-qvnd 



                                                    disease, not otherwise 



                                                    specified    

 

             CLOT EXPIRATION DATE Routine      04/15/2022                Results

 for



                                       10:16 AM CDT              this procedure



                                                                 are in the



                                                                 results



                                                                 section.

 

             TMP INTERPRETATION Routine      04/15/2022                Results f

or



             ANTIBODY SCREEN              10:16 AM CDT              this procedu

re



             NEGATIVE                                            are in the



                                                                 results



                                                                 section.

 

             ANTIBODY SCREEN Routine      04/15/2022   Chronic      Results for



                                       10:16 AM CDT upakp-raswpi-iwbu this proce

dure



                                                                disease



                                        are in the



                                                    Status post stem cell result

s



                                                    transplant   section.

 

             ABORH        Routine      04/15/2022   Chronic      Results for



                                       10:16 AM CDT pnnkx-lecmku-xwhc this proce

dure



                                                                disease



                                        are in the



                                                    Status post stem cell result

s



                                                    transplant   section.

 

             FRACTIONATED BILIRUBIN Routine      04/15/2022   Chronic      Resul

ts for



                                       10:16 AM CDT argry-vgtrjw-qlkf this proce

dure



                                                                disease



                                        are in the



                                                    Status post stem cell result

s



                                                    transplant   section.

 

             TOTAL PROTEIN Routine      04/15/2022   Chronic      Results for



                                       10:16 AM CDT dyomm-guckzo-sfnz this proce

dure



                                                                disease



                                        are in the



                                                    Status post stem cell result

s



                                                    transplant   section.

 

             ASPARTATE    Routine      04/15/2022   Chronic      Results for



             AMINOTRANSFERASE              10:16 AM CDT luady-hgjiqp-fbos this p

rocedure



                                                                disease



                                        are in the



                                                    Status post stem cell result

s



                                                    transplant   section.

 

             ALANINE      Routine      04/15/2022   Chronic      Results for



             AMINOTRANSFERASE              10:16 AM CDT vmgdo-vnieqa-xkpk this p

rocedure



                                                                disease



                                        are in the



                                                    Status post stem cell result

s



                                                    transplant   section.

 

             ALKALINE PHOSPHATASE Routine      04/15/2022   Chronic      Results

 for



                                       10:16 AM CDT dnilg-gzyqyx-uffs this proce

dure



                                                                disease



                                        are in the



                                                    Status post stem cell result

s



                                                    transplant   section.

 

             ALBUMIN LEVEL Routine      04/15/2022   Chronic      Results for



                                       10:16 AM CDT lmrxt-botukr-iuxo this proce

dure



                                                                disease



                                        are in the



                                                    Status post stem cell result

s



                                                    transplant   section.

 

             CALCIUM LEVEL TOTAL Routine      04/15/2022   Chronic      Results 

for



                                       10:16 AM CDT xcscs-yzfazy-igye this proce

dure



                                                                disease



                                        are in the



                                                    Status post stem cell result

s



                                                    transplant   section.

 

             .GLOMERULAR FILTRATION Routine      04/15/2022   Chronic      Resul

ts for



             RATE                      10:16 AM CDT qwllt-mqbgpt-ywje this proce

dure



                                                                disease



                                        are in the



                                                    Status post stem cell result

s



                                                    transplant   section.

 

             SERUM CREATININE Routine      04/15/2022   Chronic      Results for



                                       10:16 AM CDT hdsil-dgybnb-eyvf this proce

dure



                                                                disease



                                        are in the



                                                    Status post stem cell result

s



                                                    transplant   section.

 

             ELECTROLYTE PANEL Routine      04/15/2022   Chronic      Results fo

r



                                       10:16 AM CDT vjkmq-oayfdr-egqr this proce

dure



                                                                disease



                                        are in the



                                                    Status post stem cell result

s



                                                    transplant   section.

 

             BLOOD UREA NITROGEN Routine      04/15/2022   Chronic      Results 

for



                                       10:16 AM CDT cteyl-gsqhve-grip this proce

dure



                                                                disease



                                        are in the



                                                    Status post stem cell result

s



                                                    transplant   section.

 

             GLUCOSE LEVEL Routine      04/15/2022   Chronic      Results for



                                       10:16 AM CDT qxerq-vtboyv-zvrm this proce

dure



                                                                disease



                                        are in the



                                                    Status post stem cell result

s



                                                    transplant   section.

 

             MANUAL DIFFERENTIAL Routine      04/15/2022   Chronic      Results 

for



                                       10:16 AM CDT rcaig-enioaa-drot this proce

dure



                                                                disease



                                        are in the



                                                    Status post stem cell result

s



                                                    transplant   section.

 

             Results CBC  Routine      04/15/2022   Chronic      Results for



                                       10:16 AM CDT oozbt-ozdhnc-kbon this proce

dure



                                                                disease



                                        are in the



                                                    Status post stem cell result

s



                                                    transplant   section.

 

             MAGNESIUM LEVEL Routine      04/15/2022   Chronic      Results for



                                       10:16 AM CDT mvmbl-vlbbjv-tdio this proce

dure



                                                                disease



                                        are in the



                                                    Status post stem cell result

s



                                                    transplant   section.

 

             LACTATE DEHYDROGENASE Routine      04/15/2022   Chronic      Result

s for



                                       10:16 AM CDT kwusy-ldvfls-otin this proce

dure



                                                                disease



                                        are in the



                                                    Status post stem cell result

s



                                                    transplant   section.

 

             COMPREHENSIVE Routine      04/15/2022   Chronic      



             METABOLIC PANEL              10:16 AM CDT dlkto-doxtfw-phly 



                                                                disease



                                        



                                                    Status post stem cell 



                                                    transplant   

 

             TYPE AND SCREEN Routine      04/15/2022   Chronic      



                                       10:16 AM CDT llqqr-kokork-rfam 



                                                                disease



                                        



                                                    Status post stem cell 



                                                    transplant   

 

             COMPLETE BLOOD COUNT Routine      04/15/2022   Chronic      



             W/ DIFFERENTIAL              10:16 AM CDT fhhgf-vnxpfj-kohd 



                                                                disease



                                        



                                                    Status post stem cell 



                                                    transplant   

 

             CLOT EXPIRATION DATE Routine      2022                Results

 for



                                       10:27 AM CDT              this procedure



                                                                 are in the



                                                                 results



                                                                 section.

 

             TMP INTERPRETATION Routine      2022                Results f

or



             ANTIBODY SCREEN              10:27 AM CDT              this procedu

re



             NEGATIVE                                            are in the



                                                                 results



                                                                 section.

 

             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 

Results for



                                                10:27 AM CDT    disease



                                        this procedure



                                                    Chronic      are in the



                                                    tzufw-cvdhhb-ycyp results



                                                    disease      section.

 

             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

or



                                                10:27 AM CDT    disease



                                        this procedure



                                                    Chronic      are in the



                                                    ldbod-fgtdzm-bcai results



                                                    disease      section.

 

             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

r



                AMINOTRANSFERASE                 10:27 AM CDT    disease



                                        this procedure



                                                    Chronic      are in the



                                                    rydqw-kofsgd-wnip results



                                                    disease      section.

 

             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

r



                AMINOTRANSFERASE                 10:27 AM CDT    disease



                                        this procedure



                                                    Chronic      are in the



                                                    wgpwx-zizjky-jzla results



                                                    disease      section.

 

             ALKALINE PHOSPHATASE Routine      2022   Graft versus host Re

sults for



                                                10:27 AM CDT    disease



                                        this procedure



                                                    Chronic      are in the



                                                    rsubs-bhxugc-ofwk results



                                                    disease      section.

 

             ALBUMIN LEVEL Routine      2022   Graft versus host Results f

or



                                                10:27 AM CDT    disease



                                        this procedure



                                                    Chronic      are in the



                                                    abrel-gjhdmm-qyxa results



                                                    disease      section.

 

             CALCIUM LEVEL TOTAL Routine      2022   Graft versus host Res

ults for



                                                10:27 AM CDT    disease



                                        this procedure



                                                    Chronic      are in the



                                                    dnxmj-khbrgf-jaod results



                                                    disease      section.

 

             .GLOMERULAR FILTRATION Routine      2022   Graft versus host 

Results for



                RATE                            10:27 AM CDT    disease



                                        this procedure



                                                    Chronic      are in the



                                                    rseca-ctmokk-ucxz results



                                                    disease      section.

 

             SERUM CREATININE Routine      2022   Graft versus host Result

s for



                                                10:27 AM CDT    disease



                                        this procedure



                                                    Chronic      are in the



                                                    hwedp-jwwrnn-muvg results



                                                    disease      section.

 

             ANTIBODY SCREEN Routine      2022   Graft versus host Results

 for



                                                10:27 AM CDT    disease



                                        this procedure



                                                    Chronic      are in the



                                                    lcgbb-bbcykg-neic results



                                                    disease      section.

 

             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

r



                                                10:27 AM CDT    disease



                                        this procedure



                                                    Chronic      are in the



                                                    stsow-rbcafv-yjfn results



                                                    disease      section.

 

             ELECTROLYTE PANEL Routine      2022   Graft versus host Resul

ts for



                                                10:27 AM CDT    disease



                                        this procedure



                                                    Chronic      are in the



                                                    fiwbp-hidogp-oftk results



                                                    disease      section.

 

             BLOOD UREA NITROGEN Routine      2022   Graft versus host Res

ults for



                                                10:27 AM CDT    disease



                                        this procedure



                                                    Chronic      are in the



                                                    xdosq-qkuamc-gtue results



                                                    disease      section.

 

             GLUCOSE LEVEL Routine      2022   Graft versus host Results f

or



                                                10:27 AM CDT    disease



                                        this procedure



                                                    Chronic      are in the



                                                    razqb-iozqoj-xola results



                                                    disease      section.

 

             MANUAL DIFFERENTIAL Routine      2022   Graft versus host Res

ults for



                                                10:27 AM CDT    disease



                                        this procedure



                                                    Chronic      are in the



                                                    kvvli-kzoiwg-gzhi results



                                                    disease      section.

 

             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

r



                                                10:27 AM CDT    disease



                                        this procedure



                                                    Chronic      are in the



                                                    dzkdp-nbaody-kjfd results



                                                    disease      section.

 

             TYPE AND SCREEN Routine      2022   Graft versus host 



                                                10:27 AM CDT    disease



                                        



                                                    Chronic      



                                                    ghcli-vxkifr-qrkj 



                                                    disease      

 

             MAGNESIUM LEVEL Routine      2022   Graft versus host Results

 for



                                                10:27 AM CDT    disease



                                        this procedure



                                                    Chronic      are in the



                                                    sxdao-telcin-ohxv results



                                                    disease      section.

 

             COMPREHENSIVE Routine      2022   Graft versus host 



                METABOLIC PANEL                 10:27 AM CDT    disease



                                        



                                                    Chronic      



                                                    yobwn-nyjddn-vdnq 



                                                    disease      

 

             COMPLETE BLOOD COUNT Routine      2022   Graft versus host 



                W/ DIFFERENTIAL                 10:27 AM CDT    disease



                                        



                                                    Chronic      



                                                    aoema-pmjexf-ppnv 



                                                    disease      

 

             HP MD LAURA-CHINO Routine      2022                



             VIRUS QUANTITATIVE PCR              10:33 AM CDT              



             ANALYSIS                                            



             INTERPRETATION AND                                        



             REPORT                                              

 

             TMP INTERPRETATION Routine      2022                Results f

or



             ANTIBODY SCREEN              10:33 AM CDT              this procedu

re



             NEGATIVE                                            are in the



                                                                 results



                                                                 section.

 

             CLOT EXPIRATION DATE Routine      2022                Results

 for



                                       10:33 AM CDT              this procedure



                                                                 are in the



                                                                 results



                                                                 section.

 

             ANTIBODY SCREEN Routine      2022   Acute myeloblastic Result

s for



                                       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

cedure



                                                                achieved remissi

on



                                        are in the



                                                    Status post stem cell result

s



                                                                transplant



                                        section.



                                                    Edtdf-hrbptv-ajcm 



                                                    disease, not otherwise 



                                                    specified    

 

             ABORH        Routine      2022   Acute myeloblastic Results f

or



                                       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

cedure



                                                                achieved remissi

on



                                        are in the



                                                    Status post stem cell result

s



                                                                transplant



                                        section.



                                                    Txnek-pzfoiq-ckpo 



                                                    disease, not otherwise 



                                                    specified    

 

             FRACTIONATED BILIRUBIN Routine      2022   Acute myeloblastic

 Results for



                                       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

cedure



                                                                achieved remissi

on



                                        are in the



                                                    Status post stem cell result

s



                                                                transplant



                                        section.



                                                    Dnyrf-itgyhl-pltl 



                                                    disease, not otherwise 



                                                    specified    

 

             TOTAL PROTEIN Routine      2022   Acute myeloblastic Results 

for



                                       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

cedure



                                                                achieved remissi

on



                                        are in the



                                                    Status post stem cell result

s



                                                                transplant



                                        section.



                                                    Zvvcp-nmavgq-nifa 



                                                    disease, not otherwise 



                                                    specified    

 

             ASPARTATE    Routine      2022   Acute myeloblastic Results f

or



             AMINOTRANSFERASE              10:33 AM CDT leukemia not having this

 procedure



                                                                achieved remissi

on



                                        are in the



                                                    Status post stem cell result

s



                                                                transplant



                                        section.



                                                    Pvqky-ckliwp-cujx 



                                                    disease, not otherwise 



                                                    specified    

 

             ALANINE      Routine      2022   Acute myeloblastic Results f

or



             AMINOTRANSFERASE              10:33 AM CDT leukemia not having this

 procedure



                                                                achieved remissi

on



                                        are in the



                                                    Status post stem cell result

s



                                                                transplant



                                        section.



                                                    Nwfso-urgvlt-wjln 



                                                    disease, not otherwise 



                                                    specified    

 

             ALKALINE PHOSPHATASE Routine      2022   Acute myeloblastic R

esults for



                                       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

cedure



                                                                achieved remissi

on



                                        are in the



                                                    Status post stem cell result

s



                                                                transplant



                                        section.



                                                    Xqczp-mapuzv-efao 



                                                    disease, not otherwise 



                                                    specified    

 

             ALBUMIN LEVEL Routine      2022   Acute myeloblastic Results 

for



                                       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

cedure



                                                                achieved remissi

on



                                        are in the



                                                    Status post stem cell result

s



                                                                transplant



                                        section.



                                                    Bmdue-ungqau-ukdv 



                                                    disease, not otherwise 



                                                    specified    

 

             CALCIUM LEVEL TOTAL Routine      2022   Acute myeloblastic Re

sults for



                                       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

cedure



                                                                achieved remissi

on



                                        are in the



                                                    Status post stem cell result

s



                                                                transplant



                                        section.



                                                    Ydciy-gxgdfy-udju 



                                                    disease, not otherwise 



                                                    specified    

 

             .GLOMERULAR FILTRATION Routine      2022   Acute myeloblastic

 Results for



             RATE                      10:33 AM CDT leukemia not having this pro

cedure



                                                                achieved remissi

on



                                        are in the



                                                    Status post stem cell result

s



                                                                transplant



                                        section.



                                                    Vuqzk-zpmthf-eqgq 



                                                    disease, not otherwise 



                                                    specified    

 

             SERUM CREATININE Routine      2022   Acute myeloblastic Resul

ts for



                                       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

cedure



                                                                achieved remissi

on



                                        are in the



                                                    Status post stem cell result

s



                                                                transplant



                                        section.



                                                    Ztlij-ervnqq-uxtf 



                                                    disease, not otherwise 



                                                    specified    

 

             ELECTROLYTE PANEL Routine      2022   Acute myeloblastic Resu

lts for



                                       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

cedure



                                                                achieved remissi

on



                                        are in the



                                                    Status post stem cell result

s



                                                                transplant



                                        section.



                                                    Oabgw-iyhlwr-ccer 



                                                    disease, not otherwise 



                                                    specified    

 

             BLOOD UREA NITROGEN Routine      2022   Acute myeloblastic Re

sults for



                                       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

cedure



                                                                achieved remissi

on



                                        are in the



                                                    Status post stem cell result

s



                                                                transplant



                                        section.



                                                    Rsmqz-idhpgr-msyi 



                                                    disease, not otherwise 



                                                    specified    

 

             GLUCOSE LEVEL Routine      2022   Acute myeloblastic Results 

for



                                       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

cedure



                                                                achieved remissi

on



                                        are in the



                                                    Status post stem cell result

s



                                                                transplant



                                        section.



                                                    Bhvbl-ayxapf-rxlh 



                                                    disease, not otherwise 



                                                    specified    

 

             MANUAL DIFFERENTIAL Routine      2022   Acute myeloblastic Re

sults for



                                       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

cedure



                                                                achieved remissi

on



                                        are in the



                                                    Status post stem cell result

s



                                                                transplant



                                        section.



                                                    Uyasp-uqqhvf-wbvv 



                                                    disease, not otherwise 



                                                    specified    

 

             Results CBC  Routine      2022   Acute myeloblastic Results f

or



                                       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

cedure



                                                                achieved remissi

on



                                        are in the



                                                    Status post stem cell result

s



                                                                transplant



                                        section.



                                                    Tpiuo-tuopfs-qvti 



                                                    disease, not otherwise 



                                                    specified    

 

             VITAMIN D 25 HYDROXY Routine      2022   Acute myeloblastic R

esults for



             LEVEL                     10:33 AM CDT leukemia not having this pro

cedure



                                                                achieved remissi

on



                                        are in the



                                                    Status post stem cell result

s



                                                                transplant



                                        section.



                                                    Bsysa-uvtgud-vhca 



                                                    disease, not otherwise 



                                                    specified    

 

             HEMOGLOBIN A1C Routine      2022   Acute myeloblastic Results

 for



                                       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

cedure



                                                                achieved remissi

on



                                        are in the



                                                    Status post stem cell result

s



                                                                transplant



                                        section.



                                                    Hjhhr-esexab-lfqg 



                                                    disease, not otherwise 



                                                    specified    

 

             FREE THYROXINE Routine      2022   Acute myeloblastic Results

 for



                                       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

cedure



                                                                achieved remissi

on



                                        are in the



                                                    Status post stem cell result

s



                                                                transplant



                                        section.



                                                    Rewya-bekynx-uppf 



                                                    disease, not otherwise 



                                                    specified    

 

             THYROID STIMULATING Routine      2022   Acute myeloblastic Re

sults for



             HORMONE                   10:33 AM CDT leukemia not having this pro

cedure



                                                                achieved remissi

on



                                        are in the



                                                    Status post stem cell result

s



                                                                transplant



                                        section.



                                                    Yvxhq-stzpha-yycu 



                                                    disease, not otherwise 



                                                    specified    

 

             LIPID PANEL  Routine      2022   Acute myeloblastic Results f

or



                                       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

cedure



                                                                achieved remissi

on



                                        are in the



                                                    Status post stem cell result

s



                                                                transplant



                                        section.



                                                    Uyogc-vahdlu-jxvx 



                                                    disease, not otherwise 



                                                    specified    

 

             TACROLIMUS LEVEL Routine      2022   Acute myeloblastic Resul

ts for



                                       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

cedure



                                                                achieved remissi

on



                                        are in the



                                                    Status post stem cell result

s



                                                                transplant



                                        section.



                                                    Mgrme-tgftot-kbbm 



                                                    disease, not otherwise 



                                                    specified    

 

             CMV QUANT PCR Routine      2022   Acute myeloblastic Results 

for



                                       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

cedure



                                                                achieved remissi

on



                                        are in the



                                                    Status post stem cell result

s



                                                                transplant



                                        section.



                                                    Mgmbr-pakhvf-lbna 



                                                    disease, not otherwise 



                                                    specified    

 

             EVANS DAVID LAURA-CHINO Routine      2022   Acute myeloblastic Res

ults for



             VIRUS QUANTITATIVE PCR              10:33 AM CDT leukemia not havin

g this procedure



                ANALYSIS COLLECTION,                                 achieved re

mission



                                        are in the



             BLOOD                                  Status post stem cell result

s



                                                                transplant



                                        section.



                                                    Totma-zxngqq-gcko 



                                                    disease, not otherwise 



                                                    specified    

 

             MAGNESIUM LEVEL Routine      2022   Acute myeloblastic Result

s for



                                       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

cedure



                                                                achieved remissi

on



                                        are in the



                                                    Status post stem cell result

s



                                                                transplant



                                        section.



                                                    Afkrr-dzuuoz-yoiq 



                                                    disease, not otherwise 



                                                    specified    

 

             LACTATE DEHYDROGENASE Routine      2022   Acute myeloblastic 

Results for



                                       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

cedure



                                                                achieved remissi

on



                                        are in the



                                                    Status post stem cell result

s



                                                                transplant



                                        section.



                                                    Wybsk-crkjvg-gddy 



                                                    disease, not otherwise 



                                                    specified    

 

             URIC ACID    Routine      2022   Acute myeloblastic Results f

or



                                       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

cedure



                                                                achieved remissi

on



                                        are in the



                                                    Status post stem cell result

s



                                                                transplant



                                        section.



                                                    Crnsh-fonnjt-gvqm 



                                                    disease, not otherwise 



                                                    specified    

 

             PHOSPHORUS LEVEL Routine      2022   Acute myeloblastic Resul

ts for



                                       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

cedure



                                                                achieved remissi

on



                                        are in the



                                                    Status post stem cell result

s



                                                                transplant



                                        section.



                                                    Dszzt-yyficq-agdn 



                                                    disease, not otherwise 



                                                    specified    

 

             COMPREHENSIVE Routine      2022   Acute myeloblastic 



             METABOLIC PANEL              10:33 AM CDT leukemia not having 



                                                                achieved remissi

on



                                        



                                                    Status post stem cell 



                                                                transplant



                                        



                                                    Yyfrk-cyxwbn-xynf 



                                                    disease, not otherwise 



                                                    specified    

 

             TYPE AND SCREEN Routine      2022   Acute myeloblastic 



                                       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 



                                                                achieved remissi

on



                                        



                                                    Status post stem cell 



                                                                transplant



                                        



                                                    Vgrti-nugwpv-yoto 



                                                    disease, not otherwise 



                                                    specified    

 

             COMPLETE BLOOD COUNT Routine      2022   Acute myeloblastic 



             W/ DIFFERENTIAL              10:33 AM CDT leukemia not having 



                                                                achieved remissi

on



                                        



                                                    Status post stem cell 



                                                                transplant



                                        



                                                    Wpftr-ehvfdf-ylnr 



                                                    disease, not otherwise 



                                                    specified    

 

             PARVOVIRUS B19 QUANT, Routine      2022   Acute myeloblastic 

Results for



             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

cedure



                                                                achieved remissi

on



                                        are in the



                                                    Status post stem cell result

s



                                                                transplant



                                        section.



                                                    Rmlnc-zxeylk-kdcv 



                                                    disease, not otherwise 



                                                    specified    

 

             ADENOVIRUS   Routine      2022   Acute myeloblastic Results f

or



             QUANTITATIVE, PLASMA              10:33 AM CDT leukemia not having 

this procedure



                                                                achieved remissi

on



                                        are in the



                                                    Status post stem cell result

s



                                                                transplant



                                        section.



                                                    Xqtrz-vlttzk-qdzz 



                                                    disease, not otherwise 



                                                    specified    

 

             HHV6 QUANT, PLASMA Routine      2022   Acute myeloblastic Res

ults for



                                       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

cedure



                                                                achieved remissi

on



                                        are in the



                                                    Status post stem cell result

s



                                                                transplant



                                        section.



                                                    Dxegw-tbinoi-qcky 



                                                    disease, not otherwise 



                                                    specified    

 

             CLOT EXPIRATION DATE Routine      2022 9:59              Resu

lts for



                                       AM CDT                    this procedure



                                                                 are in the



                                                                 results



                                                                 section.

 

             TMP INTERPRETATION Routine      2022 9:59              Result

s for



             ANTIBODY SCREEN              AM CDT                    this procedu

re



             NEGATIVE                                            are in the



                                                                 results



                                                                 section.

 

             ANTIBODY SCREEN Routine      2022 9:59 Graft versus host Resu

lts for



                                                AM CDT          disease



                                        this procedure



                                                    Chronic      are in the



                                                    vtyvw-pnbxcr-jgzi results



                                                    disease      section.

 

             ABORH        Routine      2022 9:59 Graft versus host Results

 for



                                                AM CDT          disease



                                        this procedure



                                                    Chronic      are in the



                                                    iniyw-uxooli-koij results



                                                    disease      section.

 

             FRACTIONATED BILIRUBIN Routine      2022 9:59 Graft versus ho

st Results for



                                                AM CDT          disease



                                        this procedure



                                                    Chronic      are in the



                                                    imxio-ktczhb-grid results



                                                    disease      section.

 

             TOTAL PROTEIN Routine      2022 9:59 Graft versus host Result

s for



                                                AM CDT          disease



                                        this procedure



                                                    Chronic      are in the



                                                    tlaxw-ttorvb-fkxg results



                                                    disease      section.

 

             ASPARTATE    Routine      2022 9:59 Graft versus host Results

 for



                AMINOTRANSFERASE                 AM CDT          disease



                                        this procedure



                                                    Chronic      are in the



                                                    oqjkh-traqlc-daug results



                                                    disease      section.

 

             ALANINE      Routine      2022 9:59 Graft versus host Results

 for



                AMINOTRANSFERASE                 AM CDT          disease



                                        this procedure



                                                    Chronic      are in the



                                                    tfexo-gckjrp-tvfg results



                                                    disease      section.

 

             ALKALINE PHOSPHATASE Routine      2022 9:59 Graft versus host

 Results for



                                                AM CDT          disease



                                        this procedure



                                                    Chronic      are in the



                                                    dxhxp-dmfffz-vlzu results



                                                    disease      section.

 

             ALBUMIN LEVEL Routine      2022 9:59 Graft versus host Result

s for



                                                AM CDT          disease



                                        this procedure



                                                    Chronic      are in the



                                                    cwnbw-lhtpds-tlun results



                                                    disease      section.

 

             CALCIUM LEVEL TOTAL Routine      2022 9:59 Graft versus host 

Results for



                                                AM CDT          disease



                                        this procedure



                                                    Chronic      are in the



                                                    dhvmi-tpeykl-uqaj results



                                                    disease      section.

 

             .GLOMERULAR FILTRATION Routine      2022 9:59 Graft versus ho

st Results for



                RATE                            AM CDT          disease



                                        this procedure



                                                    Chronic      are in the



                                                    qnrvo-fkiclg-lgnd results



                                                    disease      section.

 

             SERUM CREATININE Routine      2022 9:59 Graft versus host Res

ults for



                                                AM CDT          disease



                                        this procedure



                                                    Chronic      are in the



                                                    sgmdb-yybhvp-ujuc results



                                                    disease      section.

 

             ELECTROLYTE PANEL Routine      2022 9:59 Graft versus host Re

sults for



                                                AM CDT          disease



                                        this procedure



                                                    Chronic      are in the



                                                    evspi-nccekx-alpx results



                                                    disease      section.

 

             BLOOD UREA NITROGEN Routine      2022 9:59 Graft versus host 

Results for



                                                AM CDT          disease



                                        this procedure



                                                    Chronic      are in the



                                                    angdc-pjtikq-ibnv results



                                                    disease      section.

 

             GLUCOSE LEVEL Routine      2022 9:59 Graft versus host Result

s for



                                                AM CDT          disease



                                        this procedure



                                                    Chronic      are in the



                                                    xuqdq-pfbcmc-lbli results



                                                    disease      section.

 

             MANUAL DIFFERENTIAL Routine      2022 9:59 Graft versus host 

Results for



                                                AM CDT          disease



                                        this procedure



                                                    Chronic      are in the



                                                    kicma-gaatui-gzmt results



                                                    disease      section.

 

             Results CBC  Routine      2022 9:59 Graft versus host Results

 for



                                                AM CDT          disease



                                        this procedure



                                                    Chronic      are in the



                                                    hxbaf-ovikzs-fvsc results



                                                    disease      section.

 

             TYPE AND SCREEN Routine      2022 9:59 Graft versus host 



                                                AM CDT          disease



                                        



                                                    Chronic      



                                                    gfomz-diloqj-yqca 



                                                    disease      

 

             MAGNESIUM LEVEL Routine      2022 9:59 Graft versus host Resu

lts for



                                                AM CDT          disease



                                        this procedure



                                                    Chronic      are in the



                                                    yemwj-koywfp-idqc results



                                                    disease      section.

 

             COMPREHENSIVE Routine      2022 9:59 Graft versus host 



                METABOLIC PANEL                 AM CDT          disease



                                        



                                                    Chronic      



                                                    udzui-wymvzd-hgkv 



                                                    disease      

 

             COMPLETE BLOOD COUNT Routine      2022 9:59 Graft versus host

 



                W/ DIFFERENTIAL                 AM CDT          disease



                                        



                                                    Chronic      



                                                    nuken-yofizi-xlky 



                                                    disease      

 

             FRACTIONATED BILIRUBIN Routine      03/10/2022 9:50 Graft versus ho

st Results for



                                       AM CST       disease      this procedure



                                                                 are in the



                                                                 results



                                                                 section.

 

             TOTAL PROTEIN Routine      03/10/2022 9:50 Graft versus host Result

s for



                                       AM CST       disease      this procedure



                                                                 are in the



                                                                 results



                                                                 section.

 

             ASPARTATE    Routine      03/10/2022 9:50 Graft versus host Results

 for



             AMINOTRANSFERASE              AM CST       disease      this proced

ure



                                                                 are in the



                                                                 results



                                                                 section.

 

             ALANINE      Routine      03/10/2022 9:50 Graft versus host Results

 for



             AMINOTRANSFERASE              AM CST       disease      this proced

ure



                                                                 are in the



                                                                 results



                                                                 section.

 

             ALKALINE PHOSPHATASE Routine      03/10/2022 9:50 Graft versus host

 Results for



                                       AM CST       disease      this procedure



                                                                 are in the



                                                                 results



                                                                 section.

 

             ALBUMIN LEVEL Routine      03/10/2022 9:50 Graft versus host Result

s for



                                       AM CST       disease      this procedure



                                                                 are in the



                                                                 results



                                                                 section.

 

             CALCIUM LEVEL TOTAL Routine      03/10/2022 9:50 Graft versus host 

Results for



                                       AM CST       disease      this procedure



                                                                 are in the



                                                                 results



                                                                 section.

 

             .GLOMERULAR FILTRATION Routine      03/10/2022 9:50 Graft versus ho

st Results for



             RATE                      AM CST       disease      this procedure



                                                                 are in the



                                                                 results



                                                                 section.

 

             SERUM CREATININE Routine      03/10/2022 9:50 Graft versus host Res

ults for



                                       AM CST       disease      this procedure



                                                                 are in the



                                                                 results



                                                                 section.

 

             ELECTROLYTE PANEL Routine      03/10/2022 9:50 Graft versus host Re

sults for



                                       AM CST       disease      this procedure



                                                                 are in the



                                                                 results



                                                                 section.

 

             BLOOD UREA NITROGEN Routine      03/10/2022 9:50 Graft versus host 

Results for



                                       AM CST       disease      this procedure



                                                                 are in the



                                                                 results



                                                                 section.

 

             GLUCOSE LEVEL Routine      03/10/2022 9:50 Graft versus host Result

s for



                                       AM CST       disease      this procedure



                                                                 are in the



                                                                 results



                                                                 section.

 

             MANUAL DIFFERENTIAL Routine      03/10/2022 9:50 Graft versus host 

Results for



                                       AM CST       disease      this procedure



                                                                 are in the



                                                                 results



                                                                 section.

 

             Results CBC  STAT         03/10/2022 9:50 Graft versus host Results

 for



                                       AM CST       disease      this procedure



                                                                 are in the



                                                                 results



                                                                 section.

 

             MAGNESIUM LEVEL Routine      03/10/2022 9:50 Graft versus host Resu

lts for



                                       AM CST       disease      this procedure



                                                                 are in the



                                                                 results



                                                                 section.

 

             COMPREHENSIVE Routine      03/10/2022 9:50 Graft versus host 



             METABOLIC PANEL              AM CST       disease      

 

             COMPLETE BLOOD COUNT Routine      03/10/2022 9:50 Graft versus host

 



             W/ DIFFERENTIAL              AM CST       disease      

 

             BLOODCULTURE Routine      03/10/2022 9:50 Graft versus host Results

 for



                                       AM CST       disease      this procedure



                                                                 are in the



                                                                 results



                                                                 section.

 

             CLOT EXPIRATION DATE Routine      2022 7:22              Resu

lts for



                                       AM CST                    this procedure



                                                                 are in the



                                                                 results



                                                                 section.

 

             TMP INTERPRETATION Routine      2022 7:22              Result

s for



             ANTIBODY SCREEN              AM CST                    this procedu

re



             NEGATIVE                                            are in the



                                                                 results



                                                                 section.

 

             ANTIBODY SCREEN Routine      2022 7:22 Complication of stem R

esults for



                                                AM CST          cell transplant



                                        this procedure



                                                    Graft versus host are in the



                                                    disease      results



                                                                 section.

 

             ABORH        Routine      2022 7:22 Complication of stem Resu

lts for



                                                AM CST          cell transplant



                                        this procedure



                                                    Graft versus host are in the



                                                    disease      results



                                                                 section.

 

             FRACTIONATED BILIRUBIN Routine      2022 7:22 Complication of

 stem Results for



                                                AM CST          cell transplant



                                        this procedure



                                                    Graft versus host are in the



                                                    disease      results



                                                                 section.

 

             TOTAL PROTEIN Routine      2022 7:22 Complication of stem Res

ults for



                                                AM CST          cell transplant



                                        this procedure



                                                    Graft versus host are in the



                                                    disease      results



                                                                 section.

 

             ASPARTATE    Routine      2022 7:22 Complication of stem Resu

lts for



                AMINOTRANSFERASE                 AM CST          cell transplant



                                        this procedure



                                                    Graft versus host are in the



                                                    disease      results



                                                                 section.

 

             ALANINE      Routine      2022 7:22 Complication of stem Resu

lts for



                AMINOTRANSFERASE                 AM CST          cell transplant



                                        this procedure



                                                    Graft versus host are in the



                                                    disease      results



                                                                 section.

 

             ALKALINE PHOSPHATASE Routine      2022 7:22 Complication of s

tem Results for



                                                AM CST          cell transplant



                                        this procedure



                                                    Graft versus host are in the



                                                    disease      results



                                                                 section.

 

             ALBUMIN LEVEL Routine      2022 7:22 Complication of stem Res

ults for



                                                AM CST          cell transplant



                                        this procedure



                                                    Graft versus host are in the



                                                    disease      results



                                                                 section.

 

             CALCIUM LEVEL TOTAL Routine      2022 7:22 Complication of st

em Results for



                                                AM CST          cell transplant



                                        this procedure



                                                    Graft versus host are in the



                                                    disease      results



                                                                 section.

 

             .GLOMERULAR FILTRATION Routine      2022 7:22 Complication of

 stem Results for



                RATE                            AM CST          cell transplant



                                        this procedure



                                                    Graft versus host are in the



                                                    disease      results



                                                                 section.

 

             SERUM CREATININE Routine      2022 7:22 Complication of stem 

Results for



                                                AM CST          cell transplant



                                        this procedure



                                                    Graft versus host are in the



                                                    disease      results



                                                                 section.

 

             ELECTROLYTE PANEL Routine      2022 7:22 Complication of stem

 Results for



                                                AM CST          cell transplant



                                        this procedure



                                                    Graft versus host are in the



                                                    disease      results



                                                                 section.

 

             BLOOD UREA NITROGEN Routine      2022 7:22 Complication of st

em Results for



                                                AM CST          cell transplant



                                        this procedure



                                                    Graft versus host are in the



                                                    disease      results



                                                                 section.

 

             GLUCOSE LEVEL Routine      2022 7:22 Complication of stem Res

ults for



                                                AM CST          cell transplant



                                        this procedure



                                                    Graft versus host are in the



                                                    disease      results



                                                                 section.

 

             MANUAL DIFFERENTIAL Routine      2022 7:22 Complication of st

em Results for



                                                AM CST          cell transplant



                                        this procedure



                                                    Graft versus host are in the



                                                    disease      results



                                                                 section.

 

             Results CBC  Routine      2022 7:22 Complication of stem Resu

lts for



                                                AM CST          cell transplant



                                        this procedure



                                                    Graft versus host are in the



                                                    disease      results



                                                                 section.

 

             TYPE AND SCREEN Routine      2022 7:22 Complication of stem 



                                                AM CST          cell transplant



                                        



                                                    Graft versus host 



                                                    disease      

 

             MAGNESIUM LEVEL Routine      2022 7:22 Complication of stem R

esults for



                                                AM CST          cell transplant



                                        this procedure



                                                    Graft versus host are in the



                                                    disease      results



                                                                 section.

 

             COMPREHENSIVE Routine      2022 7:22 Complication of stem 



                METABOLIC PANEL                 AM CST          cell transplant



                                        



                                                    Graft versus host 



                                                    disease      

 

             COMPLETE BLOOD COUNT Routine      2022 7:22 Complication of s

tem 



                W/ DIFFERENTIAL                 AM CST          cell transplant



                                        



                                                    Graft versus host 



                                                    disease      

 

             CLOT EXPIRATION DATE Routine      2022 9:53              Resu

lts for



                                       AM CST                    this procedure



                                                                 are in the



                                                                 results



                                                                 section.

 

             TMP INTERPRETATION Routine      2022 9:53              Result

s for



             ANTIBODY SCREEN              AM CST                    this procedu

re



             NEGATIVE                                            are in the



                                                                 results



                                                                 section.

 

             ANTIBODY SCREEN Routine      2022 9:53 Acute myeloblastic Res

ults for



                                       AM CST       leukemia not having this pro

cedure



                                                                achieved remissi

on



                                        are in the



                                                    Status post stem cell result

s



                                                                transplant



                                        section.



                                                    [id number] 



                                                    disease, not otherwise 



                                                    specified    

 

             ABORH        Routine      2022 9:53 Acute myeloblastic Result

s for



                                       AM CST       leukemia not having this pro

cedure



                                                                achieved remissi

on



                                        are in the



                                                    Status post stem cell result

s



                                                                transplant



                                        section.



                                                    [id number] 



                                                    disease, not otherwise 



                                                    specified    

 

             FRACTIONATED BILIRUBIN Routine      2022 9:53 Acute myeloblas

tic Results for



                                       AM CST       leukemia not having this pro

cedure



                                                                achieved remissi

on



                                        are in the



                                                    Status post stem cell result

s



                                                                transplant



                                        section.



                                                    [id number] 



                                                    disease, not otherwise 



                                                    specified    

 

             TOTAL PROTEIN Routine      2022 9:53 Acute myeloblastic Resul

ts for



                                       AM CST       leukemia not having this pro

cedure



                                                                achieved remissi

on



                                        are in the



                                                    Status post stem cell result

s



                                                                transplant



                                        section.



                                                    [id number] 



                                                    disease, not otherwise 



                                                    specified    

 

             ASPARTATE    Routine      2022 9:53 Acute myeloblastic Result

s for



             AMINOTRANSFERASE              AM CST       leukemia not having this

 procedure



                                                                achieved remissi

on



                                        are in the



                                                    Status post stem cell result

s



                                                                transplant



                                        section.



                                                    [id number] 



                                                    disease, not otherwise 



                                                    specified    

 

             ALANINE      Routine      2022 9:53 Acute myeloblastic Result

s for



             AMINOTRANSFERASE              AM CST       leukemia not having this

 procedure



                                                                achieved remissi

on



                                        are in the



                                                    Status post stem cell result

s



                                                                transplant



                                        section.



                                                    [id number] 



                                                    disease, not otherwise 



                                                    specified    

 

             ALKALINE PHOSPHATASE Routine      2022 9:53 Acute myeloblasti

c Results for



                                       AM CST       leukemia not having this pro

cedure



                                                                achieved remissi

on



                                        are in the



                                                    Status post stem cell result

s



                                                                transplant



                                        section.



                                                    [id number] 



                                                    disease, not otherwise 



                                                    specified    

 

             ALBUMIN LEVEL Routine      2022 9:53 Acute myeloblastic Resul

ts for



                                       AM CST       leukemia not having this pro

cedure



                                                                achieved remissi

on



                                        are in the



                                                    Status post stem cell result

s



                                                                transplant



                                        section.



                                                    [id number] 



                                                    disease, not otherwise 



                                                    specified    

 

             CALCIUM LEVEL TOTAL Routine      2022 9:53 Acute myeloblastic

 Results for



                                       AM CST       leukemia not having this pro

cedure



                                                                achieved remissi

on



                                        are in the



                                                    Status post stem cell result

s



                                                                transplant



                                        section.



                                                    [id number] 



                                                    disease, not otherwise 



                                                    specified    

 

             .GLOMERULAR FILTRATION Routine      2022 9:53 Acute myeloblas

tic Results for



             RATE                      AM CST       leukemia not having this pro

cedure



                                                                achieved remissi

on



                                        are in the



                                                    Status post stem cell result

s



                                                                transplant



                                        section.



                                                    [id number] 



                                                    disease, not otherwise 



                                                    specified    

 

             SERUM CREATININE Routine      2022 9:53 Acute myeloblastic Re

sults for



                                       AM CST       leukemia not having this pro

cedure



                                                                achieved remissi

on



                                        are in the



                                                    Status post stem cell result

s



                                                                transplant



                                        section.



                                                    [id number] 



                                                    disease, not otherwise 



                                                    specified    

 

             ELECTROLYTE PANEL Routine      2022 9:53 Acute myeloblastic R

esults for



                                       AM CST       leukemia not having this pro

cedure



                                                                achieved remissi

on



                                        are in the



                                                    Status post stem cell result

s



                                                                transplant



                                        section.



                                                    [id number] 



                                                    disease, not otherwise 



                                                    specified    

 

             BLOOD UREA NITROGEN Routine      2022 9:53 Acute myeloblastic

 Results for



                                       AM CST       leukemia not having this pro

cedure



                                                                achieved remissi

on



                                        are in the



                                                    Status post stem cell result

s



                                                                transplant



                                        section.



                                                    [id number] 



                                                    disease, not otherwise 



                                                    specified    

 

             GLUCOSE LEVEL Routine      2022 9:53 Acute myeloblastic Resul

ts for



                                       AM CST       leukemia not having this pro

cedure



                                                                achieved remissi

on



                                        are in the



                                                    Status post stem cell result

s



                                                                transplant



                                        section.



                                                    [id number] 



                                                    disease, not otherwise 



                                                    specified    

 

             MANUAL DIFFERENTIAL Routine      2022 9:53 Acute myeloblastic

 Results for



                                       AM CST       leukemia not having this pro

cedure



                                                                achieved remissi

on



                                        are in the



                                                    Status post stem cell result

s



                                                                transplant



                                        section.



                                                    [id number] 



                                                    disease, not otherwise 



                                                    specified    

 

             Results CBC  Routine      2022 9:53 Acute myeloblastic Result

s for



                                       AM CST       leukemia not having this pro

cedure



                                                                achieved remissi

on



                                        are in the



                                                    Status post stem cell result

s



                                                                transplant



                                        section.



                                                    [id number] 



                                                    disease, not otherwise 



                                                    specified    

 

             TACROLIMUS LEVEL Routine      2022 9:53 Acute myeloblastic Re

sults for



                                       AM CST       leukemia not having this pro

cedure



                                                                achieved remissi

on



                                        are in the



                                                    Status post stem cell result

s



                                                                transplant



                                        section.



                                                    [id number] 



                                                    disease, not otherwise 



                                                    specified    

 

             CMV QUANT PCR Routine      2022 9:53 Acute myeloblastic Resul

ts for



                                       AM CST       leukemia not having this pro

cedure



                                                                achieved remissi

on



                                        are in the



                                                    Status post stem cell result

s



                                                                transplant



                                        section.



                                                    [id number] 



                                                    disease, not otherwise 



                                                    specified    

 

             MAGNESIUM LEVEL Routine      2022 9:53 Acute myeloblastic Res

ults for



                                       AM CST       leukemia not having this pro

cedure



                                                                achieved remissi

on



                                        are in the



                                                    Status post stem cell result

s



                                                                transplant



                                        section.



                                                    [id number] 



                                                    disease, not otherwise 



                                                    specified    

 

             LACTATE DEHYDROGENASE Routine      2022 9:53 Acute myeloblast

ic Results for



                                       AM CST       leukemia not having this pro

cedure



                                                                achieved remissi

on



                                        are in the



                                                    Status post stem cell result

s



                                                                transplant



                                        section.



                                                    [id number] 



                                                    disease, not otherwise 



                                                    specified    

 

             URIC ACID    Routine      2022 9:53 Acute myeloblastic Result

s for



                                       AM CST       leukemia not having this pro

cedure



                                                                achieved remissi

on



                                        are in the



                                                    Status post stem cell result

s



                                                                transplant



                                        section.



                                                    [id number] 



                                                    disease, not otherwise 



                                                    specified    

 

             PHOSPHORUS LEVEL Routine      2022 9:53 Acute myeloblastic Re

sults for



                                       AM CST       leukemia not having this pro

cedure



                                                                achieved remissi

on



                                        are in the



                                                    Status post stem cell result

s



                                                                transplant



                                        section.



                                                    [id number] 



                                                    disease, not otherwise 



                                                    specified    

 

             COMPREHENSIVE Routine      2022 9:53 Acute myeloblastic 



             METABOLIC PANEL              AM CST       leukemia not having 



                                                                achieved remissi

on



                                        



                                                    Status post stem cell 



                                                                transplant



                                        



                                                    [id number]-[id number]-[id number] 



                                                    disease, not otherwise 



                                                    specified    

 

             TYPE AND SCREEN Routine      2022 9:53 Acute myeloblastic 



                                       AM CST       leukemia not having 



                                                                achieved remissi

on



                                        



                                                    Status post stem cell 



                                                                transplant



                                        



                                                    [id number] 



                                                    disease, not otherwise 



                                                    specified    

 

             COMPLETE BLOOD COUNT Routine      2022 9:53 Acute myeloblasti

c 



             W/ DIFFERENTIAL              AM CST       leukemia not having 



                                                                achieved remissi

on



                                        



                                                    Status post stem cell 



                                                                transplant



                                        



                                                    [id number] 



                                                    disease, not otherwise 



                                                    specified    

 

             CLOT EXPIRATION DATE Routine      2022                Results

 for



                                       10:21 AM CST              this procedure



                                                                 are in the



                                                                 results



                                                                 section.

 

             EVANS DAVID LAURA-CHINO Routine      2022                



             VIRUS QUANTITATIVE PCR              10:21 AM CST              



             ANALYSIS                                            



             INTERPRETATION AND                                        



             REPORT                                              

 

             TMP INTERPRETATION Routine      2022                Results f

or



             ANTIBODY SCREEN              10:21 AM CST              this procedu

re



             NEGATIVE                                            are in the



                                                                 results



                                                                 section.

 

             FRACTIONATED BILIRUBIN Routine      2022   Acute myeloblastic

 Results for



                                       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

cedure



                                                                achieved remissi

on



                                        are in the



                                                    Status post stem cell result

s



                                                    transplant   section.

 

             TOTAL PROTEIN Routine      2022   Acute myeloblastic Results 

for



                                       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

cedure



                                                                achieved remissi

on



                                        are in the



                                                    Status post stem cell result

s



                                                    transplant   section.

 

             ASPARTATE    Routine      2022   Acute myeloblastic Results f

or



             AMINOTRANSFERASE              10:21 AM CST leukemia not having this

 procedure



                                                                achieved remissi

on



                                        are in the



                                                    Status post stem cell result

s



                                                    transplant   section.

 

             ALANINE      Routine      2022   Acute myeloblastic Results f

or



             AMINOTRANSFERASE              10:21 AM CST leukemia not having this

 procedure



                                                                achieved remissi

on



                                        are in the



                                                    Status post stem cell result

s



                                                    transplant   section.

 

             ALKALINE PHOSPHATASE Routine      2022   Acute myeloblastic R

esults for



                                       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

cedure



                                                                achieved remissi

on



                                        are in the



                                                    Status post stem cell result

s



                                                    transplant   section.

 

             ALBUMIN LEVEL Routine      2022   Acute myeloblastic Results 

for



                                       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

cedure



                                                                achieved remissi

on



                                        are in the



                                                    Status post stem cell result

s



                                                    transplant   section.

 

             CALCIUM LEVEL TOTAL Routine      2022   Acute myeloblastic Re

sults for



                                       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

cedure



                                                                achieved remissi

on



                                        are in the



                                                    Status post stem cell result

s



                                                    transplant   section.

 

             .GLOMERULAR FILTRATION Routine      2022   Acute myeloblastic

 Results for



             RATE                      10:21 AM CST leukemia not having this pro

cedure



                                                                achieved remissi

on



                                        are in the



                                                    Status post stem cell result

s



                                                    transplant   section.

 

             SERUM CREATININE Routine      2022   Acute myeloblastic Resul

ts for



                                       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

cedure



                                                                achieved remissi

on



                                        are in the



                                                    Status post stem cell result

s



                                                    transplant   section.

 

             ELECTROLYTE PANEL Routine      2022   Acute myeloblastic Resu

lts for



                                       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

cedure



                                                                achieved remissi

on



                                        are in the



                                                    Status post stem cell result

s



                                                    transplant   section.

 

             BLOOD UREA NITROGEN Routine      2022   Acute myeloblastic Re

sults for



                                       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

cedure



                                                                achieved remissi

on



                                        are in the



                                                    Status post stem cell result

s



                                                    transplant   section.

 

             GLUCOSE LEVEL Routine      2022   Acute myeloblastic Results 

for



                                       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

cedure



                                                                achieved remissi

on



                                        are in the



                                                    Status post stem cell result

s



                                                    transplant   section.

 

             MANUAL DIFFERENTIAL Routine      2022   Acute myeloblastic Re

sults for



                                       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

cedure



                                                                achieved remissi

on



                                        are in the



                                                    Status post stem cell result

s



                                                    transplant   section.

 

             Results CBC  Routine      2022   Acute myeloblastic Results f

or



                                       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

cedure



                                                                achieved remissi

on



                                        are in the



                                                    Status post stem cell result

s



                                                    transplant   section.

 

             ANTIBODY SCREEN Routine      2022   Acute myeloblastic Result

s for



                                       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

cedure



                                                                achieved remissi

on



                                        are in the



                                                    Status post stem cell result

s



                                                    transplant   section.

 

             ABORH        Routine      2022   Acute myeloblastic Results f

or



                                       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

cedure



                                                                achieved remissi

on



                                        are in the



                                                    Status post stem cell result

s



                                                    transplant   section.

 

             TACROLIMUS LEVEL Routine      2022   Acute myeloblastic Resul

ts for



                                       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

cedure



                                                                achieved remissi

on



                                        are in the



                                                    Status post stem cell result

s



                                                    transplant   section.

 

             CMV QUANT PCR Routine      2022   Acute myeloblastic Results 

for



                                       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

cedure



                                                                achieved remissi

on



                                        are in the



                                                    Status post stem cell result

s



                                                    transplant   section.

 

             EVANS DAVID LAURA-CHINO Routine      2022   Acute myeloblastic Res

ults for



             VIRUS QUANTITATIVE PCR              10:21 AM CST leukemia not havin

g this procedure



                ANALYSIS COLLECTION,                                 achieved re

mission



                                        are in the



             BLOOD                                  Status post stem cell result

s



                                                    transplant   section.

 

             MAGNESIUM LEVEL Routine      2022   Acute myeloblastic Result

s for



                                       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

cedure



                                                                achieved remissi

on



                                        are in the



                                                    Status post stem cell result

s



                                                    transplant   section.

 

             LACTATE DEHYDROGENASE Routine      2022   Acute myeloblastic 

Results for



                                       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

cedure



                                                                achieved remissi

on



                                        are in the



                                                    Status post stem cell result

s



                                                    transplant   section.

 

             URIC ACID    Routine      2022   Acute myeloblastic Results f

or



                                       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

cedure



                                                                achieved remissi

on



                                        are in the



                                                    Status post stem cell result

s



                                                    transplant   section.

 

             PHOSPHORUS LEVEL Routine      2022   Acute myeloblastic Resul

ts for



                                       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

cedure



                                                                achieved remissi

on



                                        are in the



                                                    Status post stem cell result

s



                                                    transplant   section.

 

             COMPREHENSIVE Routine      2022   Acute myeloblastic 



             METABOLIC PANEL              10:21 AM CST leukemia not having 



                                                                achieved remissi

on



                                        



                                                    Status post stem cell 



                                                    transplant   

 

             TYPE AND SCREEN Routine      2022   Acute myeloblastic 



                                       10:21 AM CST leukemia not having 



                                                                achieved remissi

on



                                        



                                                    Status post stem cell 



                                                    transplant   

 

             COMPLETE BLOOD COUNT Routine      2022   Acute myeloblastic 



             W/ DIFFERENTIAL              10:21 AM CST leukemia not having 



                                                                achieved remissi

on



                                        



                                                    Status post stem cell 



                                                    transplant   

 

             ADENOVIRUS   Routine      2022   Acute myeloblastic Results f

or



             QUANTITATIVE, PLASMA              10:21 AM CST leukemia not having 

this procedure



                                                                achieved remissi

on



                                        are in the



                                                    Status post stem cell result

s



                                                    transplant   section.

 

             HHV6 QUANT, PLASMA Routine      2022   Acute myeloblastic Res

ults for



                                       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

cedure



                                                                achieved remissi

on



                                        are in the



                                                    Status post stem cell result

s



                                                    transplant   section.

 

             CLOT EXPIRATION DATE Routine      02/15/2022                Results

 for



                                       10:54 AM CST              this procedure



                                                                 are in the



                                                                 results



                                                                 section.

 

             TMP INTERPRETATION Routine      02/15/2022                Results f

or



             ANTIBODY SCREEN              10:54 AM CST              this procedu

re



             NEGATIVE                                            are in the



                                                                 results



                                                                 section.

 

             ANTIBODY SCREEN Routine      02/15/2022   Complication of stem Resu

lts for



                                                10:54 AM CST    cell transplant



                                        this procedure



                                                    Graft versus host are in the



                                                    disease      results



                                                                 section.

 

             ABORH        Routine      02/15/2022   Complication of stem Results

 for



                                                10:54 AM CST    cell transplant



                                        this procedure



                                                    Graft versus host are in the



                                                    disease      results



                                                                 section.

 

             FRACTIONATED BILIRUBIN Routine      02/15/2022   Complication of st

em Results for



                                                10:54 AM CST    cell transplant



                                        this procedure



                                                    Graft versus host are in the



                                                    disease      results



                                                                 section.

 

             TOTAL PROTEIN Routine      02/15/2022   Complication of stem Result

s for



                                                10:54 AM CST    cell transplant



                                        this procedure



                                                    Graft versus host are in the



                                                    disease      results



                                                                 section.

 

             ASPARTATE    Routine      02/15/2022   Complication of stem Results

 for



                AMINOTRANSFERASE                 10:54 AM CST    cell transplant



                                        this procedure



                                                    Graft versus host are in the



                                                    disease      results



                                                                 section.

 

             ALANINE      Routine      02/15/2022   Complication of stem Results

 for



                AMINOTRANSFERASE                 10:54 AM CST    cell transplant



                                        this procedure



                                                    Graft versus host are in the



                                                    disease      results



                                                                 section.

 

             ALKALINE PHOSPHATASE Routine      02/15/2022   Complication of stem

 Results for



                                                10:54 AM CST    cell transplant



                                        this procedure



                                                    Graft versus host are in the



                                                    disease      results



                                                                 section.

 

             ALBUMIN LEVEL Routine      02/15/2022   Complication of stem Result

s for



                                                10:54 AM CST    cell transplant



                                        this procedure



                                                    Graft versus host are in the



                                                    disease      results



                                                                 section.

 

             CALCIUM LEVEL TOTAL Routine      02/15/2022   Complication of stem 

Results for



                                                10:54 AM CST    cell transplant



                                        this procedure



                                                    Graft versus host are in the



                                                    disease      results



                                                                 section.

 

             .GLOMERULAR FILTRATION Routine      02/15/2022   Complication of st

em Results for



                RATE                            10:54 AM CST    cell transplant



                                        this procedure



                                                    Graft versus host are in the



                                                    disease      results



                                                                 section.

 

             SERUM CREATININE Routine      02/15/2022   Complication of stem Res

ults for



                                                10:54 AM CST    cell transplant



                                        this procedure



                                                    Graft versus host are in the



                                                    disease      results



                                                                 section.

 

             ELECTROLYTE PANEL Routine      02/15/2022   Complication of stem Re

sults for



                                                10:54 AM CST    cell transplant



                                        this procedure



                                                    Graft versus host are in the



                                                    disease      results



                                                                 section.

 

             BLOOD UREA NITROGEN Routine      02/15/2022   Complication of stem 

Results for



                                                10:54 AM CST    cell transplant



                                        this procedure



                                                    Graft versus host are in the



                                                    disease      results



                                                                 section.

 

             GLUCOSE LEVEL Routine      02/15/2022   Complication of stem Result

s for



                                                10:54 AM CST    cell transplant



                                        this procedure



                                                    Graft versus host are in the



                                                    disease      results



                                                                 section.

 

             MANUAL DIFFERENTIAL Routine      02/15/2022   Complication of stem 

Results for



                                                10:54 AM CST    cell transplant



                                        this procedure



                                                    Graft versus host are in the



                                                    disease      results



                                                                 section.

 

             Results CBC  Routine      02/15/2022   Complication of stem Results

 for



                                                10:54 AM CST    cell transplant



                                        this procedure



                                                    Graft versus host are in the



                                                    disease      results



                                                                 section.

 

             TYPE AND SCREEN Routine      02/15/2022   Complication of stem 



                                                10:54 AM CST    cell transplant



                                        



                                                    Graft versus host 



                                                    disease      

 

             MAGNESIUM LEVEL Routine      02/15/2022   Complication of stem Resu

lts for



                                                10:54 AM CST    cell transplant



                                        this procedure



                                                    Graft versus host are in the



                                                    disease      results



                                                                 section.

 

             COMPREHENSIVE Routine      02/15/2022   Complication of stem 



                METABOLIC PANEL                 10:54 AM CST    cell transplant



                                        



                                                    Graft versus host 



                                                    disease      

 

             COMPLETE BLOOD COUNT Routine      02/15/2022   Complication of stem

 



                W/ DIFFERENTIAL                 10:54 AM CST    cell transplant



                                        



                                                    Graft versus host 



                                                    disease      

 

             FRACTIONATED BILIRUBIN Routine      02/10/2022 9:58 Complication of

 stem Results for



                                       AM CST       cell transplant this procedu

re



                                                                 are in the



                                                                 results



                                                                 section.

 

             TOTAL PROTEIN Routine      02/10/2022 9:58 Complication of stem Res

ults for



                                       AM CST       cell transplant this procedu

re



                                                                 are in the



                                                                 results



                                                                 section.

 

             ASPARTATE    Routine      02/10/2022 9:58 Complication of stem Resu

lts for



             AMINOTRANSFERASE              AM CST       cell transplant this pro

cedure



                                                                 are in the



                                                                 results



                                                                 section.

 

             ALANINE      Routine      02/10/2022 9:58 Complication of stem Resu

lts for



             AMINOTRANSFERASE              AM CST       cell transplant this pro

cedure



                                                                 are in the



                                                                 results



                                                                 section.

 

             ALKALINE PHOSPHATASE Routine      02/10/2022 9:58 Complication of s

tem Results for



                                       AM CST       cell transplant this procedu

re



                                                                 are in the



                                                                 results



                                                                 section.

 

             ALBUMIN LEVEL Routine      02/10/2022 9:58 Complication of stem Res

ults for



                                       AM CST       cell transplant this procedu

re



                                                                 are in the



                                                                 results



                                                                 section.

 

             CALCIUM LEVEL TOTAL Routine      02/10/2022 9:58 Complication of st

em Results for



                                       AM CST       cell transplant this procedu

re



                                                                 are in the



                                                                 results



                                                                 section.

 

             .GLOMERULAR FILTRATION Routine      02/10/2022 9:58 Complication of

 stem Results for



             RATE                      AM CST       cell transplant this procedu

re



                                                                 are in the



                                                                 results



                                                                 section.

 

             SERUM CREATININE Routine      02/10/2022 9:58 Complication of stem 

Results for



                                       AM CST       cell transplant this procedu

re



                                                                 are in the



                                                                 results



                                                                 section.

 

             ELECTROLYTE PANEL Routine      02/10/2022 9:58 Complication of stem

 Results for



                                       AM CST       cell transplant this procedu

re



                                                                 are in the



                                                                 results



                                                                 section.

 

             BLOOD UREA NITROGEN Routine      02/10/2022 9:58 Complication of st

em Results for



                                       AM CST       cell transplant this procedu

re



                                                                 are in the



                                                                 results



                                                                 section.

 

             GLUCOSE LEVEL Routine      02/10/2022 9:58 Complication of stem Res

ults for



                                       AM CST       cell transplant this procedu

re



                                                                 are in the



                                                                 results



                                                                 section.

 

             MANUAL DIFFERENTIAL STAT         02/10/2022 9:58 Complication of st

em Results for



                                       AM CST       cell transplant this procedu

re



                                                                 are in the



                                                                 results



                                                                 section.

 

             Results CBC  STAT         02/10/2022 9:58 Complication of stem Resu

lts for



                                       AM CST       cell transplant this procedu

re



                                                                 are in the



                                                                 results



                                                                 section.

 

             MAGNESIUM LEVEL Routine      02/10/2022 9:58 Complication of stem R

esults for



                                       AM CST       cell transplant this procedu

re



                                                                 are in the



                                                                 results



                                                                 section.

 

             COMPREHENSIVE Routine      02/10/2022 9:58 Complication of stem 



             METABOLIC PANEL              AM CST       cell transplant 

 

             COMPLETE BLOOD COUNT Routine      02/10/2022 9:58 Complication of s

tem 



             W/ DIFFERENTIAL              AM CST       cell transplant 

 

             CT CHEST/LUNG Routine      2022 7:54 Dyspnea, not otherwise R

esults for



             SURVEILLANCE LOW DOSE              AM CST       specified    this p

rocedure



                                                                 are in the



                                                                 results



                                                                 section.

 

             TMP INTERPRETATION Routine      2022 9:51              Result

s for



             ANTIBODY SCREEN              AM CST                    this procedu

re



             NEGATIVE                                            are in the



                                                                 results



                                                                 section.

 

             CLOT EXPIRATION DATE Routine      2022 9:51              Resu

lts for



                                       AM CST                    this procedure



                                                                 are in the



                                                                 results



                                                                 section.

 

             ANTIBODY SCREEN Routine      2022 9:51 Graft versus host Resu

lts for



                                                AM CST          disease



                                        this procedure



                                                    Complication of stem are in 

the



                                                    cell transplant results



                                                                 section.

 

             ABORH        Routine      2022 9:51 Graft versus host Results

 for



                                                AM CST          disease



                                        this procedure



                                                    Complication of stem are in 

the



                                                    cell transplant results



                                                                 section.

 

             FRACTIONATED BILIRUBIN Routine      2022 9:51 Graft versus ho

st Results for



                                                AM CST          disease



                                        this procedure



                                                    Complication of stem are in 

the



                                                    cell transplant results



                                                                 section.

 

             TOTAL PROTEIN Routine      2022 9:51 Graft versus host Result

s for



                                                AM CST          disease



                                        this procedure



                                                    Complication of stem are in 

the



                                                    cell transplant results



                                                                 section.

 

             ASPARTATE    Routine      2022 9:51 Graft versus host Results

 for



                AMINOTRANSFERASE                 AM CST          disease



                                        this procedure



                                                    Complication of stem are in 

the



                                                    cell transplant results



                                                                 section.

 

             ALANINE      Routine      2022 9:51 Graft versus host Results

 for



                AMINOTRANSFERASE                 AM CST          disease



                                        this procedure



                                                    Complication of stem are in 

the



                                                    cell transplant results



                                                                 section.

 

             ALKALINE PHOSPHATASE Routine      2022 9:51 Graft versus host

 Results for



                                                AM CST          disease



                                        this procedure



                                                    Complication of stem are in 

the



                                                    cell transplant results



                                                                 section.

 

             ALBUMIN LEVEL Routine      2022 9:51 Graft versus host Result

s for



                                                AM CST          disease



                                        this procedure



                                                    Complication of stem are in 

the



                                                    cell transplant results



                                                                 section.

 

             CALCIUM LEVEL TOTAL Routine      2022 9:51 Graft versus host 

Results for



                                                AM CST          disease



                                        this procedure



                                                    Complication of stem are in 

the



                                                    cell transplant results



                                                                 section.

 

             .GLOMERULAR FILTRATION Routine      2022 9:51 Graft versus ho

st Results for



                RATE                            AM CST          disease



                                        this procedure



                                                    Complication of stem are in 

the



                                                    cell transplant results



                                                                 section.

 

             SERUM CREATININE Routine      2022 9:51 Graft versus host Res

ults for



                                                AM CST          disease



                                        this procedure



                                                    Complication of stem are in 

the



                                                    cell transplant results



                                                                 section.

 

             ELECTROLYTE PANEL Routine      2022 9:51 Graft versus host Re

sults for



                                                AM CST          disease



                                        this procedure



                                                    Complication of stem are in 

the



                                                    cell transplant results



                                                                 section.

 

             BLOOD UREA NITROGEN Routine      2022 9:51 Graft versus host 

Results for



                                                AM CST          disease



                                        this procedure



                                                    Complication of stem are in 

the



                                                    cell transplant results



                                                                 section.

 

             GLUCOSE LEVEL Routine      2022 9:51 Graft versus host Result

s for



                                                AM CST          disease



                                        this procedure



                                                    Complication of stem are in 

the



                                                    cell transplant results



                                                                 section.

 

             MANUAL DIFFERENTIAL Routine      2022 9:51 Graft versus host 

Results for



                                                AM CST          disease



                                        this procedure



                                                    Complication of stem are in 

the



                                                    cell transplant results



                                                                 section.

 

             Results CBC  Routine      2022 9:51 Graft versus host Results

 for



                                                AM CST          disease



                                        this procedure



                                                    Complication of stem are in 

the



                                                    cell transplant results



                                                                 section.

 

             TYPE AND SCREEN Routine      2022 9:51 Graft versus host 



                                                AM CST          disease



                                        



                                                    Complication of stem 



                                                    cell transplant 

 

             MAGNESIUM LEVEL Routine      2022 9:51 Graft versus host Resu

lts for



                                                AM CST          disease



                                        this procedure



                                                    Complication of stem are in 

the



                                                    cell transplant results



                                                                 section.

 

             COMPREHENSIVE Routine      2022 9:51 Graft versus host 



                METABOLIC PANEL                 AM CST          disease



                                        



                                                    Complication of stem 



                                                    cell transplant 

 

             COMPLETE BLOOD COUNT Routine      2022 9:51 Graft versus host

 



                W/ DIFFERENTIAL                 AM CST          disease



                                        



                                                    Complication of stem 



                                                    cell transplant 

 

             TMP INTERPRETATION Routine      2022 9:45              Result

s for



             ANTIBODY SCREEN              AM CST                    this procedu

re



             NEGATIVE                                            are in the



                                                                 results



                                                                 section.

 

             CLOT EXPIRATION DATE Routine      2022 9:45              Resu

lts for



                                       AM CST                    this procedure



                                                                 are in the



                                                                 results



                                                                 section.

 

             ANTIBODY SCREEN Routine      2022 9:45 Graft versus host Resu

lts for



                                                AM CST          disease



                                        this procedure



                                                    Complication of stem are in 

the



                                                    cell transplant results



                                                                 section.

 

             ABORH        Routine      2022 9:45 Graft versus host Results

 for



                                                AM CST          disease



                                        this procedure



                                                    Complication of stem are in 

the



                                                    cell transplant results



                                                                 section.

 

             FRACTIONATED BILIRUBIN Routine      2022 9:45 Graft versus ho

st Results for



                                                AM CST          disease



                                        this procedure



                                                    Complication of stem are in 

the



                                                    cell transplant results



                                                                 section.

 

             TOTAL PROTEIN Routine      2022 9:45 Graft versus host Result

s for



                                                AM CST          disease



                                        this procedure



                                                    Complication of stem are in 

the



                                                    cell transplant results



                                                                 section.

 

             ASPARTATE    Routine      2022 9:45 Graft versus host Results

 for



                AMINOTRANSFERASE                 AM CST          disease



                                        this procedure



                                                    Complication of stem are in 

the



                                                    cell transplant results



                                                                 section.

 

             ALANINE      Routine      2022 9:45 Graft versus host Results

 for



                AMINOTRANSFERASE                 AM CST          disease



                                        this procedure



                                                    Complication of stem are in 

the



                                                    cell transplant results



                                                                 section.

 

             ALKALINE PHOSPHATASE Routine      2022 9:45 Graft versus host

 Results for



                                                AM CST          disease



                                        this procedure



                                                    Complication of stem are in 

the



                                                    cell transplant results



                                                                 section.

 

             ALBUMIN LEVEL Routine      2022 9:45 Graft versus host Result

s for



                                                AM CST          disease



                                        this procedure



                                                    Complication of stem are in 

the



                                                    cell transplant results



                                                                 section.

 

             CALCIUM LEVEL TOTAL Routine      2022 9:45 Graft versus host 

Results for



                                                AM CST          disease



                                        this procedure



                                                    Complication of stem are in 

the



                                                    cell transplant results



                                                                 section.

 

             .GLOMERULAR FILTRATION Routine      2022 9:45 Graft versus ho

st Results for



                RATE                            AM CST          disease



                                        this procedure



                                                    Complication of stem are in 

the



                                                    cell transplant results



                                                                 section.

 

             SERUM CREATININE Routine      2022 9:45 Graft versus host Res

ults for



                                                AM CST          disease



                                        this procedure



                                                    Complication of stem are in 

the



                                                    cell transplant results



                                                                 section.

 

             ELECTROLYTE PANEL Routine      2022 9:45 Graft versus host Re

sults for



                                                AM CST          disease



                                        this procedure



                                                    Complication of stem are in 

the



                                                    cell transplant results



                                                                 section.

 

             BLOOD UREA NITROGEN Routine      2022 9:45 Graft versus host 

Results for



                                                AM CST          disease



                                        this procedure



                                                    Complication of stem are in 

the



                                                    cell transplant results



                                                                 section.

 

             GLUCOSE LEVEL Routine      2022 9:45 Graft versus host Result

s for



                                                AM CST          disease



                                        this procedure



                                                    Complication of stem are in 

the



                                                    cell transplant results



                                                                 section.

 

             MANUAL DIFFERENTIAL Routine      2022 9:45 Graft versus host 

Results for



                                                AM CST          disease



                                        this procedure



                                                    Complication of stem are in 

the



                                                    cell transplant results



                                                                 section.

 

             Results CBC  Routine      2022 9:45 Graft versus host Results

 for



                                                AM CST          disease



                                        this procedure



                                                    Complication of stem are in 

the



                                                    cell transplant results



                                                                 section.

 

             TYPE AND SCREEN Routine      2022 9:45 Graft versus host 



                                                AM CST          disease



                                        



                                                    Complication of stem 



                                                    cell transplant 

 

             MAGNESIUM LEVEL Routine      2022 9:45 Graft versus host Resu

lts for



                                                AM CST          disease



                                        this procedure



                                                    Complication of stem are in 

the



                                                    cell transplant results



                                                                 section.

 

             COMPREHENSIVE Routine      2022 9:45 Graft versus host 



                METABOLIC PANEL                 AM CST          disease



                                        



                                                    Complication of stem 



                                                    cell transplant 

 

             COMPLETE BLOOD COUNT Routine      2022 9:45 Graft versus host

 



                W/ DIFFERENTIAL                 AM CST          disease



                                        



                                                    Complication of stem 



                                                    cell transplant 

 

             TMP INTERPRETATION Routine      2022                Results f

or



             ANTIBODY SCREEN              10:43 AM CST              this procedu

re



             NEGATIVE                                            are in the



                                                                 results



                                                                 section.

 

             CLOT EXPIRATION DATE Routine      2022                Results

 for



                                       10:43 AM CST              this procedure



                                                                 are in the



                                                                 results



                                                                 section.

 

             ANTIBODY SCREEN Routine      2022   Acute myeloblastic Result

s for



                                       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

cedure



                                                                achieved remissi

on



                                        are in the



                                                    Status post stem cell result

s



                                                                transplant



                                        section.



                                                    [id number]-[id number] 



                                                    disease, not otherwise 



                                                    specified    

 

             ABORH        Routine      2022   Acute myeloblastic Results f

or



                                       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

cedure



                                                                achieved remissi

on



                                        are in the



                                                    Status post stem cell result

s



                                                                transplant



                                        section.



                                                    [id number] 



                                                    disease, not otherwise 



                                                    specified    

 

             FRACTIONATED BILIRUBIN Routine      2022   Acute myeloblastic

 Results for



                                       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

cedure



                                                                achieved remissi

on



                                        are in the



                                                    Status post stem cell result

s



                                                                transplant



                                        section.



                                                    [id number]-rxrbez-[id number] 



                                                    disease, not otherwise 



                                                    specified    

 

             TOTAL PROTEIN Routine      2022   Acute myeloblastic Results 

for



                                       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

cedure



                                                                achieved remissi

on



                                        are in the



                                                    Status post stem cell result

s



                                                                transplant



                                        section.



                                                    [id number]-[id number] 



                                                    disease, not otherwise 



                                                    specified    

 

             ASPARTATE    Routine      2022   Acute myeloblastic Results f

or



             AMINOTRANSFERASE              10:43 AM CST leukemia not having this

 procedure



                                                                achieved remissi

on



                                        are in the



                                                    Status post stem cell result

s



                                                                transplant



                                        section.



                                                    [id number] 



                                                    disease, not otherwise 



                                                    specified    

 

             ALANINE      Routine      2022   Acute myeloblastic Results f

or



             AMINOTRANSFERASE              10:43 AM CST leukemia not having this

 procedure



                                                                achieved remissi

on



                                        are in the



                                                    Status post stem cell result

s



                                                                transplant



                                        section.



                                                    [id number]-[id number] 



                                                    disease, not otherwise 



                                                    specified    

 

             ALKALINE PHOSPHATASE Routine      2022   Acute myeloblastic R

esults for



                                       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

cedure



                                                                achieved remissi

on



                                        are in the



                                                    Status post stem cell result

s



                                                                transplant



                                        section.



                                                    Ihlsy-ileppa-kala 



                                                    disease, not otherwise 



                                                    specified    

 

             ALBUMIN LEVEL Routine      2022   Acute myeloblastic Results 

for



                                       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

cedure



                                                                achieved remissi

on



                                        are in the



                                                    Status post stem cell result

s



                                                                transplant



                                        section.



                                                    [id number] 



                                                    disease, not otherwise 



                                                    specified    

 

             CALCIUM LEVEL TOTAL Routine      2022   Acute myeloblastic Re

sults for



                                       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

cedure



                                                                achieved remissi

on



                                        are in the



                                                    Status post stem cell result

s



                                                                transplant



                                        section.



                                                    Veytc-lwtgoe-[id number] 



                                                    disease, not otherwise 



                                                    specified    

 

             .GLOMERULAR FILTRATION Routine      2022   Acute myeloblastic

 Results for



             RATE                      10:43 AM CST leukemia not having this pro

cedure



                                                                achieved remissi

on



                                        are in the



                                                    Status post stem cell result

s



                                                                transplant



                                        section.



                                                    Ivlfa-gyymhh-qxse 



                                                    disease, not otherwise 



                                                    specified    

 

             SERUM CREATININE Routine      2022   Acute myeloblastic Resul

ts for



                                       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

cedure



                                                                achieved remissi

on



                                        are in the



                                                    Status post stem cell result

s



                                                                transplant



                                        section.



                                                    [id number]-[id number]-iole 



                                                    disease, not otherwise 



                                                    specified    

 

             ELECTROLYTE PANEL Routine      2022   Acute myeloblastic Resu

lts for



                                       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

cedure



                                                                achieved remissi

on



                                        are in the



                                                    Status post stem cell result

s



                                                                transplant



                                        section.



                                                    [id number]-[id number] 



                                                    disease, not otherwise 



                                                    specified    

 

             BLOOD UREA NITROGEN Routine      2022   Acute myeloblastic Re

sults for



                                       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

cedure



                                                                achieved remissi

on



                                        are in the



                                                    Status post stem cell result

s



                                                                transplant



                                        section.



                                                    [id number]-xuvn 



                                                    disease, not otherwise 



                                                    specified    

 

             GLUCOSE LEVEL Routine      2022   Acute myeloblastic Results 

for



                                       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

cedure



                                                                achieved remissi

on



                                        are in the



                                                    Status post stem cell result

s



                                                                transplant



                                        section.



                                                    [id number]-[id number]-prih 



                                                    disease, not otherwise 



                                                    specified    

 

             MANUAL DIFFERENTIAL Routine      2022   Acute myeloblastic Re

sults for



                                       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

cedure



                                                                achieved remissi

on



                                        are in the



                                                    Status post stem cell result

s



                                                                transplant



                                        section.



                                                    [id number]-[id number] 



                                                    disease, not otherwise 



                                                    specified    

 

             Results CBC  Routine      2022   Acute myeloblastic Results f

or



                                       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

cedure



                                                                achieved remissi

on



                                        are in the



                                                    Status post stem cell result

s



                                                                transplant



                                        section.



                                                    Izrii-[id number]-fdon 



                                                    disease, not otherwise 



                                                    specified    

 

             TACROLIMUS LEVEL Routine      2022   Acute myeloblastic Resul

ts for



                                       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

cedure



                                                                achieved remissi

on



                                        are in the



                                                    Status post stem cell result

s



                                                                transplant



                                        section.



                                                    [id number]-jikc 



                                                    disease, not otherwise 



                                                    specified    

 

             CMV QUANT PCR Routine      2022   Acute myeloblastic Results 

for



                                       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

cedure



                                                                achieved remissi

on



                                        are in the



                                                    Status post stem cell result

s



                                                                transplant



                                        section.



                                                    [id number]-[id number]-[id number] 



                                                    disease, not otherwise 



                                                    specified    

 

             MAGNESIUM LEVEL Routine      2022   Acute myeloblastic Result

s for



                                       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

cedure



                                                                achieved remissi

on



                                        are in the



                                                    Status post stem cell result

s



                                                                transplant



                                        section.



                                                    [id number] 



                                                    disease, not otherwise 



                                                    specified    

 

             LACTATE DEHYDROGENASE Routine      2022   Acute myeloblastic 

Results for



                                       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

cedure



                                                                achieved remissi

on



                                        are in the



                                                    Status post stem cell result

s



                                                                transplant



                                        section.



                                                    [id number] 



                                                    disease, not otherwise 



                                                    specified    

 

             URIC ACID    Routine      2022   Acute myeloblastic Results f

or



                                       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

cedure



                                                                achieved remissi

on



                                        are in the



                                                    Status post stem cell result

s



                                                                transplant



                                        section.



                                                    [id number] 



                                                    disease, not otherwise 



                                                    specified    

 

             PHOSPHORUS LEVEL Routine      2022   Acute myeloblastic Resul

ts for



                                       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

cedure



                                                                achieved remissi

on



                                        are in the



                                                    Status post stem cell result

s



                                                                transplant



                                        section.



                                                    [id number] 



                                                    disease, not otherwise 



                                                    specified    

 

             COMPREHENSIVE Routine      2022   Acute myeloblastic 



             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 



                                                                achieved remissi

on



                                        



                                                    Status post stem cell 



                                                                transplant



                                        



                                                    [id number] 



                                                    disease, not otherwise 



                                                    specified    

 

             TYPE AND SCREEN Routine      2022   Acute myeloblastic 



                                       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 



                                                                achieved remissi

on



                                        



                                                    Status post stem cell 



                                                                transplant



                                        



                                                    [id number] 



                                                    disease, not otherwise 



                                                    specified    

 

             COMPLETE BLOOD COUNT Routine      2022   Acute myeloblastic 



             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 



                                                                achieved remissi

on



                                        



                                                    Status post stem cell 



                                                                transplant



                                        



                                                    [id number] 



                                                    disease, not otherwise 



                                                    specified    

 

             HHV6 QUANT, PLASMA Routine      2022   Acute myeloblastic Res

ults for



                                       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

cedure



                                                                achieved remissi

on



                                        are in the



                                                    Status post stem cell result

s



                                                                transplant



                                        section.



                                                    Etwse-[id number] 



                                                    disease, not otherwise 



                                                    specified    

 

             ADENOVIRUS   Routine      2022   Acute myeloblastic Results f

or



             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 

this procedure



                                                                achieved remissi

on



                                        are in the



                                                    Status post stem cell result

s



                                                                transplant



                                        section.



                                                    [id number] 



                                                    disease, not otherwise 



                                                    specified    

 

             CLOT EXPIRATION DATE Routine      2022 2:21              Resu

lts for



                                       PM CST                    this procedure



                                                                 are in the



                                                                 results



                                                                 section.

 

             TMP INTERPRETATION BMT Routine      2022                Resul

ts for



             ABORH                     10:17 AM CST              this procedure



                                                                 are in the



                                                                 results



                                                                 section.

 

             BONE MARROW TRANSPLANT Routine      2022                Resul

ts for



             ABORH REVERSE              10:17 AM CST              this procedure



                                                                 are in the



                                                                 results



                                                                 section.

 

             BONE MARROW TRANSPLANT Routine      2022                Resul

ts for



             ABORH FORWARD              10:17 AM CST              this procedure



                                                                 are in the



                                                                 results



                                                                 section.

 

             TMP INTERPRETATION Routine      2022                Results f

or



             ANTIBODY SCREEN              10:17 AM CST              this procedu

re



             NEGATIVE                                            are in the



                                                                 results



                                                                 section.

 

             ANTIBODY SCREEN Routine      2022   Graft versus host Results

 for



                                                10:17 AM CST    disease



                                        this procedure



                                                    Complication of stem are in 

the



                                                    cell transplant results



                                                                 section.

 

             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 

Results for



                                                10:17 AM CST    disease



                                        this procedure



                                                    Complication of stem are in 

the



                                                    cell transplant results



                                                                 section.

 

             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

or



                                                10:17 AM CST    disease



                                        this procedure



                                                    Complication of stem are in 

the



                                                    cell transplant results



                                                                 section.

 

             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

r



                AMINOTRANSFERASE                 10:17 AM CST    disease



                                        this procedure



                                                    Complication of stem are in 

the



                                                    cell transplant results



                                                                 section.

 

             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

r



                AMINOTRANSFERASE                 10:17 AM CST    disease



                                        this procedure



                                                    Complication of stem are in 

the



                                                    cell transplant results



                                                                 section.

 

             ALKALINE PHOSPHATASE Routine      2022   Graft versus host Re

sults for



                                                10:17 AM CST    disease



                                        this procedure



                                                    Complication of stem are in 

the



                                                    cell transplant results



                                                                 section.

 

             ALBUMIN LEVEL Routine      2022   Graft versus host Results f

or



                                                10:17 AM CST    disease



                                        this procedure



                                                    Complication of stem are in 

the



                                                    cell transplant results



                                                                 section.

 

             CALCIUM LEVEL TOTAL Routine      2022   Graft versus host Res

ults for



                                                10:17 AM CST    disease



                                        this procedure



                                                    Complication of stem are in 

the



                                                    cell transplant results



                                                                 section.

 

             .GLOMERULAR FILTRATION Routine      2022   Graft versus host 

Results for



                RATE                            10:17 AM CST    disease



                                        this procedure



                                                    Complication of stem are in 

the



                                                    cell transplant results



                                                                 section.

 

             SERUM CREATININE Routine      2022   Graft versus host Result

s for



                                                10:17 AM CST    disease



                                        this procedure



                                                    Complication of stem are in 

the



                                                    cell transplant results



                                                                 section.

 

             ELECTROLYTE PANEL Routine      2022   Graft versus host Resul

ts for



                                                10:17 AM CST    disease



                                        this procedure



                                                    Complication of stem are in 

the



                                                    cell transplant results



                                                                 section.

 

             BLOOD UREA NITROGEN Routine      2022   Graft versus host Res

ults for



                                                10:17 AM CST    disease



                                        this procedure



                                                    Complication of stem are in 

the



                                                    cell transplant results



                                                                 section.

 

             GLUCOSE LEVEL Routine      2022   Graft versus host Results f

or



                                                10:17 AM CST    disease



                                        this procedure



                                                    Complication of stem are in 

the



                                                    cell transplant results



                                                                 section.

 

             MANUAL DIFFERENTIAL STAT         2022   Graft versus host Res

ults for



                                                10:17 AM CST    disease



                                        this procedure



                                                    Complication of stem are in 

the



                                                    cell transplant results



                                                                 section.

 

             Results CBC  STAT         2022   Graft versus host Results fo

r



                                                10:17 AM CST    disease



                                        this procedure



                                                    Complication of stem are in 

the



                                                    cell transplant results



                                                                 section.

 

             TYPE AND SCREEN Routine      2022   Graft versus host 



                                                10:17 AM CST    disease



                                        



                                                    Complication of stem 



                                                    cell transplant 

 

             MAGNESIUM LEVEL Routine      2022   Graft versus host Results

 for



                                                10:17 AM CST    disease



                                        this procedure



                                                    Complication of stem are in 

the



                                                    cell transplant results



                                                                 section.

 

             COMPREHENSIVE Routine      2022   Graft versus host 



                METABOLIC PANEL                 10:17 AM CST    disease



                                        



                                                    Complication of stem 



                                                    cell transplant 

 

             COMPLETE BLOOD COUNT Routine      2022   Graft versus host 



                W/ DIFFERENTIAL                 10:17 AM CST    disease



                                        



                                                    Complication of stem 



                                                    cell transplant 

 

             CLOT EXPIRATION DATE Routine      2022 9:47              Resu

lts for



                                       AM CST                    this procedure



                                                                 are in the



                                                                 results



                                                                 section.

 

             TMP INTERPRETATION Routine      2022 9:47              Result

s for



             ANTIBODY SCREEN              AM CST                    this procedu

re



             NEGATIVE                                            are in the



                                                                 results



                                                                 section.

 

             ANTIBODY SCREEN Routine      2022 9:47 Graft versus host Resu

lts for



                                                AM CST          disease



                                        this procedure



                                                    Complication of stem are in 

the



                                                    cell transplant results



                                                                 section.

 

             ABORH        Routine      2022 9:47 Graft versus host Results

 for



                                                AM CST          disease



                                        this procedure



                                                    Complication of stem are in 

the



                                                    cell transplant results



                                                                 section.

 

             FRACTIONATED BILIRUBIN Routine      2022 9:47 Graft versus ho

st Results for



                                                AM CST          disease



                                        this procedure



                                                    Complication of stem are in 

the



                                                    cell transplant results



                                                                 section.

 

             TOTAL PROTEIN Routine      2022 9:47 Graft versus host Result

s for



                                                AM CST          disease



                                        this procedure



                                                    Complication of stem are in 

the



                                                    cell transplant results



                                                                 section.

 

             ASPARTATE    Routine      2022 9:47 Graft versus host Results

 for



                AMINOTRANSFERASE                 AM CST          disease



                                        this procedure



                                                    Complication of stem are in 

the



                                                    cell transplant results



                                                                 section.

 

             ALANINE      Routine      2022 9:47 Graft versus host Results

 for



                AMINOTRANSFERASE                 AM CST          disease



                                        this procedure



                                                    Complication of stem are in 

the



                                                    cell transplant results



                                                                 section.

 

             ALKALINE PHOSPHATASE Routine      2022 9:47 Graft versus host

 Results for



                                                AM CST          disease



                                        this procedure



                                                    Complication of stem are in 

the



                                                    cell transplant results



                                                                 section.

 

             ALBUMIN LEVEL Routine      2022 9:47 Graft versus host Result

s for



                                                AM CST          disease



                                        this procedure



                                                    Complication of stem are in 

the



                                                    cell transplant results



                                                                 section.

 

             CALCIUM LEVEL TOTAL Routine      2022 9:47 Graft versus host 

Results for



                                                AM CST          disease



                                        this procedure



                                                    Complication of stem are in 

the



                                                    cell transplant results



                                                                 section.

 

             .GLOMERULAR FILTRATION Routine      2022 9:47 Graft versus ho

st Results for



                RATE                            AM CST          disease



                                        this procedure



                                                    Complication of stem are in 

the



                                                    cell transplant results



                                                                 section.

 

             SERUM CREATININE Routine      2022 9:47 Graft versus host Res

ults for



                                                AM CST          disease



                                        this procedure



                                                    Complication of stem are in 

the



                                                    cell transplant results



                                                                 section.

 

             ELECTROLYTE PANEL Routine      2022 9:47 Graft versus host Re

sults for



                                                AM CST          disease



                                        this procedure



                                                    Complication of stem are in 

the



                                                    cell transplant results



                                                                 section.

 

             BLOOD UREA NITROGEN Routine      2022 9:47 Graft versus host 

Results for



                                                AM CST          disease



                                        this procedure



                                                    Complication of stem are in 

the



                                                    cell transplant results



                                                                 section.

 

             GLUCOSE LEVEL Routine      2022 9:47 Graft versus host Result

s for



                                                AM CST          disease



                                        this procedure



                                                    Complication of stem are in 

the



                                                    cell transplant results



                                                                 section.

 

             MANUAL DIFFERENTIAL Routine      2022 9:47 Graft versus host 

Results for



                                                AM CST          disease



                                        this procedure



                                                    Complication of stem are in 

the



                                                    cell transplant results



                                                                 section.

 

             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

 for



                                                AM CST          disease



                                        this procedure



                                                    Complication of stem are in 

the



                                                    cell transplant results



                                                                 section.

 

             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 



                                                AM CST          disease



                                        



                                                    Complication of stem 



                                                    cell transplant 

 

             MAGNESIUM LEVEL Routine      2022 9:47 Graft versus host Resu

lts for



                                                AM CST          disease



                                        this procedure



                                                    Complication of stem are in 

the



                                                    cell transplant results



                                                                 section.

 

             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 



                METABOLIC PANEL                 AM CST          disease



                                        



                                                    Complication of stem 



                                                    cell transplant 

 

             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

 



                W/ DIFFERENTIAL                 AM CST          disease



                                        



                                                    Complication of stem 



                                                    cell transplant 

 

             TMP INTERPRETATION Routine      2022                Results f

or



             ANTIBODY SCREEN              10:38 AM CST              this procedu

re



             NEGATIVE                                            are in the



                                                                 results



                                                                 section.

 

             CLOT EXPIRATION DATE Routine      2022                Results

 for



                                       10:38 AM CST              this procedure



                                                                 are in the



                                                                 results



                                                                 section.

 

             ANTIBODY SCREEN Routine      2022   Acute myeloblastic Result

s for



                                       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

cedure



                                                                achieved remissi

on



                                        are in the



                                                    Status post stem cell result

s



                                                                transplant



                                        section.



                                                    Weczu-evseyu-hmqp 



                                                    disease, not otherwise 



                                                    specified    

 

             ABORH        Routine      2022   Acute myeloblastic Results f

or



                                       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

cedure



                                                                achieved remissi

on



                                        are in the



                                                    Status post stem cell result

s



                                                                transplant



                                        section.



                                                    Pcqjr-kuytaw-votm 



                                                    disease, not otherwise 



                                                    specified    

 

             FRACTIONATED BILIRUBIN Routine      2022   Acute myeloblastic

 Results for



                                       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

cedure



                                                                achieved remissi

on



                                        are in the



                                                    Status post stem cell result

s



                                                                transplant



                                        section.



                                                    Mcaal-asqtbc-vjjm 



                                                    disease, not otherwise 



                                                    specified    

 

             TOTAL PROTEIN Routine      2022   Acute myeloblastic Results 

for



                                       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

cedure



                                                                achieved remissi

on



                                        are in the



                                                    Status post stem cell result

s



                                                                transplant



                                        section.



                                                    Cehvq-expgnd-snwh 



                                                    disease, not otherwise 



                                                    specified    

 

             ASPARTATE    Routine      2022   Acute myeloblastic Results f

or



             AMINOTRANSFERASE              10:38 AM CST leukemia not having this

 procedure



                                                                achieved remissi

on



                                        are in the



                                                    Status post stem cell result

s



                                                                transplant



                                        section.



                                                    Xojcs-vhksts-zogj 



                                                    disease, not otherwise 



                                                    specified    

 

             ALANINE      Routine      2022   Acute myeloblastic Results f

or



             AMINOTRANSFERASE              10:38 AM CST leukemia not having this

 procedure



                                                                achieved remissi

on



                                        are in the



                                                    Status post stem cell result

s



                                                                transplant



                                        section.



                                                    Fpmcx-kwwifq-povr 



                                                    disease, not otherwise 



                                                    specified    

 

             ALKALINE PHOSPHATASE Routine      2022   Acute myeloblastic R

esults for



                                       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

cedure



                                                                achieved remissi

on



                                        are in the



                                                    Status post stem cell result

s



                                                                transplant



                                        section.



                                                    Dmuie-swnltq-evkd 



                                                    disease, not otherwise 



                                                    specified    

 

             ALBUMIN LEVEL Routine      2022   Acute myeloblastic Results 

for



                                       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

cedure



                                                                achieved remissi

on



                                        are in the



                                                    Status post stem cell result

s



                                                                transplant



                                        section.



                                                    Czkyg-tulsht-bolv 



                                                    disease, not otherwise 



                                                    specified    

 

             CALCIUM LEVEL TOTAL Routine      2022   Acute myeloblastic Re

sults for



                                       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

cedure



                                                                achieved remissi

on



                                        are in the



                                                    Status post stem cell result

s



                                                                transplant



                                        section.



                                                    Wtabc-bylqbf-yjyl 



                                                    disease, not otherwise 



                                                    specified    

 

             .GLOMERULAR FILTRATION Routine      2022   Acute myeloblastic

 Results for



             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

cedure



                                                                achieved remissi

on



                                        are in the



                                                    Status post stem cell result

s



                                                                transplant



                                        section.



                                                    Cbtbn-idrtyc-loqb 



                                                    disease, not otherwise 



                                                    specified    

 

             SERUM CREATININE Routine      2022   Acute myeloblastic Resul

ts for



                                       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

cedure



                                                                achieved remissi

on



                                        are in the



                                                    Status post stem cell result

s



                                                                transplant



                                        section.



                                                    Lrhgk-hfkhbr-yyex 



                                                    disease, not otherwise 



                                                    specified    

 

             ELECTROLYTE PANEL Routine      2022   Acute myeloblastic Resu

lts for



                                       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

cedure



                                                                achieved remissi

on



                                        are in the



                                                    Status post stem cell result

s



                                                                transplant



                                        section.



                                                    Zewkq-sjzdsg-zcez 



                                                    disease, not otherwise 



                                                    specified    

 

             BLOOD UREA NITROGEN Routine      2022   Acute myeloblastic Re

sults for



                                       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

cedure



                                                                achieved remissi

on



                                        are in the



                                                    Status post stem cell result

s



                                                                transplant



                                        section.



                                                    Eltbo-prskmz-ypab 



                                                    disease, not otherwise 



                                                    specified    

 

             GLUCOSE LEVEL Routine      2022   Acute myeloblastic Results 

for



                                       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

cedure



                                                                achieved remissi

on



                                        are in the



                                                    Status post stem cell result

s



                                                                transplant



                                        section.



                                                    Nappi-avozma-goeg 



                                                    disease, not otherwise 



                                                    specified    

 

             MANUAL DIFFERENTIAL Routine      2022   Acute myeloblastic Re

sults for



                                       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

cedure



                                                                achieved remissi

on



                                        are in the



                                                    Status post stem cell result

s



                                                                transplant



                                        section.



                                                    Fdwiy-fvgxxl-kihl 



                                                    disease, not otherwise 



                                                    specified    

 

             Results CBC  Routine      2022   Acute myeloblastic Results f

or



                                       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

cedure



                                                                achieved remissi

on



                                        are in the



                                                    Status post stem cell result

s



                                                                transplant



                                        section.



                                                    Rpipk-jjlunq-irvd 



                                                    disease, not otherwise 



                                                    specified    

 

             TACROLIMUS LEVEL Routine      2022   Acute myeloblastic Resul

ts for



                                       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

cedure



                                                                achieved remissi

on



                                        are in the



                                                    Status post stem cell result

s



                                                                transplant



                                        section.



                                                    Ogbvs-gdrkkd-zwbx 



                                                    disease, not otherwise 



                                                    specified    

 

             CMV QUANT PCR Routine      2022   Acute myeloblastic Results 

for



                                       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

cedure



                                                                achieved remissi

on



                                        are in the



                                                    Status post stem cell result

s



                                                                transplant



                                        section.



                                                    Yucha-akvvbp-kbpq 



                                                    disease, not otherwise 



                                                    specified    

 

             MAGNESIUM LEVEL Routine      2022   Acute myeloblastic Result

s for



                                       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

cedure



                                                                achieved remissi

on



                                        are in the



                                                    Status post stem cell result

s



                                                                transplant



                                        section.



                                                    Hncfu-lexrws-phci 



                                                    disease, not otherwise 



                                                    specified    

 

             LACTATE DEHYDROGENASE Routine      2022   Acute myeloblastic 

Results for



                                       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

cedure



                                                                achieved remissi

on



                                        are in the



                                                    Status post stem cell result

s



                                                                transplant



                                        section.



                                                    Kpgmp-sohvqp-grqi 



                                                    disease, not otherwise 



                                                    specified    

 

             URIC ACID    Routine      2022   Acute myeloblastic Results f

or



                                       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

cedure



                                                                achieved remissi

on



                                        are in the



                                                    Status post stem cell result

s



                                                                transplant



                                        section.



                                                    Hlduv-ugqeha-epzl 



                                                    disease, not otherwise 



                                                    specified    

 

             PHOSPHORUS LEVEL Routine      2022   Acute myeloblastic Resul

ts for



                                       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

cedure



                                                                achieved remissi

on



                                        are in the



                                                    Status post stem cell result

s



                                                                transplant



                                        section.



                                                    Xfvnz-algvfp-hwrv 



                                                    disease, not otherwise 



                                                    specified    

 

             COMPREHENSIVE Routine      2022   Acute myeloblastic 



             METABOLIC PANEL              10:38 AM CST leukemia not having 



                                                                achieved remissi

on



                                        



                                                    Status post stem cell 



                                                                transplant



                                        



                                                    Qxccl-vnnnqj-ycqd 



                                                    disease, not otherwise 



                                                    specified    

 

             TYPE AND SCREEN Routine      2022   Acute myeloblastic 



                                       10:38 AM CST leukemia not having 



                                                                achieved remissi

on



                                        



                                                    Status post stem cell 



                                                                transplant



                                        



                                                    Dhihw-qajeay-zndh 



                                                    disease, not otherwise 



                                                    specified    

 

             COMPLETE BLOOD COUNT Routine      2022   Acute myeloblastic 



             W/ DIFFERENTIAL              10:38 AM CST leukemia not having 



                                                                achieved remissi

on



                                        



                                                    Status post stem cell 



                                                                transplant



                                        



                                                    Dsyfi-zqvimj-plxn 



                                                    disease, not otherwise 



                                                    specified    

 

             SPIROMETRY W/O Routine      2022 2:05 Asthma-chronic Results 

for



             DILATORS, DLCO AND              PM CST       obstructive pulmonary 

this procedure



             BODY PLETHSMOGRAPHIC                           disease overlap are 

in the



             LUNG VOLUMES                           syndrome     results



                                                                 section.

 

             FRACTIONATED BILIRUBIN Routine      2022 9:59 Complication of

 stem Results for



                                       AM CST       cell transplant this procedu

re



                                                                 are in the



                                                                 results



                                                                 section.

 

             TOTAL PROTEIN Routine      2022 9:59 Complication of stem Res

ults for



                                       AM CST       cell transplant this procedu

re



                                                                 are in the



                                                                 results



                                                                 section.

 

             ASPARTATE    Routine      2022 9:59 Complication of stem Resu

lts for



             AMINOTRANSFERASE              AM CST       cell transplant this pro

cedure



                                                                 are in the



                                                                 results



                                                                 section.

 

             ALANINE      Routine      2022 9:59 Complication of stem Resu

lts for



             AMINOTRANSFERASE              AM CST       cell transplant this pro

cedure



                                                                 are in the



                                                                 results



                                                                 section.

 

             ALKALINE PHOSPHATASE Routine      2022 9:59 Complication of s

tem Results for



                                       AM CST       cell transplant this procedu

re



                                                                 are in the



                                                                 results



                                                                 section.

 

             ALBUMIN LEVEL Routine      2022 9:59 Complication of stem Res

ults for



                                       AM CST       cell transplant this procedu

re



                                                                 are in the



                                                                 results



                                                                 section.

 

             CALCIUM LEVEL TOTAL Routine      2022 9:59 Complication of st

em Results for



                                       AM CST       cell transplant this procedu

re



                                                                 are in the



                                                                 results



                                                                 section.

 

             .GLOMERULAR FILTRATION Routine      2022 9:59 Complication of

 stem Results for



             RATE                      AM CST       cell transplant this procedu

re



                                                                 are in the



                                                                 results



                                                                 section.

 

             SERUM CREATININE Routine      2022 9:59 Complication of stem 

Results for



                                       AM CST       cell transplant this procedu

re



                                                                 are in the



                                                                 results



                                                                 section.

 

             ELECTROLYTE PANEL Routine      2022 9:59 Complication of stem

 Results for



                                       AM CST       cell transplant this procedu

re



                                                                 are in the



                                                                 results



                                                                 section.

 

             BLOOD UREA NITROGEN Routine      2022 9:59 Complication of st

em Results for



                                       AM CST       cell transplant this procedu

re



                                                                 are in the



                                                                 results



                                                                 section.

 

             GLUCOSE LEVEL Routine      2022 9:59 Complication of stem Res

ults for



                                       AM CST       cell transplant this procedu

re



                                                                 are in the



                                                                 results



                                                                 section.

 

             MANUAL DIFFERENTIAL STAT         2022 9:59 Complication of st

em Results for



                                       AM CST       cell transplant this procedu

re



                                                                 are in the



                                                                 results



                                                                 section.

 

             Results CBC  STAT         2022 9:59 Complication of stem Resu

lts for



                                       AM CST       cell transplant this procedu

re



                                                                 are in the



                                                                 results



                                                                 section.

 

             MAGNESIUM LEVEL Routine      2022 9:59 Complication of stem R

esults for



                                       AM CST       cell transplant this procedu

re



                                                                 are in the



                                                                 results



                                                                 section.

 

             COMPREHENSIVE Routine      2022 9:59 Complication of stem 



             METABOLIC PANEL              AM CST       cell transplant 

 

             COMPLETE BLOOD COUNT Routine      2022 9:59 Complication of s

tem 



             W/ DIFFERENTIAL              AM CST       cell transplant 

 

             BLOODCULTURE Routine      2021   Chronic      Results for



                                       10:17 AM CST zcqiv-tdpelw-knmt this proce

dure



                                                    disease      are in the



                                                                 results



                                                                 section.

 

             FRACTIONATED BILIRUBIN Routine      2021   Chronic      Resul

ts for



                                       10:14 AM CST hqrig-tmeqyr-rcdy this proce

dure



                                                    disease      are in the



                                                                 results



                                                                 section.

 

             TOTAL PROTEIN Routine      2021   Chronic      Results for



                                       10:14 AM CST yqgut-fboqda-hxtg this proce

dure



                                                    disease      are in the



                                                                 results



                                                                 section.

 

             ASPARTATE    Routine      2021   Chronic      Results for



             AMINOTRANSFERASE              10:14 AM CST xevyx-hzhseq-soqv this p

rocedure



                                                    disease      are in the



                                                                 results



                                                                 section.

 

             ALANINE      Routine      2021   Chronic      Results for



             AMINOTRANSFERASE              10:14 AM CST tbpca-qehpde-tort this p

rocedure



                                                    disease      are in the



                                                                 results



                                                                 section.

 

             ALKALINE PHOSPHATASE Routine      2021   Chronic      Results

 for



                                       10:14 AM CST cdsey-qjuagj-httk this proce

dure



                                                    disease      are in the



                                                                 results



                                                                 section.

 

             ALBUMIN LEVEL Routine      2021   Chronic      Results for



                                       10:14 AM CST arblw-ccmydd-dvec this proce

dure



                                                    disease      are in the



                                                                 results



                                                                 section.

 

             CALCIUM LEVEL TOTAL Routine      2021   Chronic      Results 

for



                                       10:14 AM CST bsmqw-hncumx-sksh this proce

dure



                                                    disease      are in the



                                                                 results



                                                                 section.

 

             .GLOMERULAR FILTRATION Routine      2021   Chronic      Resul

ts for



             RATE                      10:14 AM CST tupbf-ooecug-aqoh this proce

dure



                                                    disease      are in the



                                                                 results



                                                                 section.

 

             SERUM CREATININE Routine      2021   Chronic      Results for



                                       10:14 AM CST otkai-gsdqei-jlfi this proce

dure



                                                    disease      are in the



                                                                 results



                                                                 section.

 

             ELECTROLYTE PANEL Routine      2021   Chronic      Results fo

r



                                       10:14 AM CST njcjr-koxbym-ygmp this proce

dure



                                                    disease      are in the



                                                                 results



                                                                 section.

 

             BLOOD UREA NITROGEN Routine      2021   Chronic      Results 

for



                                       10:14 AM CST jhgum-jhxpwx-mytp this proce

dure



                                                    disease      are in the



                                                                 results



                                                                 section.

 

             GLUCOSE LEVEL Routine      2021   Chronic      Results for



                                       10:14 AM CST nvghf-auaqit-xvco this proce

dure



                                                    disease      are in the



                                                                 results



                                                                 section.

 

             MANUAL DIFFERENTIAL Routine      2021   Chronic      Results 

for



                                       10:14 AM CST ectlu-uuocrj-xsfj this proce

dure



                                                    disease      are in the



                                                                 results



                                                                 section.

 

             Results CBC  Routine      2021   Chronic      Results for



                                       10:14 AM CST qhxbq-hqlntr-qsjn this proce

dure



                                                    disease      are in the



                                                                 results



                                                                 section.

 

             PHOSPHORUS LEVEL Routine      2021   Chronic      Results for



                                       10:14 AM CST hypaj-ghvryr-ozte this proce

dure



                                                    disease      are in the



                                                                 results



                                                                 section.

 

             URIC ACID    Routine      2021   Chronic      Results for



                                       10:14 AM CST juloe-xzguow-kweb this proce

dure



                                                    disease      are in the



                                                                 results



                                                                 section.

 

             LACTATE DEHYDROGENASE Routine      2021   Chronic      Result

s for



                                       10:14 AM CST ztgvy-pkzazi-gqdl this proce

dure



                                                    disease      are in the



                                                                 results



                                                                 section.

 

             MAGNESIUM LEVEL Routine      2021   Chronic      Results for



                                       10:14 AM CST amtoy-pvehgr-ahvd this proce

dure



                                                    disease      are in the



                                                                 results



                                                                 section.

 

             COMPREHENSIVE Routine      2021   Chronic      



             METABOLIC PANEL              10:14 AM CST vvqab-kdrpom-tmfw 



                                                    disease      

 

             COMPLETE BLOOD COUNT Routine      2021   Chronic      



             W/ DIFFERENTIAL              10:14 AM CST xlzan-ueodet-supi 



                                                    disease      

 

             TMP INTERPRETATION Routine      2021                Results f

or



             ANTIBODY SCREEN              10:16 AM CST              this procedu

re



             NEGATIVE                                            are in the



                                                                 results



                                                                 section.

 

             CLOT EXPIRATION DATE Routine      2021                Results

 for



                                       10:16 AM CST              this procedure



                                                                 are in the



                                                                 results



                                                                 section.

 

             ANTIBODY SCREEN Routine      2021   Graft versus host Results

 for



                                                10:16 AM CST    disease



                                        this procedure



                                                    Complication of bone are in 

the



                                                    marrow transplant, not resul

ts



                                                    otherwise specified section.

 

             ABORH        Routine      2021   Graft versus host Results fo

r



                                                10:16 AM CST    disease



                                        this procedure



                                                    Complication of bone are in 

the



                                                    marrow transplant, not resul

ts



                                                    otherwise specified section.

 

             FRACTIONATED BILIRUBIN Routine      2021   Graft versus host 

Results for



                                                10:16 AM CST    disease



                                        this procedure



                                                    Complication of bone are in 

the



                                                    marrow transplant, not resul

ts



                                                    otherwise specified section.

 

             TOTAL PROTEIN Routine      2021   Graft versus host Results f

or



                                                10:16 AM CST    disease



                                        this procedure



                                                    Complication of bone are in 

the



                                                    marrow transplant, not resul

ts



                                                    otherwise specified section.

 

             ASPARTATE    Routine      2021   Graft versus host Results fo

r



                AMINOTRANSFERASE                 10:16 AM CST    disease



                                        this procedure



                                                    Complication of bone are in 

the



                                                    marrow transplant, not resul

ts



                                                    otherwise specified section.

 

             ALANINE      Routine      2021   Graft versus host Results fo

r



                AMINOTRANSFERASE                 10:16 AM CST    disease



                                        this procedure



                                                    Complication of bone are in 

the



                                                    marrow transplant, not resul

ts



                                                    otherwise specified section.

 

             ALKALINE PHOSPHATASE Routine      2021   Graft versus host Re

sults for



                                                10:16 AM CST    disease



                                        this procedure



                                                    Complication of bone are in 

the



                                                    marrow transplant, not resul

ts



                                                    otherwise specified section.

 

             ALBUMIN LEVEL Routine      2021   Graft versus host Results f

or



                                                10:16 AM CST    disease



                                        this procedure



                                                    Complication of bone are in 

the



                                                    marrow transplant, not resul

ts



                                                    otherwise specified section.

 

             CALCIUM LEVEL TOTAL Routine      2021   Graft versus host Res

ults for



                                                10:16 AM CST    disease



                                        this procedure



                                                    Complication of bone are in 

the



                                                    marrow transplant, not resul

ts



                                                    otherwise specified section.

 

             .GLOMERULAR FILTRATION Routine      2021   Graft versus host 

Results for



                RATE                            10:16 AM CST    disease



                                        this procedure



                                                    Complication of bone are in 

the



                                                    marrow transplant, not resul

ts



                                                    otherwise specified section.

 

             SERUM CREATININE Routine      2021   Graft versus host Result

s for



                                                10:16 AM CST    disease



                                        this procedure



                                                    Complication of bone are in 

the



                                                    marrow transplant, not resul

ts



                                                    otherwise specified section.

 

             ELECTROLYTE PANEL Routine      2021   Graft versus host Resul

ts for



                                                10:16 AM CST    disease



                                        this procedure



                                                    Complication of bone are in 

the



                                                    marrow transplant, not resul

ts



                                                    otherwise specified section.

 

             BLOOD UREA NITROGEN Routine      2021   Graft versus host Res

ults for



                                                10:16 AM CST    disease



                                        this procedure



                                                    Complication of bone are in 

the



                                                    marrow transplant, not resul

ts



                                                    otherwise specified section.

 

             GLUCOSE LEVEL Routine      2021   Graft versus host Results f

or



                                                10:16 AM CST    disease



                                        this procedure



                                                    Complication of bone are in 

the



                                                    marrow transplant, not resul

ts



                                                    otherwise specified section.

 

             MANUAL DIFFERENTIAL Routine      2021   Graft versus host Res

ults for



                                                10:16 AM CST    disease



                                        this procedure



                                                    Complication of bone are in 

the



                                                    marrow transplant, not resul

ts



                                                    otherwise specified section.

 

             Results CBC  Routine      2021   Graft versus host Results fo

r



                                                10:16 AM CST    disease



                                        this procedure



                                                    Complication of bone are in 

the



                                                    marrow transplant, not resul

ts



                                                    otherwise specified section.

 

             TYPE AND SCREEN Routine      2021   Graft versus host 



                                                10:16 AM CST    disease



                                        



                                                    Complication of bone 



                                                    marrow transplant, not 



                                                    otherwise specified 

 

             MAGNESIUM LEVEL Routine      2021   Graft versus host Results

 for



                                                10:16 AM CST    disease



                                        this procedure



                                                    Complication of bone are in 

the



                                                    marrow transplant, not resul

ts



                                                    otherwise specified section.

 

             COMPREHENSIVE Routine      2021   Graft versus host 



                METABOLIC PANEL                 10:16 AM CST    disease



                                        



                                                    Complication of bone 



                                                    marrow transplant, not 



                                                    otherwise specified 

 

             COMPLETE BLOOD COUNT Routine      2021   Graft versus host 



                W/ DIFFERENTIAL                 10:16 AM CST    disease



                                        



                                                    Complication of bone 



                                                    marrow transplant, not 



                                                    otherwise specified 

 

             CLOT EXPIRATION DATE Routine      2021 1:28              Resu

lts for



                                       PM CST                    this procedure



                                                                 are in the



                                                                 results



                                                                 section.

 

             TMP INTERPRETATION Routine      2021 1:28              Result

s for



             ANTIBODY SCREEN              PM CST                    this procedu

re



             NEGATIVE                                            are in the



                                                                 results



                                                                 section.

 

             EVANS DAVID LAURA-CHINO Routine      2021 1:28              



             VIRUS QUANTITATIVE PCR              PM CST                    



             ANALYSIS                                            



             INTERPRETATION AND                                        



             REPORT                                              

 

             ANTIBODY SCREEN Routine      2021 1:28 Acute myeloblastic Res

ults for



                                       PM CST       leukemia not having this pro

cedure



                                                                achieved remissi

on



                                        are in the



                                                    Status post stem cell result

s



                                                                transplant



                                        section.



                                                    Dsdfg-jylgar-tbhl 



                                                    disease, not otherwise 



                                                                specified



                                        



                                                    Shortness of breath 

 

             ABORH        Routine      2021 1:28 Acute myeloblastic Result

s for



                                       PM CST       leukemia not having this pro

cedure



                                                                achieved remissi

on



                                        are in the



                                                    Status post stem cell result

s



                                                                transplant



                                        section.



                                                    Ohbtz-uygzfv-cgpp 



                                                    disease, not otherwise 



                                                                specified



                                        



                                                    Shortness of breath 

 

             FRACTIONATED BILIRUBIN Routine      2021 1:28 Acute myeloblas

tic Results for



                                       PM CST       leukemia not having this pro

cedure



                                                                achieved remissi

on



                                        are in the



                                                    Status post stem cell result

s



                                                                transplant



                                        section.



                                                    Gxixb-qjgwxd-iixj 



                                                    disease, not otherwise 



                                                                specified



                                        



                                                    Shortness of breath 

 

             TOTAL PROTEIN Routine      2021 1:28 Acute myeloblastic Resul

ts for



                                       PM CST       leukemia not having this pro

cedure



                                                                achieved remissi

on



                                        are in the



                                                    Status post stem cell result

s



                                                                transplant



                                        section.



                                                    Nukjc-vuokse-bnxb 



                                                    disease, not otherwise 



                                                                specified



                                        



                                                    Shortness of breath 

 

             ASPARTATE    Routine      2021 1:28 Acute myeloblastic Result

s for



             AMINOTRANSFERASE              PM CST       leukemia not having this

 procedure



                                                                achieved remissi

on



                                        are in the



                                                    Status post stem cell result

s



                                                                transplant



                                        section.



                                                    Pzaxj-ibukjl-irhr 



                                                    disease, not otherwise 



                                                                specified



                                        



                                                    Shortness of breath 

 

             ALANINE      Routine      2021 1:28 Acute myeloblastic Result

s for



             AMINOTRANSFERASE              PM CST       leukemia not having this

 procedure



                                                                achieved remissi

on



                                        are in the



                                                    Status post stem cell result

s



                                                                transplant



                                        section.



                                                    Mxeyk-wrvjeb-cohc 



                                                    disease, not otherwise 



                                                                specified



                                        



                                                    Shortness of breath 

 

             ALKALINE PHOSPHATASE Routine      2021 1:28 Acute myeloblasti

c Results for



                                       PM CST       leukemia not having this pro

cedure



                                                                achieved remissi

on



                                        are in the



                                                    Status post stem cell result

s



                                                                transplant



                                        section.



                                                    Rqqrr-ynqkai-rloy 



                                                    disease, not otherwise 



                                                                specified



                                        



                                                    Shortness of breath 

 

             ALBUMIN LEVEL Routine      2021 1:28 Acute myeloblastic Resul

ts for



                                       PM CST       leukemia not having this pro

cedure



                                                                achieved remissi

on



                                        are in the



                                                    Status post stem cell result

s



                                                                transplant



                                        section.



                                                    Hnfrd-ymzuld-gust 



                                                    disease, not otherwise 



                                                                specified



                                        



                                                    Shortness of breath 

 

             CALCIUM LEVEL TOTAL Routine      2021 1:28 Acute myeloblastic

 Results for



                                       PM CST       leukemia not having this pro

cedure



                                                                achieved remissi

on



                                        are in the



                                                    Status post stem cell result

s



                                                                transplant



                                        section.



                                                    Qzqrl-eyzxur-ribj 



                                                    disease, not otherwise 



                                                                specified



                                        



                                                    Shortness of breath 

 

             .GLOMERULAR FILTRATION Routine      2021 1:28 Acute myeloblas

tic Results for



             RATE                      PM CST       leukemia not having this pro

cedure



                                                                achieved remissi

on



                                        are in the



                                                    Status post stem cell result

s



                                                                transplant



                                        section.



                                                    Rxaiv-ntisec-curc 



                                                    disease, not otherwise 



                                                                specified



                                        



                                                    Shortness of breath 

 

             SERUM CREATININE Routine      2021 1:28 Acute myeloblastic Re

sults for



                                       PM CST       leukemia not having this pro

cedure



                                                                achieved remissi

on



                                        are in the



                                                    Status post stem cell result

s



                                                                transplant



                                        section.



                                                    Uhomr-ipnvmu-kgtz 



                                                    disease, not otherwise 



                                                                specified



                                        



                                                    Shortness of breath 

 

             ELECTROLYTE PANEL Routine      2021 1:28 Acute myeloblastic R

esults for



                                       PM CST       leukemia not having this pro

cedure



                                                                achieved remissi

on



                                        are in the



                                                    Status post stem cell result

s



                                                                transplant



                                        section.



                                                    Tgxbl-xoevuv-lanz 



                                                    disease, not otherwise 



                                                                specified



                                        



                                                    Shortness of breath 

 

             BLOOD UREA NITROGEN Routine      2021 1:28 Acute myeloblastic

 Results for



                                       PM CST       leukemia not having this pro

cedure



                                                                achieved remissi

on



                                        are in the



                                                    Status post stem cell result

s



                                                                transplant



                                        section.



                                                    Smijh-znphqf-kxlu 



                                                    disease, not otherwise 



                                                                specified



                                        



                                                    Shortness of breath 

 

             GLUCOSE LEVEL Routine      2021 1:28 Acute myeloblastic Resul

ts for



                                       PM CST       leukemia not having this pro

cedure



                                                                achieved remissi

on



                                        are in the



                                                    Status post stem cell result

s



                                                                transplant



                                        section.



                                                    Kshci-alqkfd-qudz 



                                                    disease, not otherwise 



                                                                specified



                                        



                                                    Shortness of breath 

 

             MANUAL DIFFERENTIAL Routine      2021 1:28 Acute myeloblastic

 Results for



                                       PM CST       leukemia not having this pro

cedure



                                                                achieved remissi

on



                                        are in the



                                                    Status post stem cell result

s



                                                                transplant



                                        section.



                                                    Zjwex-yyazjx-muwh 



                                                    disease, not otherwise 



                                                                specified



                                        



                                                    Shortness of breath 

 

             Results CBC  Routine      2021 1:28 Acute myeloblastic Result

s for



                                       PM CST       leukemia not having this pro

cedure



                                                                achieved remissi

on



                                        are in the



                                                    Status post stem cell result

s



                                                                transplant



                                        section.



                                                    Rxgom-rljjsj-yitn 



                                                    disease, not otherwise 



                                                                specified



                                        



                                                    Shortness of breath 

 

             TACROLIMUS LEVEL Routine      2021 1:28 Acute myeloblastic Re

sults for



                                       PM CST       leukemia not having this pro

cedure



                                                                achieved remissi

on



                                        are in the



                                                    Status post stem cell result

s



                                                                transplant



                                        section.



                                                    Yrcbt-mkacws-hrov 



                                                    disease, not otherwise 



                                                                specified



                                        



                                                    Shortness of breath 

 

             CMV QUANT PCR Routine      2021 1:28 Acute myeloblastic Resul

ts for



                                       PM CST       leukemia not having this pro

cedure



                                                                achieved remissi

on



                                        are in the



                                                    Status post stem cell result

s



                                                                transplant



                                        section.



                                                    Zefei-suooyb-ostl 



                                                    disease, not otherwise 



                                                                specified



                                        



                                                    Shortness of breath 

 

             HP MD LAURA-CHINO Routine      2021 1:28 Acute myeloblastic 

Results for



             VIRUS QUANTITATIVE PCR              PM CST       leukemia not havin

g this procedure



                ANALYSIS COLLECTION,                                 achieved re

mission



                                        are in the



             BLOOD                                  Status post stem cell result

s



                                                                transplant



                                        section.



                                                    Wsbwm-dimlwp-xwss 



                                                    disease, not otherwise 



                                                                specified



                                        



                                                    Shortness of breath 

 

             MAGNESIUM LEVEL Routine      2021 1:28 Acute myeloblastic Res

ults for



                                       PM CST       leukemia not having this pro

cedure



                                                                achieved remissi

on



                                        are in the



                                                    Status post stem cell result

s



                                                                transplant



                                        section.



                                                    Hlrlh-cfirmq-ppvt 



                                                    disease, not otherwise 



                                                                specified



                                        



                                                    Shortness of breath 

 

             LACTATE DEHYDROGENASE Routine      2021 1:28 Acute myeloblast

ic Results for



                                       PM CST       leukemia not having this pro

cedure



                                                                achieved remissi

on



                                        are in the



                                                    Status post stem cell result

s



                                                                transplant



                                        section.



                                                    Gcdgh-sstzys-gcva 



                                                    disease, not otherwise 



                                                                specified



                                        



                                                    Shortness of breath 

 

             URIC ACID    Routine      2021 1:28 Acute myeloblastic Result

s for



                                       PM CST       leukemia not having this pro

cedure



                                                                achieved remissi

on



                                        are in the



                                                    Status post stem cell result

s



                                                                transplant



                                        section.



                                                    Yskxb-rzlbag-rkwm 



                                                    disease, not otherwise 



                                                                specified



                                        



                                                    Shortness of breath 

 

             PHOSPHORUS LEVEL Routine      2021 1:28 Acute myeloblastic Re

sults for



                                       PM CST       leukemia not having this pro

cedure



                                                                achieved remissi

on



                                        are in the



                                                    Status post stem cell result

s



                                                                transplant



                                        section.



                                                    Oqdsc-wbhcfa-uwkz 



                                                    disease, not otherwise 



                                                                specified



                                        



                                                    Shortness of breath 

 

             COMPREHENSIVE Routine      2021 1:28 Acute myeloblastic 



             METABOLIC PANEL              PM CST       leukemia not having 



                                                                achieved remissi

on



                                        



                                                    Status post stem cell 



                                                                transplant



                                        



                                                    Qbajz-cwmtmi-slbk 



                                                    disease, not otherwise 



                                                                specified



                                        



                                                    Shortness of breath 

 

             TYPE AND SCREEN Routine      2021 1:28 Acute myeloblastic 



                                       PM CST       leukemia not having 



                                                                achieved remissi

on



                                        



                                                    Status post stem cell 



                                                                transplant



                                        



                                                    Oezbx-gnkrsl-nqyq 



                                                    disease, not otherwise 



                                                                specified



                                        



                                                    Shortness of breath 

 

             COMPLETE BLOOD COUNT Routine      2021 1:28 Acute myeloblasti

c 



             W/ DIFFERENTIAL              PM CST       leukemia not having 



                                                                achieved remissi

on



                                        



                                                    Status post stem cell 



                                                                transplant



                                        



                                                    Npqnv-uowwox-seul 



                                                    disease, not otherwise 



                                                                specified



                                        



                                                    Shortness of breath 

 

             ADENOVIRUS   Routine      2021 1:28 Acute myeloblastic Result

s for



             QUANTITATIVE, PLASMA              PM CST       leukemia not having 

this procedure



                                                                achieved remissi

on



                                        are in the



                                                    Status post stem cell result

s



                                                                transplant



                                        section.



                                                    Ymgiv-qjhtps-ahqf 



                                                    disease, not otherwise 



                                                                specified



                                        



                                                    Shortness of breath 

 

             HHV6 QUANT, PLASMA Routine      2021 1:28 Acute myeloblastic 

Results for



                                       PM CST       leukemia not having this pro

cedure



                                                                achieved remissi

on



                                        are in the



                                                    Status post stem cell result

s



                                                                transplant



                                        section.



                                                    Hssix-axdlze-pbni 



                                                    disease, not otherwise 



                                                                specified



                                        



                                                    Shortness of breath 

 

             TMP INTERPRETATION Routine      2021                Results f

or



             ANTIBODY SCREEN              10:46 AM CST              this procedu

re



             NEGATIVE                                            are in the



                                                                 results



                                                                 section.

 

             CLOT EXPIRATION DATE Routine      2021                Results

 for



                                       10:46 AM CST              this procedure



                                                                 are in the



                                                                 results



                                                                 section.

 

             ANTIBODY SCREEN Routine      2021   Graft versus host Results

 for



                                                10:46 AM CST    disease



                                        this procedure



                                                    Complication of bone are in 

the



                                                    marrow transplant, not resul

ts



                                                    otherwise specified section.

 

             ABORH        Routine      2021   Graft versus host Results fo

r



                                                10:46 AM CST    disease



                                        this procedure



                                                    Complication of bone are in 

the



                                                    marrow transplant, not resul

ts



                                                    otherwise specified section.

 

             FRACTIONATED BILIRUBIN Routine      2021   Graft versus host 

Results for



                                                10:46 AM CST    disease



                                        this procedure



                                                    Complication of bone are in 

the



                                                    marrow transplant, not resul

ts



                                                    otherwise specified section.

 

             TOTAL PROTEIN Routine      2021   Graft versus host Results f

or



                                                10:46 AM CST    disease



                                        this procedure



                                                    Complication of bone are in 

the



                                                    marrow transplant, not resul

ts



                                                    otherwise specified section.

 

             ASPARTATE    Routine      2021   Graft versus host Results fo

r



                AMINOTRANSFERASE                 10:46 AM CST    disease



                                        this procedure



                                                    Complication of bone are in 

the



                                                    marrow transplant, not resul

ts



                                                    otherwise specified section.

 

             ALANINE      Routine      2021   Graft versus host Results fo

r



                AMINOTRANSFERASE                 10:46 AM CST    disease



                                        this procedure



                                                    Complication of bone are in 

the



                                                    marrow transplant, not resul

ts



                                                    otherwise specified section.

 

             ALKALINE PHOSPHATASE Routine      2021   Graft versus host Re

sults for



                                                10:46 AM CST    disease



                                        this procedure



                                                    Complication of bone are in 

the



                                                    marrow transplant, not resul

ts



                                                    otherwise specified section.

 

             ALBUMIN LEVEL Routine      2021   Graft versus host Results f

or



                                                10:46 AM CST    disease



                                        this procedure



                                                    Complication of bone are in 

the



                                                    marrow transplant, not resul

ts



                                                    otherwise specified section.

 

             CALCIUM LEVEL TOTAL Routine      2021   Graft versus host Res

ults for



                                                10:46 AM CST    disease



                                        this procedure



                                                    Complication of bone are in 

the



                                                    marrow transplant, not resul

ts



                                                    otherwise specified section.

 

             .GLOMERULAR FILTRATION Routine      2021   Graft versus host 

Results for



                RATE                            10:46 AM CST    disease



                                        this procedure



                                                    Complication of bone are in 

the



                                                    marrow transplant, not resul

ts



                                                    otherwise specified section.

 

             SERUM CREATININE Routine      2021   Graft versus host Result

s for



                                                10:46 AM CST    disease



                                        this procedure



                                                    Complication of bone are in 

the



                                                    marrow transplant, not resul

ts



                                                    otherwise specified section.

 

             ELECTROLYTE PANEL Routine      2021   Graft versus host Resul

ts for



                                                10:46 AM CST    disease



                                        this procedure



                                                    Complication of bone are in 

the



                                                    marrow transplant, not resul

ts



                                                    otherwise specified section.

 

             BLOOD UREA NITROGEN Routine      2021   Graft versus host Res

ults for



                                                10:46 AM CST    disease



                                        this procedure



                                                    Complication of bone are in 

the



                                                    marrow transplant, not resul

ts



                                                    otherwise specified section.

 

             GLUCOSE LEVEL Routine      2021   Graft versus host Results f

or



                                                10:46 AM CST    disease



                                        this procedure



                                                    Complication of bone are in 

the



                                                    marrow transplant, not resul

ts



                                                    otherwise specified section.

 

             MANUAL DIFFERENTIAL STAT         2021   Graft versus host Res

ults for



                                                10:46 AM CST    disease



                                        this procedure



                                                    Complication of bone are in 

the



                                                    marrow transplant, not resul

ts



                                                    otherwise specified section.

 

             Results CBC  STAT         2021   Graft versus host Results fo

r



                                                10:46 AM CST    disease



                                        this procedure



                                                    Complication of bone are in 

the



                                                    marrow transplant, not resul

ts



                                                    otherwise specified section.

 

             TYPE AND SCREEN Routine      2021   Graft versus host 



                                                10:46 AM CST    disease



                                        



                                                    Complication of bone 



                                                    marrow transplant, not 



                                                    otherwise specified 

 

             MAGNESIUM LEVEL Routine      2021   Graft versus host Results

 for



                                                10:46 AM CST    disease



                                        this procedure



                                                    Complication of bone are in 

the



                                                    marrow transplant, not resul

ts



                                                    otherwise specified section.

 

             COMPREHENSIVE Routine      2021   Graft versus host 



                METABOLIC PANEL                 10:46 AM CST    disease



                                        



                                                    Complication of bone 



                                                    marrow transplant, not 



                                                    otherwise specified 

 

             COMPLETE BLOOD COUNT Routine      2021   Graft versus host 



                W/ DIFFERENTIAL                 10:46 AM CST    disease



                                        



                                                    Complication of bone 



                                                    marrow transplant, not 



                                                    otherwise specified 



after 12/10/2021



Results

XR Hip 3 or 4 Views Bilateral w Pelvis (2022  1:18 PM CST)





                    Anatomical Region   Laterality          Modality

 

                    Hip, Extremity                          Digital Radiography









             Specimen (Source) Anatomical   Collection Method Collection Time Re

ceived Time



                          Location /   / Volume                  



                          Laterality                             

 

                                                    2022 1:23 PM 



                                                    CST          









                                        Impressions

 

                                        2022 1:32 PM CST



1. Partially imaged spinal surgery showing lower lumbar spine and sacrum. 
Extensive fusion procedure with disc spacers and bilateral rods with pedicle 
screws. Bilateral iliac bolts are present showing s



                                        urrounding lucency indicating motion and

 loosening. The left bolt is fractured.



                                        2. Bilateral unremarkable total hip arth

roplasties.



                                        3. Infusion pump and catheter present.









                                        Narrative

 

                                        2022 1:32 PM CST



FULL RESULT:



                                        



                                        Examination: XR HIP 3 OR 4 VW BILATERAL 

W PELVIS, 2022 1:18 PM.



                                        



                                        Clinical History: 62-year-old woman with

 myelodysplastic syndrome. Previous back

surgery in 2018, right hip and left hip arthroplasties in  and .



                                        



                                        Indication: Pelvis pain



                                        



                                        Comparison: Portable abdomen 



                                        



                                        Technique: XR HIP 3 OR 4 VW BILATERAL W 

PELVIS



                                        



                                        Findings:



                                        1. Partially imaged spinal surgery showi

ng lower lumbar spine and sacrum. 

Extensive fusion procedure with disc spacers and bilateral rods with pedicle 
screws. Bilateral iliac bolts are present showing s



                                        urrounding lucency indicating motion and

 loosening. The left bolt is fractured.



                                        



                                        2. Right total hip arthroplasty. Compone

nts in expected position and without 

complication.



                                        



                                        3. Left total hip arthroplasty. Componen

ts in expected position and without 

complication.



                                        









                                        Procedure Note

 

                                        Shalom Gomez Jr., MD - 2022F

ormatting of this note might be 

different from the original.



                                        FULL RESULT:



                                        



                                        Examination: XR HIP 3 OR 4 VW BILATERAL 

W PELVIS, 2022 1:18 PM.



                                        



                                        Clinical History: 62-year-old woman with

 myelodysplastic syndrome. Previous back

surgery in 2018, right hip and left hip arthroplasties in  and .



                                        



                                        Indication: Pelvis pain



                                        



                                        Comparison: Portable abdomen 



                                        



                                        Technique: XR HIP 3 OR 4 VW BILATERAL W 

PELVIS



                                        



                                        Findings:



                                        1. Partially imaged spinal surgery showi

ng lower lumbar spine and sacrum. 

Extensive fusion procedure with disc spacers and bilateral rods with pedicle 
screws. Bilateral iliac bolts are present showing surrounding



                                        lucency indicating motion and loosening.

 The left bolt is fractured.



                                        



                                        2. Right total hip arthroplasty. Compone

nts in expected position and without 

complication.



                                        



                                        3. Left total hip arthroplasty. Componen

ts in expected position and without 

complication.



                                        



                                        IMPRESSION:



                                        1. Partially imaged spinal surgery showi

ng lower lumbar spine and sacrum. 

Extensive fusion procedure with disc spacers and bilateral rods with pedicle 
screws. Bilateral iliac bolts are present showing surrounding



                                        lucency indicating motion and loosening.

 The left bolt is fractured.



                                        2. Bilateral unremarkable total hip arth

roplasties.



                                        3. Infusion pump and catheter present.









                          Authorizing Provider      Result Type

 

                          Debra Paige [facility] DI[facility]NOSTIC IMAGING ORDER

ALIA



(ABNORMAL) .Serum Creatinine (2022 11:53 AM CST)Only the most recent of38 
resultswithin the time period is included.







          Component Value     Ref Range Test Method Analysis Time Performed At 

athologist



                                                                      Signature

 

          Creatinine 1.14 (H)  0.51 - 0.95                     [facility] 



                              mg/dL                         [facility] 









             Specimen     Anatomical   Collection Method Collection Time Receive

d Time



             (Source)     Location /   / Volume                  



                          Laterality                             

 

             Blood                                  2022 11:53 2022



                                                    AM CST       12:44 PM CST









                          Authorizing Provider      Result Type

 

                          Daron Hernadez MD         LAB BLOOD ORDERABLES









                Performing Organization Address         City/State/ZIP Code Phon

e Number

 

                [facility] Unless otherwise noted, [place] [address] 



                          [place]                    all lab tests performed



                                                    



                                                    by:



                                                    



                                Division of Pathology and                 



                                                    Laboratory Medicine



                                                    



                                Levar5 Ayala Glass                 



(ABNORMAL) .CBC (2022 11:53 AM CST)Only the most recent of38 resultswithin
the time period is included.







          Component Value     Ref Range Test Method Analysis Time Performed At 

athologist



                                                                      Signature

 

          WBC       6.8       4.0 - 11.0                     [facility]     



                              K/uL                          [facility] 

 

          RBC       4.00      4.00 -                        [facility]     



                              5.50 [facility] 

 

          Hgb       11.9 (L)  12.0 -                        [facility]     



                              16.0 gm/dL                     [facility] 

 

          Hct       38.2      37.0 -                        [facility]     



                              47.0 %                        [facility] 

 

          MCV       96        82 - 98 [facility] 

 

          MCH       29.8      27.0 -                        [facility]     



                              31.0 pg                       [facility] 

 

          MCHC      31.2      31.0 -                        [facility]     



                              36.0 gm/dL                     [facility] 

 

          RDW-SD    55.3 (H)  35.1 -                        [facility]     



                              46.3 [facility] 

 

          RDW-CV    15.6 (H)  12.0 -                        [facility]     



                              15.5 %                        [facility] 

 

          Platelet count 254       140 - 440                     [facility]     



                              K/uL                          [facility] 

 

          MPV       10.4      4.0 - 10.4                     [facility]     



                              fL                            [facility] 

 

          INRBC     0.0       <=0.0 %                       [facility] 









                                        Comment:



                                        The INRBC (instrument NRBC) value reflec

ts the enumeration



                                        of nucleated red blood cells contained i

n a 200uL sample



                                        of whole blood analyzed by the instrumen

t. This value may



                                        differ from the NRBC value reported in a

 manual differential,



                                        which is based on a 100 cell differentia

l.



                                        









             Specimen     Anatomical   Collection Method Collection Time Receive

d Time



             (Source)     Location /   / Volume                  



                          Laterality                             

 

             Blood                                  2022 11:53 2022



                                                    AM CST       12:14 PM CST









                          Authorizing Provider      Result Type

 

                          Daron Hernadez MD         LAB BLOOD ORDERABLES









                Performing Organization Address         City/State/ZIP Code Phon

e Number

 

                [facility] Unless otherwise noted, [address]                    all lab tests performed



                                                    



                                                    by:



                                                    



                                Division of Pathology and                 



                                                    Laboratory Medicine



                                                    



                                [address] [place] [address]                 



Clot Expiration Date (2022 11:53 AM CST)Only the most recent of27 results
within the time period is included.







          Component Value     Ref Range Test      Analysis  Performed At Patholo

gist



                                        Method    Time                Signature

 

          T & S     2022                               [facility] 









             Specimen     Anatomical   Collection Method Collection Time Receive

d Time



             (Source)     Location /   / Volume                  



                          Laterality                             

 

             Blood                                  2022 11:53 2022



                                                    AM CST       12:43 PM CST









                          Authorizing Provider      Result Type

 

                          Daron Hernadez MD         BLOOD BANK TEST ORDERABLES









                Performing Organization Address         City/[facility]/ZIP Code Phon

e Number

 

                [facility] Unless otherwise noted, [address]                    all lab tests performed



                                                    



                                                    by:



                                                    



                                Division of Pathology and                 



                                                    Laboratory Medicine



                                                    



                                [address] [place] [address]                 



(ABNORMAL) Glomerular Filtration Rate (2022 11:53 AM CST)Only the most 
recent of38 resultswithin the time period is included.







          Component Value     Ref Range Test Method Analysis Time Performed At P

athologist



                                                                      Signature

 

          eGFR      55 (L)    >=60                          [facility] 



                              mL/min/1.73                     [facility] 



                              sq. m                                   









                                        Comment:



                                        The eGFRcr is calculated with the 2021

KD-EPI creatinine equation using 

creatinine, patient's age, and sex for adults 18 years of age and older. Other 
factors, especially muscle mass, may affect accuracy and need to be considered.



                                        According to the Kidney Disease: Improvi

ng Global Outcomes (KDIGO) CKD Work 

Group 2012 Clinical Practice Guideline, chronic kidney disease (CKD) is defined 
as the abnormalities of kidney structure or function, present for more than 3 
months, with



                                        implications for health. CKD should be c

lassified by cause, GFR category, and 

albuminuria category. KDIGO guidelines provide the following GFR categories



                                        Stage Description GFR mL/min/1.73 m2



                                        G1* Normal or high >= 90



                                        G2* Mildly decreased 60-89



                                        G3a Mildly to moderately decreased 45-59



                                        G3b Moderately to severely decreased 30-

44



                                        G4 Severely decreased 15-29



                                        G5 Kidney failure <15



                                        *In the absence of evidence of kidney da

mage, neither G1 nor G2 fulfill criteria

for CKD.



                                        









             Specimen     Anatomical   Collection Method Collection Time Receive

d Time



             (Source)     Location /   / Volume                  



                          Laterality                             

 

             Blood                                  2022 11:53 2022



                                                    AM CST       12:44 PM CST









                          Authorizing Provider      Result Type

 

                          Daron Hernadez MD         LAB BLOOD ORDERABLES









                Performing Organization Address         City/State/ZIP Code Phon

e Number

 

                [facility] Unless otherwise noted, [address]                    all lab tests performed



                                                    



                                                    by:



                                                    



                                Division of Pathology and                 



                                                    Laboratory Medicine



                                                    



                                [address] [facility]                 



CMV Quant PCR (2022 11:53 AM CST)Only the most recent of15 resultswithin 
the time period is included.







          Component Value     Ref Range Test      Analysis  Performed At [facility]



                                        Method    Time                Signature

 

          CMV DNA PCR Target Not Target Not                     [facility]     



                    Detected  Detected                      [facility]  



                              IU/mL                         [facility] 









                                        Comment:



                                        Normal Range: Target Not Detected.



                                        Reportable Range: 34.5 to 4,000,000 CM

V DNA IU/mL; values between 4,000,000 to

10,000,000 CMV DNA IU/mL may be reported but these should be interpreted with 
caution as this range of the assay has not



                                        been internally verified. The clinical s

ignificance



                                        of CMV levels at 4,000,000 IU/ml verses 

those above 4,000,000 is unclear.



                                        



                                        Results are expressed in CMV DNA IU/ml p

lasma.



                                        



                                        Methodology: The extraction and quanti

tation of cytomegalovirus (CMV) DNA in 

human plasma is performed using the [employer] System. Amplification of viral 
DNA is achieved using polymerase chain reacti



                                        on (PCR). Internal controls are included

 to assess



                                        for possible amplification inhibitors. I

f inhibition is detected, the specimen 

is tested again and if inhibition is confirmed the specimen is resulted as 
"Invalid". When an "Invalid" results occurs, it



                                        is recommended to wait a minimum of 7-10

 days before



                                         submitting a new specimen for testing.



                                        



                                        This is an FDA-approved assay and its pe

rformance characteristics were verified 

by the microbiology laboratory at the [facility]. Results must be interpreted within th



                                        e context of all relevant clinical and l

aboratory



                                        findings.









             Specimen     Anatomical   Collection Method Collection Time Receive

d Time



             (Source)     Location /   / Volume                  



                          Laterality                             

 

             Blood                                  2022 11:53 2022 

1:39



                                                    AM CST       PM CST









                          Authorizing Provider      Result Type

 

                          Daron Hernadez MD         LAB BLOOD ORDERABLES









                Performing Organization Address         City/[facility]/ZIP Code Phon

e Number

 

                [facility] Unless otherwise noted, [address]                    all lab tests performed



                                                    



                                                    by:



                                                    



                                Division of Pathology and                 



                                                    Laboratory Medicine



                                                    



                                [address] [place] [address]                 



Fractionated Bilirubin (2022 11:53 AM CST)Only the most recent of34 
resultswithin the time period is included.







          Component Value     Ref Range Test Method Analysis Time Performed At P

athologist



                                                                      Signature

 

          Bili Total <0.3      <=1.2 mg/dL                     [facility] 









                                        Comment:



                                        Direct and indirect bilirubin will not b

e reported when Total bilirubin result 

is <0.3 mg/dL



                                        Indocyanine Green (ICG) may cause falsel

y elevated bilirubin results. Total and 

direct bilirubin must not be measured from samples containing indocyanine green.



                                        



                                        False elevation of total bilirubin can b

e seen in patients with IgG 

concentrations above 28 g/L.



                                        









             Specimen     Anatomical   Collection Method Collection Time Receive

d Time



             (Source)     Location /   / Volume                  



                          Laterality                             

 

             Blood                                  2022 11:53 2022



                                                    AM CST       12:44 PM CST









                          Authorizing Provider      Result Type

 

                          Daron Hernadez MD         LAB BLOOD ORDERABLES









                Performing Organization Address         City/[facility]/ZIP Code Phon

e Number

 

                [facility] Unless otherwise noted, [address]                    all lab tests performed



                                                    



                                                    by:



                                                    



                                Division of Pathology and                 



                                                    Laboratory Medicine



                                                    



                                [address] [place] [address]                 



TMP Interpretation Antibody Screen Negative (2022 11:53 AM CST)Only the 
most recent of27 resultswithin the time period is included.







          Component Value     Ref Range Test      Analysis  Performed At Patholo

gist



                                        Method    Time                Signature

 

          TMP Auto Neg  At the                                 [facility]     



          [facility]                                 [facility]  



                    time,                                   [facility] 



                    patient                                           



                    plasma shows                                         



                    no evidence                                         



                    of RBC                                            



                    alloantibodi                                         



                    es.                                               









                                        Comment:



                                        ________________________________________

____________



                                        ANTHONY HUBER,



                                        Dictated by: ANTHONY HUBER,



                                        Dictated Date/Time: 2022 9:54 AM C

ST  Transcribed Date/Time: 2022 

9:54 AM CST



                                        Electronically Signed By: ANTHONY HUBER

, on 2022 9:54 AM C



                                        









             Specimen     Anatomical   Collection Method Collection Time Receive

d Time



             (Source)     Location /   / Volume                  



                          Laterality                             

 

             Blood                                  2022 11:53 2022



                                                    AM CST       12:43 PM CST









                          Authorizing Provider      Result Type

 

                          Daron Hernadez MD         BLOOD BANK TEST ORDERABLES









                Performing Organization Address         City/[facility]/ZIP Code Phon

e Number

 

                [facility] Unless otherwise noted, [address]                    all lab tests performed



                                                    



                                                    by:



                                                    



                                Division of Pathology and                 



                                                    Laboratory Medicine



                                                    



                                [address] [place], [place] [address]                 



Tacrolimus (2022 11:53 AM CST)Only the most recent of16 resultswithin the 
time period is included.







          Component Value     Ref Range Test      Analysis  Performed At [facility]

gist



                                        Method    Time                Signature

 

          Tacro LD Time 2200                                    [facility] 

 

          Tacro LD Date 2022                               [facility] 

 

          Tacro Level Random                                  [facility] 

 

          Tacrolimus 15.0      5.0 - 20.0                     [facility]     



                              ng/mL                         [facility] 









                                        Comment:



                                        Therapeutic range 5 - 20 ng/mL for 12 ho

ur trough. The range varies with the 

method used, type of organ transplant, time after transplant, and co-
administration with other immunosuppressants.



                                        



                                        Analytical Method:



                                        



                                         Immunoassay



                                        



                                        Method Platform:



                                        



                                         [employer] 



                                        









             Specimen     Anatomical   Collection Method Collection Time Receive

d Time



             (Source)     Location /   / Volume                  



                          Laterality                             

 

             Blood                                  2022 11:53 2022



                                                    AM CST       12:33 PM CST









                          Authorizing Provider      Result Type

 

                          Daron Hernadze MD         LAB BLOOD ORDERABLES









                Performing Organization Address         City/[facility]/ZIP Code Phon

e Number

 

                [facility] Unless otherwise noted, [address]                    all lab tests performed



                                                    



                                                    by:



                                                    



                                Division of Pathology and                 



                                                    Laboratory Medicine



                                                    



                                [address] [place] [address] [place]                 



ABORh (2022 11:53 AM CST)Only the most recent of26 resultswithin the time 
period is included.







          Component Value     Ref Range Test Method Analysis Time Performed At 

athologist



                                                                      [facility]

 

          ABORh.    O POS                                   [facility] 









             Specimen     Anatomical   Collection Method Collection Time Receive

d Time



             (Source)     Location /   / Volume                  



                          Laterality                             

 

             Blood                                  2022 11:53 2022



                                                    AM CST       12:43 PM CST









                          Authorizing Provider      Result Type

 

                          Daron Hernadez MD         BLOOD BANK TEST ORDERABLES









                Performing Organization Address         City/State/ZIP Code Phon

e Number

 

                [facility] Unless otherwise noted, [place] [address] 



                          [place]                    all lab tests performed



                                                    



                                                    by:



                                                    



                                Division of Pathology and                 



                                                    Laboratory Medicine



                                                    



                                [address] [facility]                 



(ABNORMAL) Differential (2022 11:53 AM CST)Only the most recent of38 
resultswithin the time period is included.







          Component Value     Ref Range Test Method Analysis Time Performed At 

athologist



                                                                      Signature

 

          Neutrophil % 44.0      42.0 -                        [facility] 



                              66.0 %                        [facility] 

 

          Lymphocyte % 35.3      24.0 -                        [facility] 



                              44.0 %                        [facility] 

 

          Monocyte % 13.9 (H)  2.0 - 7.0                     [facility] 



                              %                             [facility] 

 

          Eosinophil % 5.0 (H)   1.0 - 4.0                     [facility] 



                              %                             [facility] 

 

          Basophil % 0.9       0.0 - 1.0                     [facility] 



                              %                             [facility] 

 

          IGRE %    0.9 (H)   0.0 - 0.4                     [facility] 



                              %                             [facility] 









                                        Comment: IGRE % count includes Metamyelo

cytes, Myelocytes, and Promyelocytes.









          Neutrophil Abs 3.00      1.70 - 7.30 K/uL                     [facility] 

 

          Lymphocyte Abs 2.41      1.00 - 4.80 K/uL                     [facility] 

 

          Monocyte Abs 0.95 (H)  0.08 - 0.70 K/uL                     [facility] 

 

          Eosinophil Abs 0.34      0.04 - 0.40 K/uL                     [facility] 

 

          Basophil Abs 0.06      0.00 - 0.10 K/uL                     [facility]

[facility] 

 

          IG Abs    0.06 (H)  0.00 - 0.04 K/uL                     [facility]UID[facility] 









             Specimen     Anatomical   Collection Method Collection Time Receive

d Time



             (Source)     Location /   / Volume                  



                          Laterality                             

 

             Blood                                  2022 11:53 2022



                                                    AM CST       12:14 PM CST









                          Authorizing Provider      Result Type

 

                          Daron Hernadez MD         LAB BLOOD ORDERABLES









                Performing Organization Address         City/[facility]/ZIP Code Phon

e Number

 

                [facility] Unless otherwise noted, [address]                    all lab tests performed



                                                    



                                                    by:



                                                    



                                Division of Pathology and                 



                                                    Laboratory Medicine



                                                    



                                [address] [place]                 



Antibody Screen (2022 11:53 AM CST)Only the most recent of27 resultswithin
the time period is included.







          Component Value     Ref Range Test Method Analysis Time Performed At P

athologist



                                                                      Signature

 

          [facility].     Negative [facility]                               [facility] 









             Specimen     Anatomical   Collection Method Collection Time Receive

d Time



             (Source)     Location /   / Volume                  



                          Laterality                             

 

             Blood                                  2022 11:53 2022



                                                    AM CST       12:43 PM CST









                          Authorizing Provider      Result Type

 

                          Daron Hernadez MD         BLOOD BANK TEST ORDERABLES









                Performing Organization Address         City/[facility]/ZIP Code Phon

e Number

 

                [facility] Unless otherwise noted, [address]                    all lab tests performed



                                                    



                                                    by:



                                                    



                                Division of Pathology and                 



                                                    Laboratory Medicine



                                                    



                                [facility] [place]                 



Uric Acid (2022 11:53 AM CST)Only the most recent of16 resultswithin the 
time period is included.







          Component Value     Ref Range Test Method Analysis Time Performed At P

athologist



                                                                      Signature

 

          Uric Acid 4.8       2.4 - 5.7                     [facility] 



                              mg/dL                         [facility] 









             Specimen     Anatomical   Collection Method Collection Time Receive

d Time



             (Source)     Location /   / Volume                  



                          Laterality                             

 

             Blood                                  2022 11:53 2022



                                                    AM CST       12:44 PM CST









                          Authorizing Provider      Result Type

 

                          Daron Hernadez MD         LAB BLOOD ORDERABLES









                Performing Organization Address         City/[facility]/ZIP Code Phon

e Number

 

                [facility] Unless otherwise noted, [address]                    all lab tests performed



                                                    



                                                    by:



                                                    



                                Division of Pathology and                 



                                                    Laboratory Medicine



                                                    



                                [address] [place]                 



(ABNORMAL) BUN (2022 11:53 AM CST)Only the most recent of38 resultswithin 
the time period is included.







          Component Value     Ref Range Test Method Analysis Time Performed At P

athologist



                                                                      Signature

 

          BUN       34 (H)    6 - 23                        [facility] 



                              mg/dL                         [facility] 









             Specimen     Anatomical   Collection Method Collection Time Receive

d Time



             (Source)     Location /   / Volume                  



                          Laterality                             

 

             Blood                                  2022 11:53 2022



                                                    AM CST       12:44 PM CST









                          Authorizing Provider      Result Type

 

                          Daron Hernadez MD         LAB BLOOD ORDERABLES









                Performing Organization Address         City/[facility]/ZIP Code Phon

e Number

 

                [facility] Unless otherwise noted, [address]                    all lab tests performed



                                                    



                                                    by:



                                                    



                                Division of Pathology and                 



                                                    Laboratory Medicine



                                                    



                                [address] [place]                 



(ABNORMAL) ALT (2022 11:53 AM CST)Only the most recent of34 resultswithin 
the time period is included.







          Component Value     Ref Range Test Method Analysis Time Performed At P

athologist



                                                                      Signature

 

          ALT       50 (H)    <=33 U/L                      [facility] 









             Specimen     Anatomical   Collection Method Collection Time Receive

d Time



             (Source)     Location /   / Volume                  



                          Laterality                             

 

             Blood                                  2022 11:53 2022



                                                    AM CST       12:44 PM CST









                          Authorizing Provider      Result Type

 

                          Daron Hernadez MD         LAB BLOOD ORDERABLES









                Performing Organization Address         City/[facility]/ZIP Code Phon

e Number

 

                [facility] Unless otherwise noted, [address]                    all lab tests performed



                                                    



                                                    by:



                                                    



                                Division of Pathology and                 



                                                    Laboratory Medicine



                                                    



                                [facility] [place]                 



(ABNORMAL) Aspartate Aminotransferase (2022 11:53 AM CST)Only the most 
recent of34 resultswithin the time period is included.







          Component Value     Ref Range Test Method Analysis Time Performed At P

athologist



                                                                      Signature

 

          AST       67 (H)    <=32 U/L                      [facility] 









             Specimen     Anatomical   Collection Method Collection Time Receive

d Time



             (Source)     Location /   / Volume                  



                          Laterality                             

 

             Blood                                  2022 11:53 2022



                                                    AM CST       12:44 PM CST









                          Authorizing Provider      Result Type

 

                          Daron Hernadez MD         LAB BLOOD ORDERABLES









                Performing Organization Address         City/[facility]/ZIP Code Phon

e Number

 

                [facility] Unless otherwise noted, [address]                    all lab tests performed



                                                    



                                                    by:



                                                    



                                Division of Pathology and                 



                                                    Laboratory Medicine



                                                    



                                [address] [place]                 



Total Protein (2022 11:53 AM CST)Only the most recent of34 resultswithin 
the time period is included.







          Component Value     Ref Range Test Method Analysis Time Performed At P

athologist



                                                                      Signature

 

          Total Protein 6.5       6.4 - 8.3                     [facility] 



                              g/dL                          [facility] 









             Specimen     Anatomical   Collection Method Collection Time Receive

d Time



             (Source)     Location /   / Volume                  



                          Laterality                             

 

             Blood                                  2022 11:53 2022



                                                    AM CST       12:44 PM CST









                          Authorizing Provider      Result Type

 

                          Daron Hernadez MD         LAB BLOOD ORDERABLES









                Performing Organization Address         City/[facility]/ZIP Code Phon

e Number

 

                [facility] Unless otherwise noted, [address]                    all lab tests performed



                                                    



                                                    by:



                                                    



                                Division of Pathology and                 



                                                    Laboratory Medicine



                                                    



                                [address] [place]                 



Phosphorus Level (2022 11:53 AM CST)Only the most recent of20 results
within the time period is included.







          Component Value     Ref Range Test Method Analysis Time Performed At P

athologist



                                                                      Signature

 

          Phosphorus 4.3       2.5 - 4.5                     [facility] 



                              mg/dL                         [facility] 









             Specimen     Anatomical   Collection Method Collection Time Receive

d Time



             (Source)     Location /   / Volume                  



                          Laterality                             

 

             Blood                                  2022 11:53 2022



                                                    AM CST       12:44 PM CST









                          Authorizing Provider      Result Type

 

                          Daron Hernadez MD         LAB BLOOD ORDERABLES









                Performing Organization Address         City/[facility]/ZIP Code Phon

e Number

 

                [facility] Unless otherwise noted, [address]                    all lab tests performed



                                                    



                                                    by:



                                                    



                                Division of Pathology and                 



                                                    Laboratory Medicine



                                                    



                                [address] [place]                 



(ABNORMAL) Alkaline Phosphatase (2022 11:53 AM CST)Only the most recent of
34 resultswithin the time period is included.







          Component Value     Ref Range Test Method Analysis Time Performed At P

athologist



                                                                      Signature

 

          Alk Phos  127 (H)   35 - 104                      [facility] 



                              U/L                           [facility] 









             Specimen     Anatomical   Collection Method Collection Time Receive

d Time



             (Source)     Location /   / Volume                  



                          Laterality                             

 

             Blood                                  2022 11:53 2022



                                                    AM CST       12:44 PM CST









                          Authorizing Provider      Result Type

 

                          Daron Hernadez MD         LAB BLOOD ORDERABLES









                Performing Organization Address         City/[facility]/ZIP Code Phon

e Number

 

                [facility] Unless otherwise noted, [address]                    all lab tests performed



                                                    



                                                    by:



                                                    



                                Division of Pathology and                 



                                                    Laboratory Medicine



                                                    



                                [address] [place]                 



Magnesium Level (2022 11:53 AM CST)Only the most recent of35 resultswithin
the time period is included.







          Component Value     Ref Range Test Method Analysis Time Performed At P

athologist



                                                                      Signature

 

          Magnesium 2.3       1.6 - 2.6                     [facility] 



                              mg/dL                         [facility] 









             Specimen     Anatomical   Collection Method Collection Time Receive

d Time



             (Source)     Location /   / Volume                  



                          Laterality                             

 

             Blood                                  2022 11:53 2022



                                                    AM CST       12:44 PM CST









                          Authorizing Provider      Result Type

 

                          Daron Hernadez MD         LAB BLOOD ORDERABLES









                Performing Organization Address         City/[facility]/ZIP Code Phon

e Number

 

                [facility] Unless otherwise noted, [address]                    all lab tests performed



                                                    



                                                    by:



                                                    



                                Division of Pathology and                 



                                                    Laboratory Medicine



                                                    



                                [address] [facility]                 



LDH (2022 11:53 AM CST)Only the most recent of20 resultswithin the time 
period is included.







          Component Value     Ref Range Test Method Analysis Time Performed At 

athologist



                                                                      Signature

 

          LDH       214       135 - 214                     [facility] 



                              U/L                           [facility] 









                                        Comment: Results greater than 1651 U/L m

ay not be reliable due to matrix effect 

with



                                        extended dilution as it exceeds the manu

facturer's recommended limit. Caution 

should



                                        be exercised when interpreting such valu

es and done in conjunction with clinical



                                        context.









             Specimen     Anatomical   Collection Method Collection Time Receive

d Time



             (Source)     Location /   / Volume                  



                          Laterality                             

 

             Blood                                  2022 11:53 2022



                                                    AM CST       12:43 PM CST









                          Authorizing Provider      Result Type

 

                          Daron Hernadez MD         LAB BLOOD ORDERABLES









                Performing Organization Address         City/[facility]/ZIP Code Phon

e Number

 

                [facility] Unless otherwise noted, [address]                    all lab tests performed



                                                    



                                                    by:



                                                    



                                Division of Pathology and                 



                                                    Laboratory Medicine



                                                    



                                [address] [facility]                 



Glucose Level (2022 11:53 AM CST)Only the most recent of38 resultswithin 
the time period is included.







          Component Value     Ref Range Test Method Analysis Time Performed At 

athologist



                                                                      Signature

 

          Glucose Level 98        70 - 99                       [facility] 



                              mg/dL                         [facility] 









                                        Comment:



                                        Effective 16, the glucose reference

 intervals have been updated based on 

American Diabetes Association guidelines (Standards of Medical Care in Diabetes 
2016. Diabetes Care 2016; 39: S13-S22).



                                        Fasting blood glucose:



                                        Normal: 70-99 mg/dL



                                        Impaired fasting glucose (increased risk

 for diabetes or pre-diabetes): 100-

125 mg/dL



                                        Diabetes mellitus: >/=126 mg/dL



                                        



                                        Random blood glucose:



                                        Normal:  mg/dL



                                        Note: Random glucose >100 mg/dL is assoc

iated with increased risk for diabetes



                                        









             Specimen     Anatomical   Collection Method Collection Time Receive

d Time



             (Source)     Location /   / Volume                  



                          Laterality                             

 

             Blood                                  2022 11:53 2022



                                                    AM CST       12:44 PM CST









                          Authorizing Provider      Result Type

 

                          Daron Hernadez MD         LAB BLOOD ORDERABLES









                Performing Organization Address         City/[facility]/ZIP Code Phon

e Number

 

                [facility] Unless otherwise noted, [address]                    all lab tests performed



                                                    



                                                    by:



                                                    



                                Division of Pathology and                 



                                                    Laboratory Medicine



                                                    



                                [address] [place]                 



Calcium Level (2022 11:53 AM CST)Only the most recent of38 resultswithin 
the time period is included.







          Component Value     Ref Range Test Method Analysis Time Performed At P

athologist



                                                                      Signature

 

          Calcium Lvl 9.0       8.4 - 10.2                     [facility] 



                              mg/dL                         [facility] 









             Specimen     Anatomical   Collection Method Collection Time Receive

d Time



             (Source)     Location /   / Volume                  



                          Laterality                             

 

             Blood                                  2022 11:53 2022



                                                    AM CST       12:44 PM CST









                          Authorizing Provider      Result Type

 

                          Daron Hernadez MD         LAB BLOOD ORDERABLES









                Performing Organization Address         City/[facility]/ZIP Code Phon

e Number

 

                [facility] Unless otherwise noted, [address]                    all lab tests performed



                                                    



                                                    by:



                                                    



                                Division of Pathology and                 



                                                    Laboratory Medicine



                                                    



                                [address] [place]                 



Albumin Level (2022 11:53 AM CST)Only the most recent of34 resultswithin 
the time period is included.







          Component Value     Ref Range Test Method Analysis Time Performed At 

athologist



                                                                      Signature

 

          Albumin Lvl 4.0       3.5 - 5.2                     [facility] 



                              gm/dL                         [facility] 









             Specimen     Anatomical   Collection Method Collection Time Receive

d Time



             (Source)     Location /   / Volume                  



                          Laterality                             

 

             Blood                                  2022 11:53 2022



                                                    AM CST       12:44 PM CST









                          Authorizing Provider      Result Type

 

                          Daron Hernadez MD         LAB BLOOD ORDERABLES









                Performing Organization Address         City/[facility]/ZIP Code Phon

e Number

 

                [facility] Unless otherwise noted, [address]                    all lab tests performed



                                                    



                                                    by:



                                                    



                                Division of Pathology and                 



                                                    Laboratory Medicine



                                                    



                                [address] [place]                 



Electrolyte Panel (2022 11:53 AM CST)Only the most recent of38 results
within the time period is included.







          Component Value     Ref Range Test Method Analysis Time Performed At P

athologist



                                                                      Signature

 

          Sodium Lvl 140       136 - 145                     [facility] 



                              mEq/L                         [facility] 

 

          Potassium Lvl 4.2       3.5 - 5.1                     [facility] 



                              mEq/L                         [facility] 

 

          Chloride  107       98 - 107                      [facility] 



                              mEq/L                         [facility] 

 

          CO2       26        22 - 29                       [facility] 



                              mEq/L                         [facility] 

 

          Anion Gap 7         4 - 14                        [facility] 



                              mEq/L                         [facility] 









             Specimen     Anatomical   Collection Method Collection Time Receive

d Time



             (Source)     Location /   / Volume                  



                          Laterality                             

 

             Blood                                  2022 11:53 2022



                                                    AM CST       12:44 PM CST









                          Authorizing Provider      Result Type

 

                          Daron Hernadez MD         LAB BLOOD ORDERABLES









                Performing Organization Address         City/State/ZIP Code Phon

e Number

 

                [facility] Unless otherwise noted, [place] [address] 



                          [place]                    all lab tests performed



                                                    



                                                    by:



                                                    



                                Division of Pathology and                 



                                                    Laboratory Medicine



                                                    



                                [address] [facility] DXA Bone Mineral Density Appendicular Forearm Only (2022 10:53 AM CST)





                    Anatomical Region   Laterality          Modality

 

                    Spine                                   Nuclear Medicine









             Specimen (Source) Anatomical   Collection Method Collection Time Re

ceived Time



                          Location /   / Volume                  



                          Laterality                             

 

                                                    2022 10:56 



                                                    AM CST       









                                        Impressions

 

                                        2022 12:21 PM CST



Normal bone mineral density.



                                        



                                        I personally reviewed these image(s) louie mckeon with the resident's/fellow's 

interpretations, certify that if a procedure was performed I was physically 
present, and agree with the final report.









                                        Narrative

 

                                        2022 12:21 PM CST







                                        FULL RESULT:



                                        Examination: Bone Mineral Density (DXA),

 2022



                                        



                                        Clinical History: 62-year-old postmenopa

usal female with MDS.



                                        



                                        Indication: Assessment of bone mineral d

ensity..



                                        



                                        Comparison: None.



                                        



                                        Technique: Bone mineral density was obta

ined using H[employer] dual-energy X-ray 

absorptiometry. The lumbar spine and bilateral hips are not included due to 
surgical hardware. The left distal forearm was measured



                                        



                                        Findings: The findings are provided in t

he below table(s).



                                        



                                        



                                        Bone Density:



                                        ----------------------------------------

-----------------------------------



                                        Region   Exam Date  BMD   T-

    Z-  



                                                      

     g/cm2 Score Score



                                        ----------------------------------------

-----------------------------------



                                        1/3 Forearm (Left)



                                                 2022 0.

682  -0.2   1.2  



                                        ----------------------------------------

-------------------------



                                        



                                        For postmenopausal women and men age 50 

and over, the World Health



                                        Organization criteria for BMD interpreta

tion classify patients as: Normal



                                        (T-score at or above -1.0), Osteopenia (

T-score between -1.0 and 



                                        -2.5), or Osteoporosis (T-score at or be

low -2.5).



                                        



                                        



                                        



                                        









                                        Procedure Note

 

                                        Willis Banegas MD - 2022Formatting of t

his note might be different from the 

original.



                                        



                                        FULL RESULT:



                                        Examination: Bone Mineral Density (DXA),

 2022



                                        



                                        Clinical History: 62-year-old postmenopa

usal female with MDS.



                                        



                                        Indication: Assessment of bone mineral d

ensity..



                                        



                                        Comparison: None.



                                        



                                        Technique: Bone mineral density was obta

ined using Hologic dual-energy X-ray 

absorptiometry. The lumbar spine and bilateral hips are not included due to 
surgical hardware. The left distal forearm was measured



                                        



                                        Findings: The findings are provided in t

he below table(s).



                                        



                                        



                                        Bone Density:



                                        ----------------------------------------

-----------------------------------



                                        Region Exam Date BMD T- Z-



                                         g/cm2 Score Score



                                        ----------------------------------------

-----------------------------------



                                        1/3 Forearm (Left)



                                         2022 0.682 -0.2 1.2



                                        ----------------------------------------

-------------------------



                                        



                                        For postmenopausal women and men age 50 

and over, the World Health



                                        Organization criteria for BMD interpreta

tion classify patients as: Normal



                                        (T-score at or above -1.0), Osteopenia (

T-score between -1.0 and



                                        -2.5), or Osteoporosis (T-score at or be

low -2.5).



                                        



                                        



                                        



                                        



                                        IMPRESSION:



                                        Normal bone mineral density.



                                        



                                        I personally reviewed these image(s) louie mckeon with the resident's/fellow's 

interpretations, certify that if a procedure was performed I was physically 
present, and agree with the final report.









                          Authorizing Provider      Result Type

 

                          Daron Hernadez MD         IM DXA ORDERABLES



MD [place] Virus Quantitative PCR Collection, Blood (10/25/2022 10:56 AM 
CDT)Only the most recent of6 resultswithin the time period is included.







          Component Value     Ref Range Test Method Analysis Time Performed At P

athologist



                                                                      Signature

 

          Molecular Yes                                     [facility] 



          [facility]                                         [facility] 



          (Received)                                                   









             Specimen     Anatomical   Collection Method Collection Time Receive

d Time



             (Source)     Location /   / Volume                  



                          Laterality                             

 

             Blood                                  10/25/2022 10:56 10/25/2022 

1:29



                                                    AM CDT       PM CDT









                          Authorizing Provider      Result Type

 

                          Daron JUARES  MD BLOOD COLLECTIONS









                Performing Organization Address         City/State/ZIP Code Phon

e Number

 

                [facility] Unless otherwise noted, [place] [address] 



                          [place]                    all lab tests performed



                                                    



                                                    by:



                                                    



                                Division of Pathology and                 



                                                    Laboratory Medicine



                                                    



                                [address] MD [place] Virus Quantitative PCR Interpretation and Report (10/25/2022 
10:56 AM CDT)Only the most recent of6 resultswithin the time period is included.







             Specimen (Source) Anatomical   Collection Method Collection Time Re

ceived Time



                          Location /   / Volume                  



                          Laterality                             

 

                                                    10/25/2022 10:56 



                                                    AM CDT       









                                        Narrative

 

                                        This result has an attachment that is no

t available.













                          Authorizing Provider      Result Type

 

                          Daron JUARES [facility]

 (HP MD)



Vitamin D 25OH (10/25/2022 10:56 AM CDT)Only the most recent of2 resultswithin 
the time period is included.







          Component Value     Ref Range Test Method Analysis Time Performed At [facility]

 

          Vitamin D 25 OH 56        30 - 100                      [facility]

 



                              ng/mL                         [facility] 









                                        Comment:



                                        Reference Range:



                                        



                                        Deficiency:      <10 ng/mL



                                        Insufficiency:     10-29 ng/mL



                                        Sufficiency:       ng/mL



                                        Potential toxicity:  >100 ng/mL



                                        









             Specimen     Anatomical   Collection Method Collection Time Receive

d Time



             (Source)     Location /   / Volume                  



                          Laterality                             

 

             Blood                                  10/25/2022 10:56 10/25/2022



                                                    AM CDT       12:07 PM CDT









                          Authorizing Provider      Result Type

 

                          Daron Hernadez MD         LAB BLOOD ORDERABLES









                Performing Organization Address         City/[facility]/ZIP Code Phon

e Number

 

                [facility] Unless otherwise noted, [address]                    all lab tests performed



                                                    



                                                    by:



                                                    



                                Division of Pathology and                 



                                                    Laboratory Medicine



                                                    



                                [address] [place] [address]                 



Prothrombin Time with INR (10/25/2022 10:56 AM CDT)





          Component Value     Ref Range Test Method Analysis Time Performed At [facility]

 

          PT        12.9      11.9 - 14.1                     [facility]                     [facility] 

 

          INR       1.02      0.89 - 1.10                     [facility] 









             Specimen     Anatomical   Collection Method Collection Time Receive

d Time



             (Source)     Location /   / Volume                  



                          Laterality                             

 

             Blood                                  10/25/2022 10:56 10/25/2022



                                                    AM CDT       11:01 AM CDT









                                        Narrative

 

                                        [facility] - 10/25/202

2 11:33 AM CDT







                                        This lab cannot be scheduled at the [facility] locations due to 

collection/proccessing



                                        restrictions: [facility] and [facility]

 [facility].









                          Authorizing Provider      Result Type

 

                          Daron Hernadez MD         LAB BLOOD ORDERABLES









                Performing Organization Address         City/[facility]/ZIP Code Phon

e Number

 

                [facility] Unless otherwise noted, [address]                    all lab tests performed



                                                    



                                                    by:



                                                    



                                Division of Pathology and                 



                                                    Laboratory Medicine



                                                    



                                [address] [place] [address]                 



TSH (10/25/2022 10:56 AM CDT)Only the most recent of2 resultswithin the time 
period is included.







          Component Value     Ref Range Test Method Analysis Time Performed At [facility]

 

          TSH       1.20      0.27 - 4.20                     [facility] 



                              mcunit/mL                     DIAGNOSTIC 



                                                            CENTER    









             Specimen     Anatomical   Collection Method Collection Time Receive

d Time



             (Source)     Location /   / Volume                  



                          Laterality                             

 

             Blood                                  10/25/2022 10:56 10/25/2022



                                                    AM CDT       11:27 AM CDT









                          Authorizing Provider      Result Type

 

                          Daron Hernadez MD         LAB BLOOD ORDERABLES









                Performing Organization Address         City/[facility] Code Phon

e Number

 

                [facility] Unless otherwise noted, [address]          all lab tests performed                 



                                                    by:



                                                    



                                Division of Pathology and                 



                                                    Laboratory Medicine



                                                    



                                [facility] [place]                 



Free T4 (10/25/2022 10:56 AM CDT)Only the most recent of2 resultswithin the time
period is included.







          Component Value     Ref Range Test Method Analysis Time Performed At 

athologist



                                                                      Signature

 

          T4 Free   1.35      0.93 - 1.70                     [facility] 



                              ngdL                         [facility]    









             Specimen     Anatomical   Collection Method Collection Time Receive

d Time



             (Source)     Location /   / Volume                  



                          Laterality                             

 

             Blood                                  10/25/2022 10:56 10/25/2022



                                                    AM CDT       11:27 AM CDT









                          Authorizing Provider      Result Type

 

                          Daron Hernadez MD         LAB BLOOD ORDERABLES









                Performing Organization Address         City/[facility]/ZIP Code Phon

e Number

 

                [facility] Unless otherwise noted, [address]          all lab tests performed                 



                                                    by:



                                                    



                                Division of Pathology and                 



                                                    Laboratory Medicine



                                                    



                                [facility]                 



(ABNORMAL) Lipid Panel (10/25/2022 10:56 AM CDT)Only the most recent of3 results
within the time period is included.







          Component Value     Ref Range Test Method Analysis Time Performed At 

athologist



                                                                      Signature

 

          Chol      179       <=199 mg/dL                     [facility]    









                                        Comment:



                                        ATP III Classification of Total Choleste

rol 



 Primary Target of Therapy (in mg/dL):



                                        <200     Desirable



                                        200-239  Borderline high



                                        >=240    High



                                        









          Trig      267 (H)   <=149 mg/dL                     [facility] 









                                        Comment:



                                        ATP III Classification of Serum Triglyce

rides 



 Primary Target of Therapy (in mg/dL):



                                        <150      Normal



                                        150-199   Borderline high



                                        200-499   High



                                        >=500     Very high



                                        Non-fasting triglycerides >200 mg/dL may

 be followed up with a fasting Lipid 

Panel. Calculated LDL-C may be falsely decreased when non-fasting triglycerides 
>200 mg/dL.



                                        









          HDL       38 (L)    >=40 mg/dL                     [facility] 

 

          LDL       88        <=100 mg/dL                     [facility] 









                                        Comment:



                                        ATP III Classification of LDL Cholestero

l 



 Primary Target of Therapy (in mg/dL):



                                        <100 Optimal



                                        100-129 Near optimal/above optimal



                                        130-159 Borderline high



                                        160-189 High



                                        >=190 Very high



                                        









          VLDL      53        mg/dL                         [facility] 









             Specimen     Anatomical   Collection Method Collection Time Receive

d Time



             (Source)     Location /   / Volume                  



                          Laterality                             

 

             Blood                                  10/25/2022 10:56 10/25/2022



                                                    AM CDT       11:26 AM CDT









                          Authorizing Provider      Result Type

 

                          Daron Hernadez MD         LAB BLOOD ORDERABLES









                Performing Organization Address         City/State/ZIP Code Phon

e Number

 

                [facility] Unless otherwise noted, [place], [place] [address]

[address] 



                CENTER          all lab tests performed                 



                                                    by:



                                                    



                                Division of Pathology and                 



                                                    Laboratory Medicine



                                                    



                                [address]                 



SLP Videostroboscopy (10/20/2022 12:39 PM CDT)





                                        Narrative

 

                                        [facility] - 10/20/2022 12:39 PM CDT



Sadie Vickers, PhD   10/20/2022 1:03 PM



                                        SLP Videostroboscopy



                                        



                                        Date/Time: 10/20/2022 12:39 PM



                                        



                                        Provider Information:



                                        Performed by: Sadie Vickers, PhD



                                        Authorized by: CRISTA Beckford



                                        



                                        Indication:



                                        Indications for procedure: abnormal symp

fernando



                                        



                                        Anesthesia:



                                        Local anesthesia used?: local anesthesia

 used



                                        Anesthesia: topical application



                                        Local anesthetic: lidocaine spray



                                        



                                        Sedation:



                                        Patient sedated?: patient not sedated



                                        



                                        Specimen(s) Removed:



                                        Specimen(s) removed: no specimen collect

ed



                                        



                                        Estimated Blood Loss:



                                        Estimated blood loss: none



                                        



                                        Complications:



                                        Complications: none



                                        



                                        Patient Disposition:



                                        Patient disposition: discharge to home



                                        



                                        









                          Authorizing Provider      Result Type

 

                          Dima TOBIN         SLP ORDERABLES









                Performing Organization Address         City/[facility]/ZIP Code Phon

e Number

 

                [facility]                                         



Echocardiogram 2D Complete with Contrast (2022 2:16 PM CDT)





             Specimen (Source) Anatomical   Collection Method Collection Time Re

ceived Time



                          Location /   / Volume                  



                          Laterality                             

 

                                                    2022 1:23 PM 



                                                    CDT          









                                        Narrative

 

                                        [facility] - 2022 2:46 PM CDT



This result has an attachment that is not available.







                                        



                                        Echocardiographic Report



                                        



                                        Interpretation Summary



                                        A complete two-dimensional transthoracic

 echocardiogram was performed (2D, M-

mode, Spectral and color Doppler). Compared to prior study, there is no 
significant change. Micro-Bubbles injection performed because of poor 
endocardial resolution.



                                        



                                        Normal left ventricular size and systoli

c function.



                                        Using an ultrasound enhancing agent and 

the Biplane Method of Disks, the LVEF 

measures 62%



                                        The right ventricle is normal in size an

d function.



                                        Right ventricular systolic pressure is n

ormal.



                                        There is no pericardial effusion.



                                        



                                        Left Ventricle:



                                        Normal left ventricular size and systoli

c function. Using an ultrasound 

enhancing agent and the Biplane Method of Disks, the LVEF measures 62%. No 
regional wall motion abnormalities noted.



                                        



                                        I   WMSI = 1.00   % Normal = 1

00



                                        



                                        X - Cannot  1 - Normal  2 -  

    3 - Akinetic 4 - 

Dyskinetic



                                        Interpret          Hyp

okinetic



                                        5 - Aneurysmal



                                        



                                        3D imaging:



                                        3D volumes were not performed in this st

udy.



                                        



                                        Cardiac Mechanics/Speckle Tracking Imagi

ng:



                                        Speckle tracking imaging was not perform

ed in this study. (GE Study).



                                        



                                        Diastology:



                                        Normal diastolic function.



                                        



                                        Right Ventricle:



                                        The right ventricle is normal in size an

d function. Normal RV systolic function 

using TAPSE criteria.



                                        



                                        Atria:



                                        Atria are normal in size.



                                        



                                        Mitral Valve:



                                        The mitral valve is grossly normal.



                                        



                                        Tricuspid Valve:



                                        The tricuspid valve is not well visualiz

ed, but is grossly normal. There is 

trace tricuspid regurgitation. Estimated RVSP is 25-30mmHg. Right ventricular 
systolic pressure is normal.



                                        



                                        Aortic Valve:



                                        The aortic valve is trileaflet. The aort

ic valve opens well.



                                        



                                        Pulmonic Valve:



                                        The pulmonic valve is not well visualize

d.



                                        



                                        Great Vessels:



                                        The aortic root is normal size. The infe

rior vena cava was not well visualized.



                                        



                                        Pericardium/Pleural:



                                        There is no pericardial effusion.



                                        



                                        Preliminary Reviewer



                                        Preliminary Interpretation: Aura dumont MD.



                                        



                                        MMode/2D Measurements



                                        IVSd: 0.73 cm         

              

                          
             



                                                      

             

                          
    LVIDd: 5.1 cm



                                                      

             

                          
             



                                                     

             

                          
     LVIDs: 3.1 cm



                                                      

             

                          
             



                                                     

             

                          
     LVPWd: 1.0 cm



                                        FS: 39.1 %          

              

                          
             



                                                      

             

                          
    Ao root diam: 3.2 cm



                                                      

             

                          
             



                                                     

             

                          
     Ao root area: 8.1 cm2



                                                      

             

                          
             



                                                     

             

                          
     LA dimension: 4.0 cm



                                        LVOT diam: 2.3 cm        

             

                          
             



                                                     

              

                          
   EDV(MOD-A4C): 97.0 ml



                                                      

             

                          
             



                                                     

             

                          
     ESV(MOD-A4C): 36.3 ml



                                        LVOT area: 4.0 cm2        

             

                          
             



                                                     

             

                          
  EF(MOD-A4C): 62.5 %



                                        EDV(MOD-A2C): 120.0 ml



                                        ESV(MOD-A2C): 44.8 ml       

             

                          
             



                                                     

             

                          
  EDV(MOD-bp): 109.2 ml



                                        EF(MOD-A2C): 62.6 %       

              

                          
             



                                                      

             

                          
   ESV(MOD-bp): 42.1 ml



                                                      

             

                          
             



                                                     

             

                          
     EF(MOD-bp): 61.5 %



                                        



                                        LAV(MOD-A2C): 54.8 ml       

             

                          
             



                                                     

             

                          
  EDV (MOD-bp) Index: 50.8 ml/m2



                                        LAV(MOD-A4C): 72.4 ml



                                        LAV(MOD-bp): 68.7 ml



                                        LAV(MOD-bp) Indexed: 31.9 ml/m2



                                        ESV (MOD-bp) Index: 19.6 ml/m2    

             

                          
             



                                                     

             

                          
RWT: 0.41 cm



                                        TAPSE (>1.6): 2.1 cm



                                        



                                        Doppler Measurements



                                        MV E max madeline: 63.4 cm/sec     

              

                          
             



                                                      

             

                          
MV V2 max: 89.6 cm/sec



                                        MV A max madeline: 58.1 cm/sec     

              

                          
             



                                                      

             

                          
MV max PG: 3.2 mmHg



                                        MV E/A: 1.1          

             

                          
             



                                                     

              

                          
  MV V2 mean: 47.1 cm/sec



                                                      

             

                          
             



                                                     

             

                          
   MV mean P.0 mmHg



                                                      

             

                          
             



                                                     

             

                          
   MV V2 VTI: 26.8 cm



                                                      

             

                          
             



                                                     

             

                          
   MVA(VTI): 3.5 cm2



                                        MV dec time: 0.24 sec       

             

                          
             



                                                     

             

                          
Ao V2 max: 122.4 cm/sec



                                                      

             

                          
             



                                                     

             

                          
   Ao max P.0 mmHg



                                                      

             

                          
             



                                                     

             

                          
   Ao V2 mean: 78.8 cm/sec



                                                      

             

                          
             



                                                     

             

                          
   Ao mean P.8 mmHg



                                                      

             

                          
             



                                                     

             

                          
   Ao V2 VTI: 23.5 cm



                                        



                                                      

             

                          
             



                                                     

             

                          
   JOAQUIM(I,D): 4.0 cm2



                                                      

             

                          
             



                                                     

             

                          
   JOAQUIM(V,D): 3.5 cm2



                                        LV V1 max P.7 mmHg      

              

                          
             



                                                      

             

                          
SV(LVOT): 94.1 ml



                                        LV V1 mean P.3 mmHg



                                        LV V1 max: 107.8 cm/sec



                                        LV V1 mean: 72.0 cm/sec



                                        LV V1 VTI: 23.5 cm



                                        Med Peak E' Madeline: 8.0 cm/sec     

             

                          
             



                                                     

             

                          
Lat Peak E' Madeline: 9.4 cm/sec



                                        TR max madeline: 218.7 cm/sec      

             

                          
             



                                                     

             

                          
RAP systole: 8.0 mmHg



                                        TR max P.1 mmHg



                                        RVSP(TR): 27.1 mmHg



                                        RV S Vel_phl: 16.3 cm/sec     

              

                          
             



                                                      

             

                          
JOAQUIM Index (I,D): 1.9



                                        JOAQUIM Index (V,D): 1.6       

             

                          
             



                                                     

              

                          
Dimensionless Index: 0.88



                                        E/e' (avg): 7.3         

             

                          
             



                                                     

             

                          
 E/e' (lat): 6.8



                                        E/e' (sept): 8.0



                                        



                                        



                                        



                                        









                                        Procedure Note

 

                                        Gerard Ansari MD - 2022Formatting of

 this note might be different from the 

original.



                                        



                                        



                                        Echocardiographic Report



                                        



                                        Interpretation Summary



                                        A complete two-dimensional transthoracic

 echocardiogram was performed (2D, M-

mode, Spectral and color Doppler). Compared to prior study, there is no 
significant change. Micro-Bubbles injection performed because of poor 
endocardial resolution.



                                        



                                        Normal left ventricular size and systoli

c function.



                                        Using an ultrasound enhancing agent and 

the Biplane Method of Disks, the LVEF 

measures 62%



                                        The right ventricle is normal in size an

d function.



                                        Right ventricular systolic pressure is n

ormal.



                                        There is no pericardial effusion.



                                        



                                        Left Ventricle:



                                        Normal left ventricular size and systoli

c function. Using an ultrasound 

enhancing agent and the Biplane Method of Disks, the LVEF measures 62%. No 
regional wall motion abnormalities noted.



                                        



                                        I WMSI = 1.00 % Normal = 100



                                        



                                        X - Cannot 1 - Normal 2 - 3 - Akinetic 4

 - Dyskinetic



                                        Interpret Hypokinetic



                                        5 - Aneurysmal



                                        



                                        3D imaging:



                                        3D volumes were not performed in this st

udy.



                                        



                                        Cardiac Mechanics/Speckle Tracking Imagi

ng:



                                        Speckle tracking imaging was not perform

ed in this study. (GE Study).



                                        



                                        Diastology:



                                        Normal diastolic function.



                                        



                                        Right Ventricle:



                                        The right ventricle is normal in size an

d function. Normal RV systolic function 

using TAPSE criteria.



                                        



                                        Atria:



                                        Atria are normal in size.



                                        



                                        Mitral Valve:



                                        The mitral valve is grossly normal.



                                        



                                        Tricuspid Valve:



                                        The tricuspid valve is not well visualiz

ed, but is grossly normal. There is 

trace tricuspid regurgitation. Estimated RVSP is 25-30mmHg. Right ventricular 
systolic pressure is normal.



                                        



                                        Aortic Valve:



                                        The aortic valve is trileaflet. The aort

ic valve opens well.



                                        



                                        Pulmonic Valve:



                                        The pulmonic valve is not well visualize

d.



                                        



                                        Great Vessels:



                                        The aortic root is normal size. The infe

rior vena cava was not well visualized.



                                        



                                        Pericardium/Pleural:



                                        There is no pericardial effusion.



                                        



                                        Preliminary Reviewer



                                        Preliminary Interpretation: Aura dumont MD.



                                        



                                        MMode/2D Measurements



                                        IVSd: 0.73 cm      LVIDd: 5.1 cm



                                             LVIDs: 3.1 cm



                                             LVPWd: 1.0 cm



                                        FS: 39.1 %    Ao root diam: 3.2 cm



                                              Ao root area: 8.1 cm2



                                              LA dimension: 4.0 cm



                                        LVOT diam: 2.3 cm      EDV(MOD-A4C): 97.

0 ml



                                             ESV(MOD-A4C): 36.3 ml



                                        LVOT area: 4.0 cm2     EF(MOD-A4C): 62.5

 %



                                        EDV(MOD-A2C): 120.0 ml



                                        ESV(MOD-A2C): 44.8 ml    EDV(MOD-bp): 10

9.2 ml



                                        EF(MOD-A2C): 62.6 %      ESV(MOD-bp): 42

.1 ml



                                             EF(MOD-bp): 61.5 %



                                        



                                        LAV(MOD-A2C): 54.8 ml     EDV (MOD-bp) I

ndex: 50.8 ml/m2



                                        LAV(MOD-A4C): 72.4 ml



                                        LAV(MOD-bp): 68.7 ml



                                        LAV(MOD-bp) Indexed: 31.9 ml/m2



                                        ESV (MOD-bp) Index: 19.6 ml/m2     RWT: 

0.41 cm



                                        TAPSE (>1.6): 2.1 cm



                                        



                                        Doppler Measurements



                                        MV E max madeline: 63.4 cm/sec     MV V2 max:

 89.6 cm/sec



                                        MV A max madeline: 58.1 cm/sec     MV max PG:

 3.2 mmHg



                                        MV E/A: 1.1      MV V2 mean: 47.1 cm/sec



                                             MV mean P.0 mmHg



                                             MV V2 VTI: 26.8 cm



                                             MVA(VTI): 3.5 cm2



                                        MV dec time: 0.24 sec      Ao V2 max: 12

2.4 cm/sec



                                             Ao max P.0 mmHg



                                             Ao V2 mean: 78.8 cm/sec



                                            Ao mean P.8 mmHg



                                              Ao V2 VTI: 23.5 cm



                                        



                                             JOAQUIM(I,D): 4.0 cm2



                                             JOAQUIM(V,D): 3.5 cm2



                                        LV V1 max P.7 mmHg    SV(LVOT): 94.1

 ml



                                        LV V1 mean P.3 mmHg



                                        LV V1 max: 107.8 cm/sec



                                        LV V1 mean: 72.0 cm/sec



                                        LV V1 VTI: 23.5 cm



                                        Med Peak E' Madeline: 8.0 cm/sec      Lat Pea

k E' Madeline: 9.4 cm/sec



                                        TR max madeline: 218.7 cm/sec    RAP systole:

 8.0 mmHg



                                        TR max P.1 mmHg



                                        RVSP(TR): 27.1 mmHg



                                        RV S Vel_phl: 16.3 cm/sec      JOAQUIM Index

 (I,D): 1.9



                                        JOAQUIM Index (V,D): 1.6     Dimensionless I

ndex: 0.88



                                        E/e' (avg): 7.3     E/e' (lat): 6.8



                                        E/e' (sept): 8.0









                          Authorizing Provider      Result Type

 

                          Niki Esposito APN      CV ECHO ORDERABLES









                Performing Organization Address         City/State/ZIP Code Phon

e Number

 

                [facility]                                            



(ABNORMAL) SPIROMETRY W/O DILATORS, DLCO AND BODY PLETHSMOGRAPHIC LUNG VOLUMES 
(2022 3:05 PM CDT)





          Component Value     Ref Range Test Method Analysis  Performed At Patho

logist



                                                  Time                Signature

 

          FVC (L) pre 2.364 (L) 2.847 -             2022 [facility] 



                              4.342 L             3:13 PM CDT           

 

          FEV1 (L) pre 1.860 (L) 2.143 -             2022 [facility] 



                              3.407 L             3:13 PM CDT           

 

          FEV1/FVC (%) 78.690    68.052 -            2022 [facility] 



          pre                 87.641 %            3:13 PM CDT           

 

          DLCO_SB   15.423 (L) 16.605 -            2022 [facility] 



          ml/(min*mmHg)           36.771              3:13 PM CDT           



                              ml/(min*mm                               



                              Hg)                                     

 

          DLCOc_SB  16.285 (L) 16.605 -            2022 [facility] 



          ml/(min*mmHg)           36.771              3:13 PM CDT           



                              ml/(min*mm                               



                              Hg)                                     

 

          TLC (L)   4.745     4.377 -             2022 [facility] 



                              6.351 L             3:13 PM CDT           

 

          RV (L)    2.302     1.459 -             2022 [facility] 



                              2.611 L             3:13 PM CDT           

 

          RV/TLC (%) 48.514    30.110 -            2022 [facility] 



                              49.290 %            3:13 PM CDT           

 

          FVC (% pred) 66        %                   2022 [facility] 



          pre                                     3:13 PM CDT           

 

          FEV1 (%pred) 67        %                   2022 [facility] 



          pre                                     3:13 PM CDT           

 

          FEV1/FVC (% 101       %                   2022 [facility] 



          pred) pre                               3:13 PM CDT           

 

          TLC (% pred) 88        %                   2022 [facility] 



                                                  3:13 PM CDT           

 

          RV (% pred) 113       %                   2022 [facility] 



                                                  3:13 PM CDT           

 

          RV/TLC (% 122       %                   2022 [facility] 



          pred)                                   3:13 PM CDT           

 

          DLCO_SB (% 58        %                   2022 [facility] 



          pred)                                   3:13 PM CDT           

 

          DLCOc_SB (% 61        %                   2022 [facility] 



          pred)                                   3:13 PM CDT           









             Specimen (Source) Anatomical   Collection Method Collection Time Re

ceived Time



                          Location /   / Volume                  



                          [facility]                             

 

                                                    2022 2:39 PM 



                                                    CDT          









                                        Narrative

 

                                        This result has an attachment that is no

t available.













                          Authorizing Provider      Result Type

 

                          Sameer Campuzano MD         PFT ORDERABLES









                Performing Organization Address         City/State/ZIP Code Phon

e Number

 

                [facility]                                     



EKG, 12-Lead (Scheduled) (2022)





             Specimen (Source) Anatomical Location Collection Method / Collectio

n Time 

Received Time



                          / [facility] Volume                    

 

                                                                 









                                        Narrative

 

                                        This result has an attachment that is no

t available.













                          Authorizing Provider      Result Type

 

                          Niki BRADEN      ECG ORDERABLES









                Performing Organization Address         City/State/ZIP Code Phon

e Number

 

                [facility]                                    



HHV6 Quant, Plasma (2022 12:18 PM CDT)Only the most recent of7 results
within the time period is included.







          Component Value     Ref Range Test      Analysis  Performed At [facility]



                                        Method    Time                Signature

 

          HHV6      Not Detected Not Detected                     [facility]     



          Plasma[facility]           copies/mL                     [facility]                                                 [facility]    



                                                            [place]    









                                        Comment:



                                        



                                        Assay Range: 188 copies/mL to 1.00E+08 c

opies/mL



                                        The limit of quantitation (LOQ) is 188 c

opies/mL. HHV-6 DNA detected



                                        below the LOQ will be reported as Detect

ed:<188 copies/mL.



                                        This test was developed and its performa

nce characteristics



                                        determined by [facility]. It has n

ot been cleared or approved



                                        by the U.[place]. Food and Drug Administration

. Results should be used in



                                        conjunction with clinical findings, and 

should not form the sole



                                        basis for a diagnosis or treatment decis

ion.



                                        ________________________________________

____________________



                                        Performed At:



                                        [facility]



                                        [id number] [place] [address]



                                        : Ángel Burton Ph.D., B

CLD (ABB)



                                        CLIA#: [id number]



                                        Phone: [phone]



                                        









             Specimen     Anatomical   Collection Method Collection Time Receive

d Time



             (Source)     Location /   / Volume                  



                          [facility]                             

 

             Blood                                  2022 12:18 2022



                                                    PM CDT       10:31 AM CDT









                          Authorizing Provider      Result Type

 

                          Milly SOLANO          MICROBIOLOGY - GENERAL ORDER

[place]









                Performing Organization Address         City/State/ZIP Code Phon

e Number

 

                [facility] Unless otherwise noted, [address]                    all lab tests performed



                                                    



                                                    by:



                                                    



                                Division of Pathology and                 



                                                    Laboratory Medicine



                                                    



                                [facility]                 



Adenovirus Quant, Plasma (2022 12:18 PM CDT)Only the most recent of7 
resultswithin the time period is included.







          Component Value     Ref Range Test      Analysis  Performed At [facility]



                                        Method    Time                Signature

 

          ADV       Not Detected Not Detected                     [facility]     



          Plasma-Viraco           copies/mL                     [facility]    









                                        Comment:



                                        



                                        Assay Range: 190 copies/mL to 1.00E+10 c

opies/mL



                                        The limit of quantitation (LOQ) is 190 c

opies/mL. Adenovirus DNA



                                        detected below the LOQ will be reported 

as Detected:<190 copies/mL.



                                        This test was developed and its performa

nce characteristics



                                        determined by [employer]. It has n

ot been cleared or approved



                                        by the U.S. Food and Drug Administration

. Results should be used in



                                        conjunction with clinical findings, and 

should not form the sole



                                        basis for a diagnosis or treatment decis

ion.



                                        ________________________________________

____________________



                                        Performed At:



                                        [place]



                                        [address] [address] [address]



                                        : Ángel Burton Ph.D., B

CLD (ABB)



                                        CLIA#: [id number]



                                        Phone: [phone]



                                        









             Specimen     Anatomical   Collection Method Collection Time Receive

d Time



             (Source)     Location /   / Volume                  



                          Laterality                             

 

             Blood                                  2022 12:18 2022



                                                    PM CDT       10:31 AM CDT









                          Authorizing Provider      Result Type

 

                          Milly SOLANO          MICROBIOLOGY - GENERAL ORDER

[place]









                Performing Organization Address         City/State/ZIP Code Phon

e Number

 

                [facility] Unless otherwise noted, [place] [address] 



                          CENTER                    all lab tests performed



                                                    



                                                    by:



                                                    



                                Division of Pathology and                 



                                                    Laboratory Medicine



                                                    



                                [address] [place]                 



(ABNORMAL) SPIROMETRY W/O DILATORS, DLCO AND BODY PLETHSMOGRAPHIC LUNG VOLUMES 
(2022 1:22 PM CDT)





          Component Value     Ref Range Test Method Analysis  Performed At Patho

logist



                                                  Time                Signature

 

          FVC (L) pre 2.267 (L) 2.806 -             2022 [facility] 



                              4.283 L             1:21 PM CDT           

 

          FEV1 (L) pre 1.759 (L) 2.111 -             2022 [facility] 



                              3.360 L             1:21 PM CDT           

 

          FEV1/FVC (%) 77.601    68.052 -            2022 [facility] 



          pre                 87.641 %            1:21 PM CDT           

 

          DLCO_SB   14.440 (L) 16.858 -            2022 [facility] 



          ml/(min*mmHg)           37.024              1:21 PM CDT           



                              ml/(min*mm                               



                              Hg)                                     

 

          TLC (L)   4.505     4.311 -             2022 [facility] 



                              6.285 L             1:21 PM CDT           

 

          RV (L)    2.126     1.441 -             2022 [facility] 



                              2.593 L             1:21 PM CDT           

 

          RV/TLC (%) 47.192    30.110 -            2022 [facility] 



                              49.290 %            1:21 PM CDT           

 

          FVC (% pred) 64        %                   2022 [facility] 



          pre                                     1:21 PM CDT           

 

          FEV1 (%pred) 64        %                   2022 [facility] 



          pre                                     1:21 PM CDT           

 

          FEV1/FVC (% 100       %                   2022 [facility] 



          pred) pre                               1:21 PM CDT           

 

          TLC (% pred) 85        %                   2022 [facility] 



                                                  1:21 PM CDT           

 

          RV (% pred) 105       %                   2022 [facility] 



                                                  1:21 PM CDT           

 

          RV/TLC (% 119       %                   2022 [facility] 



          pred)                                   1:21 PM CDT           

 

          DLCO_SB (% 54        %                   2022 [facility] 



          pred)                                   1:21 PM CDT           









             Specimen (Source) Anatomical   Collection Method Collection Time Re

ceived Time



                          Location /   / Volume                  



                          Laterality                             

 

                                                    2022 1:07 PM 



                                                    CDT          









                                        Narrative

 

                                        This result has an attachment that is no

t available.













                          Authorizing Provider      Result Type

 

                          Alejandra Adler NP            PFT ORDERABLES









                Performing Organization Address         City/[facility]/ZIP Code Phon

e Number

 

                [facility] Add-On Test (2022 2:59 PM CDT)





          Component Value     Ref Range Test      Analysis  Performed At [facility]



                                        Method    Time                Signature

 

          Ordered   Test Added                               [facility] 

 

          Test Needed tacrolimus                               [facility] 









             Specimen     Anatomical   Collection Method Collection Time Receive

d Time



             (Source)     Location /   / Volume                  



                          Laterality                             

 

             Existing                               2022 2:59 PM 

2 3:00



                                                    CDT          PM CDT









                          Authorizing Provider      Result Type

 

                          Milly SOLANO          LAB BLOOD ORDERABLES









                Performing Organization Address         City/[facility]/ZIP Code Phon

e Number

 

                [facility] Unless otherwise noted, [place] [address] 



                          [place]                    all lab tests performed



                                                    



                                                    by:



                                                    



                                Division of Pathology and                 



                                                    Laboratory Medicine



                                                    



                                [facility]                 



Blood Culture (2022 6:19 AM CDT)Only the most recent of9 resultswithin the
time period is included.







          Component Value     Ref       Test      Analysis  Performed At [facility]



                              Range     Method    Time                Signature

 

          Final Report No growth                               [facility]    

 

                          Path Review -             Immunity and antibiotic use 

may render culture negative. Ongoing 

infection requires repeat culture. The results have been reviewed and 
electronically signed by Pathologist:                                        [facility] 

MD        



          Bottle/Jame Richardson MD, PhD #[id number]                           

    [facility]    









             Specimen     Anatomical   Collection Method Collection Time Receive

d Time



             (Source)     Location /   / Volume                  



                          Laterality                             

 

             Blood                                  2022 6:19 AM 

2 7:44



             (Venipuncture-Ri                           CDT          AM CDT



             ght)                                                









                                        Comment: arm









                          Authorizing Provider      Result Type

 

                          Akosua BRADEN         MICROBIOLOGY - GENERAL ORDER

[place]









                Performing Organization Address         City/[facility]/ZIP Code Phon

e Number

 

                [facility] Unless otherwise noted, [address]                    all lab tests performed



                                                    



                                                    by:



                                                    



                                Division of Pathology and                 



                                                    Laboratory Medicine



                                                    



                                [address]                 



EKG, 12-Lead (Portable) (2022)





             Specimen (Source) Anatomical Location Collection Method / Collectio

n Time 

Received Time



                          / Laterality Volume                    

 

                                                                 









                                        Narrative

 

                                        This result has an attachment that is no

t available.













                          Authorizing Provider      Result Type

 

                          Jenaro Romano NP    ECG ORDERABLES









                Performing Organization Address         City/[facility]/ZIP Code Phon

e Number

 

                [facility]                                    



Vancomycin Trough Draw 30 min before the fourth dose (2022 5:23 AM CDT)





          Component Value     Ref Range Test Method Analysis Time Performed At P

athologist



                                                                      Signature

 

          Vanco Trough 14.6      5.0 - 20.0                     [facility] 



                              mcg/mL                        [facility] 









                                        Comment: Toxic Trough Level: >20 mcg/mL









          Vanco Tr Dose Time See note                                [facility] LAVELL DUMONT [facility] 









                                        Comment:



                                        Level, date, and time of previous dose i

s not available



                                        for this sample. The date reported is th

e sample



                                        collection date.



                                        









          Vanco Tr Dose Date 2022                               [facility] NAVEEN SALAZAR [facility] 









                                        Comment:



                                        Level, date, and time of previous dose i

s not available



                                        for this sample. The date reported is th

e sample



                                        collection date.



                                        









             Specimen     Anatomical   Collection Method Collection Time Receive

d Time



             (Source)     Location /   / Volume                  



                          Laterality                             

 

             Blood                                  2022 5:23 AM 

2 5:38



                                                    CDT          AM CDT









                                        Narrative

 

                                        [facility] - 

2 6:18 AM CDT







                                        Draw 30 min before the fourth dose









                          Authorizing Provider      Result Type

 

                          Perry Painter MD               LAB BLOOD ORDERABLES









                Performing Organization Address         City/[facility]/ZIP Code Phon

e Number

 

                [facility] Unless otherwise noted, [address]                    all lab tests performed



                                                    



                                                    by:



                                                    



                                Division of Pathology and                 



                                                    Laboratory Medicine



                                                    



                                [address]                 



VRE Culture (2022 6:04 PM CDT)





          Component Value     Ref       Test      Analysis  Performed At Patholo

gist



                              Range     Method    Time                Signature

 

          Final Report No Vancomycin                               [facility]     



                    resistant                               [facility]  



                    Enterococci                               CANCER    



                    [facility]                                CENTER    

 

                          Path Review -             The results have been review

ed and electronically signed by 

Pathologist:                                        [facility] MD MAIK Richardson MD, PhD #[id number]                               

[facility]    









             Specimen     Anatomical   Collection Method Collection Time Receive

d Time



             (Source)     Location /   / Volume                  



                          Laterality                             

 

             Rectal Swab                            2022 6:04 PM 

2 7:01



                                                    CDT          PM CDT









                                        Narrative

 

                                        [facility] - 

2 3:34 PM CDT







                                        10:00 AM









                          Authorizing Provider      Result Type

 

                          Akosua BRADEN         MICROBIOLOGY - GENERAL ORDER

AL[place]









                Performing Organization Address         City/[facility]/ZIP Code Phon

e Number

 

                [facility] Unless otherwise noted, [address]                    all lab tests performed



                                                    



                                                    by:



                                                    



                                Division of Pathology and                 



                                                    Laboratory Medicine



                                                    



                                [facility] [address]                 



Tobramycin Rdm (2022 1:14 AM CDT)





          Component Value     Ref Range Test Method Analysis Time Performed At 

athologist



                                                                      Signature

 

          Tobra Lvl 2.54      mcg/mL                        [facility] 









                                        Comment:



                                        No reference ranges established for rand

om levels, please refer to trough and/or

peak levels provided:



                                        



                                        Toxic random level: >10 mcg/mL



                                        



                                        Therapeutic Trough Level: <=2 mcg/mL



                                        Toxic Trough Level: >2 mcg/mL



                                        



                                        Therapeutic Peak Level: 5-10 mcg/mL



                                        Toxic Peak Level: >10 mcg/mL



                                        









          Tobra Ds Dt 2022                               [facility]

[facility]ENTER 

 

          Tobra Ds Tm see note                                [facility]

[facility] 









                                        Comment:



                                        Level, date, and time of previous dose i

s not available



                                        for this sample. The date reported is  sample



                                        collection date.



                                        









             Specimen     Anatomical   Collection Method Collection Time Receive

d Time



             (Source)     Location /   / Volume                  



                          Laterality                             

 

             Blood                                  2022 1:14 AM 

2 2:16



                                                    CDT          AM CDT









                                        Narrative

 

                                        [facility] - 

2 2:36 AM CDT







                                        Please draw level 10 hours after complet

ion of tobramycin infusion.









                          Authorizing Provider      Result Type

 

                          Perry Painter MD               LAB BLOOD ORDERABLES









                Performing Organization Address         City/[facility]/ZIP Code Phon

e Number

 

                [facility] Unless otherwise noted, [address]                    all lab tests performed



                                                    



                                                    by:



                                                    



                                Division of Pathology and                 



                                                    Laboratory Medicine



                                                    



                                [facility] [address]                 



OR RMVL GISELE CVC W/O SUBQ PORT/ (2022 10:26 AM CDT)





                                        Narrative

 

                                        Merly Jones PA - 2022 10:26 AM C

XIOMARA Bassett   2022 11:17 AM



                                        



                                        Procedure: tunneled catheter removal



                                        



                                        Date/Time: 2022 10:26 AM



                                        Laterality: right



                                        Location: chest



                                        



                                        Provider Information:



                                        Authorized by: SAMPSON Sampson



                                        Performed by: XIOMARA La



                                        Assistant present: no



                                        



                                        Patient Diagnosis:



                                        Pre-operative patient diagnosis: MDS, ba

cteremia



                                        Post-operative diagnosis: unchanged



                                        



                                        Indications:



                                        Indications: infection



                                        



                                        Pre-Procedure Note:



                                        Patient examined pre-procedure: yes



                                        Chart reviewed pre-procedure, including 

labs, images, history and physical



                                        exam: yes



                                        : no



                                        Pre-procedure patient condition: alert



                                        



                                        Anesthesia:



                                        Anesthesia: local infiltration



                                        Local anesthetic: lidocaine 1% without e

pinephrine



                                        Anesthetic total (ml): 20



                                        



                                        Sedation:



                                        Patient sedated?: no



                                        



                                        Port/Tunneled Catheter Removal:



                                        Preparation: equipment prepared, patient

 ready for procedure and removal



                                        site prepped and cleaned with aseptic te

chnique



                                        Skin prep agent dried: skin prep agent c

ompletely dried prior to procedure



                                        Sterile barriers: maximum sterile ryan

rs were used: cap, mask, sterile



                                        gown, sterile gloves, and large sterile 

sheet



                                        Hand hygiene: hand hygiene was performed

 prior to port removal



                                        Patient position: flat



                                        Instruments used: port removal tray



                                        Area adequately anesthetized prior to in

cision: yes



                                        Incision made with scalpel: no



                                        Electrocautery used to minimally dissect

 tissue: no



                                        Blunt dissection with curved hemostats u

sed to free tissue: yes



                                        Catheter successfully freed from membran

e cuff: yes



                                        Catheter removed intact: yes



                                        Hemostasis achieved: yes



                                        Wound irrigated with sterile saline: yes



                                        Wound sutured and all layers approximate

d: yes



                                        Suture type: vicryl and monocryl



                                        Suture size used: 3-0 and 4-0



                                        Steri-strips placed: yes



                                        Sterile dressing applied: sterile dressi

ng applied per protocol



                                        Estimated blood loss: none



                                        



                                        Specimen(s) Removed:



                                        Specimen(s) removed: no specimen collect

ed



                                        



                                        Post-procedure:



                                        Post-procedure: dressing applied



                                        Patient condition: patient tolerated the

 procedure well with no immediate



                                        complications, patient does not report a

dverse symptoms, patient remained



                                        hemodynamically stable throughout the pr

ocedure and patient is warm and



                                        well perfused



                                        Responsiveness: comfortable



                                        Patient disposition: remain in inpatient

 bed



                                        



                                        Comments:



                                        The right internal jugular vein catheter

/reservoir port was removed intact



                                        and discarded per institutional policy.



                                        









                          Authorizing Provider      Result Type

 

                          Akosua BRADEN         IV THERAPY ORDERABLES



(ABNORMAL) Urinalysis with Microscopic (2022 11:07 PM CDT)





          Component Value     Ref Range Test Method Analysis Time Performed At P

athologist



                                                                      Signature

 

          UA WBC    <1        0 - 2 /HPF                     [facility] 

 

          UA RBC    3 (H)     0 - 2 /HPF                     [facility] 

 

          UA Mucous NOT SEEN  Not                           [facility] 



                              Seen-Trace                     [facility] 



                              /HPF                                    

 

          UA Bacteria NOT SEEN  NOT SEEN                      [facility] 



                              /[facility]                          [facility] 

 

          UA Squam Epi OCC       None-Occas                     [facility] 



                              ional /[facility] CENTER 









             Specimen     Anatomical   Collection Method Collection Time Receive

d Time



             (Source)     Location /   / Volume                  



                          Laterality                             

 

             Urine                                  2022 11:07 2022



                                                    PM CDT       11:13 PM CDT









                                        Narrative

 

                                        [facility] - 

2 1:19 AM CDT



Some reporting parameters within the Urinalysis test have changed due to the 
implementation of new in



                                        strumentation in the [facility], allowi

ng greater sensitivity of measurement. 

Urinalysis results reported by the [facility] using existing



                                        instrumentation, as well as Urinalysis t

esting performed manually or by backup 

methodology at the [facility] will remain relatively unchanged. New reporting 
parameters and units will now be reported for all campuses.









                          Authorizing Provider      Result Type

 

                          Abdifatah Jacinto MD            URINE ORDERABLES









                Performing Organization Address         City/State/ZIP Code Phon

e Number

 

                [facility] Unless otherwise noted, [place] [address] 



                          [place]                    all lab tests performed



                                                    



                                                    by:



                                                    



                                Division of Pathology and                 



                                                    Laboratory Medicine



                                                    



                                Levar5 Ayala Glass                 



(ABNORMAL) Urinalysis w/Microscopic if Indicated (2022 11:07 PM CDT)





          Component Value     Ref Range Test      Analysis  Performed At Patholo

gist



                                        Method    Time                Signature

 

          UA Color  Yellow    Straw-Juab                     [facility] 

 

          UA Appear Hazy (A)  Clear                         [facility] 

 

          UA Glucose NEG       NEG mg/dL                     [facility] 

 

          UA Bili   NEG       NEG                           [facility] 

 

          UA Ketones NEG       NEG mg/dL                     [facility] 

 

          UA Spec Grav 1.014     1.003 -                       [facility]     



                              1.035                         [facility] 

 

          UA Blood  NEG       NEG                           [facility] 

 

          UA pH     5.0       5.0 - 9.0                     [facility] 

 

          UA Protein NEG       NEG mg/dL                     [facility] 

 

          UA Urobilinogen NEG       NEG                           [facility] 

 

          UA Nitrite NEG       NEG                           [facility] 

 

          UA Leuk Est NEG       NEG                           [facility] 









             Specimen     Anatomical   Collection Method Collection Time Receive

d Time



             (Source)     Location /   / Volume                  



                          Laterality                             

 

             Urine                                  2022 11:07 2022



                                                    PM CDT       11:13 PM CDT









                          Authorizing Provider      Result Type

 

                          Santos Smith MD       URINE ORDERABLES









                Performing Organization Address         City/State/ZIP Code Phon

e Number

 

                [facility] Unless otherwise noted, [address]                    all lab tests performed



                                                    



                                                    by:



                                                    



                                Division of Pathology and                 



                                                    Laboratory Medicine



                                                    



                                [address] [facility]                 



(ABNORMAL) Urine Culture (2022 11:07 PM CDT)





          Component Value     Ref       Test      Analysis  Performed At Patholo

gist



                              Range     Method    Time                Signature

 

          Final Report <10,000 cfu/ml Normal site sheela present.                

               UT MD     



                    Generally of low significance.                              

 RONY  



                    Correlate with clinical data and culture history.           

                    C[facility]    



                      (A)                                   CENTER    

 

                          Path Review -             The results have been review

ed and electronically signed by 

Pathologist:                                        UT MD        



          Urine     Kevyn Ruiz MD, PhD #[id number]                               [facility]  



                      (A)                                   CANC[facility]    



                                                            CENTER    









             Specimen     Anatomical   Collection Method Collection Time Receive

d Time



             (Source)     Location /   / Volume                  



                          Laterality                             

 

             Urine                                  2022 11:07 2022



                                                    PM CDT       11:41 PM CDT









                          Authorizing Provider      Result Type

 

                          Santos Smith MD       MICROBIOLOGY - GENERAL ORDER

[place]









                Performing Organization Address         City/[facility]/ZIP Code Phon

e Number

 

                [facility] Unless otherwise noted, [address]                    all lab tests performed



                                                    



                                                    by:



                                                    



                                Division of Pathology and                 



                                                    Laboratory Medicine



                                                    



                                [address] [facility]                 



(ABNORMAL) POC VBG+Lac (2022 5:10 PM CDT)





          Component Value     Ref Range Test Method Analysis Time Performed At P

athologist



                                                                      Signature

 

          POC VB pH 7.40      7.31 -                        [facility] 



                              7.41                                    

 

          POC VB pCO2 28 (L)    41 - 51                       [facility] 



                              mmHg                                    

 

          POC VB pO2 56        mmHg                          [facility] 

 

          POC VB TCO2 18 (L)    24 - 29                       [facility] 



                              mEq/L                                   

 

          POC VB Bicarb 17 (L)    23 - 28                       [facility] 



                              mmol/L                                  

 

          POC VB Base Ex -6 (L)    -2 - 3                        [facility] 



                              mmol/L                                  

 

          POC VB O2 Sat 89        %                             [facility] 

 

          POC VB LAC 1.4       0.9 - 1.7                     [facility] 



                              mmol/L                                  









                                        Comment:



                                        Method description: The i-STAT is an don

lyzer used for in vitro quantification 

of various analytes in whole blood. The device uses a single disposable 
cartridge which contains microfabricated sensors, a



                                         calibration solution, fluidics system, 

and a waste



                                        chamber. Each test cartridge contains ch

emically sensitive biosensors on a 

silicon chip that are configured to perform specific tests. The microfabricated 
sensors measure analyte concentration by an electrochemical assay.



                                        









          POC Sample Type Venous                                  [facility] 

 

          POC Clean Dev Yes                                     [facility] 

 

          Performing Lab [facility]                               [facility]

R 









                                        Comment: [facility], [address], [place] [address]; [facility]

or: Delia Pike MD









             Specimen     Anatomical   Collection Method Collection Time Receive

d Time



             (Source)     Location /   / Volume                  



                          Laterality                             

 

             Blood                                  2022 5:10 PM  5:10



                                                    CDT          PM CDT









                          Authorizing Provider      Result Type

 

                          Santos Smith MD       POCT ORDERABLES - DEVICE









                Performing Organization Address         City/State/ZIP Code Phon

e Number

 

                [facility]                                      



X-ray Chest 1 View (2022 5:00 PM CDT)





                    Anatomical Region   Laterality          Modality

 

                    Chest                                   Digital Radiography









             Specimen (Source) Anatomical   Collection Method Collection Time Re

ceived Time



                          Location /   / Volume                  



                          Laterality                             

 

                                                    2022 5:05 PM 



                                                    CDT          









                                        Impressions

 

                                        2022 5:08 PM CDT



Pleural adhesions are visualized in bilateral lung bases. No acute infiltrate is
suspected.



                                        



                                        









                                        Narrative

 

                                        2022 5:08 PM CDT



FULL RESULT:



                                        



                                        Examination: XR CHEST 1 VW, 2022 5:

00 PM



                                        



                                        Clinical History: Myelodysplastic syndro

me



                                        



                                        Indication: Fever



                                        



                                        Comparison: Chest CT February 3, 2022, c

hest radiography 2021, 

October 3, 2019



                                        



                                        Technique: Anteroposterior radiograph of

 the chest.



                                        



                                        Findings:



                                        Pleural adhesions are again visualized i

n bilateral lung bases. No acute 

infiltrates or effusions are detected. Heart size is within normal limits. 
Fixation rods extending the majority of the thoracic s



                                        pine and a lower cervical fixation plate

 is also present with no gross 

indication of hardware failure.



                                        



                                        Right internal jugular port catheter wit

h tip projecting over the cavoatrial 

junction.



                                        









                                        Procedure Note

 

                                        Rhea Blanchard MD - 2022Forma

tting of this note might be different 

from the original.



                                        FULL RESULT:



                                        



                                        Examination: XR CHEST 1 VW, 2022 5:

00 PM



                                        



                                        Clinical History: Myelodysplastic syndro

me



                                        



                                        Indication: Fever



                                        



                                        Comparison: Chest CT February 3, 2022, c

hest radiography 2021, 

October 3, 2019



                                        



                                        Technique: Anteroposterior radiograph of

 the chest.



                                        



                                        Findings:



                                        Pleural adhesions are again visualized i

n bilateral lung bases. No acute 

infiltrates or effusions are detected. Heart size is within normal limits. 
Fixation rods extending the majority of the thoracic spine and a lower



                                        cervical fixation plate is also present 

with no gross indication of hardware 

failure.



                                        



                                        Right internal jugular port catheter wit

h tip projecting over the cavoatrial 

junction.



                                        



                                        IMPRESSION:



                                        Pleural adhesions are visualized in bila

teral lung bases. No acute infiltrate is

suspected.









                          Authorizing Provider      Result Type

 

                          Abdifatah Jacinto MD            IMG DIAGNOSTIC IMAGING ORDER

[place]



Respiratory Viral Panel + COVID-19, Nasopharyngeal Swab (2022 4:46 PM CDT)





          Component Value     Ref Range Test      Analysis  Performed At [facility]



                                        Method    Time                Signature

 

          Adenovirus Not       Not                           UT MD     



                    Detected  Detected                      [facility]    

 

          Coronavirus 229E Not       Not                           UT MD     



                    Detected  Detected                      [facility]    

 

          Coronavirus HKU1 Not       Not                           UT MD     



                    Detected  Detected                      [facility]    

 

          Coronavirus NL63 Not       Not                           [facility] MD     



                    Detected  Detected                      [facility]    

 

          Coronavirus OC43 Not       Not                           [facility] MD     



                    Detected  Detected                      [facility]    

 

          COVID19   Not       Not                           [facility] MD     



          (SARS-CoV-2) Detected  Detected                      [facility]    

 

          Human     Not       Not                           UT MD     



          Metapneumovirus Detected  Detected                      [facility]    

 

          Human     Not       Not                           [facility] MD     



          Rhinovirus/Enterov Detected  Detected                      [facility]    

 

          Influenza A Not       Not                           UT MD     



                    Detected  Detected                      [facility]    

 

          Influenza A H1 Not       Not                           [facility] MD     



                    Detected  Detected                      [facility]    

 

          Influenza A H1 Not       Not                           [facility] MD     



          2009      Detected  Detected                      [facility]    

 

          Influenza A H3 Not       Not                           [facility] MD     



                    Detected  Detected                      [facility]    

 

          Influenza B Not       Not                           [facility] MD     



                    Detected  Detected                      [facility]    

 

          Parainfluenza 1 Not       Not                           [facility] MD     



                    Detected  Detected                      [facility]    

 

          Parainfluenza 2 Not       Not                           [facility] MD     



                    Detected  Detected                      [facility]    

 

          Parainfluenza 3 Not       Not                           [facility] MD     



                    Detected  Detected                      [facility]    

 

          Parainfluenza 4 Not       Not                           [facility] MD     



                    Detected  Detected                      [facility]    

 

          Respiratory Not       Not                           [facility] MD     



          Syncytial Virus Detected  Detected                      [facility]    

 

          Bordetella Not       Not                           [facility] MD     



          Parapertussis Detected  Detected                      [facility]    

 

          Bordetella Not       Not                           [facility] MD     



          pertussis Detected  Detected                      [facility]    

 

          Chlamydiophila Not       Not                           [facility] MD     



          pneumoniae Detected  Detected                      [facility]    

 

          Mycoplasma Not       Not                           [facility] MD     



          pneumoniae Detected  Detected                      [facility]    









             Specimen (Source) Anatomical   Collection Method Collection Time Re

ceived Time



                          Location /   / Volume                  



                          Laterality                             

 

             Nasopharyngeal Swab                           2022 4:46 



                                                    PM CDT       5:22 PM CDT









                                        [facility] - 

2 6:47 PM CDT



The Bio[employer] RP2.1 is a real-time, nested multiplexed polymerase chain reaction 
test designed to simul



                                        taneously identify nucleic acids from 22

 different viruses and bacteria 

associated with respiratory tract infection, including SARS-CoV-2, from a single



                                        nasopharyngeal swab (NPS) specimen obtai

michelle from individuals suspected of 

respiratory tract infections, including COVID-19.



                                        



                                        Results must be interpreted within the c

ontext of all relevant clinical and 

laboratory findings and should not form the sole basis for a diagnosis or 
treatment decision. Positive results do not rule out



                                         coninfection with other organisms. Nega

tive results



                                         in the setting of a respiratory illness

 may be due to infection with pathogens 

that are not detected by this panel, or a lower respiratory tract infection that
may not be detected by an NPS specimen.



                                        



                                        Internal controls are used to monitor al

l stages of the test process and assess 

for possible amplification inhibitors. If inhibition is detected, testing is 
repeated and if inhibition is confirmed the s



                                        pecimen is resulted as "Invalid". When a

n "Invalid"



                                        result occurs, it is recommended to wait

 3 days before submitting a new specimen

for testing if clinically indicated.



                                        



                                        This assay has been approved by the FDA 

for use in laboratories that have been 

CLIA-certified to perform moderate-complexity and high-complexity tests. The 
[facility] at [facility], CLIA Accreditation 
#[id number] and CAP



                                        Accreditation #[id number], verified the per

formance characteristics of this assay. 

[facility] at [facility] performs the assay using 
the [employer] System. The BioFire RP



                                        2.1 is a real-time, nested multiplexed p

olymerase



                                        chain reaction test designed to simultan

eously identify nucleic acids from 22 

different viruses and bacteria associated with respiratory tract infection, 
including SARS-CoV-2, from a single nasopharynge



                                        al swab (NPS) specimen obtained from ind

ividuals



                                        suspected of respiratory tract infection

s, including COVID-19.



                                        



                                        Results must be interpreted within the c

ontext of all relevant clinical and 

laboratory findings and should not form the sole basis for a diagnosis or 
treatment decision. Positive results do not rule out



                                         coninfection with other organisms. Nega

tive results



                                         in the setting of a respiratory illness

 may be due to infection with pathogens 

that are not detected by this panel, or a lower respiratory tract infection that
may not be detected by an NPS specimen.



                                        



                                        Internal controls are used to monitor al

l stages of the test process and assess 

for possible amplification inhibitors. If inhibition is detected, testing is 
repeated and if inhibition is confirmed the s



                                        pecimen is resulted as "Invalid". When a

n "Invalid"



                                        result occurs, it is recommended to wait

 3 days before submitting a new specimen

for testing if clinically indicated.



                                        



                                        This assay has been approved by the FDA 

for use in laboratories that have been 

CLIA-certified to perform moderate-complexity and high-complexity tests. The 
[facility] at [facility], CLIA Accreditation 
#[id number] and CAP



                                        Accreditation #[id number], verified the per

formance characteristics of this assay. 

[facility] performs the assay using 
the Z[employer] System.









                          Authorizing Provider      Result Type

 

                          Abdifatah Jacinto MD            MICROBIOLOGY - GENERAL ORDER

[place]









                Performing Organization Address         City/State/ZIP Code Phon

e Number

 

                [facility] Unless otherwise noted, [place] [address] 



                          [place]                    all lab tests performed



                                                    



                                                    by:



                                                    



                                Division of Pathology and                 



                                                    Laboratory Medicine



                                                    



                                [address] [place]                 



(ABNORMAL) Procalcitonin (PCT) (2022 4:46 PM CDT)





          Component Value     Ref Range Test Method Analysis Time Performed At 

athologist



                                                                      Signature

 

          Procalcitonin 0.95 (H)  <=0.08                        [facility]     



                              ng/[facility] 









                                        Comment:



                                        Procalcitonin > 2.00 ng/mL:  Procalcit

onin levels above 2.00 ng/mL are highly 

suggestive of a high risk for systematic bacterial infection/ severe sepsis 
and/or septic shock.



                                        



                                        Procalcitonin < 0.50 ng/mL: Procalcito

rosa levels below 0.50 ng/mL are at low 

risk for progression to severe sepsis and/ or septic shock.



                                        



                                        Procalcitonin (ProCT) between 0.15 and 2

.0 ng/mL do not exclude infection, 

because localized infections (without systemic signs) may be associated with 
such low levels.



                                        



                                        Results greater than 400 ng/mL may not b

e reliable due to the matrix effect with

extended dilution as it exceeds the 's recommended limit. Caution 
should be exercised when interpreting such values and done in conjunction with 
clinical



                                        context.



                                        









             Specimen     Anatomical   Collection Method Collection Time Receive

d Time



             (Source)     Location /   / Volume                  



                          Laterality                             

 

             Blood                                  2022 4:46 PM 

2 5:05



                                                    CDT          PM CDT









                          Authorizing Provider      Result Type

 

                          Abdifatah Jacinto MD            LAB BLOOD ORDERABLES









                Performing Organization Address         City/[facility]/ZIP Code Phon

e Number

 

                [facility] Unless otherwise noted, [address]                    all lab tests performed



                                                    



                                                    by:



                                                    



                                Division of Pathology and                 



                                                    Laboratory Medicine



                                                    



                                [address] [place] [address]                 



CRP (C-reactive protein) (2022 4:46 PM CDT)





          Component Value     Ref Range Test Method Analysis Time Performed At P

athologist



                                                                      Signature

 

          CRP       3.73      mg/L                          [facility] 









                                        Comment:



                                        Reference ranges for HS CRP assay are as

 follows:



                                        



                                        Reference ranges when used to assess car

diac risk:



                                         <1.00 mg/L Low cardiovascular risk



                                         1.00-3.00 mg/L Average cardiovascular

 risk



                                         >3.00 mg/L High cardiovascular risk.



                                        Reference ranges when used to assess inf

lammatory responses:



                                         Less than or equal to 10.00 mg/L.



                                        









             Specimen     Anatomical   Collection Method Collection Time Receive

d Time



             (Source)     Location /   / Volume                  



                          Laterality                             

 

             Blood                                  2022 4:46 PM 

2 5:05



                                                    CDT          PM CDT









                          Authorizing Provider      Result Type

 

                          Abdifatah Jacinto MD            LAB BLOOD ORDERABLES









                Performing Organization Address         City/[facility]/ZIP Code Phon

e Number

 

                [facility] Unless otherwise noted, [address]                    all lab tests performed



                                                    



                                                    by:



                                                    



                                Division of Pathology and                 



                                                    Laboratory Medicine



                                                    



                                [address] [place] [address]                 



CT Head without Contrast (2022 4:28 PM CDT)





                    Anatomical Region   Laterality          Modality

 

                    Head                                    Computed Tomography









             Specimen (Source) Anatomical   Collection Method Collection Time Re

ceived Time



                          Location /   / Volume                  



                          Laterality                             

 

                                                    2022 4:41 PM 



                                                    CDT          









                                        Impressions

 

                                        2022 4:55 PM CDT







                                        No acute intracranial abnormality.









                                        Narrative

 

                                        2022 4:55 PM CDT



FULL RESULT:



                                        



                                        Examination: CT brain without contrast o

n 2022 4:28 PM



                                        



                                        Clinical History: Acute myeloblastic olgan

kemia



                                        



                                        Indication: Cancer complication or comor

bidity



                                        



                                        Comparison: MRI brain without and with c

ontrast 10/7/2021



                                        



                                        Technique: CT brain without contrast



                                        



                                        Findings: There is no sign of mass effec

t or midline shift. No large volume 

intracranial hemorrhage or extra axial fluid collection is present. There is no 
sign of acute territorial infarction. The vent



                                        ricular size is stable. The bony structu

res are intact.



                                        



                                        









                                        Procedure Note

 

                                        James Watson MD - 2022Formatting

 of this note might be different from 

the original.



                                        FULL RESULT:



                                        



                                        Examination: CT brain without contrast o

n 2022 4:28 PM



                                        



                                        Clinical History: Acute myeloblastic logan

kemia



                                        



                                        Indication: Cancer complication or comor

bidity



                                        



                                        Comparison: MRI brain without and with c

ontrast 10/7/2021



                                        



                                        Technique: CT brain without contrast



                                        



                                        Findings: There is no sign of mass effec

t or midline shift. No large volume 

intracranial hemorrhage or extra axial fluid collection is present. There is no 
sign of acute territorial infarction. The ventricular size is



                                        stable. The bony structures are intact.



                                        



                                        



                                        IMPRESSION:



                                        



                                        No acute intracranial abnormality.









                          Authorizing Provider      Result Type

 

                          Abdifatah Jacinto MD            IMG CT ORDERABLES



Parvovirus B19 Quant, Plasma (2022 10:33 AM CDT)





          Component Value     Ref Range Test      Analysis  Performed At [facility]



                                        Method    Time                Signature

 

          Parvo B19 Not Detected Not Detected                     [facility] MD     



          Plasma-[facility]           IU/mL                         [facility]    









                                        Comment:



                                        



                                        Assay Range: 199 IU/mL to 1.38E+10 IU/mL



                                        One IU is equal to 0.73 copies of Parvov

irus B19.



                                        The limit of quantitation (LOQ) is 199 I

U/mL. Parvovirus B19 DNA



                                        detected below the LOQ will be reported 

as Detected:<199 IU/mL.



                                        This test was developed and its performa

nce characteristics



                                        determined by [employer]. It has n

ot been cleared or approved



                                        by the U.S. Food and Drug Administration

. Results should be used in



                                        conjunction with clinical findings, and 

should not form the sole



                                        basis for a diagnosis or treatment decis

ion.



                                        ________________________________________

____________________



                                        Performed At:



                                        [facility]



                                        [address] [address]



                                        : Ángel Burton Ph.D., B

CLD (ABB)



                                        CLIA#: [id number]



                                        Phone: [phone]



                                        









             Specimen     Anatomical   Collection Method Collection Time Receive

d Time



             (Source)     Location /   / Volume                  



                          Laterality                             

 

             Blood                                  2022 10:33 2022



                                                    AM CDT       10:12 AM CDT









                                        Narrative

 

                                        [facility] - 

2 11:45 AM CDT







                                        Coordinate w/ photophresis









                          Authorizing Provider      Result Type

 

                          Milly SOLANO          MICROBIOLOGY - GENERAL ORDER

[place]









                Performing Organization Address         City/[facility]/ZIP Code Phon

e Number

 

                [facility] Unless otherwise noted, [address]                    all lab tests performed



                                                    



                                                    by:



                                                    



                                Division of Pathology and                 



                                                    Laboratory Medicine



                                                    



                                [address] [place] [address]                 



(ABNORMAL) Hemoglobin A1c (2022 10:33 AM CDT)





          Component Value     Ref Range Test Method Analysis Time Performed At 

athologist



                                                                      Signature

 

          A1C       5.8 (H)   4.3 - 5.6 %                     [facility] 









                                        Comment:



                                        HbA1c values >=6.5% are diagnostic of di

abetes mellitus.



                                        Diagnosis should be confirmed by repeat 

testing.



                                        Therapeutic Action suggested: >8.0% HbA1

c; Goal of



                                        therapy: <7.0% HbA1c



                                        









             Specimen     Anatomical   Collection Method Collection Time Receive

d Time



             (Source)     Location /   / Volume                  



                          Laterality                             

 

             Blood                                  2022 10:33 2022



                                                    AM CDT       11:03 AM CDT









                                        Narrative

 

                                        [facility] - 

2 11:21 AM CDT







                                        Coordinate w/ photophresis









                          Authorizing Provider      Result Type

 

                          Milly SOLANO          LAB BLOOD ORDERABLES









                Performing Organization Address         City/[facility]/ZIP Code Phon

e Number

 

                [facility] Unless otherwise noted, [address]                    all lab tests performed



                                                    



                                                    by:



                                                    



                                Division of Pathology and                 



                                                    Laboratory Medicine



                                                    



                                [address] [place] [address]                 



CT Chest/Lung Surveillance Low Dose (2022 7:54 AM CST)





                    Anatomical Region   Laterality          Modality

 

                                                            Computed Tomography









             Specimen (Source) Anatomical   Collection Method Collection Time Re

ceived Time



                          Location /   / Volume                  



                          Laterality                             

 

                                                    2022 9:22 AM 



                                                    CST          









                                        Impressions

 

                                        2022 9:33 AM CST



Scattered tiny nodules remain stable, presumably benign. There is some scarring 
in the lingula and left costophrenic sulcus that is stable. No acute 
cardiopulmonary disease.



                                        









                                        Narrative

 

                                        2022 9:33 AM CST



FULL RESULT:



                                        



                                        Examination: CT CHEST/LUNG SURVEILLANCE 

LOW DOSE, 2/3/2022 7:54 AM



                                        



                                        Clinical History: Dyspnea, not otherwise

 specified. Acute myelogenous is seen 

again



                                        



                                        Indication: pulmonary infiltrates, Multi

ple incidental pulmonary nodules,



                                        



                                        Comparison: 10/5/2019



                                        



                                        Technique: Spiral CT of the chest is per

formed without intravenous contrast 

using low radiation dose technique.



                                        



                                        Findings:



                                        Central venous catheter tips are at the 

cavoatrial junction and upper right 

atrium. Cardiac chamber sizes are normal. Aortic tortuosity. There is no 
mediastinal adenopathy. Thoracic scoliosis with hardw



                                        are in place. A few tiny pulmonary nodul

es are seen less than 4 mm. There is 

some linear scarring or atelectasis in the lingula and minimally in the left 
costophrenic sulcus as before. Scarring in the l



                                        ow right paraspinal region is seen again

 with extensive paraspinal pleural 

calcifications and soft tissue thickening. The soft tissue thickening and 
adjacent consolidation has decreased mildly over the



                                        last couple of years. Calcified plaque a

long the right hemidiaphragm is seen 

again. Cholecystectomy clips. The remainder of the visualized upper abdomen is 
unremarkable. There is a healing right seventh



                                         rib fracture and slight deformity at se

veral adjacent ribs presumably 

representing intervening trauma. Orthopedic hardware in the cervical spine is 
seen again. No destructive bone lesions.



                                        









                                        Procedure Note

 

                                        Sonny Zavala MD - 2022[employer]

ing of this note might be different from

the original.



                                        FULL RESULT:



                                        



                                        Examination: CT CHEST/LUNG SURVEILLANCE 

LOW DOSE, 2/3/2022 7:54 AM



                                        



                                        Clinical History: Dyspnea, not otherwise

 specified. Acute myelogenous is seen 

again



                                        



                                        Indication: pulmonary infiltrates, Multi

ple incidental pulmonary nodules,



                                        



                                        Comparison: 10/5/2019



                                        



                                        Technique: Spiral CT of the chest is per

formed without intravenous contrast 

using low radiation dose technique.



                                        



                                        Findings:



                                        Central venous catheter tips are at the 

cavoatrial junction and upper right 

atrium. Cardiac chamber sizes are normal. Aortic tortuosity. There is no 
mediastinal adenopathy. Thoracic scoliosis with hardware in place. A



                                        few tiny pulmonary nodules are seen less

 than 4 mm. There is some linear 

scarring or atelectasis in the lingula and minimally in the left costophrenic 
sulcus as before. Scarring in the low right paraspinal region



                                        is seen again with extensive paraspinal 

pleural calcifications and soft tissue 

thickening. The soft tissue thickening and adjacent consolidation has decreased 
mildly over the last couple of years. Calcified plaque along



                                        the right hemidiaphragm is seen again. C

holecystectomy clips. The remainder of 

the visualized upper abdomen is unremarkable. There is a healing right seventh 
rib fracture and slight deformity at several



                                        adjacent ribs presumably representing in

tervening trauma. Orthopedic hardware in

the cervical spine is seen again. No destructive bone lesions.



                                        



                                        IMPRESSION:



                                        Scattered tiny nodules remain stable, pr

esumably benign. There is some scarring 

in the lingula and left costophrenic sulcus that is stable. No acute 
cardiopulmonary disease.









                          Authorizing Provider      Result Type

 

                          Alejandra Adler NP            IMG CT ORDERABLES



BMT ABORh Reverse (2022 10:17 AM CST)





          Component Value     Ref Range Test Method Analysis Time Performed At 

athologist



                                                                      Signature

 

          CURRENT ABO O                                       [facility]                                           [facility] 









             Specimen     Anatomical   Collection Method Collection Time Receive

d Time



             (Source)     Location /   / Volume                  



                          Laterality                             

 

             Blood                                  2022 10:17 2022



                                                    AM CST       11:25 AM CST









                          Authorizing Provider      Result Type

 

                          Monique Blackman NP      BLOOD BANK TEST ORDERABLES









                Performing Organization Address         City/State/ZIP Code Phon

e Number

 

                [facility] Unless otherwise noted, [address]                    all lab tests performed



                                                    



                                                    by:



                                                    



                                Division of Pathology and                 



                                                    Laboratory Medicine



                                                    



                                [address] [place] [address]                 



BMT ABORh Forward (2022 10:17 AM CST)





          Component Value     Ref Range Test Method Analysis Time Performed At 

athologist



                                                                      Signature

 

          Original ABORh O Pos                                   [facility] 

 

          Current ABORh O POS                                   [facility] 









             Specimen     Anatomical   Collection Method Collection Time Receive

d Time



             (Source)     Location /   / Volume                  



                          Laterality                             

 

             Blood                                  2022 10:17 2022



                                                    AM CST       11:25 AM CST









                          Authorizing Provider      Result Type

 

                          Monique Blackman NP      BLOOD BANK TEST ORDERABLES









                Performing Organization Address         City/State/ZIP Code Phon

e Number

 

                [facility] Unless otherwise noted, [address]                    all lab tests performed



                                                    



                                                    by:



                                                    



                                Division of Pathology and                 



                                                    Laboratory Medicine



                                                    



                                [address] [place] [address]                 



TMP Interpretation ABORh Bone Marrow Transplant (2022 10:17 AM CST)





          Component Value     Ref Range Test Method Analysis Time Performed At 

athologist



                                                                      Signature

 

          TMP BMT ABOR .                                       [facility] 









                                        Comment:



                                        ________________________________________

____________



                                        MAZIN DAVIDSON MD - [id number]



                                        Dictated by: MAZIN DAVIDSON MD - [id number]

6



                                        Dictated Date/Time: 2022 21:33 PM 

CST  Transcribed Date/Time: 2022

21:33 PM CST



                                        Electronically Signed By: MAZIN MARMOLEJO MD - [id number] on 2022 21:33 PM



                                        









             Specimen     Anatomical   Collection Method Collection Time Receive

d Time



             (Source)     Location /   / Volume                  



                          Laterality                             

 

             Blood                                  2022 10:17 2022



                                                    AM CST       11:25 AM CST









                          Authorizing Provider      Result Type

 

                          Monique Blackman NP      BLOOD BANK TEST ORDERABLES









                Performing Organization Address         City/State/ZIP Code Phon

e Number

 

                [facility] Unless otherwise noted, [place] [address] 



                          [place]                    all lab tests performed



                                                    



                                                    by:



                                                    



                                Division of Pathology and                 



                                                    Laboratory Medicine



                                                    



                                [address] [place]                 



(ABNORMAL) SPIROMETRY W/O DILATORS, DLCO AND BODY PLETHSMOGRAPHIC LUNG VOLUMES 
(2022 2:05 PM CST)





          Component Value     Ref Range Test Method Analysis  Performed At Patho

logist



                                                  Time                Signature

 

          FVC (L) pre 2.329 (L) 2.834 -             2022 [facility] 



                              4.311 L             2:09 PM CST           

 

          FEV1 (L) pre 1.710 (L) 2.138 -             2022 [facility] 



                              3.388 L             2:09 PM CST           

 

          FEV1/FVC (%) 73.440    68.265 -            2022 [facility] 



          pre                 87.853 %            2:09 PM CST           

 

          DLCO_SB   17.221    16.177 -            2022 [facility] 



          ml/(min*mmHg)           36.343              2:09 PM CST           



                              ml/(min*mm                               



                              Hg)                                     

 

          DLCOc_SB  17.496    16.177 -            2022 [facility] 



          ml/(min*mmHg)           36.343              2:09 PM CST           



                              ml/(min*mm                               



                              Hg)                                     

 

          TLC (L)   4.629     4.311 -             2022 [facility] 



                              6.285 L             2:09 PM CST           

 

          RV (L)    2.300     1.425 -             2022 [facility] 



                              2.577 L             2:09 PM CST           

 

          RV/TLC (%) 49.687 (H) 29.770 -            2022 [facility] 



                              48.950 %            2:09 PM CST           

 

          FVC (% pred) 65        %                   2022 [facility] 



          pre                                     2:09 PM CST           

 

          FEV1 (%pred) 62        %                   2022 [facility] 



          pre                                     2:09 PM CST           

 

          FEV1/FVC (% 94        %                   2022 [facility] 



          pred) pre                               2:09 PM CST           

 

          TLC (% pred) 87        %                   2022 [facility] 



                                                  2:09 PM CST           

 

          RV (% pred) 115       %                   2022 [facility] 



                                                  2:09 PM CST           

 

          RV/TLC (% 126       %                   2022 [facility] 



          pred)                                   2:09 PM CST           

 

          DLCO_SB (% 66        %                   2022 [facility] 



          pred)                                   2:09 PM CST           

 

          DLCOc_SB (% 67        %                   2022 [facility] 



          pred)                                   2:09 PM CST           









             Specimen (Source) Anatomical   Collection Method Collection Time Re

ceived Time



                          Location /   / Volume                  



                          Laterality                             

 

                                                    2022 1:19 PM 



                                                    CST          









                                        Narrative

 

                                        This result has an attachment that is no

t available.













                          Authorizing Provider      Result Type

 

                          Kinza Medellin NP        PFT ORDERABLES









                Performing Organization Address         City/State/ZIP Code Phon

e Number

 

                [facility]                                     



after 12/10/2021



Additional Health Concerns







                    Infection           Onset Date          Last Indicated

 

                    MDRO Other- non respiratory sourceComment: Citrobacter 2022



                    species on preliminary report                     

 

                    MDR-Pseudomonas putida-non respiratory source 2022







Insurance







          Payer     Benefit Plan / Subscriber ID Effective Dates Phone     Addre

ss   Type



                    Group                                             

 

           [employer] MANAGED [employer] O  [id number] 2021-[address]

X [id number]



                                        [facility], [place] 



                                                            [address] 









           Guarantor Name Account Type Relation to Date of Birth Phone      Bill

ing



                                 Patient                          Address

 

           America Hailei Ann Personal/Family Self       1960 [phone]







                                                       (Home)     [place], [place]



                                                                  [address]

 

           LazaraGita Johnson Personal/Family Self       1960 [phone]







                                                       (Home)     [place]



                                                                  [address]

 

           Gita Haile Personal/Family Self       1960 [phone]

[address]







                                                       (Home)     [place]



                                                                  [address]







Advance Directives







                Type            Date Recorded   Patient Representative Explanati

on

 

                Advance Directives: 2019                       Directive to

 Physicians



                Living Will                                     and Family or



                                                                Surrogates-Anaya rodney Will

 

                Advance Directives: 2019                       Medical Carlos

r of 



                Medical Power of                                 



                                                        









                Code Status     Date Activated  Date Inactivated Comments

 

                Full Code       2022 9:31 PM 5/3/2022 3:25 PM 









                Code Status     Date Activated  Date Inactivated Comments

 

                Full Code       10/4/2021 12:06 AM 10/9/2021 3:26 PM 

 

                Full Code       2020 6:21 PM 2020 4:44 PM 

 

                Full Code       2019 3:37 AM 2020 7:38 PM 

 

                Full Code       2019 4:35 PM 2019 3:06 PM 







Care Teams







             Team Member  Relationship Specialty    Start Date   End Date

 

             Virgen Camacho PCP - External Referring Hematology   3/26/19      



                                        Vivian Berumen MD



                                                                



                                        [address] [place], [place]                                        



                                        [address]



                                                                



                                        [phone] (Work)



                                                                



             [phone] (Fax)                                        

 

                                        Daron Hernadez MD



                PCP - General   Stem Cell Transplant 10/18/19        



                                        [address] [place] [address]



                                                                



                                        [phone] (Work)



                                                                



             [phone] (Fax)                                        

 

             Mena Lombardo Registered Nurse Nursing      19      



                                        Elizabeth RN



                                                                



                                        [address] [place] [id number]



                                                                



             [phone] (Work)                                        

 

             Brendon Research Coordinator Research     19       



                                        Juanita



                                                                



                                        [address] [place] [address]



                                                                



             [phone] (Work)